# Patient Record
Sex: FEMALE | Race: WHITE | Employment: OTHER | URBAN - NONMETROPOLITAN AREA
[De-identification: names, ages, dates, MRNs, and addresses within clinical notes are randomized per-mention and may not be internally consistent; named-entity substitution may affect disease eponyms.]

---

## 2017-11-20 ENCOUNTER — OFFICE VISIT (OUTPATIENT)
Dept: FAMILY MEDICINE CLINIC | Age: 77
End: 2017-11-20
Payer: MEDICARE

## 2017-11-20 VITALS
BODY MASS INDEX: 28.69 KG/M2 | SYSTOLIC BLOOD PRESSURE: 110 MMHG | HEIGHT: 66 IN | DIASTOLIC BLOOD PRESSURE: 62 MMHG | WEIGHT: 178.5 LBS | OXYGEN SATURATION: 94 % | HEART RATE: 77 BPM

## 2017-11-20 DIAGNOSIS — I25.10 CORONARY ARTERY DISEASE INVOLVING NATIVE HEART WITHOUT ANGINA PECTORIS, UNSPECIFIED VESSEL OR LESION TYPE: ICD-10-CM

## 2017-11-20 DIAGNOSIS — E11.9 TYPE 2 DIABETES MELLITUS WITHOUT COMPLICATION, WITHOUT LONG-TERM CURRENT USE OF INSULIN (HCC): Primary | ICD-10-CM

## 2017-11-20 DIAGNOSIS — E03.9 PRIMARY HYPOTHYROIDISM: ICD-10-CM

## 2017-11-20 DIAGNOSIS — E78.01 FAMILIAL HYPERCHOLESTEROLEMIA: ICD-10-CM

## 2017-11-20 DIAGNOSIS — I65.23 BILATERAL CAROTID ARTERY STENOSIS: ICD-10-CM

## 2017-11-20 DIAGNOSIS — I10 ESSENTIAL (PRIMARY) HYPERTENSION: ICD-10-CM

## 2017-11-20 PROCEDURE — G8400 PT W/DXA NO RESULTS DOC: HCPCS | Performed by: FAMILY MEDICINE

## 2017-11-20 PROCEDURE — 1123F ACP DISCUSS/DSCN MKR DOCD: CPT | Performed by: FAMILY MEDICINE

## 2017-11-20 PROCEDURE — 99204 OFFICE O/P NEW MOD 45 MIN: CPT | Performed by: FAMILY MEDICINE

## 2017-11-20 PROCEDURE — G8419 CALC BMI OUT NRM PARAM NOF/U: HCPCS | Performed by: FAMILY MEDICINE

## 2017-11-20 PROCEDURE — G8484 FLU IMMUNIZE NO ADMIN: HCPCS | Performed by: FAMILY MEDICINE

## 2017-11-20 PROCEDURE — 1036F TOBACCO NON-USER: CPT | Performed by: FAMILY MEDICINE

## 2017-11-20 PROCEDURE — 3017F COLORECTAL CA SCREEN DOC REV: CPT | Performed by: FAMILY MEDICINE

## 2017-11-20 PROCEDURE — 4040F PNEUMOC VAC/ADMIN/RCVD: CPT | Performed by: FAMILY MEDICINE

## 2017-11-20 PROCEDURE — G8427 DOCREV CUR MEDS BY ELIG CLIN: HCPCS | Performed by: FAMILY MEDICINE

## 2017-11-20 PROCEDURE — 1090F PRES/ABSN URINE INCON ASSESS: CPT | Performed by: FAMILY MEDICINE

## 2017-11-20 PROCEDURE — G8598 ASA/ANTIPLAT THER USED: HCPCS | Performed by: FAMILY MEDICINE

## 2017-11-20 PROCEDURE — 3045F PR MOST RECENT HEMOGLOBIN A1C LEVEL 7.0-9.0%: CPT | Performed by: FAMILY MEDICINE

## 2017-11-20 RX ORDER — ASCORBIC ACID 1000 MG
1 TABLET ORAL DAILY
Status: ON HOLD | COMMUNITY
End: 2021-12-02 | Stop reason: SDUPTHER

## 2017-11-20 RX ORDER — SOLIFENACIN SUCCINATE 10 MG/1
5 TABLET, FILM COATED ORAL DAILY
COMMUNITY
End: 2017-12-21 | Stop reason: ALTCHOICE

## 2017-11-20 RX ORDER — LISINOPRIL 40 MG/1
40 TABLET ORAL DAILY
COMMUNITY
End: 2018-04-23 | Stop reason: SDUPTHER

## 2017-11-20 RX ORDER — ESCITALOPRAM OXALATE 20 MG/1
20 TABLET ORAL DAILY
COMMUNITY
End: 2018-05-22 | Stop reason: SDUPTHER

## 2017-11-20 RX ORDER — PRAVASTATIN SODIUM 10 MG
10 TABLET ORAL DAILY
COMMUNITY
End: 2018-07-05 | Stop reason: DRUGHIGH

## 2017-11-20 RX ORDER — BETAMETHASONE DIPROPIONATE 0.5 MG/G
CREAM TOPICAL 2 TIMES DAILY
COMMUNITY
End: 2017-12-21 | Stop reason: ALTCHOICE

## 2017-11-20 RX ORDER — LANOLIN ALCOHOL/MO/W.PET/CERES
1000 CREAM (GRAM) TOPICAL DAILY
Status: ON HOLD | COMMUNITY
End: 2021-12-02 | Stop reason: SDUPTHER

## 2017-11-20 RX ORDER — CARVEDILOL 6.25 MG/1
6.25 TABLET ORAL 2 TIMES DAILY WITH MEALS
COMMUNITY
End: 2018-04-23 | Stop reason: SDUPTHER

## 2017-11-20 RX ORDER — OMEPRAZOLE 40 MG/1
40 CAPSULE, DELAYED RELEASE ORAL DAILY
COMMUNITY
End: 2020-02-25 | Stop reason: SDUPTHER

## 2017-11-20 ASSESSMENT — PATIENT HEALTH QUESTIONNAIRE - PHQ9
SUM OF ALL RESPONSES TO PHQ9 QUESTIONS 1 & 2: 2
2. FEELING DOWN, DEPRESSED OR HOPELESS: 1
SUM OF ALL RESPONSES TO PHQ QUESTIONS 1-9: 2
1. LITTLE INTEREST OR PLEASURE IN DOING THINGS: 1

## 2017-11-20 NOTE — PATIENT INSTRUCTIONS
Room 201:  Walk in anytime. Reduce lisinopril from 40 mg to 20mg. DM (diabetes mellitus)    GI bleeding

## 2017-11-20 NOTE — PROGRESS NOTES
[place]ia [address] [place], [place] [address] [facility]  [address] [facility] [address]  Dept: [phone]  Dept Fax: [phone]  Loc: [phone]    Elma Norris is a 77 y.o. female who presents today for her medical conditions/complaints as noted below. Elma Norris is here for Established New Doctor        HPI:   CC: Here today to discuss the following:    Here to establish today. She has a history of psoriatic arthritis. She states she was on methotrexate in the past but discontinued the medication secondary to side effects. She states her pain is tolerable at this point not interested in restarting the medication. She states she has been stable with her current medications for diabetes management. She recently sustained a fall after standing abruptly and passing out. She states she broke her nose. She's had no chest pain or palpitations since then. She does have history of coronary artery disease and states she was to have a stress test prior to moving. She has yet to establish with a cardiologist.      She reports having a history of low thyroid. Really no symptoms. Her blood pressure has been stable on her current medications. She reports having history of carotid artery stenosis and is in need of a follow-up carotid ultrasound. None of her records made it from her previous physician. Requested those again today. HPI    No past medical history on file. No past surgical history on file.     Family History   Problem Relation Age of Onset    Heart Disease Mother     Heart Disease Father        Social History   Substance Use Topics    Smoking status: Never Smoker    Smokeless tobacco: Never Used    Alcohol use Not on file      Current Outpatient Prescriptions   Medication Sig Dispense Refill    aspirin 81 MG tablet Take 81 mg by mouth 2 times daily      vitamin B-12 (CYANOCOBALAMIN) 1000 MCG tablet Take 1,000 mcg by mouth daily      abdominal pain, anal bleeding, constipation, diarrhea and nausea. Genitourinary: Negative for difficulty urinating. Psychiatric/Behavioral: Negative. See HPI for visit specific review of symptoms. All others negative      Objective:   /62   Pulse 77   Ht 5' 6\" (1.676 m)   Wt 178 lb 8 oz (81 kg)   SpO2 94%   BMI 28.81 kg/m²   Physical Exam    Physical Exam   Constitutional: appears well-developed. does not appear ill. Eyes: Pupils are equal, round, and reactive to light. Neck: Normal range of motion. Neck supple. Cardiovascular: Normal rate and regular rhythm. Exam reveals no friction rub. No murmur heard. Pulmonary/Chest: Effort normal and breath sounds normal. No respiratory distress. He has no wheezes. no rales. Abdominal: Soft. Bowel sounds are normal. He exhibits no distension. There is no tenderness. There is no rebound and no guarding. Musculoskeletal: exhibits no edema. Neurological: alert. Psychiatric: normal mood and affect. behavior is normal.                 Assessment & Plan:      Requested records from her previous PCP  The following diagnoses and conditions are stable with no further orders unless indicated:  1. Type 2 diabetes mellitus without complication, without long-term current use of insulin ([facility])  Recommend checking the following labs today  - Comprehensive Metabolic Panel; Future  - Hemoglobin A1C; Future  - Microalbumin / Creatinine Urine Ratio; Future    2. Essential (primary) hypertension  Controlled however she appears to be symptomatic with aggressive blood pressure control. We'll reduce her lisinopril to 20 mg daily. 3. Familial hypercholesterolemia  Check her lipids today  - Lipid Panel; Future    4. Primary hypothyroidism  Check the following lab studies and continue current medication  - TSH without Reflex; Future    5. Bilateral carotid artery stenosis  Order the following study  - US CAROTID ARTERY BILATERAL; Future    6.  Coronary artery

## 2017-11-21 DIAGNOSIS — E78.01 FAMILIAL HYPERCHOLESTEROLEMIA: ICD-10-CM

## 2017-11-21 DIAGNOSIS — E03.9 PRIMARY HYPOTHYROIDISM: ICD-10-CM

## 2017-11-21 DIAGNOSIS — E11.9 TYPE 2 DIABETES MELLITUS WITHOUT COMPLICATION, WITHOUT LONG-TERM CURRENT USE OF INSULIN (HCC): ICD-10-CM

## 2017-11-21 LAB
ALBUMIN SERPL-MCNC: 4.1 G/DL (ref 3.5–5.2)
ALP BLD-CCNC: 73 U/L (ref 35–104)
ALT SERPL-CCNC: 16 U/L (ref 5–33)
ANION GAP SERPL CALCULATED.3IONS-SCNC: 18 MMOL/L (ref 7–19)
AST SERPL-CCNC: 14 U/L (ref 5–32)
BILIRUB SERPL-MCNC: 0.5 MG/DL (ref 0.2–1.2)
BUN BLDV-MCNC: 24 MG/DL (ref 8–23)
CALCIUM SERPL-MCNC: 9.5 MG/DL (ref 8.8–10.2)
CHLORIDE BLD-SCNC: 102 MMOL/L (ref 98–111)
CHOLESTEROL, TOTAL: 167 MG/DL (ref 160–199)
CO2: 21 MMOL/L (ref 22–29)
CREAT SERPL-MCNC: 0.8 MG/DL (ref 0.5–0.9)
CREATININE URINE: 64.9 MG/DL (ref 4.2–622)
GFR NON-AFRICAN AMERICAN: >60
GLUCOSE BLD-MCNC: 151 MG/DL (ref 74–109)
HBA1C MFR BLD: 7.6 %
HDLC SERPL-MCNC: 44 MG/DL (ref 65–121)
LDL CHOLESTEROL CALCULATED: 83 MG/DL
MICROALBUMIN UR-MCNC: <1.2 MG/DL (ref 0–19)
MICROALBUMIN/CREAT UR-RTO: NORMAL MG/G
POTASSIUM SERPL-SCNC: 4 MMOL/L (ref 3.5–5)
SODIUM BLD-SCNC: 141 MMOL/L (ref 136–145)
TOTAL PROTEIN: 7.3 G/DL (ref 6.6–8.7)
TRIGL SERPL-MCNC: 201 MG/DL (ref 0–149)
TSH SERPL DL<=0.05 MIU/L-ACNC: 3.17 UIU/ML (ref 0.27–4.2)

## 2017-12-12 ENCOUNTER — DIRECT ADMIT ORDERS (OUTPATIENT)
Dept: CARDIOLOGY | Age: 77
End: 2017-12-12

## 2017-12-12 ENCOUNTER — HOSPITAL ENCOUNTER (OUTPATIENT)
Dept: NON INVASIVE DIAGNOSTICS | Age: 77
Discharge: HOME | End: 2017-12-12
Payer: MEDICARE

## 2017-12-12 DIAGNOSIS — I25.10 CORONARY ARTERY DISEASE INVOLVING NATIVE HEART WITHOUT ANGINA PECTORIS, UNSPECIFIED VESSEL OR LESION TYPE: ICD-10-CM

## 2017-12-12 DIAGNOSIS — I65.23 BILATERAL CAROTID ARTERY STENOSIS: ICD-10-CM

## 2017-12-12 DIAGNOSIS — I25.10 ATHEROSCLEROSIS OF NATIVE CORONARY ARTERY OF NATIVE HEART WITHOUT ANGINA PECTORIS: Primary | ICD-10-CM

## 2017-12-12 DIAGNOSIS — I25.10 ATHEROSCLEROSIS OF NATIVE CORONARY ARTERY OF NATIVE HEART WITHOUT ANGINA PECTORIS: ICD-10-CM

## 2017-12-12 PROCEDURE — 93880 EXTRACRANIAL BILAT STUDY: CPT

## 2017-12-12 PROCEDURE — 93350 STRESS TTE ONLY: CPT

## 2017-12-14 ENCOUNTER — TELEPHONE (OUTPATIENT)
Dept: FAMILY MEDICINE CLINIC | Age: 77
End: 2017-12-14

## 2017-12-14 NOTE — TELEPHONE ENCOUNTER
Let patient know she had an abnormal stress test and 12/18 she has an appt with Dr. Raymond Nelson to discuss next steps.

## 2017-12-15 ENCOUNTER — TELEPHONE (OUTPATIENT)
Dept: FAMILY MEDICINE CLINIC | Age: 77
End: 2017-12-15

## 2017-12-15 NOTE — TELEPHONE ENCOUNTER
Landon for pt with results and appt details since I cant reach her by phone.  appt with dr Dayday Rodriges 12/18 at 9 am

## 2017-12-18 ENCOUNTER — OFFICE VISIT (OUTPATIENT)
Dept: CARDIOLOGY | Age: 77
End: 2017-12-18
Payer: MEDICARE

## 2017-12-18 VITALS
HEIGHT: 66 IN | DIASTOLIC BLOOD PRESSURE: 60 MMHG | BODY MASS INDEX: 28.77 KG/M2 | HEART RATE: 76 BPM | WEIGHT: 179 LBS | SYSTOLIC BLOOD PRESSURE: 120 MMHG

## 2017-12-18 DIAGNOSIS — R94.39 POSITIVE CARDIAC STRESS TEST: Primary | ICD-10-CM

## 2017-12-18 DIAGNOSIS — R53.83 OTHER FATIGUE: ICD-10-CM

## 2017-12-18 DIAGNOSIS — R07.9 CHEST PAIN IN ADULT: ICD-10-CM

## 2017-12-18 PROCEDURE — 1036F TOBACCO NON-USER: CPT | Performed by: INTERNAL MEDICINE

## 2017-12-18 PROCEDURE — 4040F PNEUMOC VAC/ADMIN/RCVD: CPT | Performed by: INTERNAL MEDICINE

## 2017-12-18 PROCEDURE — G8419 CALC BMI OUT NRM PARAM NOF/U: HCPCS | Performed by: INTERNAL MEDICINE

## 2017-12-18 PROCEDURE — G8400 PT W/DXA NO RESULTS DOC: HCPCS | Performed by: INTERNAL MEDICINE

## 2017-12-18 PROCEDURE — 99204 OFFICE O/P NEW MOD 45 MIN: CPT | Performed by: INTERNAL MEDICINE

## 2017-12-18 PROCEDURE — G8427 DOCREV CUR MEDS BY ELIG CLIN: HCPCS | Performed by: INTERNAL MEDICINE

## 2017-12-18 PROCEDURE — 1090F PRES/ABSN URINE INCON ASSESS: CPT | Performed by: INTERNAL MEDICINE

## 2017-12-18 PROCEDURE — 3017F COLORECTAL CA SCREEN DOC REV: CPT | Performed by: INTERNAL MEDICINE

## 2017-12-18 PROCEDURE — 93000 ELECTROCARDIOGRAM COMPLETE: CPT | Performed by: INTERNAL MEDICINE

## 2017-12-18 PROCEDURE — 1123F ACP DISCUSS/DSCN MKR DOCD: CPT | Performed by: INTERNAL MEDICINE

## 2017-12-18 PROCEDURE — G8598 ASA/ANTIPLAT THER USED: HCPCS | Performed by: INTERNAL MEDICINE

## 2017-12-18 PROCEDURE — G8484 FLU IMMUNIZE NO ADMIN: HCPCS | Performed by: INTERNAL MEDICINE

## 2017-12-18 ASSESSMENT — ENCOUNTER SYMPTOMS: SHORTNESS OF BREATH: 0

## 2017-12-18 NOTE — PATIENT INSTRUCTIONS
an appropriate treatment   Possible Complications   If you are planning to have a cardiac catheterization, your doctor will review a list of possible complications, which may include:   Bleeding at the point of the catheter insertion   Damage to arteries   Heart attack or arrhythmia (abnormal heart beats)   Allergic reaction to x-ray dye   Blood clot formation   Infection   Some factors that may increase the risk of complications include: Allergies to medicines or x-ray dye   Obesity   Smoking   Bleeding disorder   Age: 61 or older   Recent pneumonia   Recent heart attack   Diabetes   Kidney disease   What to Expect Prior to Procedure   Your doctor may order:   Blood and urine tests   Electrocardiogram (ECG, EKG)a test that records the heart's activity by measuring electrical currents through the heart muscle   Chest x-ray   Stress test   Talk to your doctor about your medicines. You may be asked to stop taking some medicines before the procedure, like:   Anti-inflammatory drugs (eg, ibuprofen )   Blood thinners, like or warfarin (Coumadin)   clopidogrel (Plavix)   Metformin (Glucophage) or glyburide and metformin (Glucovance)   Leading up to your procedure:   Arrange for a ride to and from the procedure. The night before, do not eat or drink anything after midnight. Anesthesia   Local anesthesia will be used at the insertion site. A mild sedative may be given one hour before or through IV during the procedure. This will help you relax. Description of the Procedure   During the procedure, you will receive IV fluids and medicines. An EKG will be monitoring your heart's activity. You will be awake but sedated so that you will be more relaxed. Your doctor will ask you to do basic functions such as coughing, breathing out, and holding your breath. If you feel any chest pain, dizziness, nausea, tingling, or other discomfort, tell your doctor.    The area of the groin or arm where the catheter will be inserted You will likely need to lie still and flat on your back for a period of time. A pressure dressing may be placed over the area where the catheter was inserted to help prevent bleeding. It is important to follow the nurse's directions. At Home   When you return home, do the following to help ensure a smooth recovery:   Do not drive until your doctor says it is okay. Do not lift heavy objects or engage in strenuous exercise or sexual activity for at least 5-7 days. Change the dressing around the incision area as instructed. Your doctor will explain to you which medicines you can take and which ones to avoid. Take medicines as instructed. Ice may help decrease discomfort at the insertion site. You may apply the ice for 15-20 minutes each hour, for the first few days. To lower your risk for further complications of heart disease, you can make lifestyle changes. These include eating a healthier diet, exercising regularly, and managing stress. Ask your doctor about when it is safe to shower, bathe, or soak in water. Be sure to follow your doctor's instructions . After arriving home, contact your doctor if any of the following occurs:   Signs of infection, including fever and chills   Extreme sweating, nausea, or vomiting   Change in sensation to affected leg, including numbness, feeling cold, or change in color   Redness, swelling, increasing pain, excessive bleeding, or discharge at point of catheter insertion   Cough, shortness of breath, or difficulty breathing   Extreme pain   Chest pain   Drooping facial muscles   Changes in vision or speech   Difficulty walking or using your limbs   In case of an emergency, Call 911.

## 2017-12-18 NOTE — PROGRESS NOTES
Dear Dr. Lesvia Amato MD,    Thank you for allowing me to participate in the care of Ms. Xavier Fernández. She presents today at the [address] [place] [address] in the [facility] with her . As you know, Ms. Abraham Faulkner is a 77 y.o. female with history of hypertension, hyperlipidemia, diabetes and CAD s/p MI (s/p PCI to LADx2 [2.5x12 and 2.5 x 8] and OM) who presents with the chief complaint of fatigue. She is recently moved from [place] to the [facility]. She had a stress test performed last week that showed ECG and echo evidence of myocardial ischemia. She says for the past 6 months she's been more fatigued and tired. She becomes more breathless with exertion. She's not able to wash dishes or cleaning the house. She denies having any sort of chest pressure or pain outside of the occasional twinges that last 2 seconds. These will happen irrespective of exertion and 2-3 times per year. She didn't comply with her medications. Her blood sugar is been controlled. She otherwise denies chest pain, PND, orthopnea, syncope or near syncope. She has no other complaints. Review of Systems   Constitutional: Positive for malaise/fatigue. Respiratory: Negative for shortness of breath. Cardiovascular: Negative for chest pain. Neurological: Negative for weakness. All other systems reviewed and are negative. Past Medical History:   Diagnosis Date    CAD (coronary artery disease)     CHF (congestive heart failure) (HCC)     MI (myocardial infarction)        History reviewed. No pertinent surgical history. Family History   Problem Relation Age of Onset    Heart Disease Mother     Heart Disease Father        Social History     Social History    Marital status:      Spouse name: N/A    Number of children: N/A    Years of education: N/A     Occupational History    Not on file.      Social History Main Topics    Smoking status: Never Smoker    encounter: 179 lb (81.2 kg). General - No acute distress  Eyes - PERRL, anicteric sclerae; no lid-lag  ENMT - Atraumatic; Mucous membranes moist, oropharynx clear  Neck - trachea midline, thyroid non-tender  Cardio - No jugular venous distension                Clear s1 s2, no gallop, rub, murmur                 No edema, normal pulses  Resp - Normal effort, Clear to auscultation bilaterally  GI - abdomen soft, non-tender, no hepatosplenomegaly  Skin - warm and dry; no rashes  Psych - A+O x 3, normal affect    Lab Results   Component Value Date    CREATININE 0.8 11/21/2017       ECG 12/18/17  Sinus rhythm, left anterior fascicular block, voltage criteria for LVH     Assessment, Recommendations, & Plan:  77 y.o. female with positive cardiac stress, hypertension, CAD, hyperlipidemia    Positive cardiac stress/CAD - patient was offered. After much discussion she agreed. We'll obtain labs including CBC and BMP. I discussed with her in detail  the risks and benefits of cardiac catheterization. The risks mentioned to her include but are not limited to vascular complications in 3%, stroke <1%, renal dysfunction <5%, myocardial infarction <1%, coronary dissection <1%, need for emergency open heart surgery <1, death <1% . she verbalized understanding and agreed to proceed with this plan. HTN - well-controlled, no changes    Hyperlipidemia - monitored by PCP, no changes    Disposition - RTC in 1 months or sooner if needed    Thank you very much for allowing me to participate in this patient's care. Please do not hesitate to contact me for any questions or concerns. Sincerely yours,    Edgar Pimentel MD, MSc  Structural Heart Disease Interventions  [facility]

## 2017-12-21 ENCOUNTER — OFFICE VISIT (OUTPATIENT)
Dept: FAMILY MEDICINE CLINIC | Age: 77
End: 2017-12-21
Payer: MEDICARE

## 2017-12-21 VITALS
WEIGHT: 178 LBS | BODY MASS INDEX: 28.61 KG/M2 | SYSTOLIC BLOOD PRESSURE: 106 MMHG | HEIGHT: 66 IN | DIASTOLIC BLOOD PRESSURE: 62 MMHG | OXYGEN SATURATION: 96 % | HEART RATE: 61 BPM

## 2017-12-21 DIAGNOSIS — I25.10 CAD IN NATIVE ARTERY: ICD-10-CM

## 2017-12-21 DIAGNOSIS — K21.9 GASTROESOPHAGEAL REFLUX DISEASE WITHOUT ESOPHAGITIS: ICD-10-CM

## 2017-12-21 DIAGNOSIS — I10 ESSENTIAL HYPERTENSION: ICD-10-CM

## 2017-12-21 DIAGNOSIS — F41.8 DEPRESSION WITH ANXIETY: ICD-10-CM

## 2017-12-21 DIAGNOSIS — E11.9 TYPE 2 DIABETES MELLITUS WITHOUT COMPLICATION, WITHOUT LONG-TERM CURRENT USE OF INSULIN (HCC): Primary | ICD-10-CM

## 2017-12-21 PROCEDURE — 3017F COLORECTAL CA SCREEN DOC REV: CPT | Performed by: FAMILY MEDICINE

## 2017-12-21 PROCEDURE — G8484 FLU IMMUNIZE NO ADMIN: HCPCS | Performed by: FAMILY MEDICINE

## 2017-12-21 PROCEDURE — 4040F PNEUMOC VAC/ADMIN/RCVD: CPT | Performed by: FAMILY MEDICINE

## 2017-12-21 PROCEDURE — 1123F ACP DISCUSS/DSCN MKR DOCD: CPT | Performed by: FAMILY MEDICINE

## 2017-12-21 PROCEDURE — G8419 CALC BMI OUT NRM PARAM NOF/U: HCPCS | Performed by: FAMILY MEDICINE

## 2017-12-21 PROCEDURE — 99214 OFFICE O/P EST MOD 30 MIN: CPT | Performed by: FAMILY MEDICINE

## 2017-12-21 PROCEDURE — G8400 PT W/DXA NO RESULTS DOC: HCPCS | Performed by: FAMILY MEDICINE

## 2017-12-21 PROCEDURE — 3045F PR MOST RECENT HEMOGLOBIN A1C LEVEL 7.0-9.0%: CPT | Performed by: FAMILY MEDICINE

## 2017-12-21 PROCEDURE — G8598 ASA/ANTIPLAT THER USED: HCPCS | Performed by: FAMILY MEDICINE

## 2017-12-21 PROCEDURE — 1036F TOBACCO NON-USER: CPT | Performed by: FAMILY MEDICINE

## 2017-12-21 PROCEDURE — G8427 DOCREV CUR MEDS BY ELIG CLIN: HCPCS | Performed by: FAMILY MEDICINE

## 2017-12-21 PROCEDURE — 1090F PRES/ABSN URINE INCON ASSESS: CPT | Performed by: FAMILY MEDICINE

## 2017-12-21 NOTE — PROGRESS NOTES
orders unless indicated:  1. Type 2 diabetes mellitus without complication, without long-term current use of insulin (HCC)  a1c borderline. Encouraged ADA diet. Continue same    Discussed lifestyle changes such as diet and exercise. Recommended eliminate processed food from diet such as sugar and fried foods. Recommended exercising at least 150 minutes/week. Try to do full body resistance training twice a week as well. Offered suggestions for calorie counting such as phone apps and online resources such as My fitness pal and Lose it. Discussed healthy weight. - Comprehensive Metabolic Panel; Future  - Hemoglobin A1C; Future  - Lipid Panel; Future    2. CAD in native artery  Heart cath tomorrow    3. Essential hypertension  Controlled      4. Gastroesophageal reflux disease without esophagitis  Stable    5. Depression with anxiety  Cont with lexapro        Return in about 3 months (around 3/21/2018) for AWV. Discussed use, benefit, and side effects of prescribed medications. All patient questions answered. Pt voiced understanding. Reviewed health maintenance. Instructed to continue current medications, diet and exercise. Patient agreed with treatment plan. Follow up as directed.

## 2017-12-22 ENCOUNTER — HOSPITAL ENCOUNTER (OUTPATIENT)
Dept: CARDIAC CATH/INVASIVE PROCEDURES | Age: 77
Setting detail: OBSERVATION
Discharge: HOME | End: 2017-12-23
Attending: INTERNAL MEDICINE | Admitting: INTERNAL MEDICINE
Payer: MEDICARE

## 2017-12-22 PROBLEM — I25.10 CAD IN NATIVE ARTERY: Status: ACTIVE | Noted: 2017-12-22

## 2017-12-22 LAB
ANION GAP SERPL CALCULATED.3IONS-SCNC: 14 MMOL/L (ref 7–19)
BUN BLDV-MCNC: 22 MG/DL (ref 8–23)
CALCIUM SERPL-MCNC: 9.1 MG/DL (ref 8.8–10.2)
CHLORIDE BLD-SCNC: 101 MMOL/L (ref 98–111)
CO2: 22 MMOL/L (ref 22–29)
CREAT SERPL-MCNC: 0.8 MG/DL (ref 0.5–0.9)
GFR NON-AFRICAN AMERICAN: >60
GLUCOSE BLD-MCNC: 161 MG/DL (ref 74–109)
HCT VFR BLD CALC: 40.6 % (ref 37–47)
HEMOGLOBIN: 13.7 G/DL (ref 12–16)
MCH RBC QN AUTO: 31 PG (ref 27–31)
MCHC RBC AUTO-ENTMCNC: 33.7 G/DL (ref 33–37)
MCV RBC AUTO: 91.9 FL (ref 81–99)
PDW BLD-RTO: 14.6 % (ref 11.5–14.5)
PLATELET # BLD: 218 K/UL (ref 130–400)
PMV BLD AUTO: 9.9 FL (ref 9.4–12.3)
POC ACT LR: 313 SEC
POTASSIUM SERPL-SCNC: 4.5 MMOL/L (ref 3.5–5)
RBC # BLD: 4.42 M/UL (ref 4.2–5.4)
SODIUM BLD-SCNC: 137 MMOL/L (ref 136–145)
WBC # BLD: 7.7 K/UL (ref 4.8–10.8)

## 2017-12-22 PROCEDURE — 93458 L HRT ARTERY/VENTRICLE ANGIO: CPT | Performed by: INTERNAL MEDICINE

## 2017-12-22 PROCEDURE — C1769 GUIDE WIRE: HCPCS

## 2017-12-22 PROCEDURE — C1874 STENT, COATED/COV W/DEL SYS: HCPCS

## 2017-12-22 PROCEDURE — 2709999900 HC NON-CHARGEABLE SUPPLY

## 2017-12-22 PROCEDURE — 2580000003 HC RX 258: Performed by: INTERNAL MEDICINE

## 2017-12-22 PROCEDURE — 92928 PRQ TCAT PLMT NTRAC ST 1 LES: CPT | Performed by: INTERNAL MEDICINE

## 2017-12-22 PROCEDURE — C1887 CATHETER, GUIDING: HCPCS

## 2017-12-22 PROCEDURE — 36415 COLL VENOUS BLD VENIPUNCTURE: CPT

## 2017-12-22 PROCEDURE — 85347 COAGULATION TIME ACTIVATED: CPT

## 2017-12-22 PROCEDURE — C1894 INTRO/SHEATH, NON-LASER: HCPCS

## 2017-12-22 PROCEDURE — 80048 BASIC METABOLIC PNL TOTAL CA: CPT

## 2017-12-22 PROCEDURE — 2720000001 HC MISC SURG SUPPLY STERILE $51-500

## 2017-12-22 PROCEDURE — 6370000000 HC RX 637 (ALT 250 FOR IP)

## 2017-12-22 PROCEDURE — 85027 COMPLETE CBC AUTOMATED: CPT

## 2017-12-22 PROCEDURE — 2500000003 HC RX 250 WO HCPCS

## 2017-12-22 PROCEDURE — 6370000000 HC RX 637 (ALT 250 FOR IP): Performed by: INTERNAL MEDICINE

## 2017-12-22 PROCEDURE — G0378 HOSPITAL OBSERVATION PER HR: HCPCS

## 2017-12-22 PROCEDURE — 6360000002 HC RX W HCPCS

## 2017-12-22 PROCEDURE — 96374 THER/PROPH/DIAG INJ IV PUSH: CPT

## 2017-12-22 PROCEDURE — C1725 CATH, TRANSLUMIN NON-LASER: HCPCS

## 2017-12-22 PROCEDURE — 2720000000 HC MISC SURG SUPPLY STERILE $0-50

## 2017-12-22 RX ORDER — CLOPIDOGREL BISULFATE 75 MG/1
75 TABLET ORAL DAILY
Status: DISCONTINUED | OUTPATIENT
Start: 2017-12-23 | End: 2017-12-23 | Stop reason: HOSPADM

## 2017-12-22 RX ORDER — ASPIRIN 325 MG
325 TABLET ORAL ONCE
Status: COMPLETED | OUTPATIENT
Start: 2017-12-22 | End: 2017-12-22

## 2017-12-22 RX ORDER — SODIUM CHLORIDE 9 MG/ML
INJECTION, SOLUTION INTRAVENOUS CONTINUOUS
Status: DISCONTINUED | OUTPATIENT
Start: 2017-12-22 | End: 2017-12-23 | Stop reason: HOSPADM

## 2017-12-22 RX ORDER — SODIUM CHLORIDE 0.9 % (FLUSH) 0.9 %
10 SYRINGE (ML) INJECTION EVERY 12 HOURS SCHEDULED
Status: DISCONTINUED | OUTPATIENT
Start: 2017-12-22 | End: 2017-12-23 | Stop reason: HOSPADM

## 2017-12-22 RX ORDER — SODIUM CHLORIDE 0.9 % (FLUSH) 0.9 %
10 SYRINGE (ML) INJECTION PRN
Status: DISCONTINUED | OUTPATIENT
Start: 2017-12-22 | End: 2017-12-23 | Stop reason: HOSPADM

## 2017-12-22 RX ORDER — SODIUM CHLORIDE 0.9 % (FLUSH) 0.9 %
10 SYRINGE (ML) INJECTION PRN
Status: DISCONTINUED | OUTPATIENT
Start: 2017-12-22 | End: 2017-12-22 | Stop reason: SDUPTHER

## 2017-12-22 RX ORDER — ASPIRIN 81 MG/1
81 TABLET, CHEWABLE ORAL 2 TIMES DAILY
Status: DISCONTINUED | OUTPATIENT
Start: 2017-12-22 | End: 2017-12-23 | Stop reason: HOSPADM

## 2017-12-22 RX ORDER — SODIUM CHLORIDE 9 MG/ML
INJECTION, SOLUTION INTRAVENOUS CONTINUOUS
Status: DISCONTINUED | OUTPATIENT
Start: 2017-12-22 | End: 2017-12-22 | Stop reason: SDUPTHER

## 2017-12-22 RX ORDER — ONDANSETRON 2 MG/ML
4 INJECTION INTRAMUSCULAR; INTRAVENOUS EVERY 6 HOURS PRN
Status: DISCONTINUED | OUTPATIENT
Start: 2017-12-22 | End: 2017-12-23 | Stop reason: HOSPADM

## 2017-12-22 RX ORDER — ATORVASTATIN CALCIUM 80 MG/1
80 TABLET, FILM COATED ORAL NIGHTLY
Status: DISCONTINUED | OUTPATIENT
Start: 2017-12-22 | End: 2017-12-23 | Stop reason: HOSPADM

## 2017-12-22 RX ORDER — CARVEDILOL 6.25 MG/1
6.25 TABLET ORAL 2 TIMES DAILY WITH MEALS
Status: DISCONTINUED | OUTPATIENT
Start: 2017-12-22 | End: 2017-12-23 | Stop reason: HOSPADM

## 2017-12-22 RX ORDER — LISINOPRIL 20 MG/1
40 TABLET ORAL DAILY
Status: DISCONTINUED | OUTPATIENT
Start: 2017-12-22 | End: 2017-12-23 | Stop reason: HOSPADM

## 2017-12-22 RX ORDER — ESCITALOPRAM OXALATE 10 MG/1
20 TABLET ORAL DAILY
Status: DISCONTINUED | OUTPATIENT
Start: 2017-12-22 | End: 2017-12-23 | Stop reason: HOSPADM

## 2017-12-22 RX ORDER — ACETAMINOPHEN 325 MG/1
650 TABLET ORAL EVERY 4 HOURS PRN
Status: DISCONTINUED | OUTPATIENT
Start: 2017-12-22 | End: 2017-12-23 | Stop reason: HOSPADM

## 2017-12-22 RX ORDER — LABETALOL HYDROCHLORIDE 5 MG/ML
10 INJECTION, SOLUTION INTRAVENOUS ONCE
Status: COMPLETED | OUTPATIENT
Start: 2017-12-23 | End: 2017-12-22

## 2017-12-22 RX ORDER — SODIUM CHLORIDE 0.9 % (FLUSH) 0.9 %
10 SYRINGE (ML) INJECTION EVERY 12 HOURS SCHEDULED
Status: DISCONTINUED | OUTPATIENT
Start: 2017-12-22 | End: 2017-12-22 | Stop reason: SDUPTHER

## 2017-12-22 RX ADMIN — ASPIRIN 81 MG CHEWABLE TABLET 81 MG: 81 TABLET CHEWABLE at 15:21

## 2017-12-22 RX ADMIN — Medication 10 ML: at 21:30

## 2017-12-22 RX ADMIN — ESCITALOPRAM OXALATE 20 MG: 10 TABLET ORAL at 18:05

## 2017-12-22 RX ADMIN — ACETAMINOPHEN 650 MG: 325 TABLET ORAL at 21:30

## 2017-12-22 RX ADMIN — ASPIRIN 81 MG CHEWABLE TABLET 81 MG: 81 TABLET CHEWABLE at 21:30

## 2017-12-22 RX ADMIN — ATORVASTATIN CALCIUM 80 MG: 80 TABLET, FILM COATED ORAL at 21:30

## 2017-12-22 RX ADMIN — SODIUM CHLORIDE: 9 INJECTION, SOLUTION INTRAVENOUS at 18:06

## 2017-12-22 RX ADMIN — LISINOPRIL 40 MG: 20 TABLET ORAL at 18:05

## 2017-12-22 RX ADMIN — LABETALOL HYDROCHLORIDE 10 MG: 5 INJECTION, SOLUTION INTRAVENOUS at 11:08

## 2017-12-22 RX ADMIN — CARVEDILOL 6.25 MG: 6.25 TABLET, FILM COATED ORAL at 18:04

## 2017-12-22 RX ADMIN — ASPIRIN 325 MG ORAL TABLET 325 MG: 325 PILL ORAL at 07:11

## 2017-12-22 RX ADMIN — SODIUM CHLORIDE: 9 INJECTION, SOLUTION INTRAVENOUS at 07:07

## 2017-12-22 ASSESSMENT — PAIN SCALES - GENERAL
PAINLEVEL_OUTOF10: 0
PAINLEVEL_OUTOF10: 4

## 2017-12-22 NOTE — PLAN OF CARE
Problem: SAFETY  Goal: Free from accidental physical injury  Outcome: Ongoing    Goal: Free from intentional harm  Outcome: Ongoing      Problem: DAILY CARE  Goal: Daily care needs are met  Outcome: Ongoing      Problem: PAIN  Goal: Patient's pain/discomfort is manageable  Outcome: Ongoing      Problem: SKIN INTEGRITY  Goal: Skin integrity is maintained or improved  Outcome: Ongoing

## 2017-12-22 NOTE — PROGRESS NOTES
Patient has arrived to floor via transportation. Right wrist Radial pulses palpated +3.  TR band fully deflated per cath holding. Will continue to monitor patient.

## 2017-12-22 NOTE — LETTER
[address] [facility]  Cardiac Rehab Department  [address] [facility]  [phone]  Toll Free [phone]                December 26, 2017    Dear Matt Sanderson,    We apologize that a member of the Cardiac Rehab staff was unable to see you prior to your discharge from [facility]. Please find this informational packet that has been put together for you on heart disease and the guidelines you are to follow concerning your present cardiac condition and immediate recovery. Due to your recent hospitalization and intervention your cardiologist, Dr. Iqra Thorpe, has referred you to Phase II Outpatient Cardiac Rehab. Your orientation and assessment appointment has been scheduled for:     DAY:  Thursday  DATE:   1/4/18  TIME:   9:00 am    For your convenience the Cardiac Rehab Department is located on the first floor of the hospital within [facility] [place] [address]. Please confirm or cancel this appointment no less than 24 hours prior. Furthermore, feel free to reach out to us at anytime and our staff will be more than pleased to assist you. Thank you.     To Your Health,        Cardiac Rehab Staff

## 2017-12-23 VITALS
DIASTOLIC BLOOD PRESSURE: 76 MMHG | RESPIRATION RATE: 18 BRPM | HEIGHT: 66 IN | TEMPERATURE: 98.1 F | HEART RATE: 69 BPM | BODY MASS INDEX: 28.01 KG/M2 | SYSTOLIC BLOOD PRESSURE: 127 MMHG | OXYGEN SATURATION: 95 % | WEIGHT: 174.3 LBS

## 2017-12-23 LAB
ANION GAP SERPL CALCULATED.3IONS-SCNC: 13 MMOL/L (ref 7–19)
BUN BLDV-MCNC: 16 MG/DL (ref 8–23)
CALCIUM SERPL-MCNC: 8.2 MG/DL (ref 8.8–10.2)
CHLORIDE BLD-SCNC: 104 MMOL/L (ref 98–111)
CO2: 22 MMOL/L (ref 22–29)
CREAT SERPL-MCNC: 0.6 MG/DL (ref 0.5–0.9)
GFR NON-AFRICAN AMERICAN: >60
GLUCOSE BLD-MCNC: 144 MG/DL (ref 74–109)
HCT VFR BLD CALC: 36.8 % (ref 37–47)
HEMOGLOBIN: 12.4 G/DL (ref 12–16)
MCH RBC QN AUTO: 30.9 PG (ref 27–31)
MCHC RBC AUTO-ENTMCNC: 33.7 G/DL (ref 33–37)
MCV RBC AUTO: 91.8 FL (ref 81–99)
PDW BLD-RTO: 14.3 % (ref 11.5–14.5)
PLATELET # BLD: 190 K/UL (ref 130–400)
PMV BLD AUTO: 10 FL (ref 9.4–12.3)
POTASSIUM SERPL-SCNC: 3.7 MMOL/L (ref 3.5–5)
RBC # BLD: 4.01 M/UL (ref 4.2–5.4)
SODIUM BLD-SCNC: 139 MMOL/L (ref 136–145)
WBC # BLD: 8.7 K/UL (ref 4.8–10.8)

## 2017-12-23 PROCEDURE — 36415 COLL VENOUS BLD VENIPUNCTURE: CPT

## 2017-12-23 PROCEDURE — 99217 PR OBSERVATION CARE DISCHARGE MANAGEMENT: CPT | Performed by: INTERNAL MEDICINE

## 2017-12-23 PROCEDURE — 85027 COMPLETE CBC AUTOMATED: CPT

## 2017-12-23 PROCEDURE — 6370000000 HC RX 637 (ALT 250 FOR IP): Performed by: INTERNAL MEDICINE

## 2017-12-23 PROCEDURE — G0378 HOSPITAL OBSERVATION PER HR: HCPCS

## 2017-12-23 PROCEDURE — 2580000003 HC RX 258: Performed by: INTERNAL MEDICINE

## 2017-12-23 PROCEDURE — 80048 BASIC METABOLIC PNL TOTAL CA: CPT

## 2017-12-23 RX ORDER — CLOPIDOGREL BISULFATE 75 MG/1
75 TABLET ORAL DAILY
Qty: 30 TABLET | Refills: 3 | Status: ON HOLD | OUTPATIENT
Start: 2017-12-24 | End: 2021-12-02 | Stop reason: SDUPTHER

## 2017-12-23 RX ADMIN — ASPIRIN 81 MG CHEWABLE TABLET 81 MG: 81 TABLET CHEWABLE at 09:07

## 2017-12-23 RX ADMIN — LISINOPRIL 40 MG: 20 TABLET ORAL at 09:06

## 2017-12-23 RX ADMIN — CLOPIDOGREL BISULFATE 75 MG: 75 TABLET ORAL at 09:06

## 2017-12-23 RX ADMIN — Medication 10 ML: at 09:06

## 2017-12-23 RX ADMIN — CARVEDILOL 6.25 MG: 6.25 TABLET, FILM COATED ORAL at 09:07

## 2017-12-23 RX ADMIN — ESCITALOPRAM OXALATE 20 MG: 10 TABLET ORAL at 09:06

## 2017-12-23 NOTE — DISCHARGE SUMMARY
[facility]    Discharge Summary        Patient ID: Huckabayalonzo Seymour      Patient's PCP: Isiah Marlow MD    Admit Date: 12/22/2017     Discharge Date:  12/23/17     Admitting Physician:  Dr. Kim Shannon      Discharge Physician: Meg Garay MD     Discharge Diagnoses:    1. Coronary artery disease    12/14/2017  SE  Positive for EKG and echo myocardial ischemia  12/22/2017  PCI to mid and distal LAD    2. Hypertension  3. Diabetes mellitus      Cardiac Specific Diagnoses:    Specialty Problems        Cardiology Problems    CAD in native artery        HTN (hypertension)                The patient was seen and examined on day of discharge and this discharge summary is in conjunction with any daily progress note from day of discharge. History of Present Illness: Thank you for allowing me to participate in the care of Ms. Eryn Ferreira. She presents today at the [address] [place] [address] in the [facility] with her . As you know, Ms. Som Ryan is a 77 y.o. female with history of hypertension, hyperlipidemia, diabetes and CAD s/p MI (s/p PCI to LADx2 [2.5x12 and 2.5 x 8] and OM) who presents with the chief complaint of fatigue. She is recently moved from [place] to the [facility]. She had a stress test performed last week that showed ECG and echo evidence of myocardial ischemia. She says for the past 6 months she's been more fatigued and tired. She becomes more breathless with exertion. She's not able to wash dishes or cleaning the house. She denies having any sort of chest pressure or pain outside of the occasional twinges that last 2 seconds. These will happen irrespective of exertion and 2-3 times per year. She didn't comply with her medications. Her blood sugar is been controlled. She otherwise denies chest pain, PND, orthopnea, syncope or near syncope. She has no other complaints.       Hospital Course:     After admission, the patient underwent elective cardiac catheterization. The patient underwent cardiac catheterization according to the following criteria:  12/14/2017  SE  Positive for EKG and echo myocardial ischemia. Selective left heart and coronary arteriography was preformed, which revealed:    1. Severe 1V CAD s/p PCI to the LAD with ENOC x2 to the mid to distal LAD   2. Normal LVEDP. There were no apparent complications. The rest of the patient's hospitalization was uneventful. She was discharged home on medical therapy with outpatient follow up. Consults:     None      Significant Diagnostic Studies:    1. Selective left heart (pressure only) and coronary arteriography, (radial approach), 12/22/2017     Significant Therapeutic Endeavors:      1. Percutaneous coronary intervention, 12/22/2017      Activity: activity as directed per discharge instructions. Diet:  Cardiac diet    Labs:  For convenience and continuity at follow-up the following most recent labs are provided:    CBC:    Lab Results   Component Value Date    WBC 8.7 12/23/2017    HGB 12.4 12/23/2017    HCT 36.8 12/23/2017     12/23/2017       Renal:    Lab Results   Component Value Date     12/23/2017    K 3.7 12/23/2017     12/23/2017    CO2 22 12/23/2017    BUN 16 12/23/2017    CREATININE 0.6 12/23/2017    CALCIUM 8.2 12/23/2017         Discharge Medications:     Current Discharge Medication List           Details   clopidogrel (PLAVIX) 75 MG tablet Take 1 tablet by mouth daily  Qty: 30 tablet, Refills: 3              Details   vitamin B-12 (CYANOCOBALAMIN) 1000 MCG tablet Take 1,000 mcg by mouth daily      Coenzyme Q10 (CO Q 10) 10 MG CAPS Take by mouth      carvedilol (COREG) 6.25 MG tablet Take 6.25 mg by mouth 2 times daily (with meals)      GLUCOSAMINE-CALCIUM-VIT D PO Take by mouth      escitalopram (LEXAPRO) 20 MG tablet Take 20 mg by mouth daily      lisinopril (PRINIVIL;ZESTRIL) 40 MG tablet Take 40 mg by mouth daily      omeprazole (PRILOSEC) 40 MG delayed release capsule Take 40 mg by mouth daily      pravastatin (PRAVACHOL) 10 MG tablet Take 10 mg by mouth daily      aspirin 81 MG tablet Take 162 mg by mouth daily       Multiple Minerals-Vitamins (CALCIUM-MAGNESIUM-ZINC-D3 PO) Take by mouth 2 times daily      Misc Natural Products (OSTEO BI-FLEX/5-LOXIN ADVANCED PO) Take by mouth      Nutritional Supplements (ESTROVEN JOINT & BONE PO) Take by mouth      empagliflozin (JARDIANCE) 25 MG tablet Take 25 mg by mouth daily      linagliptin (TRADJENTA) 5 MG tablet Take 5 mg by mouth daily                 Disposition:      1. Home  2. Follow up with cardiology as arranged  3. Follow up with primary care provider as arranged  4. For patients who underwent percutaneous coronary intervention during this hospitalization, they will be sent home on:  aspirin, an antiplatelet,a beta - blocker, ACE-inhibitor or ARB, and statin if not allergic.     Electronically signed by Rayshawn Mcgrath MD on 12/23/17

## 2017-12-23 NOTE — PLAN OF CARE
Problem: SAFETY  Goal: Free from accidental physical injury  Outcome: Met This Shift    Goal: Free from intentional harm  Outcome: Met This Shift      Problem: DAILY CARE  Goal: Daily care needs are met  Outcome: Met This Shift      Problem: PAIN  Goal: Patient's pain/discomfort is manageable  Outcome: Met This Shift      Problem: SKIN INTEGRITY  Goal: Skin integrity is maintained or improved  Outcome: Met This Shift

## 2017-12-26 NOTE — CONSULTS
Patient was discharged prior to MI/PTCA/STENT TEACHING being conducted. Cardiac Rehab education packet was sent to the patient's address on record. Handouts included were titled; \"Home Instructions Following a Cardiac Event\", \"Cardiac Home Exercise Program - Phase I\", \"Risk Factors for Heart Disease and Stroke\" and \"Cardiac Diet/Low Cholesterol\". Patient was instructed to call 24 hours prior to confirm or cancel her appointment to start Phase II Outpatient Cardiac Rehab on [place].

## 2017-12-28 PROBLEM — F41.8 DEPRESSION WITH ANXIETY: Status: ACTIVE | Noted: 2017-12-28

## 2017-12-28 PROBLEM — K21.9 GASTROESOPHAGEAL REFLUX DISEASE WITHOUT ESOPHAGITIS: Status: ACTIVE | Noted: 2017-12-28

## 2017-12-28 ASSESSMENT — ENCOUNTER SYMPTOMS
CHEST TIGHTNESS: 0
NAUSEA: 0
COUGH: 0
SHORTNESS OF BREATH: 0
CONSTIPATION: 0
ANAL BLEEDING: 0
ABDOMINAL PAIN: 0
DIARRHEA: 0

## 2018-01-08 ENCOUNTER — OFFICE VISIT (OUTPATIENT)
Dept: FAMILY MEDICINE CLINIC | Age: 78
End: 2018-01-08
Payer: MEDICARE

## 2018-01-08 VITALS
OXYGEN SATURATION: 94 % | TEMPERATURE: 97.9 F | BODY MASS INDEX: 28.57 KG/M2 | HEART RATE: 90 BPM | DIASTOLIC BLOOD PRESSURE: 70 MMHG | RESPIRATION RATE: 18 BRPM | WEIGHT: 177 LBS | SYSTOLIC BLOOD PRESSURE: 122 MMHG

## 2018-01-08 DIAGNOSIS — I10 ESSENTIAL HYPERTENSION: ICD-10-CM

## 2018-01-08 DIAGNOSIS — M10.9 ACUTE GOUT INVOLVING TOE OF RIGHT FOOT, UNSPECIFIED CAUSE: ICD-10-CM

## 2018-01-08 DIAGNOSIS — R30.0 DYSURIA: ICD-10-CM

## 2018-01-08 DIAGNOSIS — E11.9 TYPE 2 DIABETES MELLITUS WITHOUT COMPLICATION, WITHOUT LONG-TERM CURRENT USE OF INSULIN (HCC): ICD-10-CM

## 2018-01-08 DIAGNOSIS — B37.9 YEAST INFECTION: Primary | ICD-10-CM

## 2018-01-08 LAB
APPEARANCE FLUID: ABNORMAL
BILIRUBIN, POC: ABNORMAL
BLOOD URINE, POC: ABNORMAL
CLARITY, POC: ABNORMAL
COLOR, POC: ABNORMAL
GLUCOSE URINE, POC: >=1000
KETONES, POC: ABNORMAL
LEUKOCYTE EST, POC: ABNORMAL
NITRITE, POC: ABNORMAL
PH, POC: 5
PROTEIN, POC: ABNORMAL
SPECIFIC GRAVITY, POC: 1.01
UROBILINOGEN, POC: 0.2

## 2018-01-08 PROCEDURE — G8484 FLU IMMUNIZE NO ADMIN: HCPCS | Performed by: FAMILY MEDICINE

## 2018-01-08 PROCEDURE — G8419 CALC BMI OUT NRM PARAM NOF/U: HCPCS | Performed by: FAMILY MEDICINE

## 2018-01-08 PROCEDURE — 99214 OFFICE O/P EST MOD 30 MIN: CPT | Performed by: FAMILY MEDICINE

## 2018-01-08 PROCEDURE — 1036F TOBACCO NON-USER: CPT | Performed by: FAMILY MEDICINE

## 2018-01-08 PROCEDURE — G8598 ASA/ANTIPLAT THER USED: HCPCS | Performed by: FAMILY MEDICINE

## 2018-01-08 PROCEDURE — G8400 PT W/DXA NO RESULTS DOC: HCPCS | Performed by: FAMILY MEDICINE

## 2018-01-08 PROCEDURE — G8427 DOCREV CUR MEDS BY ELIG CLIN: HCPCS | Performed by: FAMILY MEDICINE

## 2018-01-08 PROCEDURE — 1123F ACP DISCUSS/DSCN MKR DOCD: CPT | Performed by: FAMILY MEDICINE

## 2018-01-08 PROCEDURE — 4040F PNEUMOC VAC/ADMIN/RCVD: CPT | Performed by: FAMILY MEDICINE

## 2018-01-08 PROCEDURE — 1090F PRES/ABSN URINE INCON ASSESS: CPT | Performed by: FAMILY MEDICINE

## 2018-01-08 PROCEDURE — 81002 URINALYSIS NONAUTO W/O SCOPE: CPT | Performed by: FAMILY MEDICINE

## 2018-01-08 RX ORDER — INDOMETHACIN 50 MG/1
50 CAPSULE ORAL 3 TIMES DAILY
Qty: 60 CAPSULE | Refills: 3 | Status: SHIPPED | OUTPATIENT
Start: 2018-01-08 | End: 2018-05-22 | Stop reason: SDUPTHER

## 2018-01-08 RX ORDER — FLUCONAZOLE 100 MG/1
100 TABLET ORAL DAILY
Qty: 5 TABLET | Refills: 0 | Status: SHIPPED | OUTPATIENT
Start: 2018-01-08 | End: 2018-01-15

## 2018-01-08 RX ORDER — LIDOCAINE AND PRILOCAINE 25; 25 MG/G; MG/G
CREAM TOPICAL
Qty: 30 G | Refills: 2 | Status: SHIPPED | OUTPATIENT
Start: 2018-01-08 | End: 2018-04-23 | Stop reason: CLARIF

## 2018-01-08 RX ORDER — CLOTRIMAZOLE 1 %
CREAM (GRAM) TOPICAL
Qty: 60 G | Refills: 1 | Status: SHIPPED | OUTPATIENT
Start: 2018-01-08 | End: 2018-01-15

## 2018-01-08 ASSESSMENT — ENCOUNTER SYMPTOMS
SHORTNESS OF BREATH: 0
CONSTIPATION: 0
NAUSEA: 0
DIARRHEA: 0
COUGH: 0
ABDOMINAL PAIN: 0
CHEST TIGHTNESS: 0
ANAL BLEEDING: 0

## 2018-01-08 NOTE — PROGRESS NOTES
136 - 145 mmol/L    Potassium 4.5 3.5 - 5.0 mmol/L    Chloride 101 98 - 111 mmol/L    CO2 22 22 - 29 mmol/L    Anion Gap 14 7 - 19 mmol/L    Glucose 161 (H) 74 - 109 mg/dL    BUN 22 8 - 23 mg/dL    CREATININE 0.8 0.5 - 0.9 mg/dL    GFR Non-African American >60 >60    Calcium 9.1 8.8 - 10.2 mg/dL   POC ACT-Low Range    Collection Time: 12/22/17  8:18 AM   Result Value Ref Range    POC ACT  sec   Basic metabolic panel    Collection Time: 12/23/17  1:54 AM   Result Value Ref Range    Sodium 139 136 - 145 mmol/L    Potassium 3.7 3.5 - 5.0 mmol/L    Chloride 104 98 - 111 mmol/L    CO2 22 22 - 29 mmol/L    Anion Gap 13 7 - 19 mmol/L    Glucose 144 (H) 74 - 109 mg/dL    BUN 16 8 - 23 mg/dL    CREATININE 0.6 0.5 - 0.9 mg/dL    GFR Non-African American >60 >60    Calcium 8.2 (L) 8.8 - 10.2 mg/dL   CBC    Collection Time: 12/23/17  1:54 AM   Result Value Ref Range    WBC 8.7 4.8 - 10.8 K/uL    RBC 4.01 (L) 4.20 - 5.40 M/uL    Hemoglobin 12.4 12.0 - 16.0 g/dL    Hematocrit 36.8 (L) 37.0 - 47.0 %    MCV 91.8 81.0 - 99.0 fL    MCH 30.9 27.0 - 31.0 pg    MCHC 33.7 33.0 - 37.0 g/dL    RDW 14.3 11.5 - 14.5 %    Platelets 471 779 - 222 K/uL    MPV 10.0 9.4 - 12.3 fL   POCT Urinalysis no Micro    Collection Time: 01/08/18  3:56 PM   Result Value Ref Range    Color, UA light yellow     Clarity, UA slightly cloudy     Glucose, UA POC >=1,000     Bilirubin, UA neg     Ketones, UA neg     Spec Grav, UA 1.015     Blood, UA POC trace     pH, UA 5.0     Protein, UA POC neg     Urobilinogen, UA 0.2     Leukocytes, UA neg     Nitrite, UA neg     Appearance, Fluid  Clear, Slightly Cloudy               Assessment & Plan: The following diagnoses and conditions are stable with no further orders unless indicated:  1. Yeast infection  Patient Instructions   Hold pravastatin for 5 days. Resume when done with diflucan (fluconazole)  Use lotrimin twice a day      - fluconazole (DIFLUCAN) 100 MG tablet;  Take 1 tablet by mouth daily for 7

## 2018-01-16 ENCOUNTER — TELEPHONE (OUTPATIENT)
Dept: CARDIOLOGY | Age: 78
End: 2018-01-16

## 2018-01-23 ENCOUNTER — OUTSIDE FACILITY SERVICE (OUTPATIENT)
Dept: PODIATRY | Facility: CLINIC | Age: 78
End: 2018-01-23

## 2018-01-24 ENCOUNTER — OFFICE VISIT (OUTPATIENT)
Dept: CARDIOLOGY | Age: 78
End: 2018-01-24
Payer: MEDICARE

## 2018-01-24 VITALS
DIASTOLIC BLOOD PRESSURE: 64 MMHG | HEART RATE: 74 BPM | BODY MASS INDEX: 28.77 KG/M2 | HEIGHT: 66 IN | SYSTOLIC BLOOD PRESSURE: 138 MMHG | WEIGHT: 179 LBS

## 2018-01-24 DIAGNOSIS — K92.1 BLOOD IN STOOL: ICD-10-CM

## 2018-01-24 DIAGNOSIS — I25.10 CAD IN NATIVE ARTERY: Primary | ICD-10-CM

## 2018-01-24 LAB
BASOPHILS ABSOLUTE: 0 K/UL (ref 0–0.2)
BASOPHILS RELATIVE PERCENT: 0.3 % (ref 0–1)
EOSINOPHILS ABSOLUTE: 0.2 K/UL (ref 0–0.6)
EOSINOPHILS RELATIVE PERCENT: 2.4 % (ref 0–5)
HCT VFR BLD CALC: 41.2 % (ref 37–47)
HEMOGLOBIN: 13.9 G/DL (ref 12–16)
LYMPHOCYTES ABSOLUTE: 1.5 K/UL (ref 1.1–4.5)
LYMPHOCYTES RELATIVE PERCENT: 17.6 % (ref 20–40)
MCH RBC QN AUTO: 31.7 PG (ref 27–31)
MCHC RBC AUTO-ENTMCNC: 33.7 G/DL (ref 33–37)
MCV RBC AUTO: 93.8 FL (ref 81–99)
MONOCYTES ABSOLUTE: 0.9 K/UL (ref 0–0.9)
MONOCYTES RELATIVE PERCENT: 10.8 % (ref 0–10)
NEUTROPHILS ABSOLUTE: 5.9 K/UL (ref 1.5–7.5)
NEUTROPHILS RELATIVE PERCENT: 68.3 % (ref 50–65)
PDW BLD-RTO: 14.6 % (ref 11.5–14.5)
PLATELET # BLD: 217 K/UL (ref 130–400)
PMV BLD AUTO: 9.8 FL (ref 9.4–12.3)
RBC # BLD: 4.39 M/UL (ref 4.2–5.4)
WBC # BLD: 8.7 K/UL (ref 4.8–10.8)

## 2018-01-24 PROCEDURE — 1036F TOBACCO NON-USER: CPT | Performed by: INTERNAL MEDICINE

## 2018-01-24 PROCEDURE — 99214 OFFICE O/P EST MOD 30 MIN: CPT | Performed by: INTERNAL MEDICINE

## 2018-01-24 PROCEDURE — 1123F ACP DISCUSS/DSCN MKR DOCD: CPT | Performed by: INTERNAL MEDICINE

## 2018-01-24 PROCEDURE — 4040F PNEUMOC VAC/ADMIN/RCVD: CPT | Performed by: INTERNAL MEDICINE

## 2018-01-24 PROCEDURE — G8427 DOCREV CUR MEDS BY ELIG CLIN: HCPCS | Performed by: INTERNAL MEDICINE

## 2018-01-24 PROCEDURE — G8484 FLU IMMUNIZE NO ADMIN: HCPCS | Performed by: INTERNAL MEDICINE

## 2018-01-24 PROCEDURE — 93000 ELECTROCARDIOGRAM COMPLETE: CPT | Performed by: INTERNAL MEDICINE

## 2018-01-24 PROCEDURE — G8400 PT W/DXA NO RESULTS DOC: HCPCS | Performed by: INTERNAL MEDICINE

## 2018-01-24 PROCEDURE — 1090F PRES/ABSN URINE INCON ASSESS: CPT | Performed by: INTERNAL MEDICINE

## 2018-01-24 PROCEDURE — G8598 ASA/ANTIPLAT THER USED: HCPCS | Performed by: INTERNAL MEDICINE

## 2018-01-24 PROCEDURE — G8419 CALC BMI OUT NRM PARAM NOF/U: HCPCS | Performed by: INTERNAL MEDICINE

## 2018-01-24 ASSESSMENT — ENCOUNTER SYMPTOMS: SHORTNESS OF BREATH: 0

## 2018-01-24 NOTE — PROGRESS NOTES
History    Marital status:      Spouse name: N/A    Number of children: N/A    Years of education: N/A     Occupational History    Not on file. Social History Main Topics    Smoking status: Never Smoker    Smokeless tobacco: Never Used    Alcohol use No    Drug use: Unknown    Sexual activity: Not on file     Other Topics Concern    Not on file     Social History Narrative    No narrative on file       Allergies   Allergen Reactions    Tape [Adhesive Tape]          Current Outpatient Prescriptions:     indomethacin (INDOCIN) 50 MG capsule, Take 1 capsule by mouth 3 times daily, Disp: 60 capsule, Rfl: 3    lidocaine-prilocaine (EMLA) 2.5-2.5 % cream, Apply topically as needed. , Disp: 30 g, Rfl: 2    clopidogrel (PLAVIX) 75 MG tablet, Take 1 tablet by mouth daily, Disp: 30 tablet, Rfl: 3    aspirin 81 MG tablet, Take 162 mg by mouth daily , Disp: , Rfl:     vitamin B-12 (CYANOCOBALAMIN) 1000 MCG tablet, Take 1,000 mcg by mouth daily, Disp: , Rfl:     Coenzyme Q10 (CO Q 10) 10 MG CAPS, Take by mouth, Disp: , Rfl:     Multiple Minerals-Vitamins (CALCIUM-MAGNESIUM-ZINC-D3 PO), Take by mouth 2 times daily, Disp: , Rfl:     carvedilol (COREG) 6.25 MG tablet, Take 6.25 mg by mouth 2 times daily (with meals), Disp: , Rfl:     GLUCOSAMINE-CALCIUM-VIT D PO, Take by mouth, Disp: , Rfl:     Misc Natural Products (OSTEO BI-FLEX/5-LOXIN ADVANCED PO), Take by mouth, Disp: , Rfl:     escitalopram (LEXAPRO) 20 MG tablet, Take 20 mg by mouth daily, Disp: , Rfl:     Nutritional Supplements (ESTROVEN JOINT & BONE PO), Take by mouth, Disp: , Rfl:     empagliflozin (JARDIANCE) 25 MG tablet, Take 25 mg by mouth daily, Disp: , Rfl:     lisinopril (PRINIVIL;ZESTRIL) 40 MG tablet, Take 40 mg by mouth daily, Disp: , Rfl:     omeprazole (PRILOSEC) 40 MG delayed release capsule, Take 40 mg by mouth daily, Disp: , Rfl:     pravastatin (PRAVACHOL) 10 MG tablet, Take 10 mg by mouth daily, Disp: , Rfl:   

## 2018-04-10 ENCOUNTER — TELEPHONE (OUTPATIENT)
Dept: FAMILY MEDICINE CLINIC | Age: 78
End: 2018-04-10

## 2018-04-16 DIAGNOSIS — E11.9 TYPE 2 DIABETES MELLITUS WITHOUT COMPLICATION, WITHOUT LONG-TERM CURRENT USE OF INSULIN (HCC): ICD-10-CM

## 2018-04-16 LAB
ALBUMIN SERPL-MCNC: 3.9 G/DL (ref 3.5–5.2)
ALP BLD-CCNC: 79 U/L (ref 35–104)
ALT SERPL-CCNC: 29 U/L (ref 5–33)
ANION GAP SERPL CALCULATED.3IONS-SCNC: 19 MMOL/L (ref 7–19)
AST SERPL-CCNC: 25 U/L (ref 5–32)
BILIRUB SERPL-MCNC: 0.3 MG/DL (ref 0.2–1.2)
BUN BLDV-MCNC: 19 MG/DL (ref 8–23)
CALCIUM SERPL-MCNC: 9.3 MG/DL (ref 8.8–10.2)
CHLORIDE BLD-SCNC: 99 MMOL/L (ref 98–111)
CHOLESTEROL, TOTAL: 166 MG/DL (ref 160–199)
CO2: 18 MMOL/L (ref 22–29)
CREAT SERPL-MCNC: 0.7 MG/DL (ref 0.5–0.9)
GFR NON-AFRICAN AMERICAN: >60
GLUCOSE BLD-MCNC: 311 MG/DL (ref 74–109)
HBA1C MFR BLD: 8.9 %
HDLC SERPL-MCNC: 35 MG/DL (ref 65–121)
LDL CHOLESTEROL CALCULATED: ABNORMAL MG/DL
LDL CHOLESTEROL DIRECT: 88 MG/DL
POTASSIUM SERPL-SCNC: 4 MMOL/L (ref 3.5–5)
SODIUM BLD-SCNC: 136 MMOL/L (ref 136–145)
TOTAL PROTEIN: 6.6 G/DL (ref 6.6–8.7)
TRIGL SERPL-MCNC: 484 MG/DL (ref 0–149)

## 2018-04-23 ENCOUNTER — OFFICE VISIT (OUTPATIENT)
Dept: FAMILY MEDICINE CLINIC | Age: 78
End: 2018-04-23
Payer: MEDICARE

## 2018-04-23 VITALS
WEIGHT: 188.5 LBS | DIASTOLIC BLOOD PRESSURE: 78 MMHG | TEMPERATURE: 97.1 F | SYSTOLIC BLOOD PRESSURE: 112 MMHG | RESPIRATION RATE: 18 BRPM | OXYGEN SATURATION: 91 % | BODY MASS INDEX: 30.42 KG/M2 | HEART RATE: 83 BPM

## 2018-04-23 DIAGNOSIS — Z00.00 ANNUAL PHYSICAL EXAM: Primary | ICD-10-CM

## 2018-04-23 DIAGNOSIS — R06.02 SHORTNESS OF BREATH: ICD-10-CM

## 2018-04-23 DIAGNOSIS — M54.50 CHRONIC MIDLINE LOW BACK PAIN WITHOUT SCIATICA: ICD-10-CM

## 2018-04-23 DIAGNOSIS — E03.9 PRIMARY HYPOTHYROIDISM: ICD-10-CM

## 2018-04-23 DIAGNOSIS — Z23 NEED FOR ZOSTER VACCINE: ICD-10-CM

## 2018-04-23 DIAGNOSIS — I25.10 CAD IN NATIVE ARTERY: ICD-10-CM

## 2018-04-23 DIAGNOSIS — Z78.0 ASYMPTOMATIC MENOPAUSAL STATE: ICD-10-CM

## 2018-04-23 DIAGNOSIS — I10 ESSENTIAL (PRIMARY) HYPERTENSION: ICD-10-CM

## 2018-04-23 DIAGNOSIS — E11.9 TYPE 2 DIABETES MELLITUS WITHOUT COMPLICATION, WITHOUT LONG-TERM CURRENT USE OF INSULIN (HCC): ICD-10-CM

## 2018-04-23 DIAGNOSIS — G89.29 CHRONIC MIDLINE LOW BACK PAIN WITHOUT SCIATICA: ICD-10-CM

## 2018-04-23 DIAGNOSIS — I10 ESSENTIAL HYPERTENSION: ICD-10-CM

## 2018-04-23 PROCEDURE — 1036F TOBACCO NON-USER: CPT | Performed by: FAMILY MEDICINE

## 2018-04-23 PROCEDURE — 1090F PRES/ABSN URINE INCON ASSESS: CPT | Performed by: FAMILY MEDICINE

## 2018-04-23 PROCEDURE — G8417 CALC BMI ABV UP PARAM F/U: HCPCS | Performed by: FAMILY MEDICINE

## 2018-04-23 PROCEDURE — 1123F ACP DISCUSS/DSCN MKR DOCD: CPT | Performed by: FAMILY MEDICINE

## 2018-04-23 PROCEDURE — 99214 OFFICE O/P EST MOD 30 MIN: CPT | Performed by: FAMILY MEDICINE

## 2018-04-23 PROCEDURE — 4040F PNEUMOC VAC/ADMIN/RCVD: CPT | Performed by: FAMILY MEDICINE

## 2018-04-23 PROCEDURE — G8598 ASA/ANTIPLAT THER USED: HCPCS | Performed by: FAMILY MEDICINE

## 2018-04-23 PROCEDURE — G8427 DOCREV CUR MEDS BY ELIG CLIN: HCPCS | Performed by: FAMILY MEDICINE

## 2018-04-23 PROCEDURE — G8400 PT W/DXA NO RESULTS DOC: HCPCS | Performed by: FAMILY MEDICINE

## 2018-04-23 PROCEDURE — G0439 PPPS, SUBSEQ VISIT: HCPCS | Performed by: FAMILY MEDICINE

## 2018-04-23 RX ORDER — CARVEDILOL 6.25 MG/1
6.25 TABLET ORAL 2 TIMES DAILY WITH MEALS
Qty: 180 TABLET | Refills: 3 | Status: SHIPPED | OUTPATIENT
Start: 2018-04-23 | End: 2018-11-07 | Stop reason: SDUPTHER

## 2018-04-23 RX ORDER — LISINOPRIL 40 MG/1
40 TABLET ORAL DAILY
Qty: 90 TABLET | Refills: 3 | Status: SHIPPED | OUTPATIENT
Start: 2018-04-23 | End: 2018-07-31 | Stop reason: CLARIF

## 2018-04-23 RX ORDER — TRAMADOL HYDROCHLORIDE 50 MG/1
50 TABLET ORAL EVERY 6 HOURS PRN
COMMUNITY
End: 2018-05-22 | Stop reason: SDUPTHER

## 2018-04-23 RX ORDER — LEVOTHYROXINE SODIUM 0.03 MG/1
25 TABLET ORAL DAILY
COMMUNITY
End: 2018-04-23 | Stop reason: SDUPTHER

## 2018-04-23 RX ORDER — SYRING-NEEDL,DISP,INSUL,0.3 ML 30 GX5/16"
1 SYRINGE, EMPTY DISPOSABLE MISCELLANEOUS ONCE
Qty: 100 DEVICE | Refills: 0 | Status: SHIPPED | OUTPATIENT
Start: 2018-04-23 | End: 2018-04-23 | Stop reason: CLARIF

## 2018-04-23 RX ORDER — LEVOTHYROXINE SODIUM 0.03 MG/1
25 TABLET ORAL DAILY
Qty: 90 TABLET | Refills: 3 | Status: SHIPPED | OUTPATIENT
Start: 2018-04-23 | End: 2019-07-19 | Stop reason: SDUPTHER

## 2018-04-23 RX ORDER — SYRING-NEEDL,DISP,INSUL,0.3 ML 30 GX5/16"
1 SYRINGE, EMPTY DISPOSABLE MISCELLANEOUS ONCE
Qty: 100 DEVICE | Refills: 0 | Status: SHIPPED | OUTPATIENT
Start: 2018-04-23 | End: 2020-11-19 | Stop reason: SDUPTHER

## 2018-04-23 RX ORDER — ALBUTEROL SULFATE 90 UG/1
2 AEROSOL, METERED RESPIRATORY (INHALATION) EVERY 6 HOURS PRN
Qty: 1 INHALER | Refills: 3 | Status: SHIPPED | OUTPATIENT
Start: 2018-04-23 | End: 2019-01-14 | Stop reason: ALTCHOICE

## 2018-04-23 ASSESSMENT — LIFESTYLE VARIABLES: HOW OFTEN DO YOU HAVE A DRINK CONTAINING ALCOHOL: 0

## 2018-04-23 ASSESSMENT — PATIENT HEALTH QUESTIONNAIRE - PHQ9: SUM OF ALL RESPONSES TO PHQ QUESTIONS 1-9: 0

## 2018-04-23 ASSESSMENT — ANXIETY QUESTIONNAIRES: GAD7 TOTAL SCORE: 2

## 2018-04-28 ASSESSMENT — ENCOUNTER SYMPTOMS
COUGH: 1
CONSTIPATION: 0
DIARRHEA: 0
SHORTNESS OF BREATH: 1
ANAL BLEEDING: 0
CHEST TIGHTNESS: 0
NAUSEA: 0
ABDOMINAL PAIN: 0

## 2018-04-30 ENCOUNTER — TELEPHONE (OUTPATIENT)
Dept: FAMILY MEDICINE CLINIC | Age: 78
End: 2018-04-30

## 2018-04-30 ENCOUNTER — HOSPITAL ENCOUNTER (OUTPATIENT)
Dept: GENERAL RADIOLOGY | Age: 78
Discharge: HOME | End: 2018-04-30
Payer: MEDICARE

## 2018-04-30 DIAGNOSIS — J84.89 INTERSTITIAL PNEUMONITIS (HCC): Primary | ICD-10-CM

## 2018-04-30 DIAGNOSIS — R06.02 SHORTNESS OF BREATH: ICD-10-CM

## 2018-04-30 PROCEDURE — 71046 X-RAY EXAM CHEST 2 VIEWS: CPT

## 2018-05-03 DIAGNOSIS — R06.02 SHORTNESS OF BREATH: ICD-10-CM

## 2018-05-09 ENCOUNTER — HOSPITAL ENCOUNTER (OUTPATIENT)
Dept: CT IMAGING | Age: 78
Discharge: HOME | End: 2018-05-09
Payer: MEDICARE

## 2018-05-09 ENCOUNTER — HOSPITAL ENCOUNTER (OUTPATIENT)
Dept: PULMONOLOGY | Age: 78
Discharge: HOME | End: 2018-05-09
Payer: MEDICARE

## 2018-05-09 DIAGNOSIS — R06.02 SHORTNESS OF BREATH: ICD-10-CM

## 2018-05-09 DIAGNOSIS — J84.89 INTERSTITIAL PNEUMONITIS (HCC): ICD-10-CM

## 2018-05-09 PROCEDURE — 94729 DIFFUSING CAPACITY: CPT

## 2018-05-09 PROCEDURE — 6360000004 HC RX CONTRAST MEDICATION: Performed by: FAMILY MEDICINE

## 2018-05-09 PROCEDURE — 94727 GAS DIL/WSHOT DETER LNG VOL: CPT

## 2018-05-09 PROCEDURE — 94060 EVALUATION OF WHEEZING: CPT

## 2018-05-09 PROCEDURE — 71260 CT THORAX DX C+: CPT

## 2018-05-09 RX ORDER — ALBUTEROL SULFATE 2.5 MG/3ML
2.5 SOLUTION RESPIRATORY (INHALATION) EVERY 6 HOURS PRN
Status: DISCONTINUED | OUTPATIENT
Start: 2018-05-09 | End: 2018-05-11 | Stop reason: HOSPADM

## 2018-05-09 RX ADMIN — ALBUTEROL SULFATE 2.5 MG: 2.5 SOLUTION RESPIRATORY (INHALATION) at 09:08

## 2018-05-09 RX ADMIN — IOPAMIDOL 90 ML: 755 INJECTION, SOLUTION INTRAVENOUS at 09:58

## 2018-05-21 ENCOUNTER — TELEPHONE (OUTPATIENT)
Dept: FAMILY MEDICINE CLINIC | Age: 78
End: 2018-05-21

## 2018-05-22 ENCOUNTER — OFFICE VISIT (OUTPATIENT)
Dept: FAMILY MEDICINE CLINIC | Age: 78
End: 2018-05-22
Payer: MEDICARE

## 2018-05-22 VITALS
SYSTOLIC BLOOD PRESSURE: 132 MMHG | OXYGEN SATURATION: 94 % | DIASTOLIC BLOOD PRESSURE: 84 MMHG | BODY MASS INDEX: 28.89 KG/M2 | TEMPERATURE: 97.1 F | WEIGHT: 179 LBS | RESPIRATION RATE: 20 BRPM | HEART RATE: 85 BPM

## 2018-05-22 DIAGNOSIS — R06.02 SHORTNESS OF BREATH: Primary | ICD-10-CM

## 2018-05-22 DIAGNOSIS — I10 ESSENTIAL HYPERTENSION: ICD-10-CM

## 2018-05-22 DIAGNOSIS — J44.9 CHRONIC OBSTRUCTIVE PULMONARY DISEASE, UNSPECIFIED COPD TYPE (HCC): ICD-10-CM

## 2018-05-22 DIAGNOSIS — E11.9 TYPE 2 DIABETES MELLITUS WITHOUT COMPLICATION, WITHOUT LONG-TERM CURRENT USE OF INSULIN (HCC): ICD-10-CM

## 2018-05-22 DIAGNOSIS — R40.0 DAYTIME SOMNOLENCE: ICD-10-CM

## 2018-05-22 DIAGNOSIS — M54.50 CHRONIC MIDLINE LOW BACK PAIN WITHOUT SCIATICA: ICD-10-CM

## 2018-05-22 DIAGNOSIS — R91.1 PULMONARY NODULE: ICD-10-CM

## 2018-05-22 DIAGNOSIS — E03.9 PRIMARY HYPOTHYROIDISM: ICD-10-CM

## 2018-05-22 DIAGNOSIS — Z91.81 AT HIGH RISK FOR FALLS: ICD-10-CM

## 2018-05-22 DIAGNOSIS — Z79.899 HIGH RISK MEDICATION USE: ICD-10-CM

## 2018-05-22 DIAGNOSIS — B37.9 YEAST INFECTION: ICD-10-CM

## 2018-05-22 DIAGNOSIS — G89.29 CHRONIC MIDLINE LOW BACK PAIN WITHOUT SCIATICA: ICD-10-CM

## 2018-05-22 DIAGNOSIS — I10 ESSENTIAL (PRIMARY) HYPERTENSION: ICD-10-CM

## 2018-05-22 LAB
AMPHETAMINE SCREEN, URINE: NORMAL
BARBITURATE SCREEN, URINE: NORMAL
BENZODIAZEPINE SCREEN, URINE: NORMAL
COCAINE METABOLITE SCREEN URINE: NORMAL
MDMA URINE: NORMAL
METHADONE SCREEN, URINE: NORMAL
METHAMPHETAMINE, URINE: NORMAL
OPIATE SCREEN URINE: NORMAL
OXYCODONE SCREEN URINE: NORMAL
PHENCYCLIDINE SCREEN URINE: NORMAL
PROPOXYPHENE SCREEN, URINE: NORMAL
THC: NORMAL
TRICYCLIC ANTIDEPRESSANTS, UR: NORMAL

## 2018-05-22 PROCEDURE — G8400 PT W/DXA NO RESULTS DOC: HCPCS | Performed by: FAMILY MEDICINE

## 2018-05-22 PROCEDURE — G8427 DOCREV CUR MEDS BY ELIG CLIN: HCPCS | Performed by: FAMILY MEDICINE

## 2018-05-22 PROCEDURE — 1036F TOBACCO NON-USER: CPT | Performed by: FAMILY MEDICINE

## 2018-05-22 PROCEDURE — 3023F SPIROM DOC REV: CPT | Performed by: FAMILY MEDICINE

## 2018-05-22 PROCEDURE — 80305 DRUG TEST PRSMV DIR OPT OBS: CPT | Performed by: FAMILY MEDICINE

## 2018-05-22 PROCEDURE — 99214 OFFICE O/P EST MOD 30 MIN: CPT | Performed by: FAMILY MEDICINE

## 2018-05-22 PROCEDURE — 4040F PNEUMOC VAC/ADMIN/RCVD: CPT | Performed by: FAMILY MEDICINE

## 2018-05-22 PROCEDURE — G8926 SPIRO NO PERF OR DOC: HCPCS | Performed by: FAMILY MEDICINE

## 2018-05-22 PROCEDURE — 1123F ACP DISCUSS/DSCN MKR DOCD: CPT | Performed by: FAMILY MEDICINE

## 2018-05-22 PROCEDURE — G8417 CALC BMI ABV UP PARAM F/U: HCPCS | Performed by: FAMILY MEDICINE

## 2018-05-22 PROCEDURE — 1090F PRES/ABSN URINE INCON ASSESS: CPT | Performed by: FAMILY MEDICINE

## 2018-05-22 PROCEDURE — G8598 ASA/ANTIPLAT THER USED: HCPCS | Performed by: FAMILY MEDICINE

## 2018-05-22 RX ORDER — INDOMETHACIN 50 MG/1
50 CAPSULE ORAL 3 TIMES DAILY
Qty: 270 CAPSULE | Refills: 3 | Status: SHIPPED | OUTPATIENT
Start: 2018-05-22 | End: 2019-07-29 | Stop reason: SDUPTHER

## 2018-05-22 RX ORDER — TRAMADOL HYDROCHLORIDE 50 MG/1
50 TABLET ORAL EVERY 6 HOURS PRN
Qty: 60 TABLET | Refills: 2 | Status: SHIPPED | OUTPATIENT
Start: 2018-05-22 | End: 2018-06-21

## 2018-05-22 RX ORDER — ESCITALOPRAM OXALATE 20 MG/1
20 TABLET ORAL DAILY
Qty: 90 TABLET | Refills: 3 | Status: SHIPPED | OUTPATIENT
Start: 2018-05-22 | End: 2018-05-29

## 2018-05-22 ASSESSMENT — ENCOUNTER SYMPTOMS
COUGH: 0
SHORTNESS OF BREATH: 1
ABDOMINAL PAIN: 0
DIARRHEA: 0
CHEST TIGHTNESS: 0
CONSTIPATION: 0
ANAL BLEEDING: 0
NAUSEA: 0

## 2018-05-24 ENCOUNTER — TELEPHONE (OUTPATIENT)
Dept: FAMILY MEDICINE CLINIC | Age: 78
End: 2018-05-24

## 2018-05-29 ENCOUNTER — OFFICE VISIT (OUTPATIENT)
Dept: FAMILY MEDICINE CLINIC | Age: 78
End: 2018-05-29
Payer: MEDICARE

## 2018-05-29 VITALS
DIASTOLIC BLOOD PRESSURE: 76 MMHG | HEART RATE: 81 BPM | RESPIRATION RATE: 16 BRPM | OXYGEN SATURATION: 93 % | BODY MASS INDEX: 28.89 KG/M2 | SYSTOLIC BLOOD PRESSURE: 132 MMHG | TEMPERATURE: 97.4 F | WEIGHT: 179 LBS

## 2018-05-29 DIAGNOSIS — F41.8 DEPRESSION WITH ANXIETY: Primary | ICD-10-CM

## 2018-05-29 PROCEDURE — G8417 CALC BMI ABV UP PARAM F/U: HCPCS | Performed by: FAMILY MEDICINE

## 2018-05-29 PROCEDURE — G8427 DOCREV CUR MEDS BY ELIG CLIN: HCPCS | Performed by: FAMILY MEDICINE

## 2018-05-29 PROCEDURE — 99213 OFFICE O/P EST LOW 20 MIN: CPT | Performed by: FAMILY MEDICINE

## 2018-05-29 PROCEDURE — G8598 ASA/ANTIPLAT THER USED: HCPCS | Performed by: FAMILY MEDICINE

## 2018-05-29 PROCEDURE — 1123F ACP DISCUSS/DSCN MKR DOCD: CPT | Performed by: FAMILY MEDICINE

## 2018-05-29 PROCEDURE — 1036F TOBACCO NON-USER: CPT | Performed by: FAMILY MEDICINE

## 2018-05-29 PROCEDURE — 4040F PNEUMOC VAC/ADMIN/RCVD: CPT | Performed by: FAMILY MEDICINE

## 2018-05-29 PROCEDURE — 1090F PRES/ABSN URINE INCON ASSESS: CPT | Performed by: FAMILY MEDICINE

## 2018-05-29 PROCEDURE — G8400 PT W/DXA NO RESULTS DOC: HCPCS | Performed by: FAMILY MEDICINE

## 2018-05-29 ASSESSMENT — ENCOUNTER SYMPTOMS
DIARRHEA: 0
COUGH: 0
NAUSEA: 0
SHORTNESS OF BREATH: 1
ABDOMINAL PAIN: 0
CONSTIPATION: 0
CHEST TIGHTNESS: 0
ANAL BLEEDING: 0

## 2018-06-04 ENCOUNTER — OFFICE VISIT (OUTPATIENT)
Dept: CARDIOLOGY | Age: 78
End: 2018-06-04
Payer: MEDICARE

## 2018-06-04 VITALS
WEIGHT: 174 LBS | HEART RATE: 80 BPM | HEIGHT: 66 IN | SYSTOLIC BLOOD PRESSURE: 122 MMHG | DIASTOLIC BLOOD PRESSURE: 72 MMHG | BODY MASS INDEX: 27.97 KG/M2

## 2018-06-04 DIAGNOSIS — I25.10 CAD IN NATIVE ARTERY: ICD-10-CM

## 2018-06-04 DIAGNOSIS — Z01.812 PRE-OPERATIVE LABORATORY EXAMINATION: ICD-10-CM

## 2018-06-04 DIAGNOSIS — R07.9 CHEST PAIN IN ADULT: ICD-10-CM

## 2018-06-04 DIAGNOSIS — I10 ESSENTIAL HYPERTENSION: Primary | ICD-10-CM

## 2018-06-04 LAB
ANION GAP SERPL CALCULATED.3IONS-SCNC: 15 MMOL/L (ref 7–19)
BUN BLDV-MCNC: 17 MG/DL (ref 8–23)
CALCIUM SERPL-MCNC: 9.7 MG/DL (ref 8.8–10.2)
CHLORIDE BLD-SCNC: 96 MMOL/L (ref 98–111)
CO2: 25 MMOL/L (ref 22–29)
CREAT SERPL-MCNC: 0.8 MG/DL (ref 0.5–0.9)
GFR NON-AFRICAN AMERICAN: >60
GLUCOSE BLD-MCNC: 154 MG/DL (ref 74–109)
HCT VFR BLD CALC: 40.5 % (ref 37–47)
HEMOGLOBIN: 13.7 G/DL (ref 12–16)
INR BLD: 0.98 (ref 0.88–1.18)
MCH RBC QN AUTO: 30.5 PG (ref 27–31)
MCHC RBC AUTO-ENTMCNC: 33.8 G/DL (ref 33–37)
MCV RBC AUTO: 90.2 FL (ref 81–99)
PDW BLD-RTO: 13.9 % (ref 11.5–14.5)
PLATELET # BLD: 254 K/UL (ref 130–400)
PMV BLD AUTO: 9.9 FL (ref 9.4–12.3)
POTASSIUM SERPL-SCNC: 4 MMOL/L (ref 3.5–5)
PROTHROMBIN TIME: 12.9 SEC (ref 12–14.6)
RBC # BLD: 4.49 M/UL (ref 4.2–5.4)
SODIUM BLD-SCNC: 136 MMOL/L (ref 136–145)
WBC # BLD: 12.1 K/UL (ref 4.8–10.8)

## 2018-06-04 PROCEDURE — 1123F ACP DISCUSS/DSCN MKR DOCD: CPT | Performed by: INTERNAL MEDICINE

## 2018-06-04 PROCEDURE — G8417 CALC BMI ABV UP PARAM F/U: HCPCS | Performed by: INTERNAL MEDICINE

## 2018-06-04 PROCEDURE — 1036F TOBACCO NON-USER: CPT | Performed by: INTERNAL MEDICINE

## 2018-06-04 PROCEDURE — G8598 ASA/ANTIPLAT THER USED: HCPCS | Performed by: INTERNAL MEDICINE

## 2018-06-04 PROCEDURE — G8427 DOCREV CUR MEDS BY ELIG CLIN: HCPCS | Performed by: INTERNAL MEDICINE

## 2018-06-04 PROCEDURE — 1090F PRES/ABSN URINE INCON ASSESS: CPT | Performed by: INTERNAL MEDICINE

## 2018-06-04 PROCEDURE — 99214 OFFICE O/P EST MOD 30 MIN: CPT | Performed by: INTERNAL MEDICINE

## 2018-06-04 PROCEDURE — 4040F PNEUMOC VAC/ADMIN/RCVD: CPT | Performed by: INTERNAL MEDICINE

## 2018-06-04 PROCEDURE — G8400 PT W/DXA NO RESULTS DOC: HCPCS | Performed by: INTERNAL MEDICINE

## 2018-06-04 PROCEDURE — 93000 ELECTROCARDIOGRAM COMPLETE: CPT | Performed by: INTERNAL MEDICINE

## 2018-06-06 ENCOUNTER — TELEPHONE (OUTPATIENT)
Dept: FAMILY MEDICINE CLINIC | Age: 78
End: 2018-06-06

## 2018-06-08 ENCOUNTER — HOSPITAL ENCOUNTER (OUTPATIENT)
Dept: CARDIAC CATH/INVASIVE PROCEDURES | Age: 78
Setting detail: OBSERVATION
Discharge: HOME | End: 2018-06-09
Attending: INTERNAL MEDICINE | Admitting: INTERNAL MEDICINE
Payer: MEDICARE

## 2018-06-08 DIAGNOSIS — E11.9 TYPE 2 DIABETES MELLITUS WITHOUT COMPLICATION, WITHOUT LONG-TERM CURRENT USE OF INSULIN (HCC): ICD-10-CM

## 2018-06-08 PROBLEM — I20.0 UNSTABLE ANGINA (HCC): Status: ACTIVE | Noted: 2018-06-08

## 2018-06-08 LAB
GLUCOSE BLD-MCNC: 132 MG/DL (ref 70–99)
GLUCOSE BLD-MCNC: 182 MG/DL (ref 70–99)
GLUCOSE BLD-MCNC: 214 MG/DL (ref 70–99)
HBA1C MFR BLD: 8.5 %
PERFORMED ON: ABNORMAL
POC ACT LR: 253 SEC
POC ACT LR: 331 SEC

## 2018-06-08 PROCEDURE — G0378 HOSPITAL OBSERVATION PER HR: HCPCS

## 2018-06-08 PROCEDURE — 6370000000 HC RX 637 (ALT 250 FOR IP): Performed by: INTERNAL MEDICINE

## 2018-06-08 PROCEDURE — 82948 REAGENT STRIP/BLOOD GLUCOSE: CPT

## 2018-06-08 PROCEDURE — C1769 GUIDE WIRE: HCPCS

## 2018-06-08 PROCEDURE — C1894 INTRO/SHEATH, NON-LASER: HCPCS

## 2018-06-08 PROCEDURE — 93458 L HRT ARTERY/VENTRICLE ANGIO: CPT | Performed by: INTERNAL MEDICINE

## 2018-06-08 PROCEDURE — 2580000003 HC RX 258: Performed by: INTERNAL MEDICINE

## 2018-06-08 PROCEDURE — 83036 HEMOGLOBIN GLYCOSYLATED A1C: CPT

## 2018-06-08 PROCEDURE — 2709999900 HC NON-CHARGEABLE SUPPLY

## 2018-06-08 PROCEDURE — 2720000000 HC MISC SURG SUPPLY STERILE $0-50

## 2018-06-08 PROCEDURE — 6360000002 HC RX W HCPCS

## 2018-06-08 PROCEDURE — 36415 COLL VENOUS BLD VENIPUNCTURE: CPT

## 2018-06-08 PROCEDURE — 2720000010 HC SURG SUPPLY STERILE

## 2018-06-08 PROCEDURE — 92928 PRQ TCAT PLMT NTRAC ST 1 LES: CPT | Performed by: INTERNAL MEDICINE

## 2018-06-08 PROCEDURE — 2500000003 HC RX 250 WO HCPCS

## 2018-06-08 PROCEDURE — 2720000001 HC MISC SURG SUPPLY STERILE $51-500

## 2018-06-08 PROCEDURE — 85347 COAGULATION TIME ACTIVATED: CPT

## 2018-06-08 PROCEDURE — C1887 CATHETER, GUIDING: HCPCS

## 2018-06-08 PROCEDURE — 6370000000 HC RX 637 (ALT 250 FOR IP)

## 2018-06-08 PROCEDURE — C1874 STENT, COATED/COV W/DEL SYS: HCPCS

## 2018-06-08 PROCEDURE — C1725 CATH, TRANSLUMIN NON-LASER: HCPCS

## 2018-06-08 RX ORDER — ASPIRIN 325 MG
325 TABLET ORAL ONCE
Status: COMPLETED | OUTPATIENT
Start: 2018-06-08 | End: 2018-06-08

## 2018-06-08 RX ORDER — CAPTOPRIL 12.5 MG/1
6.25 TABLET ORAL ONCE
Status: COMPLETED | OUTPATIENT
Start: 2018-06-08 | End: 2018-06-08

## 2018-06-08 RX ORDER — CLOPIDOGREL BISULFATE 75 MG/1
75 TABLET ORAL DAILY
Status: DISCONTINUED | OUTPATIENT
Start: 2018-06-08 | End: 2018-06-09 | Stop reason: HOSPADM

## 2018-06-08 RX ORDER — ONDANSETRON 2 MG/ML
4 INJECTION INTRAMUSCULAR; INTRAVENOUS EVERY 6 HOURS PRN
Status: DISCONTINUED | OUTPATIENT
Start: 2018-06-08 | End: 2018-06-09 | Stop reason: HOSPADM

## 2018-06-08 RX ORDER — ASPIRIN 325 MG
162 TABLET ORAL DAILY
Status: DISCONTINUED | OUTPATIENT
Start: 2018-06-08 | End: 2018-06-09 | Stop reason: HOSPADM

## 2018-06-08 RX ORDER — DEXTROSE MONOHYDRATE 25 G/50ML
12.5 INJECTION, SOLUTION INTRAVENOUS PRN
Status: DISCONTINUED | OUTPATIENT
Start: 2018-06-08 | End: 2018-06-09 | Stop reason: HOSPADM

## 2018-06-08 RX ORDER — TRAMADOL HYDROCHLORIDE 50 MG/1
50 TABLET ORAL EVERY 6 HOURS PRN
Status: DISCONTINUED | OUTPATIENT
Start: 2018-06-08 | End: 2018-06-09 | Stop reason: HOSPADM

## 2018-06-08 RX ORDER — LISINOPRIL 20 MG/1
40 TABLET ORAL DAILY
Status: DISCONTINUED | OUTPATIENT
Start: 2018-06-08 | End: 2018-06-09 | Stop reason: HOSPADM

## 2018-06-08 RX ORDER — LEVOTHYROXINE SODIUM 0.05 MG/1
25 TABLET ORAL DAILY
Status: DISCONTINUED | OUTPATIENT
Start: 2018-06-08 | End: 2018-06-09 | Stop reason: HOSPADM

## 2018-06-08 RX ORDER — SODIUM CHLORIDE 0.9 % (FLUSH) 0.9 %
10 SYRINGE (ML) INJECTION PRN
Status: DISCONTINUED | OUTPATIENT
Start: 2018-06-08 | End: 2018-06-09 | Stop reason: HOSPADM

## 2018-06-08 RX ORDER — SODIUM CHLORIDE 0.9 % (FLUSH) 0.9 %
10 SYRINGE (ML) INJECTION EVERY 12 HOURS SCHEDULED
Status: DISCONTINUED | OUTPATIENT
Start: 2018-06-08 | End: 2018-06-08 | Stop reason: SDUPTHER

## 2018-06-08 RX ORDER — PANTOPRAZOLE SODIUM 40 MG/1
40 TABLET, DELAYED RELEASE ORAL
Status: DISCONTINUED | OUTPATIENT
Start: 2018-06-09 | End: 2018-06-09 | Stop reason: HOSPADM

## 2018-06-08 RX ORDER — NICOTINE POLACRILEX 4 MG
15 LOZENGE BUCCAL PRN
Status: DISCONTINUED | OUTPATIENT
Start: 2018-06-08 | End: 2018-06-09 | Stop reason: HOSPADM

## 2018-06-08 RX ORDER — DEXTROSE MONOHYDRATE 50 MG/ML
100 INJECTION, SOLUTION INTRAVENOUS PRN
Status: DISCONTINUED | OUTPATIENT
Start: 2018-06-08 | End: 2018-06-09 | Stop reason: HOSPADM

## 2018-06-08 RX ORDER — ACETAMINOPHEN 325 MG/1
650 TABLET ORAL EVERY 4 HOURS PRN
Status: DISCONTINUED | OUTPATIENT
Start: 2018-06-08 | End: 2018-06-09 | Stop reason: HOSPADM

## 2018-06-08 RX ORDER — SODIUM CHLORIDE 9 MG/ML
INJECTION, SOLUTION INTRAVENOUS CONTINUOUS
Status: DISCONTINUED | OUTPATIENT
Start: 2018-06-08 | End: 2018-06-08 | Stop reason: SDUPTHER

## 2018-06-08 RX ORDER — SODIUM CHLORIDE 9 MG/ML
INJECTION, SOLUTION INTRAVENOUS CONTINUOUS
Status: DISCONTINUED | OUTPATIENT
Start: 2018-06-08 | End: 2018-06-09 | Stop reason: HOSPADM

## 2018-06-08 RX ORDER — ALBUTEROL SULFATE 90 UG/1
2 AEROSOL, METERED RESPIRATORY (INHALATION) EVERY 6 HOURS PRN
Status: DISCONTINUED | OUTPATIENT
Start: 2018-06-08 | End: 2018-06-09 | Stop reason: HOSPADM

## 2018-06-08 RX ORDER — CARVEDILOL 6.25 MG/1
6.25 TABLET ORAL 2 TIMES DAILY WITH MEALS
Status: DISCONTINUED | OUTPATIENT
Start: 2018-06-08 | End: 2018-06-09 | Stop reason: HOSPADM

## 2018-06-08 RX ORDER — SODIUM CHLORIDE 0.9 % (FLUSH) 0.9 %
10 SYRINGE (ML) INJECTION EVERY 12 HOURS SCHEDULED
Status: DISCONTINUED | OUTPATIENT
Start: 2018-06-08 | End: 2018-06-09 | Stop reason: HOSPADM

## 2018-06-08 RX ORDER — SODIUM CHLORIDE 0.9 % (FLUSH) 0.9 %
10 SYRINGE (ML) INJECTION PRN
Status: DISCONTINUED | OUTPATIENT
Start: 2018-06-08 | End: 2018-06-08 | Stop reason: SDUPTHER

## 2018-06-08 RX ADMIN — SODIUM CHLORIDE: 9 INJECTION, SOLUTION INTRAVENOUS at 07:27

## 2018-06-08 RX ADMIN — ASPIRIN 325 MG ORAL TABLET 325 MG: 325 PILL ORAL at 07:34

## 2018-06-08 RX ADMIN — CARVEDILOL 6.25 MG: 6.25 TABLET, FILM COATED ORAL at 17:43

## 2018-06-08 RX ADMIN — INSULIN LISPRO 1 UNITS: 100 INJECTION, SOLUTION INTRAVENOUS; SUBCUTANEOUS at 17:43

## 2018-06-08 RX ADMIN — CAPTOPRIL 6.25 MG: 12.5 TABLET ORAL at 11:09

## 2018-06-08 RX ADMIN — SODIUM CHLORIDE: 9 INJECTION, SOLUTION INTRAVENOUS at 12:24

## 2018-06-08 ASSESSMENT — ENCOUNTER SYMPTOMS: SHORTNESS OF BREATH: 1

## 2018-06-08 ASSESSMENT — PAIN SCALES - GENERAL: PAINLEVEL_OUTOF10: 0

## 2018-06-09 VITALS
OXYGEN SATURATION: 93 % | BODY MASS INDEX: 27.16 KG/M2 | TEMPERATURE: 96.3 F | WEIGHT: 169 LBS | DIASTOLIC BLOOD PRESSURE: 88 MMHG | HEART RATE: 75 BPM | HEIGHT: 66 IN | SYSTOLIC BLOOD PRESSURE: 138 MMHG | RESPIRATION RATE: 16 BRPM

## 2018-06-09 LAB
GLUCOSE BLD-MCNC: 174 MG/DL (ref 70–99)
PERFORMED ON: ABNORMAL

## 2018-06-09 PROCEDURE — 2580000003 HC RX 258: Performed by: INTERNAL MEDICINE

## 2018-06-09 PROCEDURE — 6370000000 HC RX 637 (ALT 250 FOR IP): Performed by: INTERNAL MEDICINE

## 2018-06-09 PROCEDURE — G0378 HOSPITAL OBSERVATION PER HR: HCPCS

## 2018-06-09 PROCEDURE — 82948 REAGENT STRIP/BLOOD GLUCOSE: CPT

## 2018-06-09 PROCEDURE — 99217 PR OBSERVATION CARE DISCHARGE MANAGEMENT: CPT | Performed by: INTERNAL MEDICINE

## 2018-06-09 RX ADMIN — CLOPIDOGREL BISULFATE 75 MG: 75 TABLET ORAL at 08:21

## 2018-06-09 RX ADMIN — SODIUM CHLORIDE: 9 INJECTION, SOLUTION INTRAVENOUS at 03:44

## 2018-06-09 RX ADMIN — PANTOPRAZOLE SODIUM 40 MG: 40 TABLET, DELAYED RELEASE ORAL at 08:21

## 2018-06-09 RX ADMIN — ASPIRIN 325 MG ORAL TABLET 162 MG: 325 PILL ORAL at 08:21

## 2018-06-09 RX ADMIN — LISINOPRIL 40 MG: 20 TABLET ORAL at 08:21

## 2018-06-09 RX ADMIN — CARVEDILOL 6.25 MG: 6.25 TABLET, FILM COATED ORAL at 08:21

## 2018-06-09 RX ADMIN — INSULIN LISPRO 1 UNITS: 100 INJECTION, SOLUTION INTRAVENOUS; SUBCUTANEOUS at 08:43

## 2018-06-09 RX ADMIN — SERTRALINE HYDROCHLORIDE 50 MG: 50 TABLET ORAL at 08:21

## 2018-06-09 RX ADMIN — LEVOTHYROXINE SODIUM 25 MCG: 50 TABLET ORAL at 08:22

## 2018-06-09 RX ADMIN — Medication 10 ML: at 08:21

## 2018-06-12 ENCOUNTER — OFFICE VISIT (OUTPATIENT)
Dept: FAMILY MEDICINE CLINIC | Age: 78
End: 2018-06-12
Payer: MEDICARE

## 2018-06-12 VITALS
HEART RATE: 80 BPM | DIASTOLIC BLOOD PRESSURE: 80 MMHG | RESPIRATION RATE: 18 BRPM | WEIGHT: 173 LBS | SYSTOLIC BLOOD PRESSURE: 132 MMHG | OXYGEN SATURATION: 94 % | BODY MASS INDEX: 27.92 KG/M2 | TEMPERATURE: 97.9 F

## 2018-06-12 DIAGNOSIS — F41.8 DEPRESSION WITH ANXIETY: Primary | ICD-10-CM

## 2018-06-12 DIAGNOSIS — I25.10 CAD IN NATIVE ARTERY: ICD-10-CM

## 2018-06-12 DIAGNOSIS — I10 ESSENTIAL (PRIMARY) HYPERTENSION: ICD-10-CM

## 2018-06-12 DIAGNOSIS — E11.9 TYPE 2 DIABETES MELLITUS WITHOUT COMPLICATION, WITHOUT LONG-TERM CURRENT USE OF INSULIN (HCC): ICD-10-CM

## 2018-06-12 PROCEDURE — 1090F PRES/ABSN URINE INCON ASSESS: CPT | Performed by: FAMILY MEDICINE

## 2018-06-12 PROCEDURE — 99214 OFFICE O/P EST MOD 30 MIN: CPT | Performed by: FAMILY MEDICINE

## 2018-06-12 PROCEDURE — 1123F ACP DISCUSS/DSCN MKR DOCD: CPT | Performed by: FAMILY MEDICINE

## 2018-06-12 PROCEDURE — G8428 CUR MEDS NOT DOCUMENT: HCPCS | Performed by: FAMILY MEDICINE

## 2018-06-12 PROCEDURE — G8598 ASA/ANTIPLAT THER USED: HCPCS | Performed by: FAMILY MEDICINE

## 2018-06-12 PROCEDURE — G8417 CALC BMI ABV UP PARAM F/U: HCPCS | Performed by: FAMILY MEDICINE

## 2018-06-12 PROCEDURE — 1036F TOBACCO NON-USER: CPT | Performed by: FAMILY MEDICINE

## 2018-06-12 PROCEDURE — 4040F PNEUMOC VAC/ADMIN/RCVD: CPT | Performed by: FAMILY MEDICINE

## 2018-06-12 PROCEDURE — G8400 PT W/DXA NO RESULTS DOC: HCPCS | Performed by: FAMILY MEDICINE

## 2018-06-12 ASSESSMENT — ENCOUNTER SYMPTOMS
ABDOMINAL PAIN: 0
ANAL BLEEDING: 0
CONSTIPATION: 0
SHORTNESS OF BREATH: 1
COUGH: 0
DIARRHEA: 0
CHEST TIGHTNESS: 0
NAUSEA: 0

## 2018-06-20 ENCOUNTER — HOSPITAL ENCOUNTER (OUTPATIENT)
Dept: WOMENS IMAGING | Age: 78
Discharge: HOME | End: 2018-06-20
Payer: MEDICARE

## 2018-06-20 DIAGNOSIS — Z78.0 ASYMPTOMATIC MENOPAUSAL STATE: ICD-10-CM

## 2018-06-20 DIAGNOSIS — Z12.31 VISIT FOR SCREENING MAMMOGRAM: ICD-10-CM

## 2018-06-20 PROCEDURE — 77080 DXA BONE DENSITY AXIAL: CPT

## 2018-06-20 PROCEDURE — 77067 SCR MAMMO BI INCL CAD: CPT

## 2018-06-21 ENCOUNTER — TELEPHONE (OUTPATIENT)
Dept: FAMILY MEDICINE CLINIC | Age: 78
End: 2018-06-21

## 2018-07-05 ENCOUNTER — OFFICE VISIT (OUTPATIENT)
Dept: CARDIOLOGY | Age: 78
End: 2018-07-05
Payer: MEDICARE

## 2018-07-05 VITALS
SYSTOLIC BLOOD PRESSURE: 120 MMHG | BODY MASS INDEX: 27.68 KG/M2 | DIASTOLIC BLOOD PRESSURE: 76 MMHG | WEIGHT: 172.2 LBS | HEART RATE: 80 BPM | HEIGHT: 66 IN

## 2018-07-05 DIAGNOSIS — I10 ESSENTIAL HYPERTENSION: Primary | ICD-10-CM

## 2018-07-05 DIAGNOSIS — Z98.890 S/P LEFT HEART CATHETERIZATION BY PERCUTANEOUS APPROACH: ICD-10-CM

## 2018-07-05 DIAGNOSIS — I25.10 CAD IN NATIVE ARTERY: ICD-10-CM

## 2018-07-05 PROCEDURE — 1101F PT FALLS ASSESS-DOCD LE1/YR: CPT | Performed by: NURSE PRACTITIONER

## 2018-07-05 PROCEDURE — 99213 OFFICE O/P EST LOW 20 MIN: CPT | Performed by: NURSE PRACTITIONER

## 2018-07-05 PROCEDURE — 1036F TOBACCO NON-USER: CPT | Performed by: NURSE PRACTITIONER

## 2018-07-05 PROCEDURE — G8417 CALC BMI ABV UP PARAM F/U: HCPCS | Performed by: NURSE PRACTITIONER

## 2018-07-05 PROCEDURE — 4040F PNEUMOC VAC/ADMIN/RCVD: CPT | Performed by: NURSE PRACTITIONER

## 2018-07-05 PROCEDURE — G8427 DOCREV CUR MEDS BY ELIG CLIN: HCPCS | Performed by: NURSE PRACTITIONER

## 2018-07-05 PROCEDURE — G8399 PT W/DXA RESULTS DOCUMENT: HCPCS | Performed by: NURSE PRACTITIONER

## 2018-07-05 PROCEDURE — G8598 ASA/ANTIPLAT THER USED: HCPCS | Performed by: NURSE PRACTITIONER

## 2018-07-05 PROCEDURE — 1090F PRES/ABSN URINE INCON ASSESS: CPT | Performed by: NURSE PRACTITIONER

## 2018-07-05 PROCEDURE — 1123F ACP DISCUSS/DSCN MKR DOCD: CPT | Performed by: NURSE PRACTITIONER

## 2018-07-05 RX ORDER — ATORVASTATIN CALCIUM 80 MG/1
80 TABLET, FILM COATED ORAL DAILY
Qty: 90 TABLET | Refills: 3 | Status: SHIPPED | OUTPATIENT
Start: 2018-07-05 | End: 2019-08-01 | Stop reason: SDUPTHER

## 2018-07-05 NOTE — PROGRESS NOTES
CAD (coronary artery disease)     CHF (congestive heart failure) (HCC)     COPD (chronic obstructive pulmonary disease) (HCC)     DM (diabetes mellitus) (HCC)     GERD (gastroesophageal reflux disease)     HTN (hypertension)     Hyperlipidemia     MI (myocardial infarction)     x2    Thyroid disease        Past Surgical History:   Procedure Laterality Date    CATARACT REMOVAL      CHOLECYSTECTOMY      HIP SURGERY      HYSTERECTOMY      JOINT REPLACEMENT Right     Right knee replacement       Family History   Problem Relation Age of Onset    Heart Disease Mother     Heart Disease Father        Social History     Social History    Marital status:      Spouse name: N/A    Number of children: N/A    Years of education: N/A     Occupational History    Not on file.      Social History Main Topics    Smoking status: Never Smoker    Smokeless tobacco: Never Used    Alcohol use No    Drug use: No    Sexual activity: Not on file     Other Topics Concern    Not on file     Social History Narrative    No narrative on file       Allergies   Allergen Reactions    Tape [Adhesive Tape]          Current Outpatient Prescriptions:     atorvastatin (LIPITOR) 80 MG tablet, Take 1 tablet by mouth daily, Disp: 90 tablet, Rfl: 3    sertraline (ZOLOFT) 50 MG tablet, Take 1 tablet by mouth daily, Disp: 90 tablet, Rfl: 3    linagliptin (TRADJENTA) 5 MG tablet, Take 1 tablet by mouth daily, Disp: 90 tablet, Rfl: 3    indomethacin (INDOCIN) 50 MG capsule, Take 1 capsule by mouth 3 times daily, Disp: 270 capsule, Rfl: 3    carvedilol (COREG) 6.25 MG tablet, Take 1 tablet by mouth 2 times daily (with meals), Disp: 180 tablet, Rfl: 3    lisinopril (PRINIVIL;ZESTRIL) 40 MG tablet, Take 1 tablet by mouth daily (Patient taking differently: Take 40 mg by mouth daily 1/2 tablet daily), Disp: 90 tablet, Rfl: 3    levothyroxine (SYNTHROID) 25 MCG tablet, Take 1 tablet by mouth Daily, Disp: 90 tablet, Rfl: 3   rate, regular rhythm, normal heart sounds. no murmur ascultated. No gallop and no friction rub.  no carotid bruits. no peripheral edema. Pulmonary/Chest:  Lungs clear to auscultation bilaterally without evidence of respiratory distress. She without wheezes. She without rales or ronchi. Musculoskeletal: Normal range of motion. Gait is normal no assitive device. Neurological: She is alert and oriented to person, place, and time. Skin: Skin is warm and dry without rash or pallor. right hand warm and dry with palpable pulses   Psychiatric: She has a normal mood and affect. Her behavior is normal. Thought content normal.     Lab Results   Component Value Date    CREATININE 0.8 06/04/2018    CREATININE 0.7 04/16/2018    CREATININE 0.6 12/23/2017    HGB 13.7 06/04/2018    HGB 13.9 01/24/2018    HGB 12.4 12/23/2017       Cath 6/8/2018     Conclusions      1. 2 vessel CAD status post PCI to the mid LAD and balloon angioplasty for   in-stent restenosis to the distal LAD   2. Elevated LVEDP      Recommendations      Routine post cath care.   Optimize medical management for CAD.   Switch to Lipitor   Loaded with 300 mg Plavix      Signatures      ----------------------------------------------------------------   Electronically signed by Myrtle Hendrix MD(Performing Physician)   on 06/08/2018 11:06      Cath 12/12/2017  Conclusions      1. Severe 1V CAD s/p PCI to the LAD with ENOC x2 to the mid to distal LAD   2. Normal LVEDP      Recommendations      Routine post cath care.   Optimize medical management for CAD.   Aggressive risk factor modification.      Signatures      ----------------------------------------------------------------   Electronically signed by Myrtle Hendrix MD(Performing Physician)  Randall Calderon 12/22/2017 09:10    Assessment    1. Essential hypertension    2. CAD in native artery    3.  S/P left heart catheterization by percutaneous approach          Plan:  CAD-S/P Cath with PCI to the mid LAD and balloon

## 2018-07-12 ENCOUNTER — OFFICE VISIT (OUTPATIENT)
Dept: FAMILY MEDICINE CLINIC | Age: 78
End: 2018-07-12
Payer: MEDICARE

## 2018-07-12 VITALS
SYSTOLIC BLOOD PRESSURE: 105 MMHG | RESPIRATION RATE: 18 BRPM | BODY MASS INDEX: 27.84 KG/M2 | WEIGHT: 172.5 LBS | DIASTOLIC BLOOD PRESSURE: 68 MMHG | HEART RATE: 101 BPM | OXYGEN SATURATION: 93 % | TEMPERATURE: 96.2 F

## 2018-07-12 DIAGNOSIS — E11.9 TYPE 2 DIABETES MELLITUS WITHOUT COMPLICATION, WITHOUT LONG-TERM CURRENT USE OF INSULIN (HCC): ICD-10-CM

## 2018-07-12 DIAGNOSIS — I10 ESSENTIAL HYPERTENSION: ICD-10-CM

## 2018-07-12 DIAGNOSIS — K21.9 GASTROESOPHAGEAL REFLUX DISEASE WITHOUT ESOPHAGITIS: ICD-10-CM

## 2018-07-12 DIAGNOSIS — G47.33 OSA ON CPAP: Primary | ICD-10-CM

## 2018-07-12 DIAGNOSIS — F41.8 DEPRESSION WITH ANXIETY: ICD-10-CM

## 2018-07-12 DIAGNOSIS — E03.9 PRIMARY HYPOTHYROIDISM: ICD-10-CM

## 2018-07-12 DIAGNOSIS — Z99.89 OSA ON CPAP: Primary | ICD-10-CM

## 2018-07-12 PROCEDURE — 1090F PRES/ABSN URINE INCON ASSESS: CPT | Performed by: FAMILY MEDICINE

## 2018-07-12 PROCEDURE — 99214 OFFICE O/P EST MOD 30 MIN: CPT | Performed by: FAMILY MEDICINE

## 2018-07-12 PROCEDURE — 1101F PT FALLS ASSESS-DOCD LE1/YR: CPT | Performed by: FAMILY MEDICINE

## 2018-07-12 PROCEDURE — G8428 CUR MEDS NOT DOCUMENT: HCPCS | Performed by: FAMILY MEDICINE

## 2018-07-12 PROCEDURE — G8399 PT W/DXA RESULTS DOCUMENT: HCPCS | Performed by: FAMILY MEDICINE

## 2018-07-12 PROCEDURE — G8417 CALC BMI ABV UP PARAM F/U: HCPCS | Performed by: FAMILY MEDICINE

## 2018-07-12 PROCEDURE — G8598 ASA/ANTIPLAT THER USED: HCPCS | Performed by: FAMILY MEDICINE

## 2018-07-12 PROCEDURE — 4040F PNEUMOC VAC/ADMIN/RCVD: CPT | Performed by: FAMILY MEDICINE

## 2018-07-12 PROCEDURE — 1123F ACP DISCUSS/DSCN MKR DOCD: CPT | Performed by: FAMILY MEDICINE

## 2018-07-12 PROCEDURE — 1036F TOBACCO NON-USER: CPT | Performed by: FAMILY MEDICINE

## 2018-07-12 RX ORDER — LISINOPRIL 40 MG/1
40 TABLET ORAL DAILY
Qty: 45 TABLET | Refills: 3 | Status: CANCELLED | OUTPATIENT
Start: 2018-07-12

## 2018-07-12 RX ORDER — LISINOPRIL 20 MG/1
20 TABLET ORAL DAILY
Qty: 90 TABLET | Refills: 3 | Status: SHIPPED | OUTPATIENT
Start: 2018-07-12 | End: 2018-09-20 | Stop reason: SDUPTHER

## 2018-07-12 NOTE — PATIENT INSTRUCTIONS
Go to room 201 for labs prior to next visit. Be sure to fast for at least 12 hours prior to laboratory appointment. Would recommend getting labs about 1 week prior to the appointment time to ensure they are available at the time of the appointment. No appointment is necessary for laboratory work as the orders have already been placed.     Lab opens at 6:30 am and closes at 5:00 pm.

## 2018-07-12 NOTE — PROGRESS NOTES
(PLAVIX) 75 MG tablet Take 1 tablet by mouth daily 30 tablet 3    aspirin 81 MG tablet Take 162 mg by mouth daily       vitamin B-12 (CYANOCOBALAMIN) 1000 MCG tablet Take 1,000 mcg by mouth daily      Coenzyme Q10 (CO Q 10) 10 MG CAPS Take 1 capsule by mouth daily       GLUCOSAMINE-CALCIUM-VIT D PO Take 1 tablet by mouth daily       empagliflozin (JARDIANCE) 25 MG tablet Take 25 mg by mouth daily      omeprazole (PRILOSEC) 40 MG delayed release capsule Take 40 mg by mouth daily       No current facility-administered medications for this visit. Allergies   Allergen Reactions    Tape [Adhesive [address] Maintenance   Topic Date Due    Colon cancer screen colonoscopy  12/28/2016    DTaP/Tdap/Td vaccine (1 - Tdap) 11/20/2027 (Originally 11/21/2017)    Flu vaccine (1) 09/01/2018    Potassium monitoring  06/04/2019    Creatinine monitoring  06/04/2019    Breast cancer screen  06/20/2020    DEXA (modify frequency per FRAX score)  Completed    Pneumococcal low/med risk  Completed    Shingles Vaccine  Completed       Subjective:      Review of Systems   Constitutional: Positive for fatigue. Negative for chills and fever. HENT: Negative for congestion. Respiratory: Positive for shortness of breath. Negative for cough and chest tightness. Cardiovascular: Negative for chest pain, palpitations and leg swelling. Gastrointestinal: Negative for abdominal pain, anal bleeding, constipation, diarrhea and nausea. Genitourinary: Negative for difficulty urinating. Psychiatric/Behavioral: Negative. See HPI for visit specific review of symptoms. All others negative      Objective:   /68   Pulse 101   Temp 96.2 °F (35.7 °C) (Temporal)   Resp 18   Wt 172 lb 8 oz (78.2 kg)   SpO2 93%   BMI 27.84 kg/m²   Physical Exam   Constitutional: She appears well-developed. She does not appear ill. Eyes: Pupils are equal, round, and reactive to light. Neck: Normal range of motion.

## 2018-07-15 ASSESSMENT — ENCOUNTER SYMPTOMS
CHEST TIGHTNESS: 0
DIARRHEA: 0
ANAL BLEEDING: 0
NAUSEA: 0
COUGH: 0
CONSTIPATION: 0
SHORTNESS OF BREATH: 1
ABDOMINAL PAIN: 0

## 2018-07-20 ENCOUNTER — TELEPHONE (OUTPATIENT)
Dept: FAMILY MEDICINE CLINIC | Age: 78
End: 2018-07-20

## 2018-07-28 PROBLEM — Z98.890 S/P LEFT HEART CATHETERIZATION BY PERCUTANEOUS APPROACH: Status: ACTIVE | Noted: 2018-07-28

## 2018-07-31 ENCOUNTER — OFFICE VISIT (OUTPATIENT)
Dept: FAMILY MEDICINE CLINIC | Age: 78
End: 2018-07-31
Payer: MEDICARE

## 2018-07-31 VITALS
SYSTOLIC BLOOD PRESSURE: 180 MMHG | WEIGHT: 171.38 LBS | HEART RATE: 118 BPM | TEMPERATURE: 97.5 F | DIASTOLIC BLOOD PRESSURE: 92 MMHG | OXYGEN SATURATION: 96 % | BODY MASS INDEX: 27.66 KG/M2

## 2018-07-31 DIAGNOSIS — J30.2 ACUTE SEASONAL ALLERGIC RHINITIS, UNSPECIFIED TRIGGER: ICD-10-CM

## 2018-07-31 DIAGNOSIS — E11.9 TYPE 2 DIABETES MELLITUS WITHOUT COMPLICATION, WITHOUT LONG-TERM CURRENT USE OF INSULIN (HCC): Primary | ICD-10-CM

## 2018-07-31 DIAGNOSIS — J44.9 CHRONIC OBSTRUCTIVE PULMONARY DISEASE, UNSPECIFIED COPD TYPE (HCC): ICD-10-CM

## 2018-07-31 PROCEDURE — G8427 DOCREV CUR MEDS BY ELIG CLIN: HCPCS | Performed by: NURSE PRACTITIONER

## 2018-07-31 PROCEDURE — 4040F PNEUMOC VAC/ADMIN/RCVD: CPT | Performed by: NURSE PRACTITIONER

## 2018-07-31 PROCEDURE — G8399 PT W/DXA RESULTS DOCUMENT: HCPCS | Performed by: NURSE PRACTITIONER

## 2018-07-31 PROCEDURE — G8417 CALC BMI ABV UP PARAM F/U: HCPCS | Performed by: NURSE PRACTITIONER

## 2018-07-31 PROCEDURE — 1101F PT FALLS ASSESS-DOCD LE1/YR: CPT | Performed by: NURSE PRACTITIONER

## 2018-07-31 PROCEDURE — G8598 ASA/ANTIPLAT THER USED: HCPCS | Performed by: NURSE PRACTITIONER

## 2018-07-31 PROCEDURE — 1123F ACP DISCUSS/DSCN MKR DOCD: CPT | Performed by: NURSE PRACTITIONER

## 2018-07-31 PROCEDURE — 3023F SPIROM DOC REV: CPT | Performed by: NURSE PRACTITIONER

## 2018-07-31 PROCEDURE — 1036F TOBACCO NON-USER: CPT | Performed by: NURSE PRACTITIONER

## 2018-07-31 PROCEDURE — 99214 OFFICE O/P EST MOD 30 MIN: CPT | Performed by: NURSE PRACTITIONER

## 2018-07-31 PROCEDURE — G8926 SPIRO NO PERF OR DOC: HCPCS | Performed by: NURSE PRACTITIONER

## 2018-07-31 PROCEDURE — 1090F PRES/ABSN URINE INCON ASSESS: CPT | Performed by: NURSE PRACTITIONER

## 2018-07-31 RX ORDER — LANCETS 28 GAUGE
EACH MISCELLANEOUS
Qty: 180 EACH | Refills: 1 | Status: SHIPPED | OUTPATIENT
Start: 2018-07-31 | End: 2018-09-24 | Stop reason: SDUPTHER

## 2018-07-31 RX ORDER — FEXOFENADINE HCL 180 MG/1
180 TABLET ORAL DAILY PRN
Qty: 90 TABLET | Refills: 1 | Status: SHIPPED | OUTPATIENT
Start: 2018-07-31 | End: 2019-01-03 | Stop reason: ALTCHOICE

## 2018-07-31 RX ORDER — IPRATROPIUM BROMIDE 42 UG/1
2 SPRAY, METERED NASAL 3 TIMES DAILY PRN
Qty: 3 BOTTLE | Refills: 1 | Status: SHIPPED | OUTPATIENT
Start: 2018-07-31 | End: 2021-06-17 | Stop reason: ALTCHOICE

## 2018-07-31 ASSESSMENT — ENCOUNTER SYMPTOMS
COUGH: 0
SORE THROAT: 0
CHEST TIGHTNESS: 0
RHINORRHEA: 1
ABDOMINAL PAIN: 0
DIARRHEA: 0
WHEEZING: 0
NAUSEA: 0
SHORTNESS OF BREATH: 0

## 2018-07-31 NOTE — PROGRESS NOTES
by mouth daily       No current facility-administered medications for this visit. Allergies   Allergen Reactions    Tape Laishane [place] Tape]        Past Surgical History:   Procedure Laterality Date    CATARACT REMOVAL      CHOLECYSTECTOMY      HIP SURGERY      HYSTERECTOMY      JOINT REPLACEMENT Right     Right knee replacement       Social History   Substance Use Topics    Smoking status: Never Smoker    Smokeless tobacco: Never Used    Alcohol use No       Family History   Problem Relation Age of Onset    Heart Disease Mother     Heart Disease Father        BP (!) 180/92   Pulse 118   Temp 97.5 °F (36.4 °C) (Temporal)   Wt 171 lb 6 oz (77.7 kg)   SpO2 96%   BMI 27.66 kg/m²     Physical Exam   Constitutional: She appears well-developed and well-nourished. HENT:   Head: Normocephalic. Right Ear: External ear normal.   Left Ear: External ear normal.   Nose: Nose normal.   Mouth/Throat: Oropharynx is clear and moist. No oropharyngeal exudate. Eyes: Conjunctivae and EOM are normal. Pupils are equal, round, and reactive to light. Neck: Normal range of motion. Neck supple. No JVD present. No tracheal deviation present. No thyromegaly present. Cardiovascular: Normal rate, regular rhythm, normal heart sounds and intact distal pulses. No murmur heard. Pulmonary/Chest: Effort normal and breath sounds normal. No respiratory distress. Musculoskeletal: Normal range of motion. She exhibits no edema. Lymphadenopathy:     She has no cervical adenopathy. Skin: Skin is warm and dry. No rash noted. Psychiatric: She has a normal mood and affect. Vitals reviewed. Assessment:    ICD-10-CM ICD-9-CM    1. Type 2 diabetes mellitus without complication, without long-term current use of insulin ([facility]) E11.9 250.00 metFORMIN (GLUCOPHAGE) 500 MG tablet   2. Chronic obstructive pulmonary disease, unspecified COPD type ([facility] J44.9 496    3.  Acute seasonal allergic rhinitis, unspecified trigger

## 2018-08-06 ENCOUNTER — TELEPHONE (OUTPATIENT)
Dept: CARDIOLOGY | Age: 78
End: 2018-08-06

## 2018-08-06 NOTE — TELEPHONE ENCOUNTER
SF no longer in clinic. Called Pt regarding appt on 11/7/2018. Unable to leaving v/m for Pt.  Pt needs to be moved to NP at main office or LMP

## 2018-08-21 ENCOUNTER — HOSPITAL ENCOUNTER (OUTPATIENT)
Dept: SLEEP CENTER | Age: 78
Discharge: HOME | End: 2018-08-21
Payer: MEDICARE

## 2018-09-12 DIAGNOSIS — E11.9 TYPE 2 DIABETES MELLITUS WITHOUT COMPLICATION, WITHOUT LONG-TERM CURRENT USE OF INSULIN (HCC): ICD-10-CM

## 2018-09-12 LAB
ALBUMIN SERPL-MCNC: 4.3 G/DL (ref 3.5–5.2)
ALP BLD-CCNC: 85 U/L (ref 35–104)
ALT SERPL-CCNC: 19 U/L (ref 5–33)
ANION GAP SERPL CALCULATED.3IONS-SCNC: 16 MMOL/L (ref 7–19)
AST SERPL-CCNC: 13 U/L (ref 5–32)
BILIRUB SERPL-MCNC: 0.4 MG/DL (ref 0.2–1.2)
BUN BLDV-MCNC: 24 MG/DL (ref 8–23)
CALCIUM SERPL-MCNC: 9.8 MG/DL (ref 8.8–10.2)
CHLORIDE BLD-SCNC: 102 MMOL/L (ref 98–111)
CHOLESTEROL, TOTAL: 116 MG/DL (ref 160–199)
CO2: 21 MMOL/L (ref 22–29)
CREAT SERPL-MCNC: 0.8 MG/DL (ref 0.5–0.9)
CREATININE URINE: 136.9 MG/DL (ref 4.2–622)
GFR NON-AFRICAN AMERICAN: >60
GLUCOSE BLD-MCNC: 202 MG/DL (ref 74–109)
HBA1C MFR BLD: 7.5 % (ref 4–6)
HDLC SERPL-MCNC: 37 MG/DL (ref 65–121)
LDL CHOLESTEROL CALCULATED: 30 MG/DL
MICROALBUMIN UR-MCNC: 2.7 MG/DL (ref 0–19)
MICROALBUMIN/CREAT UR-RTO: 19.7 MG/G
POTASSIUM SERPL-SCNC: 4.1 MMOL/L (ref 3.5–5)
SODIUM BLD-SCNC: 139 MMOL/L (ref 136–145)
TOTAL PROTEIN: 7.7 G/DL (ref 6.6–8.7)
TRIGL SERPL-MCNC: 243 MG/DL (ref 0–149)

## 2018-09-20 ENCOUNTER — OFFICE VISIT (OUTPATIENT)
Dept: FAMILY MEDICINE CLINIC | Age: 78
End: 2018-09-20
Payer: MEDICARE

## 2018-09-20 VITALS
HEIGHT: 66 IN | HEART RATE: 75 BPM | TEMPERATURE: 98.1 F | WEIGHT: 173 LBS | DIASTOLIC BLOOD PRESSURE: 82 MMHG | OXYGEN SATURATION: 95 % | SYSTOLIC BLOOD PRESSURE: 138 MMHG | BODY MASS INDEX: 27.8 KG/M2

## 2018-09-20 DIAGNOSIS — E78.01 FAMILIAL HYPERCHOLESTEROLEMIA: ICD-10-CM

## 2018-09-20 DIAGNOSIS — Z23 NEEDS FLU SHOT: Primary | ICD-10-CM

## 2018-09-20 DIAGNOSIS — E03.9 PRIMARY HYPOTHYROIDISM: ICD-10-CM

## 2018-09-20 DIAGNOSIS — E11.9 TYPE 2 DIABETES MELLITUS WITHOUT COMPLICATION, WITHOUT LONG-TERM CURRENT USE OF INSULIN (HCC): ICD-10-CM

## 2018-09-20 DIAGNOSIS — Z99.89 OSA ON CPAP: ICD-10-CM

## 2018-09-20 DIAGNOSIS — I10 ESSENTIAL HYPERTENSION: ICD-10-CM

## 2018-09-20 DIAGNOSIS — F41.8 DEPRESSION WITH ANXIETY: ICD-10-CM

## 2018-09-20 DIAGNOSIS — G47.33 OSA ON CPAP: ICD-10-CM

## 2018-09-20 PROCEDURE — G8428 CUR MEDS NOT DOCUMENT: HCPCS | Performed by: FAMILY MEDICINE

## 2018-09-20 PROCEDURE — G0008 ADMIN INFLUENZA VIRUS VAC: HCPCS | Performed by: FAMILY MEDICINE

## 2018-09-20 PROCEDURE — 1101F PT FALLS ASSESS-DOCD LE1/YR: CPT | Performed by: FAMILY MEDICINE

## 2018-09-20 PROCEDURE — G8598 ASA/ANTIPLAT THER USED: HCPCS | Performed by: FAMILY MEDICINE

## 2018-09-20 PROCEDURE — 1090F PRES/ABSN URINE INCON ASSESS: CPT | Performed by: FAMILY MEDICINE

## 2018-09-20 PROCEDURE — G8399 PT W/DXA RESULTS DOCUMENT: HCPCS | Performed by: FAMILY MEDICINE

## 2018-09-20 PROCEDURE — G8417 CALC BMI ABV UP PARAM F/U: HCPCS | Performed by: FAMILY MEDICINE

## 2018-09-20 PROCEDURE — 4040F PNEUMOC VAC/ADMIN/RCVD: CPT | Performed by: FAMILY MEDICINE

## 2018-09-20 PROCEDURE — 99214 OFFICE O/P EST MOD 30 MIN: CPT | Performed by: FAMILY MEDICINE

## 2018-09-20 PROCEDURE — 90662 IIV NO PRSV INCREASED AG IM: CPT | Performed by: FAMILY MEDICINE

## 2018-09-20 PROCEDURE — 1036F TOBACCO NON-USER: CPT | Performed by: FAMILY MEDICINE

## 2018-09-20 PROCEDURE — 1123F ACP DISCUSS/DSCN MKR DOCD: CPT | Performed by: FAMILY MEDICINE

## 2018-09-20 RX ORDER — LISINOPRIL 40 MG/1
40 TABLET ORAL DAILY
Qty: 90 TABLET | Refills: 3 | Status: SHIPPED | OUTPATIENT
Start: 2018-09-20 | End: 2019-10-01 | Stop reason: SDUPTHER

## 2018-09-20 NOTE — PATIENT INSTRUCTIONS
1.  Increase lisinopril to 40mg once a day. 2.  The following may help for colds and allergies:    Antihistamines: Help nasal drainage, watery eyes, sneezing, sore throat.     Benadryl (diphenhydramine) 25mg every 6 hours as needed    Zyrtec (cetirizine) 10mg once a day   Allegra (fexofenadine) 180mg once a day   Claritin or Alavert (loratadine) 10mg once a day     Expectorants: Loosen mucous and help nasal and chest congestion   Mucinex 600mg twice a day     Nasal Steroid: Help nasal drainage, nasal congestion, sneezing, and sinus pressure   Nasacort 1 spray each nostril once a day   Flonase 1 spray each nostril once a day   Rhinocort 1 spray each nostril once a day

## 2018-09-20 NOTE — PROGRESS NOTES
(myocardial infarction) ([facility] [address]     x2    Thyroid disease       Past Surgical History:   Procedure Laterality Date    CATARACT REMOVAL      CHOLECYSTECTOMY      HIP SURGERY      HYSTERECTOMY      JOINT REPLACEMENT Right     Right knee replacement       Family History   Problem Relation Age of Onset    Heart Disease Mother     Heart Disease Father        Social History   Substance Use Topics    Smoking status: Never Smoker    Smokeless tobacco: Never Used    Alcohol use No      Current Outpatient Prescriptions   Medication Sig Dispense Refill    lisinopril (PRINIVIL;ZESTRIL) 40 MG tablet Take 1 tablet by mouth daily 90 tablet 3    metFORMIN (GLUCOPHAGE) 500 MG tablet Take 2 tabs by mouth qam and 1 tab qpm 270 tablet 1    FREESTYLE LANCETS MISC Use 1-2 times daily to check blood sugar. 180 each 1    blood glucose test strips (FREESTYLE LITE) strip Use 1-2 times daily to check blood sugar.  180 each 1    fexofenadine (ALLEGRA ALLERGY) 180 MG tablet Take 1 tablet by mouth daily as needed (allergies) 90 tablet 1    atorvastatin (LIPITOR) 80 MG tablet Take 1 tablet by mouth daily 90 tablet 3    sertraline (ZOLOFT) 50 MG tablet Take 1 tablet by mouth daily 90 tablet 3    linagliptin (TRADJENTA) 5 MG tablet Take 1 tablet by mouth daily 90 tablet 3    indomethacin (INDOCIN) 50 MG capsule Take 1 capsule by mouth 3 times daily 270 capsule 3    carvedilol (COREG) 6.25 MG tablet Take 1 tablet by mouth 2 times daily (with meals) 180 tablet 3    levothyroxine (SYNTHROID) 25 MCG tablet Take 1 tablet by mouth Daily 90 tablet 3    albuterol sulfate HFA (VENTOLIN HFA) 108 (90 Base) MCG/ACT inhaler Inhale 2 puffs into the lungs every 6 hours as needed for Wheezing 1 Inhaler 3    Blood Glucose Monitoring Suppl ADE 1 kit by Does not apply route daily ICD-10-CM: E11.9 1 Device 0    aspirin 81 MG tablet Take 162 mg by mouth daily       GLUCOSAMINE-CALCIUM-VIT D PO Take 1 tablet by mouth daily       empagliflozin diagnoses and conditions are stable with no further orders unless indicated:  1. Needs flu shot    - INFLUENZA, HIGH DOSE, 65 YRS +, IM, PF, PREFILL SYR, 0.5ML (FLUZONE HD)    2. Essential hypertension  BP Readings from Last 3 Encounters:   09/20/18 138/82   07/31/18 (!) 180/92   07/12/18 105/68     Stable  - lisinopril (PRINIVIL;ZESTRIL) 40 MG tablet; Take 1 tablet by mouth daily  Dispense: 90 tablet; Refill: 3    3. Type 2 diabetes mellitus without complication, without long-term current use of insulin (HCC)  Lab Results   Component Value Date    LABA1C 7.5 (H) 09/12/2018     No results found for: EAG  Blood sugar continues to improve. Continue with current medication encouraged her to stick with a strict ADA diet  - Hemoglobin A1C; Future  - Comprehensive Metabolic Panel; Future    4. MAGALY on CPAP  Compliant satisfied with current results    5. Depression with anxiety  Stable on current medication    6. Primary hypothyroidism  Lab Results   Component Value Date    TSH 3.170 11/21/2017     Stable will check prior to next visit. Symptoms are stable  - TSH without Reflex; Future    7. Familial hypercholesterolemia  Lab Results   Component Value Date    CHOL 116 (L) 09/12/2018    CHOL 166 04/16/2018    CHOL 167 11/21/2017     Lab Results   Component Value Date    TRIG 243 (H) 09/12/2018    TRIG 484 (H) 04/16/2018    TRIG 201 (H) 11/21/2017     Lab Results   Component Value Date    HDL 37 (L) 09/12/2018    HDL 35 (L) 04/16/2018    HDL 44 (L) 11/21/2017     Lab Results   Component Value Date    LDLCALC 30 09/12/2018    LDLCALC see below 04/16/2018    LDLCALC 83 11/21/2017     No results found for: LABVLDL, VLDL  No results found for: CHOLHDLRATIO    Continue high intensity statin therapy. Continual improvement in her cholesterol and triglycerides          Return in about 3 months (around 12/20/2018) for Routine follow up - 15 minute visit. Discussed use, benefit, and side effects of prescribed medications. All patient questions answered. Pt voiced understanding. Reviewed health maintenance. Instructed to continue current medications, diet and exercise. Patient agreed with treatment plan. Follow up as directed.

## 2018-09-24 DIAGNOSIS — E11.9 TYPE 2 DIABETES MELLITUS WITHOUT COMPLICATION, WITHOUT LONG-TERM CURRENT USE OF INSULIN (HCC): Primary | ICD-10-CM

## 2018-09-24 RX ORDER — LANCETS 28 GAUGE
EACH MISCELLANEOUS
Qty: 180 EACH | Refills: 1 | Status: SHIPPED | OUTPATIENT
Start: 2018-09-24 | End: 2019-05-18 | Stop reason: SDUPTHER

## 2018-10-30 ENCOUNTER — OFFICE VISIT (OUTPATIENT)
Dept: FAMILY MEDICINE CLINIC | Age: 78
End: 2018-10-30
Payer: MEDICARE

## 2018-10-30 VITALS
OXYGEN SATURATION: 96 % | HEART RATE: 78 BPM | BODY MASS INDEX: 27.44 KG/M2 | TEMPERATURE: 97.2 F | SYSTOLIC BLOOD PRESSURE: 130 MMHG | RESPIRATION RATE: 20 BRPM | DIASTOLIC BLOOD PRESSURE: 72 MMHG | WEIGHT: 170 LBS

## 2018-10-30 DIAGNOSIS — I10 ESSENTIAL HYPERTENSION: ICD-10-CM

## 2018-10-30 DIAGNOSIS — J30.1 SEASONAL ALLERGIC RHINITIS DUE TO POLLEN: Primary | ICD-10-CM

## 2018-10-30 DIAGNOSIS — E11.9 TYPE 2 DIABETES MELLITUS WITHOUT COMPLICATION, WITHOUT LONG-TERM CURRENT USE OF INSULIN (HCC): ICD-10-CM

## 2018-10-30 DIAGNOSIS — R19.7 DIARRHEA, UNSPECIFIED TYPE: ICD-10-CM

## 2018-10-30 DIAGNOSIS — F41.8 DEPRESSION WITH ANXIETY: ICD-10-CM

## 2018-10-30 PROCEDURE — 1090F PRES/ABSN URINE INCON ASSESS: CPT | Performed by: FAMILY MEDICINE

## 2018-10-30 PROCEDURE — G8598 ASA/ANTIPLAT THER USED: HCPCS | Performed by: FAMILY MEDICINE

## 2018-10-30 PROCEDURE — 1123F ACP DISCUSS/DSCN MKR DOCD: CPT | Performed by: FAMILY MEDICINE

## 2018-10-30 PROCEDURE — 1036F TOBACCO NON-USER: CPT | Performed by: FAMILY MEDICINE

## 2018-10-30 PROCEDURE — G8427 DOCREV CUR MEDS BY ELIG CLIN: HCPCS | Performed by: FAMILY MEDICINE

## 2018-10-30 PROCEDURE — G8482 FLU IMMUNIZE ORDER/ADMIN: HCPCS | Performed by: FAMILY MEDICINE

## 2018-10-30 PROCEDURE — 1101F PT FALLS ASSESS-DOCD LE1/YR: CPT | Performed by: FAMILY MEDICINE

## 2018-10-30 PROCEDURE — G8399 PT W/DXA RESULTS DOCUMENT: HCPCS | Performed by: FAMILY MEDICINE

## 2018-10-30 PROCEDURE — G8417 CALC BMI ABV UP PARAM F/U: HCPCS | Performed by: FAMILY MEDICINE

## 2018-10-30 PROCEDURE — 4040F PNEUMOC VAC/ADMIN/RCVD: CPT | Performed by: FAMILY MEDICINE

## 2018-10-30 PROCEDURE — 99214 OFFICE O/P EST MOD 30 MIN: CPT | Performed by: FAMILY MEDICINE

## 2018-10-30 RX ORDER — MONTELUKAST SODIUM 10 MG/1
10 TABLET ORAL DAILY
Qty: 90 TABLET | Refills: 3 | Status: SHIPPED | OUTPATIENT
Start: 2018-10-30 | End: 2021-11-04 | Stop reason: ALTCHOICE

## 2018-10-30 RX ORDER — AZELASTINE HCL 205.5 UG/1
2 SPRAY NASAL 2 TIMES DAILY
Qty: 90 ML | Refills: 5 | Status: SHIPPED | OUTPATIENT
Start: 2018-10-30 | End: 2021-11-04 | Stop reason: ALTCHOICE

## 2018-11-02 ENCOUNTER — TRANSCRIBE ORDERS (OUTPATIENT)
Dept: PULMONOLOGY | Facility: CLINIC | Age: 78
End: 2018-11-02

## 2018-11-02 ENCOUNTER — HOSPITAL ENCOUNTER (OUTPATIENT)
Dept: CT IMAGING | Age: 78
Discharge: HOME | End: 2018-11-02
Payer: MEDICARE

## 2018-11-02 DIAGNOSIS — R91.8 LUNG NODULES: Primary | ICD-10-CM

## 2018-11-02 DIAGNOSIS — R91.8 LUNG NODULES: ICD-10-CM

## 2018-11-02 PROCEDURE — 71250 CT THORAX DX C-: CPT

## 2018-11-03 PROBLEM — I20.0 UNSTABLE ANGINA (HCC): Status: RESOLVED | Noted: 2018-06-08 | Resolved: 2018-11-03

## 2018-11-03 ASSESSMENT — ENCOUNTER SYMPTOMS
CHEST TIGHTNESS: 0
ABDOMINAL PAIN: 0
SINUS PRESSURE: 1
CONSTIPATION: 0
SINUS PAIN: 0
RHINORRHEA: 1
FACIAL SWELLING: 0
COUGH: 0
SHORTNESS OF BREATH: 0
NAUSEA: 0
ANAL BLEEDING: 0
DIARRHEA: 1

## 2018-11-07 ENCOUNTER — OFFICE VISIT (OUTPATIENT)
Dept: CARDIOLOGY | Age: 78
End: 2018-11-07
Payer: MEDICARE

## 2018-11-07 VITALS
HEIGHT: 66 IN | HEART RATE: 74 BPM | WEIGHT: 167 LBS | BODY MASS INDEX: 26.84 KG/M2 | SYSTOLIC BLOOD PRESSURE: 142 MMHG | DIASTOLIC BLOOD PRESSURE: 80 MMHG

## 2018-11-07 DIAGNOSIS — E78.2 MIXED HYPERLIPIDEMIA: ICD-10-CM

## 2018-11-07 DIAGNOSIS — E11.9 TYPE 2 DIABETES MELLITUS WITHOUT COMPLICATION, WITHOUT LONG-TERM CURRENT USE OF INSULIN (HCC): ICD-10-CM

## 2018-11-07 DIAGNOSIS — I10 ESSENTIAL HYPERTENSION: ICD-10-CM

## 2018-11-07 DIAGNOSIS — R06.02 SHORTNESS OF BREATH: Primary | ICD-10-CM

## 2018-11-07 DIAGNOSIS — I25.10 CAD IN NATIVE ARTERY: ICD-10-CM

## 2018-11-07 PROCEDURE — G8482 FLU IMMUNIZE ORDER/ADMIN: HCPCS | Performed by: CLINICAL NURSE SPECIALIST

## 2018-11-07 PROCEDURE — G8598 ASA/ANTIPLAT THER USED: HCPCS | Performed by: CLINICAL NURSE SPECIALIST

## 2018-11-07 PROCEDURE — 1036F TOBACCO NON-USER: CPT | Performed by: CLINICAL NURSE SPECIALIST

## 2018-11-07 PROCEDURE — G8399 PT W/DXA RESULTS DOCUMENT: HCPCS | Performed by: CLINICAL NURSE SPECIALIST

## 2018-11-07 PROCEDURE — 1123F ACP DISCUSS/DSCN MKR DOCD: CPT | Performed by: CLINICAL NURSE SPECIALIST

## 2018-11-07 PROCEDURE — G8417 CALC BMI ABV UP PARAM F/U: HCPCS | Performed by: CLINICAL NURSE SPECIALIST

## 2018-11-07 PROCEDURE — 1101F PT FALLS ASSESS-DOCD LE1/YR: CPT | Performed by: CLINICAL NURSE SPECIALIST

## 2018-11-07 PROCEDURE — 99214 OFFICE O/P EST MOD 30 MIN: CPT | Performed by: CLINICAL NURSE SPECIALIST

## 2018-11-07 PROCEDURE — G8427 DOCREV CUR MEDS BY ELIG CLIN: HCPCS | Performed by: CLINICAL NURSE SPECIALIST

## 2018-11-07 PROCEDURE — 4040F PNEUMOC VAC/ADMIN/RCVD: CPT | Performed by: CLINICAL NURSE SPECIALIST

## 2018-11-07 PROCEDURE — 1090F PRES/ABSN URINE INCON ASSESS: CPT | Performed by: CLINICAL NURSE SPECIALIST

## 2018-11-07 RX ORDER — CARVEDILOL 12.5 MG/1
12.5 TABLET ORAL 2 TIMES DAILY WITH MEALS
Qty: 180 TABLET | Refills: 3 | Status: SHIPPED | OUTPATIENT
Start: 2018-11-07 | End: 2019-05-20 | Stop reason: DRUGHIGH

## 2018-11-07 ASSESSMENT — ENCOUNTER SYMPTOMS
COUGH: 0
WHEEZING: 0
FACIAL SWELLING: 0
CHEST TIGHTNESS: 0
NAUSEA: 0
ABDOMINAL PAIN: 0
VOMITING: 0
EYE REDNESS: 0
SHORTNESS OF BREATH: 1

## 2018-11-07 NOTE — PROGRESS NOTES
Inhaler 3    Blood Glucose Monitoring Suppl ADE 1 kit by Does not apply route daily ICD-10-CM: E11.9 1 Device 0    Lancet Device MISC 1 Device by Does not apply route once for 1 dose ICD-10-CM: E11.9 100 Device 0    clopidogrel (PLAVIX) 75 MG tablet Take 1 tablet by mouth daily 30 tablet 3    aspirin 81 MG tablet Take 162 mg by mouth daily       vitamin B-12 (CYANOCOBALAMIN) 1000 MCG tablet Take 1,000 mcg by mouth daily      Coenzyme Q10 (CO Q 10) 10 MG CAPS Take 1 capsule by mouth daily       GLUCOSAMINE-CALCIUM-VIT D PO Take 1 tablet by mouth daily       empagliflozin (JARDIANCE) 25 MG tablet Take 25 mg by mouth daily      omeprazole (PRILOSEC) 40 MG delayed release capsule Take 40 mg by mouth daily       No current facility-administered medications for this visit. Allergies: Tape [adhesive tape]    Review of Systems  Review of Systems   Constitutional: Positive for fatigue. Negative for activity change, diaphoresis, fever and unexpected weight change. HENT: Positive for congestion. Negative for facial swelling and nosebleeds. Eyes: Negative for redness and visual disturbance. Respiratory: Positive for shortness of breath. Negative for cough, chest tightness and wheezing. Cardiovascular: Negative for chest pain, palpitations and leg swelling. Gastrointestinal: Negative for abdominal pain, nausea and vomiting. Endocrine: Negative for cold intolerance and heat intolerance. Genitourinary: Negative for dysuria and hematuria. Musculoskeletal: Negative for arthralgias and myalgias. Skin: Negative for pallor and rash. Neurological: Negative for dizziness, seizures, syncope, weakness and light-headedness. Hematological: Does not bruise/bleed easily. Psychiatric/Behavioral: Negative for agitation. The patient is not nervous/anxious.         Objective  Vital Signs - BP (!) 142/80   Pulse 74   Ht 5' 6\" (1.676 m)   Wt 167 lb (75.8 kg)   BMI 26.95 kg/m²    Wt Readings from Last 3

## 2018-11-08 NOTE — PROGRESS NOTES
MANOJ Estrada  [facility]   Respiratory Disease Clinic  [address] [place] [address]  Phone: [phone]  Fax: [phone]     Kary Alcaraz is a 77 y.o. female.   CC:   Chief Complaint   Patient presents with   • COPD      HPI: This is a 77-year-old  female who presents today for her follow-up of COPD and lung nodule.  The patient appears to be uninterested in the visit today and continues to look at a magazine as I am trying to assess her.  I did have to re-ask several questions for her to answer as she was continuing to look at a magazine.  The patient states that her breathing has been okay and she is using the anoro.  She is requesting samples and a prescription of Anoro.  The patient was given samples today from the office.  The patient also has known allergic rhinitis and states that she has had a runny nose for the past 6 months.  She is using Flonase, azelastine, over-the-counter allergy medication, and Singulair.  I encouraged her to continue that regimen.  For her lung nodule, the CT scan from [facility] was reviewed as stated below.  I do recommend that she have a repeat CT scan of the chest in 6 months as recommended by the radiologist.  She is agreeable to this.  The patient's primary care provider is Dr. Alfonso Whitley.  She does receive her flu and pneumonia vaccines.  The following portions of the patient's history were reviewed and updated as appropriate: allergies, current medications, past family history, past medical history, past social history, past surgical history and problem list.  No past medical history on file.  No family history on file.  Social History     Social History   • Marital status:      Spouse name: N/A   • Number of children: N/A   • Years of education: N/A     Occupational History   • Not on file.     Social History Main Topics   • Smoking status: Never Smoker   • Smokeless tobacco: Not on file   • Alcohol use Not on file   •  Drug use: No   • Sexual activity: Defer     Other Topics Concern   • Not on file     Social History Narrative   • No narrative on file     Review of Systems   Constitutional: Negative for chills and fever.   HENT: Positive for rhinorrhea. Negative for congestion.    Eyes: Negative for blurred vision.   Respiratory: Negative for cough and shortness of breath.    Cardiovascular: Negative for chest pain.   Gastrointestinal: Negative for diarrhea, nausea and vomiting.   Endocrine: Negative for cold intolerance and heat intolerance.   Genitourinary: Negative for dysuria.   Musculoskeletal: Negative for arthralgias.   Skin: Negative for rash.   Neurological: Negative for dizziness, weakness and light-headedness.   Hematological: Does not bruise/bleed easily.   Psychiatric/Behavioral: Negative for agitation. The patient is not nervous/anxious.      Vitals:    11/09/18 1056   BP: 126/72   Pulse: 70   SpO2: 96%     Physical Exam   Constitutional: She is oriented to person, place, and time. She appears well-developed and well-nourished. No distress.   HENT:   Head: Normocephalic and atraumatic.   Eyes: Pupils are equal, round, and reactive to light. Conjunctivae and EOM are normal. No scleral icterus.   Neck: Normal range of motion. Neck supple.   Cardiovascular: Normal rate, regular rhythm and normal heart sounds.  Exam reveals no friction rub.    No murmur heard.  Pulmonary/Chest: Effort normal and breath sounds normal. No respiratory distress. She has no wheezes. She has no rales.   Abdominal: Soft. Bowel sounds are normal. She exhibits no distension. There is no tenderness.   Musculoskeletal: Normal range of motion. She exhibits no edema.   Neurological: She is alert and oriented to person, place, and time.   Skin: Skin is warm and dry.   Psychiatric: She has a normal mood and affect. Her behavior is normal. Judgment and thought content normal.   Nursing note and vitals reviewed.    Pulmonary Functions Testing  Results:  No results found for: FEV1, FVC, OYR2ACS, TLC, DLCO  PFT Interpretation: None today  Imaging impression: CT scan of the chest without contrast at [facility] on 11/02/2018 shows the previously seen 6 mm solid nodule in the left lower lobe is not visualized in this study.  The radiologist recommends another six-month follow-up in case this nodule is missed between the CT cuts.  A stable tiny nodule in the right upper lobe.  Chronic inflammatory lung changes.  Assessment and Plan:   Kary was seen today for copd.    Diagnoses and all orders for this visit:    Stage 1 mild COPD by GOLD classification (CMS/[facility])  Continue Anoro.  The patient was given samples of Anoro today and a prescription for Anoro today.  Allergic rhinitis, unspecified seasonality, unspecified trigger  Continue her current treatment regimen of Flonase, over-the-counter allergy medication, Singulair, and azelastine.  Scarring of lung  Stable.  Lung nodule  -     CT Chest Without Contrast; Future  Repeat CT scan of the chest without contrast in 6 months.  Other orders  -     umeclidinium-vilanterol (ANORO ELLIPTA) 62.5-25 MCG/INH aerosol powder  inhaler; Inhale 1 puff Daily for 90 days.  -     umeclidinium-vilanterol (ANORO ELLIPTA) 62.5-25 MCG/INH aerosol powder  inhaler; Inhale 1 puff Daily for 14 days.      Patient's Body mass index is 27.6 kg/m². BMI is within normal parameters. No follow-up required.    Follow up: 6 months with flow volume loop and diffusion  MANOJ Estrada  11/9/2018  2:44 PM

## 2018-11-09 ENCOUNTER — OFFICE VISIT (OUTPATIENT)
Dept: PULMONOLOGY | Facility: CLINIC | Age: 78
End: 2018-11-09

## 2018-11-09 VITALS
DIASTOLIC BLOOD PRESSURE: 72 MMHG | HEART RATE: 70 BPM | WEIGHT: 171 LBS | SYSTOLIC BLOOD PRESSURE: 126 MMHG | OXYGEN SATURATION: 96 % | BODY MASS INDEX: 27.48 KG/M2 | HEIGHT: 66 IN

## 2018-11-09 DIAGNOSIS — R91.1 LUNG NODULE: ICD-10-CM

## 2018-11-09 DIAGNOSIS — J30.9 ALLERGIC RHINITIS, UNSPECIFIED SEASONALITY, UNSPECIFIED TRIGGER: ICD-10-CM

## 2018-11-09 DIAGNOSIS — J44.9 STAGE 1 MILD COPD BY GOLD CLASSIFICATION (HCC): Primary | ICD-10-CM

## 2018-11-09 DIAGNOSIS — J98.4 SCARRING OF LUNG: ICD-10-CM

## 2018-11-09 PROCEDURE — 99214 OFFICE O/P EST MOD 30 MIN: CPT | Performed by: NURSE PRACTITIONER

## 2018-11-09 RX ORDER — CLOPIDOGREL BISULFATE 75 MG/1
75 TABLET ORAL DAILY
COMMUNITY
End: 2022-10-31

## 2018-11-09 RX ORDER — ASPIRIN 81 MG/1
81 TABLET, CHEWABLE ORAL DAILY
COMMUNITY

## 2018-11-09 RX ORDER — AZELASTINE 1 MG/ML
2 SPRAY, METERED NASAL 2 TIMES DAILY
COMMUNITY
End: 2022-10-31

## 2018-11-09 RX ORDER — LANOLIN ALCOHOL/MO/W.PET/CERES
1000 CREAM (GRAM) TOPICAL DAILY
COMMUNITY

## 2018-11-09 RX ORDER — MONTELUKAST SODIUM 10 MG/1
10 TABLET ORAL NIGHTLY
COMMUNITY

## 2018-11-09 RX ORDER — ALBUTEROL SULFATE 90 UG/1
AEROSOL, METERED RESPIRATORY (INHALATION)
COMMUNITY
Start: 2018-04-23

## 2018-11-09 RX ORDER — ATORVASTATIN CALCIUM 80 MG/1
80 TABLET, FILM COATED ORAL DAILY
COMMUNITY

## 2018-11-09 RX ORDER — IPRATROPIUM BROMIDE 42 UG/1
2 SPRAY, METERED NASAL EVERY 12 HOURS
COMMUNITY
End: 2022-10-31

## 2018-11-09 RX ORDER — LISINOPRIL 40 MG/1
40 TABLET ORAL DAILY
COMMUNITY
End: 2022-10-31

## 2018-11-09 RX ORDER — OMEPRAZOLE 40 MG/1
40 CAPSULE, DELAYED RELEASE ORAL DAILY
COMMUNITY

## 2018-11-09 RX ORDER — LEVOTHYROXINE SODIUM 0.03 MG/1
25 TABLET ORAL DAILY
COMMUNITY

## 2018-11-09 RX ORDER — UBIDECARENONE 100 MG
100 CAPSULE ORAL DAILY
COMMUNITY

## 2018-11-09 RX ORDER — CARVEDILOL 12.5 MG/1
12.5 TABLET ORAL 2 TIMES DAILY WITH MEALS
COMMUNITY

## 2018-11-09 RX ORDER — FEXOFENADINE HCL 180 MG/1
180 TABLET ORAL DAILY
COMMUNITY
End: 2022-10-31

## 2018-11-09 RX ORDER — INDOMETHACIN 50 MG/1
50 CAPSULE ORAL 3 TIMES DAILY PRN
COMMUNITY
End: 2022-10-31

## 2018-11-09 NOTE — PATIENT INSTRUCTIONS
Chronic Obstructive Pulmonary Disease  Chronic obstructive pulmonary disease (COPD) is a long-term (chronic) lung problem. When you have COPD, it is hard for air to get in and out of your lungs. The way your lungs work will never return to normal. Usually the condition gets worse over time. There are things you can do to keep yourself as healthy as possible. Your doctor may treat your condition with:  · Medicines.  · Quitting smoking, if you smoke.  · Rehabilitation. This may involve a team of specialists.  · Oxygen.  · Exercise and changes to your diet.  · Lung surgery.  · Comfort measures (palliative care).    Follow these instructions at home:  Medicines  · Take over-the-counter and prescription medicines only as told by your doctor.  · Talk to your doctor before taking any cough or allergy medicines. You may need to avoid medicines that cause your lungs to be dry.  Lifestyle  · If you smoke, stop. Smoking makes the problem worse. If you need help quitting, ask your doctor.  · Avoid being around things that make your breathing worse. This may include smoke, chemicals, and fumes.  · Stay active, but remember to also rest.  · Learn and use tips on how to relax.  · Make sure you get enough sleep. Most adults need at least 7 hours a night.  · Eat healthy foods. Eat smaller meals more often. Rest before meals.  Controlled breathing  · Learn and use tips on how to control your breathing as told by your doctor. Try:  ? Breathing in (inhaling) through your nose for 1 second. Then, pucker your lips and breath out (exhale) through your lips for 2 seconds.  ? Putting one hand on your belly (abdomen). Breathe in slowly through your nose for 1 second. Your hand on your belly should move out. Pucker your lips and breathe out slowly through your lips. Your hand on your belly should move in as you breathe out.  Controlled coughing  · Learn and use controlled coughing to clear mucus from your lungs. The steps are:  1. Lean your  head a little forward.  2. Breathe in deeply.  3. Try to hold your breath for 3 seconds.  4. Keep your mouth slightly open while coughing 2 times.  5. Spit any mucus out into a tissue.  6. Rest and do the steps again 1 or 2 times as needed.  General instructions  · Make sure you get all the shots (vaccines) that your doctor recommends. Ask your doctor about a flu shot and a pneumonia shot.  · Use oxygen therapy and therapy to help improve your lungs (pulmonary rehabilitation) if told by your doctor. If you need home oxygen therapy, ask your doctor if you should buy a tool to measure your oxygen level (oximeter).  · Make a COPD action plan with your doctor. This helps you know what to do if you feel worse than usual.  · Manage any other conditions you have as told by your doctor.  · Avoid going outside when it is very hot, cold, or humid.  · Avoid people who have a sickness you can catch (contagious).  · Keep all follow-up visits as told by your doctor. This is important.  Contact a doctor if:  · You cough up more mucus than usual.  · There is a change in the color or thickness of the mucus.  · It is harder to breathe than usual.  · Your breathing is faster than usual.  · You have trouble sleeping.  · You need to use your medicines more often than usual.  · You have trouble doing your normal activities such as getting dressed or walking around the house.  Get help right away if:  · You have shortness of breath while resting.  · You have shortness of breath that stops you from:  ? Being able to talk.  ? Doing normal activities.  · Your chest hurts for longer than 5 minutes.  · Your skin color is more blue than usual.  · Your pulse oximeter shows that you have low oxygen for longer than 5 minutes.  · You have a fever.  · You feel too tired to breathe normally.  Summary  · Chronic obstructive pulmonary disease (COPD) is a long-term lung problem.  · The way your lungs work will never return to normal. Usually the  condition gets worse over time. There are things you can do to keep yourself as healthy as possible.  · Take over-the-counter and prescription medicines only as told by your doctor.  · If you smoke, stop. Smoking makes the problem worse.  This information is not intended to replace advice given to you by your health care provider. Make sure you discuss any questions you have with your health care provider.  Document Released: 06/05/2009 Document Revised: 05/25/2017 Document Reviewed: 08/14/2014  Elsevier Interactive Patient Education © 2017 [employer]  Allergic Rhinitis, Adult  Allergic rhinitis is an allergic reaction that affects the mucous membrane inside the nose. It causes sneezing, a runny or stuffy nose, and the feeling of mucus going down the back of the throat (postnasal drip). Allergic rhinitis can be mild to severe.  There are two types of allergic rhinitis:  · Seasonal. This type is also called hay fever. It happens only during certain seasons.  · Perennial. This type can happen at any time of the year.    What are the causes?  This condition happens when the body's defense system (immune system) responds to certain harmless substances called allergens as though they were germs.   Seasonal allergic rhinitis is triggered by pollen, which can come from grasses, trees, and weeds. Perennial allergic rhinitis may be caused by:  · House dust mites.  · Pet dander.  · Mold spores.    What are the signs or symptoms?  Symptoms of this condition include:  · Sneezing.  · Runny or stuffy nose (nasal congestion).  · Postnasal drip.  · Itchy nose.  · Tearing of the eyes.  · Trouble sleeping.  · Daytime sleepiness.    How is this diagnosed?  This condition may be diagnosed based on:  · Your medical history.  · A physical exam.  · Tests to check for related conditions, such as:  ? Asthma.  ? Pink eye.  ? Ear infection.  ? Upper respiratory infection.  · Tests to find out which allergens trigger your symptoms. These  may include skin or blood tests.    How is this treated?  There is no cure for this condition, but treatment can help control symptoms. Treatment may include:  · Taking medicines that block allergy symptoms, such as antihistamines. Medicine may be given as a shot, nasal spray, or pill.  · Avoiding the allergen.  · Desensitization. This treatment involves getting ongoing shots until your body becomes less sensitive to the allergen. This treatment may be done if other treatments do not help.  · If taking medicine and avoiding the allergen does not work, new, stronger medicines may be prescribed.    Follow these instructions at home:  · Find out what you are allergic to. Common allergens include smoke, dust, and pollen.  · Avoid the things you are allergic to. These are some things you can do to help avoid allergens:  ? Replace carpet with wood, tile, or vinyl priya. Carpet can trap dander and dust.  ? Do not smoke. Do not allow smoking in your home.  ? Change your heating and air conditioning filter at least once a month.  ? During allergy season:  § Keep windows closed as much as possible.  § Plan outdoor activities when pollen counts are lowest. This is usually during the evening hours.  § When coming indoors, change clothing and shower before sitting on furniture or bedding.  · Take over-the-counter and prescription medicines only as told by your health care provider.  · Keep all follow-up visits as told by your health care provider. This is important.  Contact a health care provider if:  · You have a fever.  · You develop a persistent cough.  · You make whistling sounds when you breathe (you wheeze).  · Your symptoms interfere with your normal daily activities.  Get help right away if:  · You have shortness of breath.  Summary  · This condition can be managed by taking medicines as directed and avoiding allergens.  · Contact your health care provider if you develop a persistent cough or fever.  · During allergy  season, keep windows closed as much as possible.  This information is not intended to replace advice given to you by your health care provider. Make sure you discuss any questions you have with your health care provider.  Document Released: 09/12/2002 Document Revised: 01/25/2018 Document Reviewed: 01/25/2018  [employer] Interactive Patient Education © 2018 [employer]  ructions  · Make sure you get all the shots (vaccines) that your doctor recommends. Ask your doctor about a flu shot and a pneumonia shot.  · Use oxygen therapy and therapy to help improve your lungs (pulmonary rehabilitation) if told by your doctor. If you need home oxygen therapy, ask your doctor if you should buy a tool to measure your oxygen level (oximeter).  · Make a COPD action plan with your doctor. This helps you know what to do if you feel worse than usual.  · Manage any other conditions you have as told by your doctor.  · Avoid going outside when it is very hot, cold, or humid.  · Avoid people who have a sickness you can catch (contagious).  · Keep all follow-up visits as told by your doctor. This is important.  Contact a doctor if:  · You cough up more mucus than usual.  · There is a change in the color or thickness of the mucus.  · It is harder to breathe than usual.  · Your breathing is faster than usual.  · You have trouble sleeping.  · You need to use your medicines more often than usual.  · You have trouble doing your normal activities such as getting dressed or walking around the house.  Get help right away if:  · You have shortness of breath while resting.  · You have shortness of breath that stops you from:  ? Being able to talk.  ? Doing normal activities.  · Your chest hurts for longer than 5 minutes.  · Your skin color is more blue than usual.  · Your pulse oximeter shows that you have low oxygen for longer than 5 minutes.  · You have a fever.  · You feel too tired to breathe normally.  Summary  · Chronic obstructive pulmonary  disease (COPD) is a long-term lung problem.  · The way your lungs work will never return to normal. Usually the condition gets worse over time. There are things you can do to keep yourself as healthy as possible.  · Take over-the-counter and prescription medicines only as told by your doctor.  · If you smoke, stop. Smoking makes the problem worse.  This information is not intended to replace advice given to you by your health care provider. Make sure you discuss any questions you have with your health care provider.  Document Released: 06/05/2009 Document Revised: 05/25/2017 Document Reviewed: 08/14/2014  [email or web address] Interactive Patient Education © 2017 [employer]

## 2018-11-16 ENCOUNTER — HOSPITAL ENCOUNTER (OUTPATIENT)
Dept: NON INVASIVE DIAGNOSTICS | Age: 78
Discharge: HOME | End: 2018-11-16
Payer: MEDICARE

## 2018-11-16 DIAGNOSIS — R06.02 SHORTNESS OF BREATH: ICD-10-CM

## 2018-11-16 LAB
LV EF: 58 %
LVEF MODALITY: NORMAL

## 2018-11-16 PROCEDURE — 93306 TTE W/DOPPLER COMPLETE: CPT

## 2018-11-19 RX ORDER — FUROSEMIDE 20 MG/1
20 TABLET ORAL DAILY
Qty: 90 TABLET | Refills: 0 | Status: SHIPPED | OUTPATIENT
Start: 2018-11-19 | End: 2019-01-25 | Stop reason: SDUPTHER

## 2018-11-30 DIAGNOSIS — E11.9 TYPE 2 DIABETES MELLITUS WITHOUT COMPLICATION, WITHOUT LONG-TERM CURRENT USE OF INSULIN (HCC): ICD-10-CM

## 2018-11-30 DIAGNOSIS — E03.9 PRIMARY HYPOTHYROIDISM: ICD-10-CM

## 2018-11-30 LAB
ALBUMIN SERPL-MCNC: 3.8 G/DL (ref 3.5–5.2)
ALP BLD-CCNC: 90 U/L (ref 35–104)
ALT SERPL-CCNC: 16 U/L (ref 5–33)
ANION GAP SERPL CALCULATED.3IONS-SCNC: 17 MMOL/L (ref 7–19)
AST SERPL-CCNC: 14 U/L (ref 5–32)
BILIRUB SERPL-MCNC: 0.3 MG/DL (ref 0.2–1.2)
BUN BLDV-MCNC: 26 MG/DL (ref 8–23)
CALCIUM SERPL-MCNC: 9.3 MG/DL (ref 8.8–10.2)
CHLORIDE BLD-SCNC: 98 MMOL/L (ref 98–111)
CO2: 23 MMOL/L (ref 22–29)
CREAT SERPL-MCNC: 1.2 MG/DL (ref 0.5–0.9)
GFR NON-AFRICAN AMERICAN: 44
GLUCOSE BLD-MCNC: 272 MG/DL (ref 74–109)
HBA1C MFR BLD: 8.7 % (ref 4–6)
POTASSIUM SERPL-SCNC: 3.7 MMOL/L (ref 3.5–5)
SODIUM BLD-SCNC: 138 MMOL/L (ref 136–145)
TOTAL PROTEIN: 7 G/DL (ref 6.6–8.7)
TSH SERPL DL<=0.05 MIU/L-ACNC: 2.45 UIU/ML (ref 0.27–4.2)

## 2018-12-03 ENCOUNTER — OFFICE VISIT (OUTPATIENT)
Dept: FAMILY MEDICINE CLINIC | Age: 78
End: 2018-12-03
Payer: MEDICARE

## 2018-12-03 VITALS
TEMPERATURE: 96.8 F | HEART RATE: 77 BPM | RESPIRATION RATE: 20 BRPM | BODY MASS INDEX: 27.44 KG/M2 | OXYGEN SATURATION: 95 % | SYSTOLIC BLOOD PRESSURE: 114 MMHG | DIASTOLIC BLOOD PRESSURE: 82 MMHG | WEIGHT: 170 LBS

## 2018-12-03 DIAGNOSIS — R19.7 DIARRHEA, UNSPECIFIED TYPE: ICD-10-CM

## 2018-12-03 DIAGNOSIS — E03.9 PRIMARY HYPOTHYROIDISM: ICD-10-CM

## 2018-12-03 DIAGNOSIS — K21.9 GASTROESOPHAGEAL REFLUX DISEASE WITHOUT ESOPHAGITIS: ICD-10-CM

## 2018-12-03 DIAGNOSIS — F41.8 DEPRESSION WITH ANXIETY: ICD-10-CM

## 2018-12-03 DIAGNOSIS — I10 ESSENTIAL HYPERTENSION: ICD-10-CM

## 2018-12-03 DIAGNOSIS — E11.9 TYPE 2 DIABETES MELLITUS WITHOUT COMPLICATION, WITHOUT LONG-TERM CURRENT USE OF INSULIN (HCC): Primary | ICD-10-CM

## 2018-12-03 DIAGNOSIS — J30.1 SEASONAL ALLERGIC RHINITIS DUE TO POLLEN: ICD-10-CM

## 2018-12-03 DIAGNOSIS — Z99.89 OSA ON CPAP: ICD-10-CM

## 2018-12-03 DIAGNOSIS — G47.33 OSA ON CPAP: ICD-10-CM

## 2018-12-03 DIAGNOSIS — J44.9 CHRONIC OBSTRUCTIVE PULMONARY DISEASE, UNSPECIFIED COPD TYPE (HCC): ICD-10-CM

## 2018-12-03 PROCEDURE — 1036F TOBACCO NON-USER: CPT | Performed by: FAMILY MEDICINE

## 2018-12-03 PROCEDURE — G8427 DOCREV CUR MEDS BY ELIG CLIN: HCPCS | Performed by: FAMILY MEDICINE

## 2018-12-03 PROCEDURE — 1123F ACP DISCUSS/DSCN MKR DOCD: CPT | Performed by: FAMILY MEDICINE

## 2018-12-03 PROCEDURE — G8482 FLU IMMUNIZE ORDER/ADMIN: HCPCS | Performed by: FAMILY MEDICINE

## 2018-12-03 PROCEDURE — G8926 SPIRO NO PERF OR DOC: HCPCS | Performed by: FAMILY MEDICINE

## 2018-12-03 PROCEDURE — 3023F SPIROM DOC REV: CPT | Performed by: FAMILY MEDICINE

## 2018-12-03 PROCEDURE — 1101F PT FALLS ASSESS-DOCD LE1/YR: CPT | Performed by: FAMILY MEDICINE

## 2018-12-03 PROCEDURE — 1090F PRES/ABSN URINE INCON ASSESS: CPT | Performed by: FAMILY MEDICINE

## 2018-12-03 PROCEDURE — 99214 OFFICE O/P EST MOD 30 MIN: CPT | Performed by: FAMILY MEDICINE

## 2018-12-03 PROCEDURE — G8399 PT W/DXA RESULTS DOCUMENT: HCPCS | Performed by: FAMILY MEDICINE

## 2018-12-03 PROCEDURE — G8417 CALC BMI ABV UP PARAM F/U: HCPCS | Performed by: FAMILY MEDICINE

## 2018-12-03 PROCEDURE — G8598 ASA/ANTIPLAT THER USED: HCPCS | Performed by: FAMILY MEDICINE

## 2018-12-03 PROCEDURE — 4040F PNEUMOC VAC/ADMIN/RCVD: CPT | Performed by: FAMILY MEDICINE

## 2018-12-03 RX ORDER — SERTRALINE HYDROCHLORIDE 100 MG/1
100 TABLET, FILM COATED ORAL DAILY
Qty: 30 TABLET | Refills: 5 | Status: SHIPPED | OUTPATIENT
Start: 2018-12-03 | End: 2019-01-14 | Stop reason: SDUPTHER

## 2018-12-03 RX ORDER — GLIMEPIRIDE 1 MG/1
1 TABLET ORAL
Qty: 30 TABLET | Refills: 5 | Status: SHIPPED | OUTPATIENT
Start: 2018-12-03 | End: 2019-01-14 | Stop reason: SDUPTHER

## 2018-12-03 NOTE — PROGRESS NOTES
[facility] [address] [place], [place] [address] [facility]  [address] [facility] [address]  Dept: [phone]  Dept Fax: [phone]  Loc: [phone]    Lisseth You is a 69 y.o. female who presents today for her medical conditions/complaintsas noted below. Lisseth You is here for 1 Month Follow-Up        HPI:   CC: Here today to discuss the following:    She was last here on October 30 for follow-up for chronic medical issues including allergies, type II diabetes mellitus, hypertension and depression with anxiety. She was experiencing diarrhea associated with her metformin and it was discontinued. Since her last visit, she followed up with her cardiologist. She was seen by the nurse practitioner and 2-D echocardiogram was ordered. A 2-D echocardiogram showed evidence of an ejection fraction of 55-60%. She had normal left ventricular size. She was not demonstrating any chest pain at her visit with her cardiologist however she does have chronic shortness of breath with exertion. Also, since her last visit she is followed up with pulmonology. She was to be continued on her current inhaler, Anoro. She was also advised to continue treating her allergic rhinitis with Flonase Singulair and Astepro. There was evidence of a lung nodule and the plan was to repeat a CT scan in 6 months. She is out of Jardiance and discontinued to her metformin. MAGALY on CPAP  Compliant with CPAP. No daytime somnolence. Feels rested for the most part when wearing CPAP. Diabetes Mellitus  Her insurance is requesting to change her medication. She has been compliant with her current medication. Her diarrhea resolved once we discontinued metformin. Hyperlipidemia  Tolerating current cholesterol medication without side effects. No body aches. Attempting to reduce processed sugar and cholesterol from diet. Hypothyroidism  Symptoms are currently well controlled.   No temperature intolerance, mood issues, or fatigue reported. Tolerating current medication without adverse effects. Allergies  Continue be a problem. .    Depression with Anxiety  Compliant with her current medication. She is requesting an increase in dose. Has been having some increased depression symptoms lately.  No suicidal or homicidal ideation      HPI    Past Medical History:   Diagnosis Date    Arthritis     Psoriatic    Asthma     CAD (coronary artery disease)     [facility]estive [facility] failure[facility])     [facility]     [facility]     GERD (gastroesophageal reflux disease)     HTN (hypertension)     Hyperlipidemia     MI [facility]myocardial infarction) [facility]     x2    Thyroid disease       Past Surgical History:   Procedure Laterality Date    CATARACT REMOVAL      CHOLECYSTECTOMY      CORONARY ANGIOPLASTY  06/2018    Angioplasty to in-stent restenosis to the distal LAD    HIP SURGERY      HYSTERECTOMY      JOINT REPLACEMENT Right     Right knee replacement       Family History   Problem Relation Age of Onset    Heart Disease Mother     Heart Disease Father        Social History   Substance Use Topics    Smoking status: Never Smoker    Smokeless tobacco: Never Used    Alcohol use No     Current Outpatient Prescriptions   Medication Sig Dispense Refill    SITagliptin (JANUVIA) 100 MG tablet Take 1 tablet by mouth daily 30 tablet 5    canagliflozin (INVOKANA) 100 MG TABS tablet Take 1 tablet by mouth every morning (before breakfast) 30 tablet 5    glimepiride (AMARYL) 1 MG tablet Take 1 tablet by mouth every morning (before breakfast) 30 tablet 5    sertraline (ZOLOFT) 100 MG tablet Take 1 tablet by mouth daily 30 tablet 5    furosemide (LASIX) 20 MG tablet Take 1 tablet by mouth daily 90 tablet 0    carvedilol (COREG) 12.5 MG tablet Take 1 tablet by mouth 2 times daily (with meals) 180 tablet 3    montelukast (SINGULAIR) 10 MG tablet Take 1 tablet by mouth Hemoglobin A1C 8.7 (H) 4.0 - 6.0 %   Comprehensive Metabolic Panel    Collection Time: 11/30/18  3:32 PM   Result Value Ref Range    Sodium 138 136 - 145 mmol/L    Potassium 3.7 3.5 - 5.0 mmol/L    Chloride 98 98 - 111 mmol/L    CO2 23 22 - 29 mmol/L    Anion Gap 17 7 - 19 mmol/L    Glucose 272 (H) 74 - 109 mg/dL    BUN 26 (H) 8 - 23 mg/dL    CREATININE 1.2 (H) 0.5 - 0.9 mg/dL    GFR Non- 44 (A) >60    Calcium 9.3 8.8 - 10.2 mg/dL    Total Protein 7.0 6.6 - 8.7 g/dL    Alb 3.8 3.5 - 5.2 g/dL    Total Bilirubin 0.3 0.2 - 1.2 mg/dL    Alkaline Phosphatase 90 35 - 104 U/L    ALT 16 5 - 33 U/L    AST 14 5 - 32 U/L   TSH without Reflex    Collection Time: 11/30/18  3:32 PM   Result Value Ref Range    TSH 2.450 0.270 - 4.200 uIU/mL               Assessment & Plan: The following diagnoses and conditions are stable with no further orders unlessindicated:  1. Type 2 diabetes mellitus without complication, without long-term current use of insulin [facility])  Patient Instructions   Discontinue Tradjenta  Replace with Januvia    Discontinue Jardiance  Replace with Invokana    Start Glimepiride 1mg once a day with breakfast    Increase sertraline from 50mg to 100mg. Medication changes made due to insurance coverage  - SITagliptin (JANUVIA) 100 MG tablet; Take 1 tablet by mouth daily  Dispense: 30 tablet; Refill: 5  - canagliflozin (INVOKANA) 100 MG TABS tablet; Take 1 tablet by mouth every morning (before breakfast)  Dispense: 30 tablet; Refill: 5  - glimepiride (AMARYL) 1 MG tablet; Take 1 tablet by mouth every morning (before breakfast)  Dispense: 30 tablet; Refill: 5    2. Essential hypertension  BP Readings from Last 3 Encounters:   12/03/18 114/82   11/07/18 (!) 142/80   10/30/18 130/72     Stable    3. Primary hypothyroidism  Lab Results   Component Value Date    TSH 2.450 11/30/2018     Stable compliant    4. MAGALY on CPAP  Compliant    5.  Seasonal allergic rhinitis due to pollen  Continue with current medication. Offered referral to allergist. She will consider    6. Diarrhea, unspecified type  Resolved with discontinuation of metformin    7. Depression with anxiety  Increase Zoloft 200 mg daily    8. Gastroesophageal reflux disease without esophagitis  Gio    9. Chronic obstructive pulmonary disease, unspecified COPD type ([facility]  Stable. Diabetes mellitus type 2:  She is currently taking DPP 4 inhibitor: Tradjenta  SGL T2 inhibitor:  Jardiance    Return in about 1 month (around 1/3/2019) for Routine follow up - 15 minute visit. Patient Instructions   Discontinue Tradjenta  Replace with Januvia    Discontinue Jardiance  Replace with Invokana    Start Glimepiride 1mg once a day with breakfast    Increase sertraline from 50mg to 100mg. Discussed use, benefit, and side effects of prescribed medications. All patientquestions answered. Pt voiced understanding. Reviewed health maintenance. Instructedto continue current medications, diet and exercise. Patient agreed with treatmentplan. Follow up as directed.

## 2018-12-11 ASSESSMENT — ENCOUNTER SYMPTOMS
DIARRHEA: 0
SHORTNESS OF BREATH: 1
ABDOMINAL PAIN: 0
CONSTIPATION: 0
ANAL BLEEDING: 0
NAUSEA: 0
COUGH: 0
CHEST TIGHTNESS: 0

## 2018-12-31 DIAGNOSIS — E11.9 TYPE 2 DIABETES MELLITUS WITHOUT COMPLICATION, WITHOUT LONG-TERM CURRENT USE OF INSULIN (HCC): ICD-10-CM

## 2019-01-03 ENCOUNTER — HOSPITAL ENCOUNTER (OUTPATIENT)
Dept: GENERAL RADIOLOGY | Age: 79
Discharge: HOME | End: 2019-01-03
Payer: MEDICARE

## 2019-01-03 ENCOUNTER — OFFICE VISIT (OUTPATIENT)
Dept: FAMILY MEDICINE CLINIC | Age: 79
End: 2019-01-03
Payer: MEDICARE

## 2019-01-03 VITALS
DIASTOLIC BLOOD PRESSURE: 62 MMHG | TEMPERATURE: 97.4 F | HEART RATE: 92 BPM | BODY MASS INDEX: 26.47 KG/M2 | OXYGEN SATURATION: 98 % | SYSTOLIC BLOOD PRESSURE: 100 MMHG | WEIGHT: 164 LBS

## 2019-01-03 DIAGNOSIS — R06.02 SHORTNESS OF BREATH: ICD-10-CM

## 2019-01-03 DIAGNOSIS — J44.1 CHRONIC OBSTRUCTIVE PULMONARY DISEASE WITH ACUTE EXACERBATION (HCC): Primary | ICD-10-CM

## 2019-01-03 LAB
ALBUMIN SERPL-MCNC: 4.3 G/DL (ref 3.5–5.2)
ALP BLD-CCNC: 82 U/L (ref 35–104)
ALT SERPL-CCNC: 22 U/L (ref 5–33)
ANION GAP SERPL CALCULATED.3IONS-SCNC: 15 MMOL/L (ref 7–19)
AST SERPL-CCNC: 18 U/L (ref 5–32)
BILIRUB SERPL-MCNC: 0.3 MG/DL (ref 0.2–1.2)
BUN BLDV-MCNC: 21 MG/DL (ref 8–23)
CALCIUM SERPL-MCNC: 9.4 MG/DL (ref 8.8–10.2)
CHLORIDE BLD-SCNC: 103 MMOL/L (ref 98–111)
CO2: 22 MMOL/L (ref 22–29)
CREAT SERPL-MCNC: 1.1 MG/DL (ref 0.5–0.9)
GFR NON-AFRICAN AMERICAN: 48
GLUCOSE BLD-MCNC: 155 MG/DL (ref 74–109)
HCT VFR BLD CALC: 39.4 % (ref 37–47)
HEMOGLOBIN: 12.9 G/DL (ref 12–16)
MCH RBC QN AUTO: 29.7 PG (ref 27–31)
MCHC RBC AUTO-ENTMCNC: 32.7 G/DL (ref 33–37)
MCV RBC AUTO: 90.6 FL (ref 81–99)
PDW BLD-RTO: 14.5 % (ref 11.5–14.5)
PLATELET # BLD: 249 K/UL (ref 130–400)
PMV BLD AUTO: 9.7 FL (ref 9.4–12.3)
POTASSIUM SERPL-SCNC: 3.5 MMOL/L (ref 3.5–5)
RBC # BLD: 4.35 M/UL (ref 4.2–5.4)
SODIUM BLD-SCNC: 140 MMOL/L (ref 136–145)
TOTAL PROTEIN: 7.4 G/DL (ref 6.6–8.7)
WBC # BLD: 8.9 K/UL (ref 4.8–10.8)

## 2019-01-03 PROCEDURE — G8482 FLU IMMUNIZE ORDER/ADMIN: HCPCS | Performed by: CLINICAL NURSE SPECIALIST

## 2019-01-03 PROCEDURE — 4040F PNEUMOC VAC/ADMIN/RCVD: CPT | Performed by: CLINICAL NURSE SPECIALIST

## 2019-01-03 PROCEDURE — G8926 SPIRO NO PERF OR DOC: HCPCS | Performed by: CLINICAL NURSE SPECIALIST

## 2019-01-03 PROCEDURE — G8399 PT W/DXA RESULTS DOCUMENT: HCPCS | Performed by: CLINICAL NURSE SPECIALIST

## 2019-01-03 PROCEDURE — 3023F SPIROM DOC REV: CPT | Performed by: CLINICAL NURSE SPECIALIST

## 2019-01-03 PROCEDURE — 1090F PRES/ABSN URINE INCON ASSESS: CPT | Performed by: CLINICAL NURSE SPECIALIST

## 2019-01-03 PROCEDURE — G8427 DOCREV CUR MEDS BY ELIG CLIN: HCPCS | Performed by: CLINICAL NURSE SPECIALIST

## 2019-01-03 PROCEDURE — 1101F PT FALLS ASSESS-DOCD LE1/YR: CPT | Performed by: CLINICAL NURSE SPECIALIST

## 2019-01-03 PROCEDURE — 99214 OFFICE O/P EST MOD 30 MIN: CPT | Performed by: CLINICAL NURSE SPECIALIST

## 2019-01-03 PROCEDURE — 1036F TOBACCO NON-USER: CPT | Performed by: CLINICAL NURSE SPECIALIST

## 2019-01-03 PROCEDURE — G8598 ASA/ANTIPLAT THER USED: HCPCS | Performed by: CLINICAL NURSE SPECIALIST

## 2019-01-03 PROCEDURE — 71046 X-RAY EXAM CHEST 2 VIEWS: CPT

## 2019-01-03 PROCEDURE — 1123F ACP DISCUSS/DSCN MKR DOCD: CPT | Performed by: CLINICAL NURSE SPECIALIST

## 2019-01-03 PROCEDURE — G8417 CALC BMI ABV UP PARAM F/U: HCPCS | Performed by: CLINICAL NURSE SPECIALIST

## 2019-01-03 PROCEDURE — 93000 ELECTROCARDIOGRAM COMPLETE: CPT | Performed by: CLINICAL NURSE SPECIALIST

## 2019-01-03 RX ORDER — BENZONATATE 200 MG/1
200 CAPSULE ORAL 3 TIMES DAILY PRN
Qty: 30 CAPSULE | Refills: 0 | Status: SHIPPED | OUTPATIENT
Start: 2019-01-03 | End: 2019-01-10

## 2019-01-03 RX ORDER — PREDNISONE 20 MG/1
TABLET ORAL
Qty: 12 TABLET | Refills: 0 | Status: SHIPPED | OUTPATIENT
Start: 2019-01-03 | End: 2019-01-14 | Stop reason: ALTCHOICE

## 2019-01-03 RX ORDER — DOXYCYCLINE HYCLATE 100 MG/1
100 CAPSULE ORAL 2 TIMES DAILY
Qty: 14 CAPSULE | Refills: 0 | Status: SHIPPED | OUTPATIENT
Start: 2019-01-03 | End: 2019-01-10

## 2019-01-03 RX ORDER — CETIRIZINE HYDROCHLORIDE 10 MG/1
10 TABLET ORAL 2 TIMES DAILY
Status: ON HOLD | COMMUNITY
End: 2021-12-02 | Stop reason: HOSPADM

## 2019-01-03 RX ORDER — AZELASTINE HYDROCHLORIDE, FLUTICASONE PROPIONATE 137; 50 UG/1; UG/1
SPRAY, METERED NASAL
COMMUNITY
End: 2019-01-14 | Stop reason: SDUPTHER

## 2019-01-03 ASSESSMENT — ENCOUNTER SYMPTOMS
BACK PAIN: 0
EYE REDNESS: 0
DIARRHEA: 0
RHINORRHEA: 1
SHORTNESS OF BREATH: 1
FACIAL SWELLING: 0
TROUBLE SWALLOWING: 0
COLOR CHANGE: 0
EYE PAIN: 0
EYE DISCHARGE: 0
SINUS PRESSURE: 0
CONSTIPATION: 0
COUGH: 1
ABDOMINAL PAIN: 0
WHEEZING: 0
CHEST TIGHTNESS: 1
SORE THROAT: 0

## 2019-01-14 ENCOUNTER — OFFICE VISIT (OUTPATIENT)
Dept: FAMILY MEDICINE CLINIC | Age: 79
End: 2019-01-14
Payer: MEDICARE

## 2019-01-14 VITALS
TEMPERATURE: 97 F | DIASTOLIC BLOOD PRESSURE: 76 MMHG | OXYGEN SATURATION: 98 % | HEART RATE: 70 BPM | WEIGHT: 166 LBS | BODY MASS INDEX: 26.79 KG/M2 | SYSTOLIC BLOOD PRESSURE: 136 MMHG

## 2019-01-14 DIAGNOSIS — I25.10 CAD IN NATIVE ARTERY: ICD-10-CM

## 2019-01-14 DIAGNOSIS — I10 ESSENTIAL HYPERTENSION: ICD-10-CM

## 2019-01-14 DIAGNOSIS — R06.02 SHORTNESS OF BREATH: ICD-10-CM

## 2019-01-14 DIAGNOSIS — K21.9 GASTROESOPHAGEAL REFLUX DISEASE WITHOUT ESOPHAGITIS: ICD-10-CM

## 2019-01-14 DIAGNOSIS — Z99.89 OSA ON CPAP: ICD-10-CM

## 2019-01-14 DIAGNOSIS — R91.1 PULMONARY NODULE: ICD-10-CM

## 2019-01-14 DIAGNOSIS — J44.1 CHRONIC OBSTRUCTIVE PULMONARY DISEASE WITH ACUTE EXACERBATION (HCC): Primary | ICD-10-CM

## 2019-01-14 DIAGNOSIS — F41.8 DEPRESSION WITH ANXIETY: ICD-10-CM

## 2019-01-14 DIAGNOSIS — E78.2 MIXED HYPERLIPIDEMIA: ICD-10-CM

## 2019-01-14 DIAGNOSIS — G47.33 OSA ON CPAP: ICD-10-CM

## 2019-01-14 DIAGNOSIS — E11.9 TYPE 2 DIABETES MELLITUS WITHOUT COMPLICATION, WITHOUT LONG-TERM CURRENT USE OF INSULIN (HCC): ICD-10-CM

## 2019-01-14 PROCEDURE — G8417 CALC BMI ABV UP PARAM F/U: HCPCS | Performed by: FAMILY MEDICINE

## 2019-01-14 PROCEDURE — G8482 FLU IMMUNIZE ORDER/ADMIN: HCPCS | Performed by: FAMILY MEDICINE

## 2019-01-14 PROCEDURE — 1123F ACP DISCUSS/DSCN MKR DOCD: CPT | Performed by: FAMILY MEDICINE

## 2019-01-14 PROCEDURE — 3023F SPIROM DOC REV: CPT | Performed by: FAMILY MEDICINE

## 2019-01-14 PROCEDURE — G8399 PT W/DXA RESULTS DOCUMENT: HCPCS | Performed by: FAMILY MEDICINE

## 2019-01-14 PROCEDURE — 1090F PRES/ABSN URINE INCON ASSESS: CPT | Performed by: FAMILY MEDICINE

## 2019-01-14 PROCEDURE — 1036F TOBACCO NON-USER: CPT | Performed by: FAMILY MEDICINE

## 2019-01-14 PROCEDURE — 4040F PNEUMOC VAC/ADMIN/RCVD: CPT | Performed by: FAMILY MEDICINE

## 2019-01-14 PROCEDURE — 99214 OFFICE O/P EST MOD 30 MIN: CPT | Performed by: FAMILY MEDICINE

## 2019-01-14 PROCEDURE — 1101F PT FALLS ASSESS-DOCD LE1/YR: CPT | Performed by: FAMILY MEDICINE

## 2019-01-14 PROCEDURE — G8926 SPIRO NO PERF OR DOC: HCPCS | Performed by: FAMILY MEDICINE

## 2019-01-14 PROCEDURE — G8427 DOCREV CUR MEDS BY ELIG CLIN: HCPCS | Performed by: FAMILY MEDICINE

## 2019-01-14 PROCEDURE — G8598 ASA/ANTIPLAT THER USED: HCPCS | Performed by: FAMILY MEDICINE

## 2019-01-14 RX ORDER — FLUTICASONE PROPIONATE 50 MCG
1 SPRAY, SUSPENSION (ML) NASAL DAILY
Status: ON HOLD | COMMUNITY
End: 2021-12-02 | Stop reason: HOSPADM

## 2019-01-14 RX ORDER — SERTRALINE HYDROCHLORIDE 100 MG/1
100 TABLET, FILM COATED ORAL DAILY
Qty: 90 TABLET | Refills: 3 | Status: SHIPPED | OUTPATIENT
Start: 2019-01-14 | End: 2020-04-17

## 2019-01-14 RX ORDER — GLIMEPIRIDE 1 MG/1
1 TABLET ORAL
Qty: 90 TABLET | Refills: 3 | Status: SHIPPED | OUTPATIENT
Start: 2019-01-14 | End: 2019-01-24

## 2019-01-14 ASSESSMENT — ENCOUNTER SYMPTOMS
SINUS PAIN: 0
FACIAL SWELLING: 0
CONSTIPATION: 0
DIARRHEA: 0
SHORTNESS OF BREATH: 0
CHEST TIGHTNESS: 0
COUGH: 0
ABDOMINAL PAIN: 0
SINUS PRESSURE: 1
ANAL BLEEDING: 0
NAUSEA: 0
RHINORRHEA: 1

## 2019-01-20 LAB — MISCELLANEOUS LAB TEST RESULT: NORMAL

## 2019-01-21 ENCOUNTER — TELEPHONE (OUTPATIENT)
Dept: FAMILY MEDICINE CLINIC | Age: 79
End: 2019-01-21

## 2019-01-21 LAB
ALLERGEN AMERICAN COCKROACH (PERIPLANETA AMERICANA) IGE: <0.1 KU/L
ALLERGEN ASPERGILLUS ALTERNATA IGE: <0.1 KU/L
ALLERGEN BERMUDA GRASS IGE: 0.46 KU/L
ALLERGEN CEPHALOSPORIUM ACREMONIUM IGE: <0.1 KU/L
ALLERGEN FUNGI/MOLD M.RACEMOSUS IGE: <0.1 KU/L
ALLERGEN GERMAN COCKROACH IGE: <0.1 KU/L
ALLERGEN GRASS BENT/REDTOP IGE: 0.98 KU/L
ALLERGEN JOHNSON GRASS IGE: 1.44 KU/L
ALLERGEN JUNE KENTUCKY BLUEGRASS: 1.31 KU/L
ALLERGEN MEADOW GRASS: 1.07 KU/L
ALLERGEN ORCHARD GRASS: 0.96 KU/L
ALLERGEN PENICILLIUM NOTATUM: <0.1 KU/L
ALLERGEN PERENNIAL RYE GRASS IGE: 0.85 KU/L
ALLERGEN PHOMA BETAE: <0.1 KU/L
ALLERGEN SEE NOTE: ABNORMAL
ALLERGEN SEE NOTE: NORMAL
ALLERGEN SEE NOTE: NORMAL
ALLERGEN TIMOTHY GRASS: 0.7 KU/L
Lab: <0.1 KU/L
MISCELLANEOUS LAB TEST ORDER: NORMAL
MISCELLANEOUS LAB TEST RESULT: NORMAL
MISCELLANEOUS LAB TEST RESULT: NORMAL

## 2019-01-22 LAB
ALLERGEN HORMODENDRUM HORDEI IGE: <0.1 KU/L
ALLERGEN SEE NOTE: NORMAL
ALLERGEN SEE NOTE: NORMAL
MISCELLANEOUS LAB TEST ORDER: NORMAL
MISCELLANEOUS LAB TEST RESULT: NORMAL

## 2019-01-23 LAB — MISCELLANEOUS LAB TEST RESULT: NORMAL

## 2019-01-24 ENCOUNTER — OFFICE VISIT (OUTPATIENT)
Dept: FAMILY MEDICINE CLINIC | Age: 79
End: 2019-01-24
Payer: MEDICARE

## 2019-01-24 VITALS
BODY MASS INDEX: 26.79 KG/M2 | TEMPERATURE: 97.6 F | SYSTOLIC BLOOD PRESSURE: 120 MMHG | WEIGHT: 166 LBS | DIASTOLIC BLOOD PRESSURE: 64 MMHG | HEART RATE: 62 BPM | OXYGEN SATURATION: 98 %

## 2019-01-24 DIAGNOSIS — E11.9 TYPE 2 DIABETES MELLITUS WITHOUT COMPLICATION, WITHOUT LONG-TERM CURRENT USE OF INSULIN (HCC): Primary | ICD-10-CM

## 2019-01-24 PROCEDURE — 1101F PT FALLS ASSESS-DOCD LE1/YR: CPT | Performed by: FAMILY MEDICINE

## 2019-01-24 PROCEDURE — 1090F PRES/ABSN URINE INCON ASSESS: CPT | Performed by: FAMILY MEDICINE

## 2019-01-24 PROCEDURE — G8427 DOCREV CUR MEDS BY ELIG CLIN: HCPCS | Performed by: FAMILY MEDICINE

## 2019-01-24 PROCEDURE — G8482 FLU IMMUNIZE ORDER/ADMIN: HCPCS | Performed by: FAMILY MEDICINE

## 2019-01-24 PROCEDURE — 99213 OFFICE O/P EST LOW 20 MIN: CPT | Performed by: FAMILY MEDICINE

## 2019-01-24 PROCEDURE — 4040F PNEUMOC VAC/ADMIN/RCVD: CPT | Performed by: FAMILY MEDICINE

## 2019-01-24 PROCEDURE — G8598 ASA/ANTIPLAT THER USED: HCPCS | Performed by: FAMILY MEDICINE

## 2019-01-24 PROCEDURE — 1036F TOBACCO NON-USER: CPT | Performed by: FAMILY MEDICINE

## 2019-01-24 PROCEDURE — G8399 PT W/DXA RESULTS DOCUMENT: HCPCS | Performed by: FAMILY MEDICINE

## 2019-01-24 PROCEDURE — 1123F ACP DISCUSS/DSCN MKR DOCD: CPT | Performed by: FAMILY MEDICINE

## 2019-01-24 PROCEDURE — G8417 CALC BMI ABV UP PARAM F/U: HCPCS | Performed by: FAMILY MEDICINE

## 2019-01-24 RX ORDER — MECLIZINE HYDROCHLORIDE 25 MG/1
25 TABLET ORAL 2 TIMES DAILY PRN
Qty: 30 TABLET | Refills: 2 | Status: SHIPPED | OUTPATIENT
Start: 2019-01-24 | End: 2019-02-23

## 2019-01-28 ENCOUNTER — TELEPHONE (OUTPATIENT)
Dept: FAMILY MEDICINE CLINIC | Age: 79
End: 2019-01-28

## 2019-01-28 DIAGNOSIS — J44.9 CHRONIC OBSTRUCTIVE PULMONARY DISEASE, UNSPECIFIED COPD TYPE (HCC): Primary | ICD-10-CM

## 2019-01-28 RX ORDER — FUROSEMIDE 20 MG/1
TABLET ORAL
Qty: 90 TABLET | Refills: 3 | Status: SHIPPED | OUTPATIENT
Start: 2019-01-28 | End: 2019-05-20 | Stop reason: DRUGHIGH

## 2019-03-21 ENCOUNTER — OFFICE VISIT (OUTPATIENT)
Dept: FAMILY MEDICINE CLINIC | Age: 79
End: 2019-03-21
Payer: MEDICARE

## 2019-03-21 VITALS
SYSTOLIC BLOOD PRESSURE: 132 MMHG | DIASTOLIC BLOOD PRESSURE: 88 MMHG | TEMPERATURE: 98.6 F | HEART RATE: 64 BPM | WEIGHT: 167 LBS | OXYGEN SATURATION: 95 % | BODY MASS INDEX: 26.95 KG/M2

## 2019-03-21 DIAGNOSIS — E78.2 MIXED HYPERLIPIDEMIA: ICD-10-CM

## 2019-03-21 DIAGNOSIS — Z99.89 OSA ON CPAP: ICD-10-CM

## 2019-03-21 DIAGNOSIS — J44.9 CHRONIC OBSTRUCTIVE PULMONARY DISEASE, UNSPECIFIED COPD TYPE (HCC): ICD-10-CM

## 2019-03-21 DIAGNOSIS — E11.9 TYPE 2 DIABETES MELLITUS WITHOUT COMPLICATION, WITHOUT LONG-TERM CURRENT USE OF INSULIN (HCC): Primary | ICD-10-CM

## 2019-03-21 DIAGNOSIS — G47.33 OSA ON CPAP: ICD-10-CM

## 2019-03-21 DIAGNOSIS — K21.9 GASTROESOPHAGEAL REFLUX DISEASE WITHOUT ESOPHAGITIS: ICD-10-CM

## 2019-03-21 DIAGNOSIS — I10 ESSENTIAL HYPERTENSION: ICD-10-CM

## 2019-03-21 DIAGNOSIS — F41.8 DEPRESSION WITH ANXIETY: ICD-10-CM

## 2019-03-21 DIAGNOSIS — E03.9 PRIMARY HYPOTHYROIDISM: ICD-10-CM

## 2019-03-21 PROCEDURE — G8399 PT W/DXA RESULTS DOCUMENT: HCPCS | Performed by: FAMILY MEDICINE

## 2019-03-21 PROCEDURE — G8598 ASA/ANTIPLAT THER USED: HCPCS | Performed by: FAMILY MEDICINE

## 2019-03-21 PROCEDURE — 3023F SPIROM DOC REV: CPT | Performed by: FAMILY MEDICINE

## 2019-03-21 PROCEDURE — G8427 DOCREV CUR MEDS BY ELIG CLIN: HCPCS | Performed by: FAMILY MEDICINE

## 2019-03-21 PROCEDURE — 4040F PNEUMOC VAC/ADMIN/RCVD: CPT | Performed by: FAMILY MEDICINE

## 2019-03-21 PROCEDURE — G8417 CALC BMI ABV UP PARAM F/U: HCPCS | Performed by: FAMILY MEDICINE

## 2019-03-21 PROCEDURE — 1123F ACP DISCUSS/DSCN MKR DOCD: CPT | Performed by: FAMILY MEDICINE

## 2019-03-21 PROCEDURE — 1036F TOBACCO NON-USER: CPT | Performed by: FAMILY MEDICINE

## 2019-03-21 PROCEDURE — 1090F PRES/ABSN URINE INCON ASSESS: CPT | Performed by: FAMILY MEDICINE

## 2019-03-21 PROCEDURE — G8482 FLU IMMUNIZE ORDER/ADMIN: HCPCS | Performed by: FAMILY MEDICINE

## 2019-03-21 PROCEDURE — 1101F PT FALLS ASSESS-DOCD LE1/YR: CPT | Performed by: FAMILY MEDICINE

## 2019-03-21 PROCEDURE — G8926 SPIRO NO PERF OR DOC: HCPCS | Performed by: FAMILY MEDICINE

## 2019-03-21 PROCEDURE — 99214 OFFICE O/P EST MOD 30 MIN: CPT | Performed by: FAMILY MEDICINE

## 2019-03-21 RX ORDER — KETOCONAZOLE 20 MG/ML
SHAMPOO TOPICAL
Qty: 120 ML | Refills: 0 | Status: SHIPPED | OUTPATIENT
Start: 2019-03-21 | End: 2021-10-07 | Stop reason: ALTCHOICE

## 2019-03-21 ASSESSMENT — ENCOUNTER SYMPTOMS
NAUSEA: 0
ANAL BLEEDING: 0
CONSTIPATION: 0
COUGH: 0
CHEST TIGHTNESS: 0
ABDOMINAL PAIN: 0
DIARRHEA: 0

## 2019-03-21 ASSESSMENT — PATIENT HEALTH QUESTIONNAIRE - PHQ9
2. FEELING DOWN, DEPRESSED OR HOPELESS: 1
1. LITTLE INTEREST OR PLEASURE IN DOING THINGS: 1
SUM OF ALL RESPONSES TO PHQ QUESTIONS 1-9: 2
SUM OF ALL RESPONSES TO PHQ9 QUESTIONS 1 & 2: 2
SUM OF ALL RESPONSES TO PHQ QUESTIONS 1-9: 2

## 2019-03-23 ASSESSMENT — ENCOUNTER SYMPTOMS: SHORTNESS OF BREATH: 1

## 2019-04-10 ENCOUNTER — OUTSIDE FACILITY SERVICE (OUTPATIENT)
Dept: PODIATRY | Facility: CLINIC | Age: 79
End: 2019-04-10

## 2019-04-10 PROCEDURE — 11721 DEBRIDE NAIL 6 OR MORE: CPT | Performed by: PODIATRIST

## 2019-04-22 ENCOUNTER — OFFICE VISIT (OUTPATIENT)
Dept: FAMILY MEDICINE CLINIC | Age: 79
End: 2019-04-22
Payer: MEDICARE

## 2019-04-22 VITALS
TEMPERATURE: 97 F | WEIGHT: 168 LBS | HEART RATE: 76 BPM | DIASTOLIC BLOOD PRESSURE: 76 MMHG | SYSTOLIC BLOOD PRESSURE: 146 MMHG | BODY MASS INDEX: 27.12 KG/M2 | OXYGEN SATURATION: 98 %

## 2019-04-22 DIAGNOSIS — E78.2 MIXED HYPERLIPIDEMIA: ICD-10-CM

## 2019-04-22 DIAGNOSIS — E11.9 TYPE 2 DIABETES MELLITUS WITHOUT COMPLICATION, WITHOUT LONG-TERM CURRENT USE OF INSULIN (HCC): Primary | ICD-10-CM

## 2019-04-22 DIAGNOSIS — J44.9 CHRONIC OBSTRUCTIVE PULMONARY DISEASE, UNSPECIFIED COPD TYPE (HCC): ICD-10-CM

## 2019-04-22 DIAGNOSIS — G47.33 OSA ON CPAP: ICD-10-CM

## 2019-04-22 DIAGNOSIS — F41.8 DEPRESSION WITH ANXIETY: ICD-10-CM

## 2019-04-22 DIAGNOSIS — Z99.89 OSA ON CPAP: ICD-10-CM

## 2019-04-22 DIAGNOSIS — K21.9 GASTROESOPHAGEAL REFLUX DISEASE WITHOUT ESOPHAGITIS: ICD-10-CM

## 2019-04-22 PROCEDURE — G8417 CALC BMI ABV UP PARAM F/U: HCPCS | Performed by: FAMILY MEDICINE

## 2019-04-22 PROCEDURE — 1090F PRES/ABSN URINE INCON ASSESS: CPT | Performed by: FAMILY MEDICINE

## 2019-04-22 PROCEDURE — 4040F PNEUMOC VAC/ADMIN/RCVD: CPT | Performed by: FAMILY MEDICINE

## 2019-04-22 PROCEDURE — G8427 DOCREV CUR MEDS BY ELIG CLIN: HCPCS | Performed by: FAMILY MEDICINE

## 2019-04-22 PROCEDURE — 1036F TOBACCO NON-USER: CPT | Performed by: FAMILY MEDICINE

## 2019-04-22 PROCEDURE — 1123F ACP DISCUSS/DSCN MKR DOCD: CPT | Performed by: FAMILY MEDICINE

## 2019-04-22 PROCEDURE — 99213 OFFICE O/P EST LOW 20 MIN: CPT | Performed by: FAMILY MEDICINE

## 2019-04-22 PROCEDURE — G8926 SPIRO NO PERF OR DOC: HCPCS | Performed by: FAMILY MEDICINE

## 2019-04-22 PROCEDURE — 3023F SPIROM DOC REV: CPT | Performed by: FAMILY MEDICINE

## 2019-04-22 PROCEDURE — G8399 PT W/DXA RESULTS DOCUMENT: HCPCS | Performed by: FAMILY MEDICINE

## 2019-04-22 PROCEDURE — G8598 ASA/ANTIPLAT THER USED: HCPCS | Performed by: FAMILY MEDICINE

## 2019-04-22 NOTE — PROGRESS NOTES
Dania [address] [place] [address] Way [facility]  Dept: [phone]  Dept Fax: [phone]  Loc: [phone]    Deanne Wagner is a 69 y.o. female who presents today for her medical conditions/complaints as noted below. Deanne Wagner is here for 1 Month Follow-Up        HPI:   CC: Here today to discuss the following:    Diabetes Mellitus  Has been compliant with medications. No side effects of medications since last visit. No polyuria, polydipsia, or vision changes since last visit. No symptomatic episodes of hypoglycemia. Glucometer readings show a steady decline in her blood sugar. She's had no episodes of hypoglycemia. COPD  Compliant with medications. No adverse effects from medication. Symptoms are stable today. Has chronic shortness of breath and cough but at baseline today. No wheezing or chest tightness. Depression with Anxiety  Compliant with medications. No adverse effects from medication. Depression and anxiety symptoms are stable today. No suicidal or homicidal ideation. Excessive worry, insomnia, and anxiousness are stable. Energy, concentration, and apathy are stable. Gastroesophageal Reflux Disease  Symptoms currently under control. Medication adequately controls his symptoms. No hematochezia or melena. Hyperlipidemia  Tolerating current cholesterol medication without side effects. No body aches. Attempting to reduce processed sugar and cholesterol from diet. MAGALY on CPAP  Compliant with CPAP. No daytime somnolence. Feels rested for the most part when wearing CPAP.               HPI    Past Medical History:   Diagnosis Date    Arthritis     Psoriatic    Asthma     CAD (coronary artery disease)     CHF (congestive heart failure) (HCC)     COPD (chronic obstructive pulmonary disease) (HCC)     [facility]     GERD (gastroesophageal reflux disease)     HTN (hypertension)     Hyperlipidemia     MI [facility]myocardial infarction) [facility]     x2    Thyroid disease       Past Surgical History:   Procedure Laterality Date    CATARACT REMOVAL      CHOLECYSTECTOMY      CORONARY ANGIOPLASTY  06/2018    Angioplasty to in-stent restenosis to the distal LAD    HIP SURGERY      HYSTERECTOMY      JOINT REPLACEMENT Right     Right knee replacement       Family History   Problem Relation Age of Onset    Heart Disease Mother     Heart Disease Father        Social History     Tobacco Use    Smoking status: Never Smoker    Smokeless tobacco: Never Used   Substance Use Topics    Alcohol use: No     Current Outpatient Medications   Medication Sig Dispense Refill    ketoconazole (NIZORAL) 2 % shampoo Apply topically daily as needed. 120 mL 0    furosemide (LASIX) 20 MG tablet TAKE 1 TABLET DAILY 90 tablet 3    insulin glargine (LANTUS SOLOSTAR) 100 UNIT/ML injection pen Inject 10 Units into the skin nightly 5 pen 5    Insulin Pen Needle 32G X 6 MM MISC 1 Act by Does not apply route every evening 100 each 5    Omega-3 Fatty Acids (FISH OIL OMEGA-3 PO) Take by mouth      umeclidinium-vilanterol (ANORO ELLIPTA) 62.5-25 MCG/INH AEPB inhaler Inhale 1 puff into the lungs daily      fluticasone (FLONASE) 50 MCG/ACT nasal spray 1 spray by Each Nare route daily      sertraline (ZOLOFT) 100 MG tablet Take 1 tablet by mouth daily 90 tablet 3    cetirizine (ZYRTEC) 10 MG tablet Take 10 mg by mouth 2 times daily      SITagliptin (JANUVIA) 100 MG tablet Take 1 tablet by mouth daily 90 tablet 3    carvedilol (COREG) 12.5 MG tablet Take 1 tablet by mouth 2 times daily (with meals) 180 tablet 3    montelukast (SINGULAIR) 10 MG tablet Take 1 tablet by mouth daily 90 tablet 3    azelastine HCl 0.15 % SOLN 2 sprays by Nasal route 2 times daily 90 mL 5    FREESTYLE LANCETS MISC Use 1-2 times daily to check blood sugar.  Dx:E11.9 180 each 1    lisinopril (PRINIVIL;ZESTRIL) 40 MG tablet Take 1 tablet by mouth daily 90 tablet 3    blood glucose test strips (FREESTYLE LITE) strip Use 1-2 times daily to check blood sugar. 180 each 1    ipratropium (ATROVENT) 0.06 % nasal spray 2 sprays by Nasal route 3 times daily as needed for Rhinitis 3 Bottle 1    atorvastatin (LIPITOR) 80 MG tablet Take 1 tablet by mouth daily 90 tablet 3    indomethacin (INDOCIN) 50 MG capsule Take 1 capsule by mouth 3 times daily 270 capsule 3    levothyroxine (SYNTHROID) 25 MCG tablet Take 1 tablet by mouth Daily 90 tablet 3    Blood Glucose Monitoring Suppl ADE 1 kit by Does not apply route daily ICD-10-CM: E11.9 1 Device 0    Lancet Device MISC 1 Device by Does not apply route once for 1 dose ICD-10-CM: E11.9 100 Device 0    clopidogrel (PLAVIX) 75 MG tablet Take 1 tablet by mouth daily 30 tablet 3    aspirin 81 MG tablet Take 162 mg by mouth daily       vitamin B-12 (CYANOCOBALAMIN) 1000 MCG tablet Take 1,000 mcg by mouth daily      Coenzyme Q10 (CO Q 10) 10 MG CAPS Take 1 capsule by mouth daily       GLUCOSAMINE-CALCIUM-VIT D PO Take 2 tablets by mouth daily       omeprazole (PRILOSEC) 40 MG delayed release capsule Take 40 mg by mouth daily       No current facility-administered medications for this visit. Allergies   Allergen Reactions    Tape [Adhesive Tape] Rash       Health Maintenance   Topic Date Due    Colon cancer screen colonoscopy  12/28/2016    DTaP/Tdap/Td vaccine (1 - Tdap) 11/20/2027 (Originally 11/21/2017)    Potassium monitoring  01/03/2020    Creatinine monitoring  01/03/2020    Breast cancer screen  06/20/2020    DEXA (modify frequency per FRAX score)  Completed    Flu vaccine  Completed    Shingles Vaccine  Completed    Pneumococcal 65+ years Vaccine  Completed       Subjective:      Review of Systems    Review of Systems   Constitutional: Negative for chills and fever. HENT: Negative for congestion. Respiratory: Negative for cough, chest tightness and shortness of breath.   Cardiovascular: Negative for chest pain, palpitations and leg swelling. Gastrointestinal: Negative for abdominal pain, anal bleeding, constipation, diarrhea and nausea. Genitourinary: Negative for difficulty urinating. Psychiatric/Behavioral: Negative. SeeHPI for visit specific review of symptoms. All others negative      Objective:   BP (!) 146/76   Pulse 76   Temp 97 °F (36.1 °C)   Wt 168 lb (76.2 kg)   SpO2 98%   BMI 27.12 kg/m²   Physical Exam    Physical Exam   Constitutional: She appears well-developed. Does not appear ill. Eyes: Pupils are equal, round, and reactive to light. Conjunctiva and Lids normal.  Neck: Normal range of motion. Neck supple. No masses. Neck Symmetric. Normal tracheal position. No thyroid enlargement  Cardiovascular: Normal rate and regular rhythm. Exam reveals no friction rub. Carotid arteries: no bruit observed. No murmur heard. Respiratory:  Effort normal and breath sounds normal. No respiratory distress. No wheezes. No rales. No use of accessory muscles or intercostal retractions. Abdominal: Soft. Bowel sounds are normal. exhibits no distension. There is no tenderness. There is no rebound and no guarding. Musculoskeletal: exhibits no edema. Normal gait. Neurological: alert. Psychiatric: normal mood and affect. Her behavior is normal. Normal judgement and insight observed. No results found for this or any previous visit (from the past 672 hour(s)). Assessment & Plan: The following diagnoses and conditions are stable with no further orders unless indicated:  1. Type 2 diabetes mellitus without complication, without long-term current use of insulin ([facility])  Lab Results   Component Value Date    LABA1C 8.7 (H) 11/30/2018     No results found for: EAG   On further review of her medications she tells me she does not take her Lantus when her blood sugar is less than 120 at night. She had noticed occasional hypoglycemic episodes at this point.  I discussed we can tailor a basal insulin schedule for her based on her reported hypoglycemia. She is not consistent with her meal planning and the following schedule may provide some flexibility and reduce hypoglycemia: She appears to be sensitive to insulin. If we are still having difficulty may switch her to NPH for more options for control. Patient Instructions   If blood sguar is less than 70 at bedtime:  No lantus  If blood sugar is 120 or less: Give 6 units of lantus at bedtime. If blood sugar is greater than 120: give the full 10 units. 2. Chronic obstructive pulmonary disease, unspecified COPD type ([address]  stable    3. Depression with anxiety  stable    4. Gastroesophageal reflux disease without esophagitis  stable    5. Mixed hyperlipidemia  stable    6. MAGALY on CPAP  Compliant in stable            Return in about 6 weeks (around 6/3/2019) for Routine follow up - 15 minute visit; poct a1c. Peace Hector Discussed use, benefit, and side effects of prescribed medications. All patient questions answered. Pt voiced understanding. Reviewed health maintenance. Instructedto continue current medications, diet and exercise. Patient agreed with treatmentplan. Follow up as directed.

## 2019-04-22 NOTE — PATIENT INSTRUCTIONS
If blood sguar is less than 70 at bedtime:  No lantus  If blood sugar is 120 or less: Give 6 units of lantus at bedtime. If blood sugar is greater than 120: give the full 10 units.

## 2019-05-11 NOTE — PROGRESS NOTES
"Subjective   Kary Alcaraz is a 78 y.o. female.     Background: pt with lung nodule 6 mm LLL, copd gold stage 1.    Chief Complaint   Patient presents with   • Shortness of Breath        History of Present Illness   Pt reports moderate intermittent dyspnea on exertion in chest associated with wheeze and alleviated by inhalers.  She is using anoro and doing well with that.    Medical/Family/Social History   reports that she has never smoked. She does not have any smokeless tobacco history on file. She reports that she does not use drugs.  Allergies   Allergen Reactions   • Adhesive Tape Other (See Comments)   • Elastic Bandages & [Zinc] Other (See Comments)     \"Elastic in bra\"     Medications    Current Outpatient Medications:   •  albuterol (PROVENTIL HFA;VENTOLIN HFA) 108 (90 Base) MCG/ACT inhaler, Inhale., Disp: , Rfl:   •  aspirin 81 MG chewable tablet, Chew 81 mg Daily., Disp: , Rfl:   •  atorvastatin (LIPITOR) 80 MG tablet, Take 80 mg by mouth Daily., Disp: , Rfl:   •  azelastine (ASTELIN) 0.1 % nasal spray, 2 sprays into the nostril(s) as directed by provider 2 (Two) Times a Day. Use in each nostril as directed, Disp: , Rfl:   •  carvedilol (COREG) 12.5 MG tablet, Take 12.5 mg by mouth 2 (Two) Times a Day With Meals., Disp: , Rfl:   •  clopidogrel (PLAVIX) 75 MG tablet, Take 75 mg by mouth Daily., Disp: , Rfl:   •  coenzyme Q10 100 MG capsule, Take 100 mg by mouth Daily., Disp: , Rfl:   •  Empagliflozin 25 MG tablet, Take  by mouth., Disp: , Rfl:   •  fexofenadine (ALLEGRA) 180 MG tablet, Take 180 mg by mouth Daily., Disp: , Rfl:   •  indomethacin (INDOCIN) 50 MG capsule, Take 50 mg by mouth 3 (Three) Times a Day As Needed for Mild Pain ., Disp: , Rfl:   •  ipratropium (ATROVENT) 0.06 % nasal spray, 2 sprays into the nostril(s) as directed by provider Every 12 (Twelve) Hours., Disp: , Rfl:   •  levothyroxine (SYNTHROID, LEVOTHROID) 25 MCG tablet, Take 25 mcg by mouth Daily., Disp: , Rfl:   •  linagliptin " "(TRADJENTA) 5 MG tablet tablet, Take 5 mg by mouth Daily., Disp: , Rfl:   •  lisinopril (PRINIVIL,ZESTRIL) 40 MG tablet, Take 40 mg by mouth Daily., Disp: , Rfl:   •  montelukast (SINGULAIR) 10 MG tablet, Take 10 mg by mouth Every Night., Disp: , Rfl:   •  omeprazole (priLOSEC) 40 MG capsule, Take 40 mg by mouth Daily., Disp: , Rfl:   •  sertraline (ZOLOFT) 50 MG tablet, Take 50 mg by mouth Daily., Disp: , Rfl:   •  vitamin B-12 (CYANOCOBALAMIN) 1000 MCG tablet, Take 1,000 mcg by mouth Daily., Disp: , Rfl:     Review of Systems   Constitutional: Negative for chills and fever.   Respiratory: Negative for cough and choking.    Cardiovascular: Negative for chest pain.   Gastrointestinal: Negative for nausea.     Objective   /80   Pulse 66   Ht 165.7 cm (65.25\")   Wt 75.8 kg (167 lb)   SpO2 94% Comment: RA  Breastfeeding? No   BMI 27.58 kg/m²   Physical Exam   Constitutional: She appears well-developed. She does not appear ill. No distress.   HENT:   Head: Atraumatic.   Nose: Nose normal.   Eyes: Conjunctivae and EOM are normal. No scleral icterus.   Neck: Neck supple.   Cardiovascular: Normal rate, regular rhythm, S1 normal and S2 normal.   Pulmonary/Chest: Effort normal and breath sounds normal.   Abdominal: Soft. She exhibits no distension.   Musculoskeletal: She exhibits no deformity.   Neurological: She is alert.   Skin: No rash noted.   Psychiatric: She has a normal mood and affect.     -----------------------------------------------------------------------------------------------  Pulmonary Functions Testing Results:  FEV1   Date Value Ref Range Status   05/13/2019 85% liters Final     FVC   Date Value Ref Range Status   05/13/2019 87% liters Final     FEV1/FVC   Date Value Ref Range Status   05/13/2019 74.47% % Final     DLCO   Date Value Ref Range Status   05/13/2019 84% ml/mmHg sec Final      My interpretation of PFT: normal " spirometry  -----------------------------------------------------------------------------------------------  Assessment/Plan   Problem List Items Addressed This Visit        Respiratory    Stage 1 mild COPD by GOLD classification (CMS/[facility]) - Primary    Relevant Orders    Pulmonary Function Test (Completed)        Patient's Body mass index is 27.58 kg/m². BMI is within normal parameters. No follow-up required..      She has normalized her spirometry now.  We have treated her as a stage 1 copd, may have more asthmatic physiology.  No just has mild intermittent symptoms.  Continue anoro inasmuch as it helps.  We could end up changing to ics if she ends up with more persistent symptoms. Follow up in 6 months.       Electronically signed by Jose Shah MD, 5/13/2019, 11:53 AM

## 2019-05-13 ENCOUNTER — PROCEDURE VISIT (OUTPATIENT)
Dept: PULMONOLOGY | Facility: CLINIC | Age: 79
End: 2019-05-13

## 2019-05-13 ENCOUNTER — OFFICE VISIT (OUTPATIENT)
Dept: PULMONOLOGY | Facility: CLINIC | Age: 79
End: 2019-05-13

## 2019-05-13 VITALS
BODY MASS INDEX: 27.82 KG/M2 | WEIGHT: 167 LBS | SYSTOLIC BLOOD PRESSURE: 132 MMHG | HEIGHT: 65 IN | HEART RATE: 66 BPM | DIASTOLIC BLOOD PRESSURE: 80 MMHG | OXYGEN SATURATION: 94 %

## 2019-05-13 DIAGNOSIS — J44.9 STAGE 1 MILD COPD BY GOLD CLASSIFICATION (HCC): Primary | ICD-10-CM

## 2019-05-13 LAB
DIFF CAP.CO: NORMAL ML/MMHG SEC
FEV1/FVC: NORMAL %
FEV1: NORMAL LITERS
FVC VOL RESPIRATORY: NORMAL LITERS

## 2019-05-13 PROCEDURE — 94010 BREATHING CAPACITY TEST: CPT | Performed by: INTERNAL MEDICINE

## 2019-05-13 PROCEDURE — 99212 OFFICE O/P EST SF 10 MIN: CPT | Performed by: INTERNAL MEDICINE

## 2019-05-13 PROCEDURE — 94729 DIFFUSING CAPACITY: CPT | Performed by: INTERNAL MEDICINE

## 2019-05-20 ENCOUNTER — OFFICE VISIT (OUTPATIENT)
Dept: CARDIOLOGY | Age: 79
End: 2019-05-20
Payer: MEDICARE

## 2019-05-20 VITALS
DIASTOLIC BLOOD PRESSURE: 70 MMHG | SYSTOLIC BLOOD PRESSURE: 120 MMHG | BODY MASS INDEX: 28.16 KG/M2 | HEIGHT: 65 IN | WEIGHT: 169 LBS | HEART RATE: 78 BPM

## 2019-05-20 DIAGNOSIS — I25.10 CORONARY ARTERY DISEASE INVOLVING NATIVE CORONARY ARTERY OF NATIVE HEART WITHOUT ANGINA PECTORIS: ICD-10-CM

## 2019-05-20 DIAGNOSIS — E78.5 DYSLIPIDEMIA: Primary | ICD-10-CM

## 2019-05-20 DIAGNOSIS — I10 ESSENTIAL HYPERTENSION: ICD-10-CM

## 2019-05-20 DIAGNOSIS — I51.89 DIASTOLIC DYSFUNCTION: ICD-10-CM

## 2019-05-20 PROCEDURE — G8598 ASA/ANTIPLAT THER USED: HCPCS | Performed by: NURSE PRACTITIONER

## 2019-05-20 PROCEDURE — 99213 OFFICE O/P EST LOW 20 MIN: CPT | Performed by: NURSE PRACTITIONER

## 2019-05-20 PROCEDURE — G8427 DOCREV CUR MEDS BY ELIG CLIN: HCPCS | Performed by: NURSE PRACTITIONER

## 2019-05-20 PROCEDURE — 1123F ACP DISCUSS/DSCN MKR DOCD: CPT | Performed by: NURSE PRACTITIONER

## 2019-05-20 PROCEDURE — G8399 PT W/DXA RESULTS DOCUMENT: HCPCS | Performed by: NURSE PRACTITIONER

## 2019-05-20 PROCEDURE — 1090F PRES/ABSN URINE INCON ASSESS: CPT | Performed by: NURSE PRACTITIONER

## 2019-05-20 PROCEDURE — G8417 CALC BMI ABV UP PARAM F/U: HCPCS | Performed by: NURSE PRACTITIONER

## 2019-05-20 PROCEDURE — 4040F PNEUMOC VAC/ADMIN/RCVD: CPT | Performed by: NURSE PRACTITIONER

## 2019-05-20 PROCEDURE — 1036F TOBACCO NON-USER: CPT | Performed by: NURSE PRACTITIONER

## 2019-05-20 RX ORDER — FUROSEMIDE 20 MG/1
20 TABLET ORAL PRN
Qty: 30 TABLET | Refills: 5
Start: 2019-05-20 | End: 2020-02-11

## 2019-05-20 RX ORDER — CARVEDILOL 25 MG/1
25 TABLET ORAL 2 TIMES DAILY WITH MEALS
Qty: 30 TABLET | Refills: 5 | Status: SHIPPED | OUTPATIENT
Start: 2019-05-20 | End: 2020-01-16 | Stop reason: SDUPTHER

## 2019-05-20 NOTE — PATIENT INSTRUCTIONS
Change Lasix to PRN for weight gain. Increase Coreg to 25 mg twice daily. Weigh daily:  Learn what your \"dry\" or \"ideal\" weight is - Dry weight is your weight without extra water (fluid). Weigh yourself at the same time each day, preferably in the morning, in similar clothing, after urinating but before eating, and on the same scale. Record your weight in a diary or calendar. If you gain two pounds in a day or three pounds in two days, double your water pill until you are back down to your dry weight. Limiting Sodium and Fluids With Heart Failure: Care Instructions  Your Care Instructions    Sodium causes your body to hold on to extra water. This may cause your heart failure symptoms to get worse. Limiting sodium may help you feel better and lower your risk of having to go to the hospital.  People get most of their sodium from processed foods. Fast food and restaurant meals also tend to be very high in sodium. Your doctor may suggest that you limit sodium to 2,000 milligrams (mg) a day or less. That is less than 1 teaspoon of salt a day, including all the salt you eat in cooked or packaged foods. Usually, you have to limit the amount of liquids you drink only if your heart failure is severe. Limiting sodium alone often is enough to help your body get rid of extra fluids. However, your doctor may tell you to limit your fluid intake to a set amount each day. Follow-up care is a key part of your treatment and safety. Be sure to make and go to all appointments, and call your doctor if you are having problems. It's also a good idea to know your test results and keep a list of the medicines you take. How can you care for yourself at home? Read food labels  · Read food labels on cans and food packages. The labels tell you how much sodium is in each serving. Make sure that you look at the serving size. If you eat more than the serving size, you have eaten more sodium than is listed for one serving.   · Food labels also tell you the Percent Daily Value. If the Percent Daily Value says 50%, it means that you will get at least 50% of all the sodium you need for the entire day in one serving. Choose products with low Percent Daily Values for sodium. · Be aware that sodium can come in forms other than salt, including monosodium glutamate (MSG), sodium citrate, and sodium bicarbonate (baking soda). MSG is often added to Asian food. You can sometimes ask for food without MSG or salt. Buy low-sodium foods  · Buy foods that are labeled \"unsalted\" (no salt added), \"sodium-free\" (less than 5 mg of sodium per serving), or \"low-sodium\" (less than 140 mg of sodium per serving). A food labeled \"light sodium\" has less than half of the full-sodium version of that food. Foods labeled \"reduced-sodium\" may still have too much sodium. · Buy fresh vegetables or plain, frozen vegetables. Buy low-sodium versions of canned vegetables, soups, and other canned goods. Prepare low-sodium meals  · Use less salt each day when cooking. Reducing salt in this way will help you adjust to the taste. Do not add salt after cooking. Take the salt shaker off the table. · Flavor your food with garlic, lemon juice, onion, vinegar, herbs, and spices instead of salt. Do not use soy sauce, steak sauce, onion salt, garlic salt, mustard, or ketchup on your food. · Make your own salad dressings, sauces, and ketchup without adding salt. · Use less salt (or none) when recipes call for it. You can often use half the salt a recipe calls for without losing flavor. Other dishes like rice, pasta, and grains do not need added salt. · Rinse canned vegetables. This removes some-but not all-of the salt. · Avoid water that has a naturally high sodium content or that has been treated with water softeners, which add sodium. Call your local water company to find out the sodium content of your water supply.  If you buy bottled water, read the label and choose a sodium-free brand. Avoid high-sodium foods, such as:  · Smoked, cured, salted, and canned meat, fish, and poultry. · Ham, mora, hot dogs, and luncheon meats. · Regular, hard, and processed cheese and regular peanut butter. · Crackers with salted tops. · Frozen prepared meals. · Canned and dried soups, broths, and bouillon, unless labeled sodium-free or low-sodium. · Canned vegetables, unless labeled sodium-free or low-sodium. · Salted snack foods such as chips and pretzels. · [place] fries, pizza, tacos, and other fast foods. · Pickles, olives, ketchup, and other condiments, especially soy sauce, unless labeled sodium-free or low-sodium. If you cannot cook for yourself  · Have family members or friends help you, or have someone cook low-sodium meals. · Check with your local senior nutrition program to find out where meals are served and whether they offer a low-sodium option. You can often find these programs through your local health department or hospital.  · Have meals delivered to your home. Most [facility] have a Meals on [address] [place]. These programs provide one hot meal a day for older adults, delivered to their homes. Ask whether these meals are low-sodium. Let them know that you are on a low-sodium diet. Limiting fluid intake  · Find a method that works for you. You might simply write down how much you drink every time you do. Some people keep a container filled with the amount of fluid allowed for that day. If they drink from a source other than the container, then they pour out that amount. · Measure your regular drinking glasses to find out how much fluid each one holds. Once you know this, you will not have to measure every time. · Besides water, milk, juices, and other drinks, some foods have a lot of fluid. Count any foods that will melt (such as ice cream or gelatin dessert) or liquid foods (such as soup) as part of your fluid intake for the day. Where can you learn more?   Go to [email or web address].[email or web address] org and sign in to your [employer] account. Enter A166 in the [facility] box to learn more about \"Limiting Sodium and Fluids With Heart Failure: Care Instructions. \"     If you do not have an account, please click on the \"Sign Up Now\" link. Current as of: September 21, 2016  Content Version: 11.5  © 7665-7499 [employer]. Care instructions adapted under license by [facility] ([facility]).  If you have questions about a medical condition or this instruction, always ask your healthcare professional. [place] [address] any warranty or liability for your use of this information.

## 2019-05-20 NOTE — PROGRESS NOTES
Dear Drs. Kae Canavan, MD & Rodrigo Linares MD,    Thank you for allowing me to participate in the care of Ms. Rafaela Carrera. She presents today at the [address] [place] [address] in the [facility]. As you know, Ms. Hortencia Sarah is a 69 y.o. female with history of hypertension, hyperlipidemia, diabetes, GERD, COPD, CHF and CAD s/p balloon angioplasty to LAD in-stent restenosis who presents with the chief complaint of 6 month follow up of chronic cardiac conditions. HTN-High today. Had coreg increased in Nov. Usually 140/80 at home. Had a stressful morning this morning. Uses salt shaker. Weighs daily. Weight runs 162-165/166. Uses Lasix 20 mg daily. Increased instance of dizziness and stumbling. Using cane more frequently. Dizziness is worse when sitting for a while and standing. HLD-on statin, PCP manages   CAD-on ASA, statin, plavix. Denies chest pain or SOB   CHF-diastolic dysfunction-on Lasix     Lives at the [place] [address]. She otherwise denies PND, orthopnea, syncope or near syncope. She has no other complaints. Review of Systems    Constitutional: Negative for fever, chills, diaphoresis, activity change, appetite change, fatigue and unexpected weight change. Eyes: Negative for photophobia, pain, redness and visual disturbance. Respiratory: Negative for apnea, cough, chest tightness, + shortness of breath-improved, wheezing and stridor. Cardiovascular: Negative for chest pain, palpitations and leg swelling. Gastrointestinal: Negative for abdominal distention. Genitourinary: Negative for dysuria, urgency and frequency. Musculoskeletal: Negative for myalgias, arthralgias and +gait problem using cane-stumbling   Skin: Negative for color change, pallor, rash and wound. Neurological: Negative for  tremors, speech difficulty, weakness and numbness. +dizziness  Hematological: Does not bruise/bleed easily. Psychiatric/Behavioral: Negative.         Past Medical History:   Diagnosis Date    Arthritis     Psoriatic    Asthma     CAD (coronary artery disease)     CHF (congestive heart failure) (HCC)     COPD (chronic obstructive pulmonary disease) (HCC)     [facility]     GERD (gastroesophageal reflux disease)     HTN (hypertension)     Hyperlipidemia     [facility] ([facility]ocardial infarction) ([facility]     x2    Thyroid disease        Past Surgical History:   Procedure Laterality Date    CATARACT REMOVAL      CHOLECYSTECTOMY      CORONARY ANGIOPLASTY  06/2018    Angioplasty to in-stent restenosis to the distal LAD    HIP SURGERY      HYSTERECTOMY      JOINT REPLACEMENT Right     Right knee replacement       Family History   Problem Relation Age of Onset    Heart Disease Mother     Heart Disease Father        Social History     Socioeconomic History    Marital status:      Spouse name: Not on file    Number of children: Not on file    Years of education: Not on file    Highest education level: Not on file   Occupational History    Not on file   Social Needs    Financial resource strain: Not on file    Food insecurity:     Worry: Not on file     Inability: Not on file    Transportation needs:     Medical: Not on file     Non-medical: Not on file   Tobacco Use    Smoking status: Never Smoker    Smokeless tobacco: Never Used   Substance and Sexual Activity    Alcohol use: No    Drug use: No    Sexual activity: Not on file   Lifestyle    Physical activity:     Days per week: Not on file     Minutes per session: Not on file    Stress: Not on file   Relationships    Social connections:     Talks on phone: Not on file     Gets together: Not on file     Attends Judaism service: Not on file     Active member of club or organization: Not on file     Attends meetings of clubs or organizations: Not on file     Relationship status: Not on file    Intimate partner violence:     Fear of current or ex partner: Not on file     Emotionally abused: Not on file     Physically abused: Not on file     Forced sexual activity: Not on file   Other Topics Concern    Not on file   Social History Narrative    Not on file       Allergies   Allergen Reactions    Tape Julio Friendly Tape] Rash         Current Outpatient Medications:     blood glucose test strips (FREESTYLE LITE) strip, USE 1 STRIP TO CHECK GLUCOSE ONCE DAILY AS NEEDED, Disp: 100 each, Rfl: 3    FREESTYLE LANCETS MISC, USE TO CHECK BLOOD SUGAR 1-2 TIMES DAILY, Disp: 100 each, Rfl: 3    ketoconazole (NIZORAL) 2 % shampoo, Apply topically daily as needed. , Disp: 120 mL, Rfl: 0    furosemide (LASIX) 20 MG tablet, TAKE 1 TABLET DAILY, Disp: 90 tablet, Rfl: 3    insulin glargine (LANTUS SOLOSTAR) 100 UNIT/ML injection pen, Inject 10 Units into the skin nightly, Disp: 5 pen, Rfl: 5    Insulin Pen Needle 32G X 6 MM MISC, 1 Act by Does not apply route every evening, Disp: 100 each, Rfl: 5    Omega-3 Fatty Acids (FISH OIL OMEGA-3 PO), Take by mouth, Disp: , Rfl:     umeclidinium-vilanterol (ANORO ELLIPTA) 62.5-25 MCG/INH AEPB inhaler, Inhale 1 puff into the lungs daily, Disp: , Rfl:     fluticasone (FLONASE) 50 MCG/ACT nasal spray, 1 spray by Each Nare route daily, Disp: , Rfl:     sertraline (ZOLOFT) 100 MG tablet, Take 1 tablet by mouth daily, Disp: 90 tablet, Rfl: 3    cetirizine (ZYRTEC) 10 MG tablet, Take 10 mg by mouth 2 times daily, Disp: , Rfl:     SITagliptin (JANUVIA) 100 MG tablet, Take 1 tablet by mouth daily, Disp: 90 tablet, Rfl: 3    carvedilol (COREG) 12.5 MG tablet, Take 1 tablet by mouth 2 times daily (with meals), Disp: 180 tablet, Rfl: 3    montelukast (SINGULAIR) 10 MG tablet, Take 1 tablet by mouth daily, Disp: 90 tablet, Rfl: 3    azelastine HCl 0.15 % SOLN, 2 sprays by Nasal route 2 times daily, Disp: 90 mL, Rfl: 5    lisinopril (PRINIVIL;ZESTRIL) 40 MG tablet, Take 1 tablet by mouth daily, Disp: 90 tablet, Rfl: 3    ipratropium (ATROVENT) 0.06 % nasal spray, 2 sprays by Nasal route 3 times daily as needed for Rhinitis, Disp: 3 Bottle, Rfl: 1    atorvastatin (LIPITOR) 80 MG tablet, Take 1 tablet by mouth daily, Disp: 90 tablet, Rfl: 3    indomethacin (INDOCIN) 50 MG capsule, Take 1 capsule by mouth 3 times daily, Disp: 270 capsule, Rfl: 3    levothyroxine (SYNTHROID) 25 MCG tablet, Take 1 tablet by mouth Daily, Disp: 90 tablet, Rfl: 3    Blood Glucose Monitoring Suppl ADE, 1 kit by Does not apply route daily ICD-10-CM: E11.9, Disp: 1 Device, Rfl: 0    Lancet Device MISC, 1 Device by Does not apply route once for 1 dose ICD-10-CM: E11.9, Disp: 100 Device, Rfl: 0    clopidogrel (PLAVIX) 75 MG tablet, Take 1 tablet by mouth daily, Disp: 30 tablet, Rfl: 3    aspirin 81 MG tablet, Take 162 mg by mouth daily , Disp: , Rfl:     vitamin B-12 (CYANOCOBALAMIN) 1000 MCG tablet, Take 1,000 mcg by mouth daily, Disp: , Rfl:     Coenzyme Q10 (CO Q 10) 10 MG CAPS, Take 1 capsule by mouth daily , Disp: , Rfl:     GLUCOSAMINE-CALCIUM-VIT D PO, Take 2 tablets by mouth daily , Disp: , Rfl:     omeprazole (PRILOSEC) 40 MG delayed release capsule, Take 40 mg by mouth daily, Disp: , Rfl:     PE:  Vitals:    05/20/19 1052   BP: 120/70   Pulse:        Estimated body mass index is 28.12 kg/m² as calculated from the following:    Height as of this encounter: 5' 5\" (1.651 m). Weight as of this encounter: 169 lb (76.7 kg). Constitutional: She is oriented to person, place, and time. She appears well-developed and well-nourished in no acute distress. Head: Normocephalic and atraumatic. Neck:  Neck supple without JVD present. Cardiovascular: Normal rate, regular rhythm, normal heart sounds. no murmur ascultated. No gallop and no friction rub.  no carotid bruits. no peripheral edema. Pulmonary/Chest:  Lungs clear to auscultation bilaterally without evidence of respiratory distress. She without wheezes. She without rales or ronchi. Musculoskeletal: Normal range of motion.   Gait is effusion and aortic root dimensions are within normal   limits.      Signature      ----------------------------------------------------------------   Electronically signed by Mallika Watson MD(Interpreting physician)  Timothy Castaneda 11/19/2018 03:34 PM    Assessment    1. Dyslipidemia    2. Essential hypertension    3. Coronary artery disease involving native coronary artery of native heart without angina pectoris    4. Diastolic dysfunction          Plan:    HTN-Coreg 12.5 twice daily, lisinopril 40 daily-Orthostatic /90 supine, 148/86 sitting, 120/70 standing. Will change Lasix to PRN. Increase Coreg to 25 mg twice daily. HLD-on statin, pcp manages   CAD-on aspirin and statin, Plavix-continue DAPT till after June 8th, then stop Plavix and continue ASA   CHF-diastolic dysfunction-Lasix 20 mg daily-change to PRN    Disposition - RTC in 1 months with APRN or sooner if needed    Please do not hesitate to contact me for any questions or concerns.     Sincerely yours,    SHARI Brand

## 2019-05-30 ENCOUNTER — TELEPHONE (OUTPATIENT)
Dept: FAMILY MEDICINE CLINIC | Age: 79
End: 2019-05-30

## 2019-05-30 NOTE — TELEPHONE ENCOUNTER
Pt has checked her BG this morning and it was 310 before breakfast. She currently takes Lantus 10units at bedtime.  Please advise if needs to be seen or med changes

## 2019-06-04 ENCOUNTER — OFFICE VISIT (OUTPATIENT)
Dept: FAMILY MEDICINE CLINIC | Age: 79
End: 2019-06-04
Payer: MEDICARE

## 2019-06-04 VITALS
SYSTOLIC BLOOD PRESSURE: 128 MMHG | WEIGHT: 169 LBS | TEMPERATURE: 97 F | DIASTOLIC BLOOD PRESSURE: 72 MMHG | HEART RATE: 74 BPM | BODY MASS INDEX: 28.12 KG/M2 | OXYGEN SATURATION: 97 %

## 2019-06-04 DIAGNOSIS — E11.9 TYPE 2 DIABETES MELLITUS WITHOUT COMPLICATION, WITHOUT LONG-TERM CURRENT USE OF INSULIN (HCC): Primary | ICD-10-CM

## 2019-06-04 DIAGNOSIS — E78.2 MIXED HYPERLIPIDEMIA: ICD-10-CM

## 2019-06-04 DIAGNOSIS — J44.9 CHRONIC OBSTRUCTIVE PULMONARY DISEASE, UNSPECIFIED COPD TYPE (HCC): ICD-10-CM

## 2019-06-04 DIAGNOSIS — F41.8 DEPRESSION WITH ANXIETY: ICD-10-CM

## 2019-06-04 DIAGNOSIS — E03.9 PRIMARY HYPOTHYROIDISM: ICD-10-CM

## 2019-06-04 DIAGNOSIS — K21.9 GASTROESOPHAGEAL REFLUX DISEASE WITHOUT ESOPHAGITIS: ICD-10-CM

## 2019-06-04 PROCEDURE — G8598 ASA/ANTIPLAT THER USED: HCPCS | Performed by: FAMILY MEDICINE

## 2019-06-04 PROCEDURE — G8427 DOCREV CUR MEDS BY ELIG CLIN: HCPCS | Performed by: FAMILY MEDICINE

## 2019-06-04 PROCEDURE — G8926 SPIRO NO PERF OR DOC: HCPCS | Performed by: FAMILY MEDICINE

## 2019-06-04 PROCEDURE — 4040F PNEUMOC VAC/ADMIN/RCVD: CPT | Performed by: FAMILY MEDICINE

## 2019-06-04 PROCEDURE — 99214 OFFICE O/P EST MOD 30 MIN: CPT | Performed by: FAMILY MEDICINE

## 2019-06-04 PROCEDURE — G8417 CALC BMI ABV UP PARAM F/U: HCPCS | Performed by: FAMILY MEDICINE

## 2019-06-04 PROCEDURE — 3023F SPIROM DOC REV: CPT | Performed by: FAMILY MEDICINE

## 2019-06-04 PROCEDURE — 1090F PRES/ABSN URINE INCON ASSESS: CPT | Performed by: FAMILY MEDICINE

## 2019-06-04 PROCEDURE — 1036F TOBACCO NON-USER: CPT | Performed by: FAMILY MEDICINE

## 2019-06-04 PROCEDURE — G8399 PT W/DXA RESULTS DOCUMENT: HCPCS | Performed by: FAMILY MEDICINE

## 2019-06-04 PROCEDURE — 1123F ACP DISCUSS/DSCN MKR DOCD: CPT | Performed by: FAMILY MEDICINE

## 2019-06-04 NOTE — PROGRESS NOTES
[facility] [address] [place] [address]  [address] [place] [address] [place] [address]  Dept: [phone]  Dept Fax: [phone]  Loc: [phone]    Duke Arce is a 69 y.o. female who presents today for her medical conditions/complaints as noted below. Duke Arce is here for Follow-up        HPI:   CC: Here today to discuss the following:    Diabetes Mellitus  She states she often sleeps in past noon will forget to take her insulin  No side effects of medications since last visit. No polyuria, polydipsia, or vision changes since last visit. No symptomatic episodes of hypoglycemia. She did not bring her glucometer results in today. COPD  Compliant with medications. No adverse effects from medication. Symptoms are stable today. Has chronic shortness of breath and cough but at baseline today. No wheezing or chest tightness. Depression with Anxiety  Compliant with medications. No adverse effects from medication. Depression and anxiety symptoms are stable today. No suicidal or homicidal ideation. Excessive worry, insomnia, and anxiousness are stable. Energy, concentration, and apathy are stable. Gastroesophageal Reflux Disease  Symptoms currently under control. Medication adequately controls his symptoms. No hematochezia or melena. Hypothyroidism  Symptoms are currently well controlled. No temperature intolerance, mood issues, or fatigue reported. Tolerating current medication without adverse effects. Hyperlipidemia  Tolerating current cholesterol medication without side effects. No body aches. Attempting to reduce processed sugar and cholesterol from diet.             HPI    Past Medical History:   Diagnosis Date    Arthritis     Psoriatic    Asthma     CAD (coronary artery disease)     CHF (congestive heart failure) (HCC)     [facility]     DM (diabetes mellitus) ([facility]     GERD (gastroesophageal reflux disease)     HTN (hypertension)     Hyperlipidemia     [facility] infarction) [facility]     x2    Thyroid disease       Past Surgical History:   Procedure Laterality Date    CATARACT REMOVAL      CHOLECYSTECTOMY      CORONARY ANGIOPLASTY  06/2018    Angioplasty to in-stent restenosis to the distal LAD    HIP SURGERY      HYSTERECTOMY      JOINT REPLACEMENT Right     Right knee replacement       Family History   Problem Relation Age of Onset    Heart Disease Mother     Heart Disease Father        Social History     Tobacco Use    Smoking status: Never Smoker    Smokeless tobacco: Never Used   Substance Use Topics    Alcohol use: No     Current Outpatient Medications   Medication Sig Dispense Refill    insulin glargine (LANTUS SOLOSTAR) 100 UNIT/ML injection pen Inject 16 Units into the skin nightly 5 pen 5    blood glucose test strips (FREESTYLE LITE) strip USE 1 STRIP TO CHECK GLUCOSE ONCE DAILY AS NEEDED 100 each 3    FREESTYLE LANCETS MISC USE TO CHECK BLOOD SUGAR 1-2 TIMES DAILY 100 each 3    furosemide (LASIX) 20 MG tablet Take 1 tablet by mouth as needed (swelling) 30 tablet 5    carvedilol (COREG) 25 MG tablet Take 1 tablet by mouth 2 times daily (with meals) 30 tablet 5    ketoconazole (NIZORAL) 2 % shampoo Apply topically daily as needed.  120 mL 0    Insulin Pen Needle 32G X 6 MM MISC 1 Act by Does not apply route every evening 100 each 5    Omega-3 Fatty Acids (FISH OIL OMEGA-3 PO) Take by mouth      umeclidinium-vilanterol (ANORO ELLIPTA) 62.5-25 MCG/INH AEPB inhaler Inhale 1 puff into the lungs daily      fluticasone (FLONASE) 50 MCG/ACT nasal spray 1 spray by Each Nare route daily      sertraline (ZOLOFT) 100 MG tablet Take 1 tablet by mouth daily 90 tablet 3    cetirizine (ZYRTEC) 10 MG tablet Take 10 mg by mouth 2 times daily      montelukast (SINGULAIR) 10 MG tablet Take 1 tablet by mouth daily 90 tablet 3    azelastine HCl 0.15 % SOLN 2 sprays by Nasal route 2 times daily 90 mL 5    lisinopril (PRINIVIL;ZESTRIL) 40 MG tablet Take 1 tablet by mouth daily 90 tablet 3    ipratropium (ATROVENT) 0.06 % nasal spray 2 sprays by Nasal route 3 times daily as needed for Rhinitis 3 Bottle 1    atorvastatin (LIPITOR) 80 MG tablet Take 1 tablet by mouth daily 90 tablet 3    indomethacin (INDOCIN) 50 MG capsule Take 1 capsule by mouth 3 times daily 270 capsule 3    levothyroxine (SYNTHROID) 25 MCG tablet Take 1 tablet by mouth Daily 90 tablet 3    Blood Glucose Monitoring Suppl ADE 1 kit by Does not apply route daily ICD-10-CM: E11.9 1 Device 0    Lancet Device MISC 1 Device by Does not apply route once for 1 dose ICD-10-CM: E11.9 100 Device 0    clopidogrel (PLAVIX) 75 MG tablet Take 1 tablet by mouth daily 30 tablet 3    aspirin 81 MG tablet Take 162 mg by mouth daily       vitamin B-12 (CYANOCOBALAMIN) 1000 MCG tablet Take 1,000 mcg by mouth daily      Coenzyme Q10 (CO Q 10) 10 MG CAPS Take 1 capsule by mouth daily       GLUCOSAMINE-CALCIUM-VIT D PO Take 2 tablets by mouth daily       omeprazole (PRILOSEC) 40 MG delayed release capsule Take 40 mg by mouth daily       No current facility-administered medications for this visit. Allergies   Allergen Reactions    Tape [Adhesive Tape] Rash       Health Maintenance   Topic Date Due    Colon cancer screen colonoscopy  12/28/2016    DTaP/Tdap/Td vaccine (1 - Tdap) 11/20/2027 (Originally 11/21/2017)    Potassium monitoring  01/03/2020    Creatinine monitoring  01/03/2020    Breast cancer screen  06/20/2020    DEXA (modify frequency per FRAX score)  Completed    Flu vaccine  Completed    Shingles Vaccine  Completed    Pneumococcal 65+ years Vaccine  Completed       Subjective:      Review of Systems   Constitutional: Negative for chills and fever. HENT: Negative for congestion. Respiratory: Positive for cough and shortness of breath. Negative for chest tightness.     Cardiovascular: Negative for chest pain, SOLOSTAR) 100 UNIT/ML injection pen; Inject 16 Units into the skin nightly  Dispense: 5 pen; Refill: 5    2. Chronic obstructive pulmonary disease, unspecified COPD type ([facility]  stable    3. Depression with anxiety  stable    4. Gastroesophageal reflux disease without esophagitis  stable  - CBC Auto Differential; Future    5. Mixed hyperlipidemia  Continue current medication check the following labs prior to next visit  - Comprehensive Metabolic Panel; Future  - Lipid Panel; Future    6. Primary hypothyroidism  Lab Results   Component Value Date    TSH 2.450 11/30/2018     Symptoms appear to be stable recheck labs before next visit  - T4, Free; Future  - TSH without Reflex; Future        She feels she went to the lab last week. I don't see her labwork today and I asked her to find out where she got her labwork. Return in about 3 months (around 9/4/2019) for AWV - 30 minute visit. Discussed use, benefit, and side effects of prescribed medications. All patient questions answered. Pt voiced understanding. Reviewed health maintenance. Instructedto continue current medications, diet and exercise. Patient agreed with treatmentplan. Follow up as directed.

## 2019-06-04 NOTE — PATIENT INSTRUCTIONS
Go to room 201 for labs prior to next visit. Be sure to fast for at least 12 hours prior to laboratory appointment. Would recommend getting labs about 1 week prior to the appointment time to ensure they are available at the time of the appointment. No appointment is necessary for laboratory work as the orders have already been placed.     Lab opens at 6:30 am and closes at 5:00 pm.     _______________________________________________________________        Stop Correne Fall  Increase Insulin (Lantus)

## 2019-06-08 ASSESSMENT — ENCOUNTER SYMPTOMS
SHORTNESS OF BREATH: 1
DIARRHEA: 0
COUGH: 1
CONSTIPATION: 0
ABDOMINAL PAIN: 0
ANAL BLEEDING: 0
NAUSEA: 0
CHEST TIGHTNESS: 0

## 2019-06-12 ENCOUNTER — TELEPHONE (OUTPATIENT)
Dept: FAMILY MEDICINE CLINIC | Age: 79
End: 2019-06-12

## 2019-06-17 DIAGNOSIS — E78.2 MIXED HYPERLIPIDEMIA: ICD-10-CM

## 2019-06-17 DIAGNOSIS — E03.9 PRIMARY HYPOTHYROIDISM: ICD-10-CM

## 2019-06-17 DIAGNOSIS — K21.9 GASTROESOPHAGEAL REFLUX DISEASE WITHOUT ESOPHAGITIS: ICD-10-CM

## 2019-06-17 DIAGNOSIS — E11.9 TYPE 2 DIABETES MELLITUS WITHOUT COMPLICATION, WITHOUT LONG-TERM CURRENT USE OF INSULIN (HCC): ICD-10-CM

## 2019-06-17 LAB
ALBUMIN SERPL-MCNC: 4 G/DL (ref 3.5–5.2)
ALP BLD-CCNC: 88 U/L (ref 35–104)
ALT SERPL-CCNC: 19 U/L (ref 5–33)
ANION GAP SERPL CALCULATED.3IONS-SCNC: 16 MMOL/L (ref 7–19)
AST SERPL-CCNC: 15 U/L (ref 5–32)
BASOPHILS ABSOLUTE: 0 K/UL (ref 0–0.2)
BASOPHILS RELATIVE PERCENT: 0.4 % (ref 0–1)
BILIRUB SERPL-MCNC: 0.4 MG/DL (ref 0.2–1.2)
BUN BLDV-MCNC: 18 MG/DL (ref 8–23)
CALCIUM SERPL-MCNC: 9.8 MG/DL (ref 8.8–10.2)
CHLORIDE BLD-SCNC: 101 MMOL/L (ref 98–111)
CHOLESTEROL, TOTAL: 116 MG/DL (ref 160–199)
CO2: 24 MMOL/L (ref 22–29)
CREAT SERPL-MCNC: 1.1 MG/DL (ref 0.5–0.9)
EOSINOPHILS ABSOLUTE: 0.1 K/UL (ref 0–0.6)
EOSINOPHILS RELATIVE PERCENT: 0.9 % (ref 0–5)
GFR NON-AFRICAN AMERICAN: 48
GLUCOSE BLD-MCNC: 273 MG/DL (ref 74–109)
HBA1C MFR BLD: 8.9 % (ref 4–6)
HCT VFR BLD CALC: 36.8 % (ref 37–47)
HDLC SERPL-MCNC: 30 MG/DL (ref 65–121)
HEMOGLOBIN: 12.1 G/DL (ref 12–16)
LDL CHOLESTEROL CALCULATED: 35 MG/DL
LYMPHOCYTES ABSOLUTE: 1.4 K/UL (ref 1.1–4.5)
LYMPHOCYTES RELATIVE PERCENT: 17.8 % (ref 20–40)
MCH RBC QN AUTO: 29.3 PG (ref 27–31)
MCHC RBC AUTO-ENTMCNC: 32.9 G/DL (ref 33–37)
MCV RBC AUTO: 89.1 FL (ref 81–99)
MONOCYTES ABSOLUTE: 0.8 K/UL (ref 0–0.9)
MONOCYTES RELATIVE PERCENT: 10.4 % (ref 0–10)
NEUTROPHILS ABSOLUTE: 5.5 K/UL (ref 1.5–7.5)
NEUTROPHILS RELATIVE PERCENT: 70 % (ref 50–65)
PDW BLD-RTO: 13.7 % (ref 11.5–14.5)
PLATELET # BLD: 242 K/UL (ref 130–400)
PMV BLD AUTO: 10 FL (ref 9.4–12.3)
POTASSIUM SERPL-SCNC: 4.3 MMOL/L (ref 3.5–5)
RBC # BLD: 4.13 M/UL (ref 4.2–5.4)
SODIUM BLD-SCNC: 141 MMOL/L (ref 136–145)
T4 FREE: 1.1 NG/DL (ref 0.9–1.7)
TOTAL PROTEIN: 7.1 G/DL (ref 6.6–8.7)
TRIGL SERPL-MCNC: 255 MG/DL (ref 0–149)
TSH SERPL DL<=0.05 MIU/L-ACNC: 3.47 UIU/ML (ref 0.27–4.2)
WBC # BLD: 7.9 K/UL (ref 4.8–10.8)

## 2019-06-21 DIAGNOSIS — E11.9 TYPE 2 DIABETES MELLITUS WITHOUT COMPLICATION, WITHOUT LONG-TERM CURRENT USE OF INSULIN (HCC): ICD-10-CM

## 2019-06-21 RX ORDER — LANCETS 28 GAUGE
EACH MISCELLANEOUS
Qty: 100 EACH | Refills: 3 | Status: ON HOLD | OUTPATIENT
Start: 2019-06-21 | End: 2021-12-02 | Stop reason: HOSPADM

## 2019-06-24 ENCOUNTER — OFFICE VISIT (OUTPATIENT)
Dept: CARDIOLOGY | Age: 79
End: 2019-06-24
Payer: MEDICARE

## 2019-06-24 VITALS
HEART RATE: 68 BPM | HEIGHT: 66 IN | SYSTOLIC BLOOD PRESSURE: 140 MMHG | DIASTOLIC BLOOD PRESSURE: 90 MMHG | BODY MASS INDEX: 26.68 KG/M2 | WEIGHT: 166 LBS

## 2019-06-24 DIAGNOSIS — I10 ESSENTIAL HYPERTENSION: Primary | ICD-10-CM

## 2019-06-24 PROCEDURE — 1036F TOBACCO NON-USER: CPT | Performed by: NURSE PRACTITIONER

## 2019-06-24 PROCEDURE — 4040F PNEUMOC VAC/ADMIN/RCVD: CPT | Performed by: NURSE PRACTITIONER

## 2019-06-24 PROCEDURE — G8399 PT W/DXA RESULTS DOCUMENT: HCPCS | Performed by: NURSE PRACTITIONER

## 2019-06-24 PROCEDURE — 1123F ACP DISCUSS/DSCN MKR DOCD: CPT | Performed by: NURSE PRACTITIONER

## 2019-06-24 PROCEDURE — 1090F PRES/ABSN URINE INCON ASSESS: CPT | Performed by: NURSE PRACTITIONER

## 2019-06-24 PROCEDURE — 99213 OFFICE O/P EST LOW 20 MIN: CPT | Performed by: NURSE PRACTITIONER

## 2019-06-24 PROCEDURE — G8598 ASA/ANTIPLAT THER USED: HCPCS | Performed by: NURSE PRACTITIONER

## 2019-06-24 PROCEDURE — G8427 DOCREV CUR MEDS BY ELIG CLIN: HCPCS | Performed by: NURSE PRACTITIONER

## 2019-06-24 PROCEDURE — G8417 CALC BMI ABV UP PARAM F/U: HCPCS | Performed by: NURSE PRACTITIONER

## 2019-06-24 RX ORDER — NIFEDIPINE 30 MG/1
30 TABLET, EXTENDED RELEASE ORAL DAILY
Qty: 90 TABLET | Refills: 1 | Status: SHIPPED | OUTPATIENT
Start: 2019-06-24 | End: 2019-07-29 | Stop reason: SDUPTHER

## 2019-06-24 NOTE — PATIENT INSTRUCTIONS
Only take Lasix as needed for 2-3 lb weight gain overnight. Begin Nifedipine 30 mg daily at noon. Continue to take Lisinopril and Coreg at 8 am and Coreg at 6pm     Completing a Blood Pressure Log    Your doctor has recommended completing a blood pressure log to see what your blood pressure runs at home. Managing your blood pressure can be very beneficial in your overall health. Listed below are a few tips and instructions on how to check your blood pressure correctly. · Always check your blood pressure AFTER taking all blood pressure medications. Waiting about 2 hours gives the best results on how your medication is working. · Before checking your blood pressure come inside, sit comfortably for 5 to 10 minutes and then check your blood pressure. Your arm should rest comfortably, sit up straight with your back against the chair, legs uncrossed and feet touching the floor. · Your blood pressure will typically run higher when you have recently been excercising, smoking, or drinking caffiene and can give inaccurate readings, try to wait 30 minutes before checking your blood pressure. · Check your blood pressure in the mornings and at night for  3 days a week and write it down. After 2 to 3 weeks of gathering readings you can call, e-mail, or fax in your results. Our office number is [phone], our fax number is [phone], and you can always e-mail us via [employer] to send your results directly to your Doctor. Weigh daily:  Learn what your \"dry\" or \"ideal\" weight is - Dry weight is your weight without extra water (fluid). Weigh yourself at the same time each day, preferably in the morning, in similar clothing, after urinating but before eating, and on the same scale. Record your weight in a diary or calendar.   If you gain two pounds in a day or three pounds in two days, double your water pill until you are back down to your dry weight.    nifedipine  Pronunciation: magui kay  Brand: Adalat CC, Afeditab CR, Nifediac CC, Nifedical XL, Procardia, Procardia XL  What is the most important information I should know about nifedipine? You should not use nifedipine if you have severe coronary artery disease, or if you have had a heart attack within the past 2 weeks. What is nifedipine? Nifedipine is in a group of drugs called calcium channel blockers. It works by relaxing the muscles of your heart and blood vessels. Nifedipine is used to treat hypertension (high blood pressure) and angina (chest pain). Nifedipine may also be used for purposes not listed in this medication guide. What should I discuss with my healthcare provider before taking nifedipine? You should not use this medicine if you are allergic to nifedipine, if you have severe coronary artery disease, or if you have had a heart attack within the past 2 weeks. To make sure nifedipine is safe for you, tell your doctor if you have:  · severe COPD (chronic obstructive pulmonary disease);  · kidney disease;  · congestive heart failure; or  · if you take other medications, especially an antibiotic or antifungal medicine, an antidepressant, heart or blood pressure medicine, or drugs to treat HIV/AIDS or hepatitis C. It is not known whether nifedipine will harm an unborn baby. Tell your doctor if you are pregnant or plan to become pregnant. Nifedipine can pass into breast milk and may harm a nursing baby. Tell your doctor if you are breast-feeding a baby. The nifedipine extended-release tablet may contain lactose. Talk to your doctor before using this form of nifedipine if you have galactose intolerance, or severe problems with lactose (milk sugar). How should I take nifedipine? Follow all directions on your prescription label. Your doctor may occasionally change your dose to make sure you get the best results. Do not take this medicine in larger or smaller amounts or for longer than recommended.   You may need to take an extended-release tablet on an empty stomach. Follow the directions on your medicine label about taking this medication with or without food. Do not crush, chew, or break an extended-release tablet. Swallow it whole. Your blood pressure will need to be checked often and you may need other blood tests at your doctor's office. Some tablet forms of nifedipine are made with a shell that is not absorbed or melted in the body. Part of the tablet shell may appear in your stool. This is a normal side effect of nifedipine and will not make the medication less effective. If you need surgery, tell the surgeon ahead of time that you are using nifedipine. You may need to stop using the medicine at least 36 hours before surgery. You may have very low blood pressure while taking this medication. Call your doctor if you are sick with vomiting or diarrhea, or if you are sweating more than usual.  If you are also taking a beta-blocker (atenolol, carvedilol, labetalol, metoprolol, nadolol, nebivolol, propranolol, sotalol, and others) you should not stop using the beta-blocker suddenly or you could have serious heart problems that will not be prevented by nifedipine. Follow your doctor's instructions about tapering your beta-blocker dose. You should not stop using nifedipine suddenly. Stopping suddenly may make your condition worse. If you are being treated for high blood pressure, keep using this medication even if you feel well. High blood pressure often has no symptoms. You may need to use blood pressure medication for the rest of your life. Store at room temperature away from moisture, heat, and light. What happens if I miss a dose? Take the missed dose as soon as you remember. Skip the missed dose if it is almost time for your next scheduled dose. Do not take extra medicine to make up the missed dose. What happens if I overdose? Seek emergency medical attention or call the Poison Help line at 1-966.771.9974.   What should I avoid while taking nifedipine? Grapefruit and grapefruit juice may interact with nifedipine and lead to unwanted side effects. Discuss the use of grapefruit products with your doctor. Avoid getting up too fast from a sitting or lying position, or you may feel dizzy. Get up slowly and steady yourself to prevent a fall. What are the possible side effects of nifedipine? Get emergency medical help if you have signs of an allergic reaction: hives; difficult breathing; swelling of your face, lips, tongue, or throat. Call your doctor at once if you have:  · worsening angina;  · a light-headed feeling, like you might pass out;  · pounding heartbeats or fluttering in your chest;  · chest pain or heavy feeling, pain spreading to the jaw or shoulder, nausea, sweating, general ill feeling;  · swelling in your ankles or feet; or  · upper stomach pain, jaundice (yellowing of the skin or eyes). Common side effects may include:  · mild dizziness;  · flushing (warmth, redness, or tingly feeling);  · weakness, headache, mood changes;  · heartburn, nausea;  · tremors, muscle cramps; or  · cough, wheezing, sore throat, stuffy nose. This is not a complete list of side effects and others may occur. Call your doctor for medical advice about side effects. You may report side effects to FDA at 5-181-FDA-2407. What other drugs will affect nifedipine? Other drugs may interact with nifedipine, including prescription and over-the-counter medicines, vitamins, and herbal products. Tell each of your health care providers about all medicines you use now and any medicine you start or stop using. Where can I get more information? Your pharmacist can provide more information about nifedipine. Remember, keep this and all other medicines out of the reach of children, never share your medicines with others, and use this medication only for the indication prescribed.   Every effort has been made to ensure that the information provided by [address] is accurate, up-to-date, and complete, but no guarantee is made to that effect. Drug information contained herein may be time sensitive. [facility] information has been compiled for use by healthcare practitioners and consumers in the [place] and therefore [facility] does not warrant that uses outside of the [place] are appropriate, unless specifically indicated otherwise. [facility]'s drug information does not endorse drugs, diagnose patients or recommend therapy. [facility]'s drug information is an informational resource designed to assist licensed healthcare practitioners in caring for their patients and/or to serve consumers viewing this service as a supplement to, and not a substitute for, the expertise, skill, knowledge and judgment of healthcare practitioners. The absence of a warning for a given drug or drug combination in no way should be construed to indicate that the drug or drug combination is safe, effective or appropriate for any given patient. [facility] does not assume any responsibility for any aspect of healthcare administered with the aid of information [facility] provides. The information contained herein is not intended to cover all possible uses, directions, precautions, warnings, drug interactions, allergic reactions, or adverse effects. If you have questions about the drugs you are taking, check with your doctor, nurse or pharmacist.  Copyright 7344-2134 [address]: 12.01. Revision date: 3/17/2015. Care instructions adapted under license by [facility] ([facility]).  If you have questions about a medical condition or this instruction, always ask your healthcare professional. [place] [address] any warranty or liability for your use of this information.

## 2019-06-24 NOTE — PROGRESS NOTES
speech difficulty, weakness and numbness. +dizziness-ongoing forever but seems to be worse. Hematological: Does not bruise/bleed easily. Psychiatric/Behavioral: Negative.         Past Medical History:   Diagnosis Date    Arthritis     Psoriatic    Asthma     CAD (coronary artery disease)     CHF (congestive heart failure) (HCC)     COPD (chronic obstructive pulmonary disease) (HCC)     DM (diabetes mellitus) ([facility] [address]     GERD (gastroesophageal reflux disease)     HTN (hypertension)     Hyperlipidemia     [facility]     x2    Thyroid disease        Past Surgical History:   Procedure Laterality Date    CATARACT REMOVAL      CHOLECYSTECTOMY      CORONARY ANGIOPLASTY  06/2018    Angioplasty to in-stent restenosis to the distal LAD    HIP SURGERY      HYSTERECTOMY      JOINT REPLACEMENT Right     Right knee replacement       Family History   Problem Relation Age of Onset    Heart Disease Mother     Heart Disease Father        Social History     Socioeconomic History    Marital status:      Spouse name: Not on file    Number of children: Not on file    Years of education: Not on file    Highest education level: Not on file   Occupational History    Not on file   Social Needs    Financial resource strain: Not on file    Food insecurity:     Worry: Not on file     Inability: Not on file    Transportation needs:     Medical: Not on file     Non-medical: Not on file   Tobacco Use    Smoking status: Never Smoker    Smokeless tobacco: Never Used   Substance and Sexual Activity    Alcohol use: No    Drug use: No    Sexual activity: Not on file   Lifestyle    Physical activity:     Days per week: Not on file     Minutes per session: Not on file    Stress: Not on file   Relationships    Social connections:     Talks on phone: Not on file     Gets together: Not on file     Attends Mandaeism service: Not on file     Active member of club or organization: Not on file Attends meetings of clubs or organizations: Not on file     Relationship status: Not on file    Intimate partner violence:     Fear of current or ex partner: Not on file     Emotionally abused: Not on file     Physically abused: Not on file     Forced sexual activity: Not on file   Other Topics Concern    Not on file   Social History Narrative    Not on file       Allergies   Allergen Reactions    Tape Danette Goldberg Tape] Rash         Current Outpatient Medications:     NIFEdipine (PROCARDIA XL) 30 MG extended release tablet, Take 1 tablet by mouth daily, Disp: 90 tablet, Rfl: 1    blood glucose test strips (FREESTYLE LITE) strip, USE 1 STRIP TO CHECK GLUCOSE ONCE DAILY AS NEEDED, Disp: 100 each, Rfl: 3    FREESTYLE LANCETS MISC, USE TO CHECK BLOOD SUGAR 1-2 TIMES DAILY, Disp: 100 each, Rfl: 3    insulin glargine (LANTUS SOLOSTAR) 100 UNIT/ML injection pen, Inject 16 Units into the skin nightly, Disp: 5 pen, Rfl: 5    furosemide (LASIX) 20 MG tablet, Take 1 tablet by mouth as needed (swelling), Disp: 30 tablet, Rfl: 5    carvedilol (COREG) 25 MG tablet, Take 1 tablet by mouth 2 times daily (with meals), Disp: 30 tablet, Rfl: 5    ketoconazole (NIZORAL) 2 % shampoo, Apply topically daily as needed. , Disp: 120 mL, Rfl: 0    Insulin Pen Needle 32G X 6 MM MISC, 1 Act by Does not apply route every evening, Disp: 100 each, Rfl: 5    Omega-3 Fatty Acids (FISH OIL OMEGA-3 PO), Take by mouth, Disp: , Rfl:     umeclidinium-vilanterol (ANORO ELLIPTA) 62.5-25 MCG/INH AEPB inhaler, Inhale 1 puff into the lungs daily, Disp: , Rfl:     fluticasone (FLONASE) 50 MCG/ACT nasal spray, 1 spray by Each Nare route daily, Disp: , Rfl:     sertraline (ZOLOFT) 100 MG tablet, Take 1 tablet by mouth daily, Disp: 90 tablet, Rfl: 3    cetirizine (ZYRTEC) 10 MG tablet, Take 10 mg by mouth 2 times daily, Disp: , Rfl:     montelukast (SINGULAIR) 10 MG tablet, Take 1 tablet by mouth daily, Disp: 90 tablet, Rfl: 3    azelastine HCl 0.15 % SOLN, 2 sprays by Nasal route 2 times daily, Disp: 90 mL, Rfl: 5    lisinopril (PRINIVIL;ZESTRIL) 40 MG tablet, Take 1 tablet by mouth daily, Disp: 90 tablet, Rfl: 3    ipratropium (ATROVENT) 0.06 % nasal spray, 2 sprays by Nasal route 3 times daily as needed for Rhinitis, Disp: 3 Bottle, Rfl: 1    atorvastatin (LIPITOR) 80 MG tablet, Take 1 tablet by mouth daily, Disp: 90 tablet, Rfl: 3    indomethacin (INDOCIN) 50 MG capsule, Take 1 capsule by mouth 3 times daily, Disp: 270 capsule, Rfl: 3    levothyroxine (SYNTHROID) 25 MCG tablet, Take 1 tablet by mouth Daily, Disp: 90 tablet, Rfl: 3    Blood Glucose Monitoring Suppl ADE, 1 kit by Does not apply route daily ICD-10-CM: E11.9, Disp: 1 Device, Rfl: 0    Lancet Device MISC, 1 Device by Does not apply route once for 1 dose ICD-10-CM: E11.9, Disp: 100 Device, Rfl: 0    clopidogrel (PLAVIX) 75 MG tablet, Take 1 tablet by mouth daily, Disp: 30 tablet, Rfl: 3    aspirin 81 MG tablet, Take 162 mg by mouth daily , Disp: , Rfl:     vitamin B-12 (CYANOCOBALAMIN) 1000 MCG tablet, Take 1,000 mcg by mouth daily, Disp: , Rfl:     Coenzyme Q10 (CO Q 10) 10 MG CAPS, Take 1 capsule by mouth daily , Disp: , Rfl:     GLUCOSAMINE-CALCIUM-VIT D PO, Take 2 tablets by mouth daily , Disp: , Rfl:     omeprazole (PRILOSEC) 40 MG delayed release capsule, Take 40 mg by mouth daily, Disp: , Rfl:     PE:  Vitals:    06/24/19 1046   BP: (!) 140/90   Pulse:        Estimated body mass index is 26.79 kg/m² as calculated from the following:    Height as of this encounter: 5' 6\" (1.676 m). Weight as of this encounter: 166 lb (75.3 kg). Constitutional: She is oriented to person, place, and time. She appears well-developed and well-nourished in no acute distress. Head: Normocephalic and atraumatic. Neck:  Neck supple without JVD present. Cardiovascular: Normal rate, regular rhythm, normal heart sounds. no murmur ascultated.   No gallop and no friction rub.  no carotid bruits. no peripheral edema. Pulmonary/Chest:  Lungs clear to auscultation bilaterally without evidence of respiratory distress. She without wheezes. She without rales or ronchi. Musculoskeletal: Normal range of motion. Gait is normal no assitive device. Neurological: She is alert and oriented to person, place, and time. Skin: Skin is warm and dry without rash or pallor. right hand warm and dry with palpable pulses   Psychiatric: She has a normal mood and affect. Her behavior is normal. Thought content normal.     Lab Results   Component Value Date    CREATININE 1.1 06/17/2019    CREATININE 1.1 01/03/2019    CREATININE 1.2 11/30/2018    HGB 12.1 06/17/2019    HGB 12.9 01/03/2019    HGB 13.7 06/04/2018       Cath 6/8/2018     Conclusions      1. 2 vessel CAD status post PCI to the mid LAD and balloon angioplasty for   in-stent restenosis to the distal LAD   2. Elevated LVEDP      Recommendations      Routine post cath care.   Optimize medical management for CAD.   Switch to Lipitor   Loaded with 300 mg Plavix      Signatures      ----------------------------------------------------------------   Electronically signed by Ulysses Garces MD(Performing Physician)   on 06/08/2018 11:06      Cath 12/12/2017  Conclusions      1. Severe 1V CAD s/p PCI to the LAD with ENOC x2 to the mid to distal LAD   2. Normal LVEDP      Recommendations      Routine post cath care.   Optimize medical management for CAD.   Aggressive risk factor modification.      Signatures      ----------------------------------------------------------------   Electronically signed by Ulysses Garces MD(Performing Physician)   on 12/22/2017 09:10    Echo 11/16/2018   Summary   Normal left ventricular size and systolic function EF 85-20%. Mild   concentric left ventricular hypertrophy with grade 2 diastolic   dysfunction. Mild left atrial enlargement. Mild thickening of a tricuspid   aortic valve with normal cusp separation and trace insufficiency. Mild   thickening of the normally mobile mitral valve with mild regurgitation.   Right-sided chambers appear normal size with preserved RV systolic   function. Mild tricuspid regurgitation with a normal RVSP estimate of 30.   No pericardial effusion and aortic root dimensions are within normal   limits.      Signature      ----------------------------------------------------------------   Electronically signed by Dorothea Higgins MD(Interpreting physician)  Cathy Brush 11/19/2018 03:34 PM    Assessment    1. Essential hypertension          Plan:    HTN-Only take Lasix as needed for 2-3 lb weight gain overnight. Begin Nifedipine 30 mg daily at noon. Continue to take Lisinopril and Coreg at 8 am and Coreg at 6pm     HLD-on statin, pcp manages   CAD-on aspirin and statin, Plavix-continue DAPT till after June 8th, then stop Plavix and continue ASA   CHF-diastolic dysfunction-Lasix 20 mg daily-change to PRN    Disposition - RTC in 1 months with SHARI or sooner if needed    Please do not hesitate to contact me for any questions or concerns.     Sincerely yours,    SHARI Ibarra

## 2019-07-19 DIAGNOSIS — E03.9 PRIMARY HYPOTHYROIDISM: ICD-10-CM

## 2019-07-22 RX ORDER — LEVOTHYROXINE SODIUM 25 MCG
TABLET ORAL
Qty: 90 TABLET | Refills: 3 | Status: SHIPPED | OUTPATIENT
Start: 2019-07-22 | End: 2020-08-10 | Stop reason: SDUPTHER

## 2019-07-26 ENCOUNTER — TELEPHONE (OUTPATIENT)
Dept: CARDIOLOGY | Age: 79
End: 2019-07-26

## 2019-07-29 ENCOUNTER — OFFICE VISIT (OUTPATIENT)
Dept: CARDIOLOGY | Age: 79
End: 2019-07-29
Payer: MEDICARE

## 2019-07-29 VITALS
HEIGHT: 66 IN | BODY MASS INDEX: 26.52 KG/M2 | HEART RATE: 60 BPM | WEIGHT: 165 LBS | SYSTOLIC BLOOD PRESSURE: 110 MMHG | DIASTOLIC BLOOD PRESSURE: 76 MMHG

## 2019-07-29 DIAGNOSIS — I25.10 CORONARY ARTERY DISEASE INVOLVING NATIVE CORONARY ARTERY OF NATIVE HEART WITHOUT ANGINA PECTORIS: Primary | ICD-10-CM

## 2019-07-29 DIAGNOSIS — E11.9 TYPE 2 DIABETES MELLITUS WITHOUT COMPLICATION, WITHOUT LONG-TERM CURRENT USE OF INSULIN (HCC): ICD-10-CM

## 2019-07-29 DIAGNOSIS — E78.5 HYPERLIPIDEMIA, UNSPECIFIED HYPERLIPIDEMIA TYPE: ICD-10-CM

## 2019-07-29 DIAGNOSIS — B37.9 YEAST INFECTION: ICD-10-CM

## 2019-07-29 DIAGNOSIS — I10 ESSENTIAL HYPERTENSION: ICD-10-CM

## 2019-07-29 DIAGNOSIS — R00.2 HEART PALPITATIONS: ICD-10-CM

## 2019-07-29 LAB
CHP ED QC CHECK: ABNORMAL
GLUCOSE BLD-MCNC: 300 MG/DL

## 2019-07-29 PROCEDURE — 0296T PR EXT ECG > 48HR TO 21 DAY RCRD W/CONECT INTL RCRD: CPT | Performed by: NURSE PRACTITIONER

## 2019-07-29 PROCEDURE — 99214 OFFICE O/P EST MOD 30 MIN: CPT | Performed by: NURSE PRACTITIONER

## 2019-07-29 RX ORDER — INDOMETHACIN 50 MG/1
50 CAPSULE ORAL 3 TIMES DAILY
Qty: 270 CAPSULE | Refills: 3 | Status: SHIPPED | OUTPATIENT
Start: 2019-07-29 | End: 2021-06-17 | Stop reason: ALTCHOICE

## 2019-07-29 RX ORDER — NIFEDIPINE 30 MG/1
30 TABLET, EXTENDED RELEASE ORAL DAILY
Qty: 90 TABLET | Refills: 3 | Status: SHIPPED | OUTPATIENT
Start: 2019-07-29 | End: 2020-05-07 | Stop reason: SDUPTHER

## 2019-07-29 NOTE — PROGRESS NOTES
40 MG tablet Take 1 tablet by mouth daily 90 tablet 3    ipratropium (ATROVENT) 0.06 % nasal spray 2 sprays by Nasal route 3 times daily as needed for Rhinitis 3 Bottle 1    atorvastatin (LIPITOR) 80 MG tablet Take 1 tablet by mouth daily 90 tablet 3    Blood Glucose Monitoring Suppl ADE 1 kit by Does not apply route daily ICD-10-CM: E11.9 1 Device 0    Lancet Device MISC 1 Device by Does not apply route once for 1 dose ICD-10-CM: E11.9 100 Device 0    clopidogrel (PLAVIX) 75 MG tablet Take 1 tablet by mouth daily 30 tablet 3    aspirin 81 MG tablet Take 162 mg by mouth daily       vitamin B-12 (CYANOCOBALAMIN) 1000 MCG tablet Take 1,000 mcg by mouth daily      Coenzyme Q10 (CO Q 10) 10 MG CAPS Take 1 capsule by mouth daily       GLUCOSAMINE-CALCIUM-VIT D PO Take 2 tablets by mouth daily       omeprazole (PRILOSEC) 40 MG delayed release capsule Take 40 mg by mouth daily       No current facility-administered medications for this visit.       Allergies: Tape Kodi Clunes tape]  Past Medical History:   Diagnosis Date    Arthritis     Psoriatic    Asthma     CAD (coronary artery disease)     CHF (congestive heart failure) (HCC)     COPD (chronic obstructive pulmonary disease) (HCC)     [facility]     GERD (gastroesophageal reflux disease)     HTN (hypertension)     Hyperlipidemia     [facility] infarction) [facility]     x2    Thyroid disease      Past Surgical History:   Procedure Laterality Date    CATARACT REMOVAL      CHOLECYSTECTOMY      CORONARY ANGIOPLASTY  06/2018    Angioplasty to in-stent restenosis to the distal LAD    HIP SURGERY      HYSTERECTOMY      JOINT REPLACEMENT Right     Right knee replacement     Family History   Problem Relation Age of Onset    Heart Disease Mother     Heart Disease Father      Social History     Tobacco Use    Smoking status: Never Smoker    Smokeless tobacco: Never Used   Substance Use Topics    Alcohol use: No          Review of Systems:    General:      Complaint / Symptom Yes / No / Description if Yes       Fatigue No   Weight gain N/A   Insomnia N/A       Respiratory:        Complaint / Symptom Yes / No / Description if Yes       Cough No   Horseness N/A       Cardiovascular:    Complaint / Symptom Yes / No / Description if Yes       Chest Pain No   Shortness of Air / Orthopnea No   Presyncope / Syncope No   Palpitations No         Objective:    /76   Pulse 60   Ht 5' 6\" (1.676 m)   Wt 165 lb (74.8 kg)   BMI 26.63 kg/m²     GENERAL - well developed and well nourished, conversant  HEENT -  PERRLA, Hearing appears normal, gentleman lids are normal.  External inspection of ears and nose appear normal.  NECK - no thyromegaly, no JVD, trachea is in the midline  CARDIOVASCULAR - PMI is in the mid line clavicular position, Normal S1 and S2 with no systolic murmur. No S3 or S4    PULMONARY - no respiratory distress. No wheezes or rales. Lungs are clear to ausculation, normal respiratory effort. ABDOMEN  - soft, non tender, no rebound  MUSCULOSKELETAL  - range of motion of the upper and lower extermites appears normal and equal and is without pain   EXTREMITIES - no significant edema   NEUROLOGIC - gait and station are normal  SKIN - turgor is normal, can warm and dry. PSYCHIATRIC - normal mood and affect, alert and orientated x 3,      ASSESSMENT:    ALL THE CARDIOLOGY PROBLEMS ARE LISTED ABOVE; HOWEVER, THE FOLLOWING SPECIFIC CARDIAC PROBLEMS / CONDITIONS WERE ADDRESSED AND TREATED DURING THE OFFICE VISIT TODAY:                                                                                            MEDICAL DECISION MAKING             Cardiac Specific Problem / Diagnosis  Discussion and Data Reviewed Diagnostic Procedures Ordered Management Options Selected           1. CAD  Stable   Review and summation of old records:    No chest pain. No Continue current medications:     Yes           2.  Hypertension   Well Controlled

## 2019-08-01 RX ORDER — ATORVASTATIN CALCIUM 80 MG/1
80 TABLET, FILM COATED ORAL DAILY
Qty: 90 TABLET | Refills: 3 | Status: SHIPPED | OUTPATIENT
Start: 2019-08-01 | End: 2020-08-10 | Stop reason: SDUPTHER

## 2019-08-13 DIAGNOSIS — E11.9 TYPE 2 DIABETES MELLITUS WITHOUT COMPLICATION, WITHOUT LONG-TERM CURRENT USE OF INSULIN (HCC): ICD-10-CM

## 2019-08-13 NOTE — TELEPHONE ENCOUNTER
Valdojonny Micah called to request a refill on her medication.       Last office visit : 6/4/2019   Next office visit : 9/5/2019     Requested Prescriptions     Pending Prescriptions Disp Refills    blood glucose test strips (FREESTYLE LITE) strip 100 each 3     Sig: USE 1 STRIP TO CHECK GLUCOSE ONCE DAILY AS NEEDED            Becky Miller MA

## 2019-08-29 ENCOUNTER — OFFICE VISIT (OUTPATIENT)
Dept: CARDIOLOGY | Age: 79
End: 2019-08-29
Payer: MEDICARE

## 2019-08-29 VITALS
HEART RATE: 77 BPM | WEIGHT: 162 LBS | BODY MASS INDEX: 25.43 KG/M2 | OXYGEN SATURATION: 97 % | SYSTOLIC BLOOD PRESSURE: 130 MMHG | HEIGHT: 67 IN | DIASTOLIC BLOOD PRESSURE: 80 MMHG

## 2019-08-29 DIAGNOSIS — I25.10 CORONARY ARTERY DISEASE INVOLVING NATIVE CORONARY ARTERY OF NATIVE HEART WITHOUT ANGINA PECTORIS: Primary | ICD-10-CM

## 2019-08-29 DIAGNOSIS — I10 ESSENTIAL HYPERTENSION: ICD-10-CM

## 2019-08-29 DIAGNOSIS — R06.09 DYSPNEA ON EXERTION: ICD-10-CM

## 2019-08-29 PROCEDURE — 99214 OFFICE O/P EST MOD 30 MIN: CPT | Performed by: NURSE PRACTITIONER

## 2019-08-29 NOTE — PROGRESS NOTES
[facility]  Established Patient Office Visit   [facility]. [address] [place] [id number] [phone]        OFFICE VISIT:  2019    Gio Lange - : 1940    Reason For Visit:  Kisha Miller is a 69 y.o. female who is here for Follow-up (Patient complains of shortness of breath still; here for zio results ) and Hypertension    1. Coronary artery disease involving native coronary artery of native heart without angina pectoris    2. Essential hypertension      Patient with a history of hypertension, hyperlipidemia, diabetes, GERD, COPD, CHF, and coronary artery disease status post balloon angioplasty to LAD in-stent restenosis. Was seen last in the office on 2019 patient was complaining of palpitations and dizziness. A 14-day ZIO patch was placed which showed sinus rhythm with a minimum heart rate of 52 bpm and a maximum heart rate of 164 bpm.  There were 3 VT runs fastest interval was 5 beats at a maximum rate of 164 bpm.  There were 19 SVT runs. Patient noting shortness of breath with exertion. Patient denies any chest pain, pressure or tightness. Last 2D echo  Normal left ventricular size and systolic function EF 21-13%. Mild   concentric left ventricular hypertrophy with grade 2 diastolic   dysfunction. Mild left atrial enlargement. Mild thickening of a tricuspid   aortic valve with normal cusp separation and trace insufficiency. Mild   thickening of the normally mobile mitral valve with mild regurgitation.   Right-sided chambers appear normal size with preserved RV systolic   function.  Mild tricuspid regurgitation with a normal RVSP estimate of 30.   No pericardial effusion and aortic root dimensions are within normal    Subjective    Gio Lange is a 69 y.o. female with the [facility]:    Patient Active Problem List    Diagnosis Date Noted    HTN (hypertension)      Priority: High    CAD in native artery 2017     Priority: Smoking status: Never Smoker    Smokeless tobacco: Never Used   Substance Use Topics    Alcohol use: No          Review of Systems:    General:      Complaint / Symptom Yes / No / Description if Yes       Fatigue No   Weight gain N/A   Insomnia N/A       Respiratory:        Complaint / Symptom Yes / No / Description if Yes       Cough No   Horseness N/A       Cardiovascular:    Complaint / Symptom Yes / No / Description if Yes       Chest Pain No   Shortness of Air / Orthopnea Yes: Exertional   Presyncope / Syncope No   Palpitations No     All other systems reviewed and are negative. Objective:    /80   Pulse 77   Ht 5' 7\" (1.702 m)   Wt 162 lb (73.5 kg)   SpO2 97%   BMI 25.37 kg/m²     GENERAL - well developed and well nourished, conversant  HEENT -  PERRLA, Hearing appears normal, gentleman lids are normal.  External inspection of ears and nose appear normal.  NECK - no thyromegaly, no JVD, trachea is in the midline  CARDIOVASCULAR - PMI is in the mid line clavicular position, Normal S1 and S2 with no systolic murmur. No S3 or S4    PULMONARY - no respiratory distress. No wheezes or rales. Lungs are clear to ausculation, normal respiratory effort. ABDOMEN  - soft, non tender, no rebound  MUSCULOSKELETAL  - range of motion of the upper and lower extermites appears normal and equal and is without pain   EXTREMITIES - no significant edema   NEUROLOGIC - gait and station are normal  SKIN - turgor is normal, can warm and dry.   PSYCHIATRIC - normal mood and affect, alert and orientated x 3,      ASSESSMENT:    ALL THE CARDIOLOGY PROBLEMS ARE LISTED ABOVE; HOWEVER, THE FOLLOWING SPECIFIC CARDIAC PROBLEMS / CONDITIONS WERE ADDRESSED AND TREATED DURING THE OFFICE VISIT TODAY:                                                                                            MEDICAL DECISION MAKING             Cardiac Specific Problem / Diagnosis  Discussion and Data Reviewed Diagnostic Procedures Ordered

## 2019-08-29 NOTE — PATIENT INSTRUCTIONS
to the test.   Nitroglycerin patches must be taken off 1 hour before testing.  Wear comfortable clothing.  Please refrain from any strenuous exercise or activities the day before your test, or the day of your test.   The Nuclear Lexiscan Stress test takes about 2 ½ to 3 hours to complete. If for any reason you are unable to keep this appointment, please contact Outpatient Scheduling, [phone], as soon as possible to reschedule.

## 2019-09-09 ENCOUNTER — HOSPITAL ENCOUNTER (OUTPATIENT)
Dept: NON INVASIVE DIAGNOSTICS | Age: 79
Discharge: HOME | End: 2019-09-09
Payer: MEDICARE

## 2019-09-09 ENCOUNTER — HOSPITAL ENCOUNTER (OUTPATIENT)
Dept: NUCLEAR MEDICINE | Age: 79
Discharge: HOME | End: 2019-09-11
Payer: MEDICARE

## 2019-09-09 DIAGNOSIS — R06.09 DYSPNEA ON EXERTION: ICD-10-CM

## 2019-09-09 DIAGNOSIS — I25.10 CORONARY ARTERY DISEASE INVOLVING NATIVE CORONARY ARTERY OF NATIVE HEART WITHOUT ANGINA PECTORIS: ICD-10-CM

## 2019-09-09 LAB
LV EF: 58 %
LVEF MODALITY: NORMAL

## 2019-09-09 PROCEDURE — 3430000000 HC RX DIAGNOSTIC RADIOPHARMACEUTICAL: Performed by: NURSE PRACTITIONER

## 2019-09-09 PROCEDURE — 6360000002 HC RX W HCPCS: Performed by: INTERNAL MEDICINE

## 2019-09-09 PROCEDURE — 78452 HT MUSCLE IMAGE SPECT MULT: CPT

## 2019-09-09 PROCEDURE — 93017 CV STRESS TEST TRACING ONLY: CPT

## 2019-09-09 PROCEDURE — A9500 TC99M SESTAMIBI: HCPCS | Performed by: NURSE PRACTITIONER

## 2019-09-09 PROCEDURE — 93306 TTE W/DOPPLER COMPLETE: CPT

## 2019-09-09 RX ADMIN — TETRAKIS(2-METHOXYISOBUTYLISOCYANIDE)COPPER(I) TETRAFLUOROBORATE 30 MILLICURIE: 1 INJECTION, POWDER, LYOPHILIZED, FOR SOLUTION INTRAVENOUS at 16:38

## 2019-09-09 RX ADMIN — REGADENOSON 0.4 MG: 0.08 INJECTION, SOLUTION INTRAVENOUS at 10:30

## 2019-09-09 RX ADMIN — TETRAKIS(2-METHOXYISOBUTYLISOCYANIDE)COPPER(I) TETRAFLUOROBORATE 10 MILLICURIE: 1 INJECTION, POWDER, LYOPHILIZED, FOR SOLUTION INTRAVENOUS at 16:38

## 2019-09-10 ENCOUNTER — TELEPHONE (OUTPATIENT)
Dept: CARDIOLOGY | Age: 79
End: 2019-09-10

## 2019-09-10 LAB
LV EF: 57 %
LVEF MODALITY: NORMAL

## 2019-09-30 ENCOUNTER — OFFICE VISIT (OUTPATIENT)
Dept: FAMILY MEDICINE CLINIC | Age: 79
End: 2019-09-30
Payer: MEDICARE

## 2019-09-30 VITALS
BODY MASS INDEX: 25.06 KG/M2 | OXYGEN SATURATION: 98 % | TEMPERATURE: 97 F | HEART RATE: 68 BPM | SYSTOLIC BLOOD PRESSURE: 142 MMHG | WEIGHT: 160 LBS | DIASTOLIC BLOOD PRESSURE: 88 MMHG

## 2019-09-30 DIAGNOSIS — G47.33 OSA ON CPAP: ICD-10-CM

## 2019-09-30 DIAGNOSIS — I10 ESSENTIAL HYPERTENSION: ICD-10-CM

## 2019-09-30 DIAGNOSIS — N18.30 TYPE 2 DIABETES MELLITUS WITH STAGE 3 CHRONIC KIDNEY DISEASE, WITHOUT LONG-TERM CURRENT USE OF INSULIN (HCC): Primary | ICD-10-CM

## 2019-09-30 DIAGNOSIS — Z23 NEED FOR INFLUENZA VACCINATION: ICD-10-CM

## 2019-09-30 DIAGNOSIS — F41.8 DEPRESSION WITH ANXIETY: ICD-10-CM

## 2019-09-30 DIAGNOSIS — Z99.89 OSA ON CPAP: ICD-10-CM

## 2019-09-30 DIAGNOSIS — E78.2 MIXED HYPERLIPIDEMIA: ICD-10-CM

## 2019-09-30 DIAGNOSIS — K21.9 GASTROESOPHAGEAL REFLUX DISEASE WITHOUT ESOPHAGITIS: ICD-10-CM

## 2019-09-30 DIAGNOSIS — E11.22 TYPE 2 DIABETES MELLITUS WITH STAGE 3 CHRONIC KIDNEY DISEASE, WITHOUT LONG-TERM CURRENT USE OF INSULIN (HCC): Primary | ICD-10-CM

## 2019-09-30 DIAGNOSIS — N18.30 CHRONIC KIDNEY DISEASE, STAGE III (MODERATE) (HCC): ICD-10-CM

## 2019-09-30 PROCEDURE — 1090F PRES/ABSN URINE INCON ASSESS: CPT | Performed by: FAMILY MEDICINE

## 2019-09-30 PROCEDURE — G8598 ASA/ANTIPLAT THER USED: HCPCS | Performed by: FAMILY MEDICINE

## 2019-09-30 PROCEDURE — 1123F ACP DISCUSS/DSCN MKR DOCD: CPT | Performed by: FAMILY MEDICINE

## 2019-09-30 PROCEDURE — 90653 IIV ADJUVANT VACCINE IM: CPT | Performed by: FAMILY MEDICINE

## 2019-09-30 PROCEDURE — G8417 CALC BMI ABV UP PARAM F/U: HCPCS | Performed by: FAMILY MEDICINE

## 2019-09-30 PROCEDURE — 1036F TOBACCO NON-USER: CPT | Performed by: FAMILY MEDICINE

## 2019-09-30 PROCEDURE — 99214 OFFICE O/P EST MOD 30 MIN: CPT | Performed by: FAMILY MEDICINE

## 2019-09-30 PROCEDURE — 4040F PNEUMOC VAC/ADMIN/RCVD: CPT | Performed by: FAMILY MEDICINE

## 2019-09-30 PROCEDURE — G0008 ADMIN INFLUENZA VIRUS VAC: HCPCS | Performed by: FAMILY MEDICINE

## 2019-09-30 PROCEDURE — G8399 PT W/DXA RESULTS DOCUMENT: HCPCS | Performed by: FAMILY MEDICINE

## 2019-09-30 PROCEDURE — G8427 DOCREV CUR MEDS BY ELIG CLIN: HCPCS | Performed by: FAMILY MEDICINE

## 2019-10-01 DIAGNOSIS — I10 ESSENTIAL HYPERTENSION: ICD-10-CM

## 2019-10-01 RX ORDER — LISINOPRIL 40 MG/1
TABLET ORAL
Qty: 90 TABLET | Refills: 3 | Status: SHIPPED | OUTPATIENT
Start: 2019-10-01 | End: 2020-12-17

## 2019-10-14 PROBLEM — E11.22 TYPE 2 DIABETES MELLITUS WITH CHRONIC KIDNEY DISEASE, WITHOUT LONG-TERM CURRENT USE OF INSULIN (HCC): Status: ACTIVE | Noted: 2018-01-08

## 2019-10-14 PROBLEM — N18.30 CHRONIC KIDNEY DISEASE, STAGE III (MODERATE) (HCC): Status: ACTIVE | Noted: 2019-10-14

## 2019-10-31 ENCOUNTER — OFFICE VISIT (OUTPATIENT)
Dept: FAMILY MEDICINE CLINIC | Age: 79
End: 2019-10-31
Payer: MEDICARE

## 2019-10-31 VITALS
WEIGHT: 164 LBS | BODY MASS INDEX: 25.69 KG/M2 | TEMPERATURE: 97 F | DIASTOLIC BLOOD PRESSURE: 68 MMHG | OXYGEN SATURATION: 96 % | HEART RATE: 76 BPM | SYSTOLIC BLOOD PRESSURE: 124 MMHG

## 2019-10-31 DIAGNOSIS — Z99.89 OSA ON CPAP: ICD-10-CM

## 2019-10-31 DIAGNOSIS — G47.33 OSA ON CPAP: ICD-10-CM

## 2019-10-31 DIAGNOSIS — E78.00 HYPERCHOLESTEROLEMIA: ICD-10-CM

## 2019-10-31 DIAGNOSIS — E11.9 TYPE 2 DIABETES MELLITUS WITHOUT COMPLICATION, WITHOUT LONG-TERM CURRENT USE OF INSULIN (HCC): ICD-10-CM

## 2019-10-31 DIAGNOSIS — I10 ESSENTIAL HYPERTENSION: Primary | ICD-10-CM

## 2019-10-31 PROCEDURE — G8482 FLU IMMUNIZE ORDER/ADMIN: HCPCS | Performed by: FAMILY MEDICINE

## 2019-10-31 PROCEDURE — 99214 OFFICE O/P EST MOD 30 MIN: CPT | Performed by: FAMILY MEDICINE

## 2019-10-31 PROCEDURE — 1090F PRES/ABSN URINE INCON ASSESS: CPT | Performed by: FAMILY MEDICINE

## 2019-10-31 PROCEDURE — 1123F ACP DISCUSS/DSCN MKR DOCD: CPT | Performed by: FAMILY MEDICINE

## 2019-10-31 PROCEDURE — G8427 DOCREV CUR MEDS BY ELIG CLIN: HCPCS | Performed by: FAMILY MEDICINE

## 2019-10-31 PROCEDURE — 4040F PNEUMOC VAC/ADMIN/RCVD: CPT | Performed by: FAMILY MEDICINE

## 2019-10-31 PROCEDURE — G8399 PT W/DXA RESULTS DOCUMENT: HCPCS | Performed by: FAMILY MEDICINE

## 2019-10-31 PROCEDURE — G8417 CALC BMI ABV UP PARAM F/U: HCPCS | Performed by: FAMILY MEDICINE

## 2019-10-31 PROCEDURE — 1036F TOBACCO NON-USER: CPT | Performed by: FAMILY MEDICINE

## 2019-10-31 PROCEDURE — G8598 ASA/ANTIPLAT THER USED: HCPCS | Performed by: FAMILY MEDICINE

## 2019-11-06 ASSESSMENT — ENCOUNTER SYMPTOMS
CONSTIPATION: 0
SHORTNESS OF BREATH: 0
COUGH: 0
CHEST TIGHTNESS: 0
ANAL BLEEDING: 0
NAUSEA: 0
DIARRHEA: 0
ABDOMINAL PAIN: 0

## 2019-11-25 ENCOUNTER — OFFICE VISIT (OUTPATIENT)
Dept: FAMILY MEDICINE CLINIC | Age: 79
End: 2019-11-25
Payer: MEDICARE

## 2019-11-25 VITALS
BODY MASS INDEX: 30.55 KG/M2 | SYSTOLIC BLOOD PRESSURE: 126 MMHG | HEART RATE: 70 BPM | DIASTOLIC BLOOD PRESSURE: 84 MMHG | WEIGHT: 166 LBS | HEIGHT: 62 IN | OXYGEN SATURATION: 99 % | TEMPERATURE: 97 F

## 2019-11-25 DIAGNOSIS — I10 ESSENTIAL HYPERTENSION: ICD-10-CM

## 2019-11-25 DIAGNOSIS — R59.0 AXILLARY LYMPHADENOPATHY: ICD-10-CM

## 2019-11-25 DIAGNOSIS — E11.9 TYPE 2 DIABETES MELLITUS WITHOUT COMPLICATION, WITHOUT LONG-TERM CURRENT USE OF INSULIN (HCC): Primary | ICD-10-CM

## 2019-11-25 DIAGNOSIS — R53.83 OTHER FATIGUE: ICD-10-CM

## 2019-11-25 DIAGNOSIS — Z12.31 BREAST CANCER SCREENING BY MAMMOGRAM: ICD-10-CM

## 2019-11-25 DIAGNOSIS — E78.00 HYPERCHOLESTEROLEMIA: ICD-10-CM

## 2019-11-25 PROCEDURE — 1090F PRES/ABSN URINE INCON ASSESS: CPT | Performed by: FAMILY MEDICINE

## 2019-11-25 PROCEDURE — 4040F PNEUMOC VAC/ADMIN/RCVD: CPT | Performed by: FAMILY MEDICINE

## 2019-11-25 PROCEDURE — 1123F ACP DISCUSS/DSCN MKR DOCD: CPT | Performed by: FAMILY MEDICINE

## 2019-11-25 PROCEDURE — G8417 CALC BMI ABV UP PARAM F/U: HCPCS | Performed by: FAMILY MEDICINE

## 2019-11-25 PROCEDURE — G8482 FLU IMMUNIZE ORDER/ADMIN: HCPCS | Performed by: FAMILY MEDICINE

## 2019-11-25 PROCEDURE — G8399 PT W/DXA RESULTS DOCUMENT: HCPCS | Performed by: FAMILY MEDICINE

## 2019-11-25 PROCEDURE — 99214 OFFICE O/P EST MOD 30 MIN: CPT | Performed by: FAMILY MEDICINE

## 2019-11-25 PROCEDURE — G8427 DOCREV CUR MEDS BY ELIG CLIN: HCPCS | Performed by: FAMILY MEDICINE

## 2019-11-25 PROCEDURE — 1036F TOBACCO NON-USER: CPT | Performed by: FAMILY MEDICINE

## 2019-11-25 PROCEDURE — G8598 ASA/ANTIPLAT THER USED: HCPCS | Performed by: FAMILY MEDICINE

## 2019-11-25 ASSESSMENT — PATIENT HEALTH QUESTIONNAIRE - PHQ9
SUM OF ALL RESPONSES TO PHQ QUESTIONS 1-9: 0
SUM OF ALL RESPONSES TO PHQ9 QUESTIONS 1 & 2: 0
1. LITTLE INTEREST OR PLEASURE IN DOING THINGS: 0
2. FEELING DOWN, DEPRESSED OR HOPELESS: 0
SUM OF ALL RESPONSES TO PHQ QUESTIONS 1-9: 0

## 2019-12-10 ENCOUNTER — HOSPITAL ENCOUNTER (OUTPATIENT)
Dept: WOMENS IMAGING | Age: 79
Discharge: HOME | End: 2019-12-10
Payer: MEDICARE

## 2019-12-10 DIAGNOSIS — Z12.31 BREAST CANCER SCREENING BY MAMMOGRAM: ICD-10-CM

## 2019-12-10 DIAGNOSIS — R59.0 AXILLARY LYMPHADENOPATHY: ICD-10-CM

## 2019-12-10 PROCEDURE — 76642 ULTRASOUND BREAST LIMITED: CPT

## 2019-12-10 PROCEDURE — 77063 BREAST TOMOSYNTHESIS BI: CPT

## 2020-01-16 RX ORDER — CARVEDILOL 25 MG/1
25 TABLET ORAL 2 TIMES DAILY WITH MEALS
Qty: 30 TABLET | Refills: 5 | Status: SHIPPED | OUTPATIENT
Start: 2020-01-16 | End: 2020-02-17 | Stop reason: SDUPTHER

## 2020-02-06 ENCOUNTER — OFFICE VISIT (OUTPATIENT)
Dept: FAMILY MEDICINE CLINIC | Age: 80
End: 2020-02-06
Payer: MEDICARE

## 2020-02-06 VITALS
HEIGHT: 64 IN | WEIGHT: 169 LBS | OXYGEN SATURATION: 97 % | DIASTOLIC BLOOD PRESSURE: 70 MMHG | TEMPERATURE: 97 F | BODY MASS INDEX: 28.85 KG/M2 | SYSTOLIC BLOOD PRESSURE: 136 MMHG | HEART RATE: 73 BPM

## 2020-02-06 DIAGNOSIS — E11.9 TYPE 2 DIABETES MELLITUS WITHOUT COMPLICATION, WITHOUT LONG-TERM CURRENT USE OF INSULIN (HCC): ICD-10-CM

## 2020-02-06 DIAGNOSIS — E78.00 HYPERCHOLESTEROLEMIA: ICD-10-CM

## 2020-02-06 DIAGNOSIS — I10 ESSENTIAL HYPERTENSION: ICD-10-CM

## 2020-02-06 DIAGNOSIS — R53.83 OTHER FATIGUE: ICD-10-CM

## 2020-02-06 LAB
ALBUMIN SERPL-MCNC: 4.2 G/DL (ref 3.5–5.2)
ALP BLD-CCNC: 133 U/L (ref 35–104)
ALT SERPL-CCNC: 17 U/L (ref 5–33)
ANION GAP SERPL CALCULATED.3IONS-SCNC: 14 MMOL/L (ref 7–19)
AST SERPL-CCNC: 14 U/L (ref 5–32)
BASOPHILS ABSOLUTE: 0 K/UL (ref 0–0.2)
BASOPHILS RELATIVE PERCENT: 0.5 % (ref 0–1)
BILIRUB SERPL-MCNC: 0.4 MG/DL (ref 0.2–1.2)
BUN BLDV-MCNC: 24 MG/DL (ref 8–23)
CALCIUM SERPL-MCNC: 9.5 MG/DL (ref 8.8–10.2)
CHLORIDE BLD-SCNC: 97 MMOL/L (ref 98–111)
CHOLESTEROL, TOTAL: 132 MG/DL (ref 160–199)
CO2: 25 MMOL/L (ref 22–29)
CREAT SERPL-MCNC: 1.2 MG/DL (ref 0.5–0.9)
CREATININE URINE: 112.4 MG/DL (ref 4.2–622)
EOSINOPHILS ABSOLUTE: 0.2 K/UL (ref 0–0.6)
EOSINOPHILS RELATIVE PERCENT: 1.7 % (ref 0–5)
GFR NON-AFRICAN AMERICAN: 43
GLUCOSE BLD-MCNC: 261 MG/DL (ref 74–109)
HBA1C MFR BLD: 9.7 % (ref 4–6)
HCT VFR BLD CALC: 39.6 % (ref 37–47)
HDLC SERPL-MCNC: 40 MG/DL (ref 65–121)
HEMOGLOBIN: 13.1 G/DL (ref 12–16)
IMMATURE GRANULOCYTES #: 0 K/UL
LDL CHOLESTEROL CALCULATED: ABNORMAL MG/DL
LDL CHOLESTEROL DIRECT: 53 MG/DL
LYMPHOCYTES ABSOLUTE: 1.5 K/UL (ref 1.1–4.5)
LYMPHOCYTES RELATIVE PERCENT: 16.8 % (ref 20–40)
MCH RBC QN AUTO: 29.6 PG (ref 27–31)
MCHC RBC AUTO-ENTMCNC: 33.1 G/DL (ref 33–37)
MCV RBC AUTO: 89.4 FL (ref 81–99)
MICROALBUMIN UR-MCNC: 13.9 MG/DL (ref 0–19)
MICROALBUMIN/CREAT UR-RTO: 123.7 MG/G
MONOCYTES ABSOLUTE: 0.8 K/UL (ref 0–0.9)
MONOCYTES RELATIVE PERCENT: 8.6 % (ref 0–10)
NEUTROPHILS ABSOLUTE: 6.4 K/UL (ref 1.5–7.5)
NEUTROPHILS RELATIVE PERCENT: 71.9 % (ref 50–65)
PDW BLD-RTO: 14.6 % (ref 11.5–14.5)
PLATELET # BLD: 254 K/UL (ref 130–400)
PMV BLD AUTO: 10.3 FL (ref 9.4–12.3)
POTASSIUM SERPL-SCNC: 4.4 MMOL/L (ref 3.5–5)
RBC # BLD: 4.43 M/UL (ref 4.2–5.4)
SODIUM BLD-SCNC: 136 MMOL/L (ref 136–145)
T4 FREE: 0.86 NG/DL (ref 0.93–1.7)
TOTAL PROTEIN: 7.2 G/DL (ref 6.6–8.7)
TRIGL SERPL-MCNC: 513 MG/DL (ref 0–149)
TSH SERPL DL<=0.05 MIU/L-ACNC: 3.22 UIU/ML (ref 0.27–4.2)
WBC # BLD: 8.9 K/UL (ref 4.8–10.8)

## 2020-02-06 PROCEDURE — 1090F PRES/ABSN URINE INCON ASSESS: CPT | Performed by: FAMILY MEDICINE

## 2020-02-06 PROCEDURE — G8926 SPIRO NO PERF OR DOC: HCPCS | Performed by: FAMILY MEDICINE

## 2020-02-06 PROCEDURE — 4040F PNEUMOC VAC/ADMIN/RCVD: CPT | Performed by: FAMILY MEDICINE

## 2020-02-06 PROCEDURE — 1036F TOBACCO NON-USER: CPT | Performed by: FAMILY MEDICINE

## 2020-02-06 PROCEDURE — G8417 CALC BMI ABV UP PARAM F/U: HCPCS | Performed by: FAMILY MEDICINE

## 2020-02-06 PROCEDURE — G8427 DOCREV CUR MEDS BY ELIG CLIN: HCPCS | Performed by: FAMILY MEDICINE

## 2020-02-06 PROCEDURE — G8399 PT W/DXA RESULTS DOCUMENT: HCPCS | Performed by: FAMILY MEDICINE

## 2020-02-06 PROCEDURE — 1123F ACP DISCUSS/DSCN MKR DOCD: CPT | Performed by: FAMILY MEDICINE

## 2020-02-06 PROCEDURE — 3023F SPIROM DOC REV: CPT | Performed by: FAMILY MEDICINE

## 2020-02-06 PROCEDURE — G0439 PPPS, SUBSEQ VISIT: HCPCS | Performed by: FAMILY MEDICINE

## 2020-02-06 PROCEDURE — 99214 OFFICE O/P EST MOD 30 MIN: CPT | Performed by: FAMILY MEDICINE

## 2020-02-06 PROCEDURE — G8482 FLU IMMUNIZE ORDER/ADMIN: HCPCS | Performed by: FAMILY MEDICINE

## 2020-02-06 ASSESSMENT — ENCOUNTER SYMPTOMS
CHEST TIGHTNESS: 0
SHORTNESS OF BREATH: 1
ANAL BLEEDING: 0
COUGH: 1
DIARRHEA: 0
NAUSEA: 0
ABDOMINAL PAIN: 0
CONSTIPATION: 0

## 2020-02-06 ASSESSMENT — LIFESTYLE VARIABLES: HOW OFTEN DO YOU HAVE A DRINK CONTAINING ALCOHOL: 0

## 2020-02-06 ASSESSMENT — PATIENT HEALTH QUESTIONNAIRE - PHQ9
SUM OF ALL RESPONSES TO PHQ QUESTIONS 1-9: 2
SUM OF ALL RESPONSES TO PHQ QUESTIONS 1-9: 2

## 2020-02-06 NOTE — PROGRESS NOTES
Yes Alpesh Valerio MD   Omega-3 Fatty Acids (FISH OIL OMEGA-3 PO) Take by mouth Yes Historical Provider, MD   umeclidinium-vilanterol (ANORO ELLIPTA) 62.5-25 MCG/INH AEPB inhaler Inhale 1 puff into the lungs daily Yes Historical Provider, MD   fluticasone (FLONASE) 50 MCG/ACT nasal spray 1 spray by Each Nare route daily Yes Historical Provider, MD   sertraline (ZOLOFT) 100 MG tablet Take 1 tablet by mouth daily Yes Alpesh Valerio MD   cetirizine (ZYRTEC) 10 MG tablet Take 10 mg by mouth 2 times daily Yes Historical Provider, MD   montelukast (SINGULAIR) 10 MG tablet Take 1 tablet by mouth daily Yes Alpesh Valerio MD   azelastine HCl 0.15 % SOLN 2 sprays by Nasal route 2 times daily Yes Alpesh Valeiro MD   Blood Glucose Monitoring Suppl ADE 1 kit by Does not apply route daily ICD-10-CM: E11.9 Yes Alpesh Valerio MD   Lancet Device MISC 1 Device by Does not apply route once for 1 dose ICD-10-CM: E11.9 Yes Alpesh Valerio MD   clopidogrel (PLAVIX) 75 MG tablet Take 1 tablet by mouth daily Yes Leatha Wheeler MD   aspirin 81 MG tablet Take 162 mg by mouth daily  Yes Historical Provider, MD   vitamin B-12 (CYANOCOBALAMIN) 1000 MCG tablet Take 1,000 mcg by mouth daily Yes Historical Provider, MD   Coenzyme Q10 (CO Q 10) 10 MG CAPS Take 1 capsule by mouth daily  Yes Historical Provider, MD   GLUCOSAMINE-CALCIUM-VIT D PO Take 2 tablets by mouth daily  Yes Historical Provider, MD   omeprazole (PRILOSEC) 40 MG delayed release capsule Take 40 mg by mouth daily Yes Historical Provider, MD   ipratropium (ATROVENT) 0.06 % nasal spray 2 sprays by Nasal route 3 times daily as needed for Rhinitis  SHARI Womack       Past Medical History:   Diagnosis Date    Arthritis     Psoriatic    Asthma     CAD (coronary artery disease)     [facility]     COPD [facility]ch[facility] obstructive [facility]     DM (diabetes mellitus) ([facility]     GERD (gastroesophageal reflux disease)  HTN (hypertension)     Hyperlipidemia     MI [facility]ocardial infarction) [facility]     x2    Thyroid disease        Past Surgical History:   Procedure Laterality Date    CATARACT REMOVAL      CHOLECYSTECTOMY      CORONARY ANGIOPLASTY  06/2018    Angioplasty to in-stent restenosis to the distal LAD    HIP SURGERY      HYSTERECTOMY      JOINT REPLACEMENT Right     Right knee replacement       Family History   Problem Relation Age of Onset    Heart Disease Mother     Heart Disease Father        CareTeam (Including outside providers/suppliers regularly involved in providing care):   Patient Care Team:  Armando Kapoor MD as PCP - General (Family Medicine)  Armando Kapoor MD as PCP [facility]  Horacio Kingston MD as Consulting Physician (Interventional Cardiology)    Wt Readings from Last 3 Encounters:   02/06/20 169 lb (76.7 kg)   11/25/19 166 lb (75.3 kg)   10/31/19 164 lb (74.4 kg)     Vitals:    02/06/20 1306   BP: 136/70   Pulse: 73   Temp: 97 °F (36.1 °C)   SpO2: 97%   Weight: 169 lb (76.7 kg)   Height: 5' 4\" (1.626 m)           The following problems were reviewed today and where indicated follow up appointments were made and/or referrals ordered. Risk Factor Screenings with Interventions     Fall Risk:  Timed Up and Go Test > 12 seconds?  (Complete if either Fall Risk answers are Yes): no  2 or more falls in past year?: no  Fall with injury in past year?: no  Fall Risk Interventions:    · Not indicated     Depression:  PHQ-2 Score: 2  Depression Interventions:  · Not indicated     Anxiety:     Anxiety Interventions:  · Not indicated     Cognitive:  Clock Drawing Test (CDT) Score: (!) Abnormal  Cognitive Impairment Interventions:  · She has issues with short-term memory  · Currently resides in assisted living    Substance Abuse:  Social History     Socioeconomic History    Marital status:      Spouse name: Not on file    Number of children: Not on file    Years of education: Not on file    Highest education level: Not on file   Occupational History    Not on file   Social Needs    Financial resource strain: Not on file    Food insecurity:     Worry: Not on file     Inability: Not on file    Transportation needs:     Medical: Not on file     Non-medical: Not on file   Tobacco Use    Smoking status: Never Smoker    Smokeless tobacco: Never Used   Substance and Sexual Activity    Alcohol use: No    Drug use: No    Sexual activity: Not on file   Lifestyle    Physical activity:     Days per week: Not on file     Minutes per session: Not on file    Stress: Not on file   Relationships    Social connections:     Talks on phone: Not on file     Gets together: Not on file     Attends Latter-day service: Not on file     Active member of club or organization: Not on file     Attends meetings of clubs or organizations: Not on file     Relationship status: Not on file    Intimate partner violence:     Fear of current or ex partner: Not on file     Emotionally abused: Not on file     Physically abused: Not on file     Forced sexual activity: Not on file   Other Topics Concern    Not on file   Social History Narrative    Not on file     Audit Questionnaire: Screen for Alcohol Misuse  How often do you have a drink containing alcohol?: Never  Substance Abuse Interventions:  · Not indicated     Health Risk Assessment:     General  In general, how would you say your health is?: Good  In the past 7 days, have you experienced any of the following?  New or Increased Pain, New or Increased Fatigue, Loneliness, Social Isolation, Stress or Anger?: (!) Stress  Do you get the social and emotional support that you need?: Yes  Do you have a Living Will?: Yes  General Health Risk Interventions:  · Stressed regarding her general health    Health Habits/Nutrition  Do you exercise for at least 20 minutes 2-3 times per week?: Yes  Have you lost any weight without trying in the past 3 months?: No  Do you eat fewer than 2 meals per day?: No  Have you seen a dentist within the past year?: Yes  Body mass index is 29.01 kg/m². Health Habits/Nutrition Interventions:  · Not indicated    Hearing/Vision  Do you or your family notice any trouble with your hearing?: (!) Yes  Do you have difficulty driving, watching TV, or doing any of your daily activities because of your eyesight?: No  Have you had an eye exam within the past year?: Yes  Hearing/Vision Interventions:  · Offered referral to audiology. She will consider and call me back. ·     Safety  Do you have working smoke detectors?: Yes  Have all throw rugs been removed or fastened?: (!) No  Do you have non-slip mats or surfaces in all bathtubs/showers?: Yes  Do all of your stairways have a railing or banister?: Yes  Are your doorways, halls and stairs free of clutter?: Yes  Do you always fasten your seatbelt when you are in a car?: Yes  Safety Interventions:  · Provided home safety tips handout  ·     ADLs  In the past 7 days, did you need help from others to perform any of the following everyday activities? Eating, dressing, grooming, bathing, toileting, or walking/balance?: None  In the past 7 days, did you need help from others to take care of any of the following?  Laundry, housekeeping, banking/finances, shopping, telephone use, food preparation, transportation, or taking medications?: None  ADL Interventions:  · Not indicated     Personalized Preventive Plan   Current Health Maintenance Status  Immunization History   Administered Date(s) Administered    Influenza, High Dose (Fluzone 65 yrs and older) 11/01/2017, 09/20/2018    Influenza, Triv, inactivated, subunit, adjuvanted, IM (Fluad 65 yrs and older) 09/30/2019    Pneumococcal Conjugate 13-valent ([id number]) 10/31/2016    Pneumococcal Polysaccharide ([id number]) 10/31/2017    Td, unspecified formulation 11/20/2017    Zoster Recombinant (Shingrix) 04/23/2018, 06/28/2018        Health Maintenance   Topic Date Due    Hepatitis B vaccine (1 of 3 - Risk 3-dose series) 12/28/1960    Colon cancer screen colonoscopy  12/28/2016    Annual Wellness Visit (AWV)  05/29/2019    DTaP/Tdap/Td vaccine (1 - Tdap) 11/20/2027 (Originally [birth year]28/1951)    Lipid screen  06/17/2020    Potassium monitoring  06/17/2020    Creatinine monitoring  06/17/2020    Breast cancer screen  12/10/2020    DEXA (modify frequency per FRAX score)  Completed    Flu vaccine  Completed    Shingles Vaccine  Completed    Pneumococcal 65+ years Vaccine  Completed    Hepatitis A vaccine  Aged Out    Hib vaccine  Aged Out    Meningococcal (ACWY) vaccine  Aged Out       Recommendations for [employer] Due: see orders.   Recommended screening schedule for the next 5-10 years is provided to the patient in written form: see Patient Instructions/AVS.

## 2020-02-06 NOTE — PROGRESS NOTES
by mouth      umeclidinium-vilanterol (ANORO ELLIPTA) 62.5-25 MCG/INH AEPB inhaler Inhale 1 puff into the lungs daily      fluticasone (FLONASE) 50 MCG/ACT nasal spray 1 spray by Each Nare route daily      sertraline (ZOLOFT) 100 MG tablet Take 1 tablet by mouth daily 90 tablet 3    cetirizine (ZYRTEC) 10 MG tablet Take 10 mg by mouth 2 times daily      montelukast (SINGULAIR) 10 MG tablet Take 1 tablet by mouth daily 90 tablet 3    azelastine HCl 0.15 % SOLN 2 sprays by Nasal route 2 times daily 90 mL 5    Blood Glucose Monitoring Suppl ADE 1 kit by Does not apply route daily ICD-10-CM: E11.9 1 Device 0    Lancet Device MISC 1 Device by Does not apply route once for 1 dose ICD-10-CM: E11.9 100 Device 0    clopidogrel (PLAVIX) 75 MG tablet Take 1 tablet by mouth daily 30 tablet 3    aspirin 81 MG tablet Take 162 mg by mouth daily       vitamin B-12 (CYANOCOBALAMIN) 1000 MCG tablet Take 1,000 mcg by mouth daily      Coenzyme Q10 (CO Q 10) 10 MG CAPS Take 1 capsule by mouth daily       GLUCOSAMINE-CALCIUM-VIT D PO Take 2 tablets by mouth daily       omeprazole (PRILOSEC) 40 MG delayed release capsule Take 40 mg by mouth daily      ipratropium (ATROVENT) 0.06 % nasal spray 2 sprays by Nasal route 3 times daily as needed for Rhinitis 3 Bottle 1     No current facility-administered medications for this visit.       Allergies   Allergen Reactions    Tape [Adhesive Tape] Rash       Health Maintenance   Topic Date Due    Hepatitis B vaccine (1 of 3 - Risk 3-dose series) 12/28/1960    Colon cancer screen colonoscopy  12/28/2016    Annual Wellness Visit (AWV)  05/29/2019    DTaP/Tdap/Td vaccine (1 - Tdap) 11/20/2027 (Originally 12/28/1951)    Lipid screen  06/17/2020    Potassium monitoring  06/17/2020    Creatinine monitoring  06/17/2020    Breast cancer screen  12/10/2020    DEXA (modify frequency per FRAX score)  Completed    Flu vaccine  Completed    Shingles Vaccine  Completed    Pneumococcal 02/06/20  1:58 PM   Result Value Ref Range    T4 Free 0.86 (L) 0.93 - 1.70 ng/dL   TSH without Reflex    Collection Time: 02/06/20  1:58 PM   Result Value Ref Range    TSH 3.220 0.270 - 4.200 uIU/mL   CBC Auto Differential    Collection Time: 02/06/20  1:58 PM   Result Value Ref Range    WBC 8.9 4.8 - 10.8 K/uL    RBC 4.43 4.20 - 5.40 M/uL    Hemoglobin 13.1 12.0 - 16.0 g/dL    Hematocrit 39.6 37.0 - 47.0 %    MCV 89.4 81.0 - 99.0 fL    MCH 29.6 27.0 - 31.0 pg    MCHC 33.1 33.0 - 37.0 g/dL    RDW 14.6 (H) 11.5 - 14.5 %    Platelets 384 513 - 485 K/uL    MPV 10.3 9.4 - 12.3 fL    Neutrophils % 71.9 (H) 50.0 - 65.0 %    Lymphocytes % 16.8 (L) 20.0 - 40.0 %    Monocytes % 8.6 0.0 - 10.0 %    Eosinophils % 1.7 0.0 - 5.0 %    Basophils % 0.5 0.0 - 1.0 %    Neutrophils Absolute 6.4 1.5 - 7.5 K/uL    Immature Granulocytes # 0.0 K/uL    Lymphocytes Absolute 1.5 1.1 - 4.5 K/uL    Monocytes Absolute 0.80 0.00 - 0.90 K/uL    Eosinophils Absolute 0.20 0.00 - 0.60 K/uL    Basophils Absolute 0.00 0.00 - 0.20 K/uL   Lipid Panel    Collection Time: 02/06/20  1:58 PM   Result Value Ref Range    Cholesterol, Total 132 (L) 160 - 199 mg/dL    Triglycerides 513 (H) 0 - 149 mg/dL    HDL 40 (L) 65 - 121 mg/dL    LDL Calculated see below <100 mg/dL   Comprehensive Metabolic Panel    Collection Time: 02/06/20  1:58 PM   Result Value Ref Range    Sodium 136 136 - 145 mmol/L    Potassium 4.4 3.5 - 5.0 mmol/L    Chloride 97 (L) 98 - 111 mmol/L    CO2 25 22 - 29 mmol/L    Anion Gap 14 7 - 19 mmol/L    Glucose 261 (H) 74 - 109 mg/dL    BUN 24 (H) 8 - 23 mg/dL    CREATININE 1.2 (H) 0.5 - 0.9 mg/dL    GFR Non- 43 (A) >60    Calcium 9.5 8.8 - 10.2 mg/dL    Total Protein 7.2 6.6 - 8.7 g/dL    Alb 4.2 3.5 - 5.2 g/dL    Total Bilirubin 0.4 0.2 - 1.2 mg/dL    Alkaline Phosphatase 133 (H) 35 - 104 U/L    ALT 17 5 - 33 U/L    AST 14 5 - 32 U/L   Hemoglobin A1C    Collection Time: 02/06/20  1:58 PM   Result Value Ref Range    Hemoglobin A1C 9.7 (H) 4.0 - 6.0 %   LDL Cholesterol, Direct    Collection Time: 02/06/20  1:58 PM   Result Value Ref Range    LDL Direct 53 (L) <100 mg/dL               Assessment & Plan: The following diagnoses and conditions are stable with no further orders unless indicated:  1. Annual physical exam  Completed annual wellness today    2. Type 2 diabetes mellitus with stage 3 chronic kidney disease, without long-term current use of insulin ([facility])  Blood sugar readings continue to be an issue. Increase Lantus to 46 units  Would recommend checking blood sugar before breakfast, lunch, dinner and bedtime and sending results through [employer] or calling us. - blood glucose test strips (FREESTYLE LITE) strip; USE 4 STRIP TO CHECK GLUCOSE ONCE DAILY AS NEEDED  Dispense: 400 each; Refill: 3    3. Essential hypertension  BP Readings from Last 3 Encounters:   02/06/20 136/70   11/25/19 126/84   10/31/19 124/68     Stable    4. Hypercholesterolemia  Lab Results   Component Value Date    CHOL 132 (L) 02/06/2020    CHOL 116 (L) 06/17/2019    CHOL 116 (L) 09/12/2018     Lab Results   Component Value Date    TRIG 513 (H) 02/06/2020    TRIG 255 (H) 06/17/2019    TRIG 243 (H) 09/12/2018     Lab Results   Component Value Date    HDL 40 (L) 02/06/2020    HDL 30 (L) 06/17/2019    HDL 37 (L) 09/12/2018     Lab Results   Component Value Date    LDLCALC see below 02/06/2020    LDLCALC 35 06/17/2019    LDLCALC 30 09/12/2018     No results found for: LABVLDL, VLDL  No results found for: CHOLHDLRATIO  Triglycerides severely elevated likely related to her increased glucose. Reviewed ADA diet guidelines as well today. Historically her LDL has been well controlled however will recheck next visit    5. MAGALY on CPAP  Compliant and stable    6. Depression with anxiety  Stable    7. Gastroesophageal reflux disease without esophagitis  Stable    8.  Chronic kidney disease, stage III (moderate) ([facility])  Lab Results   Component Value Date    CREATININE 1.2 (H) 02/06/2020     Lab Results   Component Value Date    BUN 24 (H) 02/06/2020       Reinforced goals of adequate hydration. Recommended he avoid NSAIDs such as naproxen and ibuprofen. 9. Primary hypothyroidism  Lab Results   Component Value Date    TSH 3.220 02/06/2020    T4FREE 0.86 (L) 02/06/2020       TSH within normal limits. T4 is slightly low. She is asymptomatic we will continue to monitor    10. Chronic obstructive pulmonary disease, unspecified COPD type ([facility]  Stable    11. Type 2 diabetes mellitus without complication, without long-term current use of insulin (HCC)  As above  - insulin glargine (LANTUS SOLOSTAR) 100 UNIT/ML injection pen; Inject 46 Units into the skin nightly  Dispense: 5 pen; Refill: 5            Return in about 3 months (around 5/6/2020) for Routine follow up - 15 minute visit; poct a1c. Discussed use, benefit, and side effects of prescribed medications. All patient questions answered. Pt voiced understanding. Reviewed health maintenance. Instructedto continue current medications, diet and exercise. Patient agreed with treatmentplan. Follow up as directed.        Note dictated using [id number] [place] [address]

## 2020-02-06 NOTE — PATIENT INSTRUCTIONS
We are committed to providing you with the best care possible. In order to help us achieve these goals please remember to bring all medications, herbal products, and over the counter supplements with you to each visit. If your provider has ordered testing for you, please be sure to follow up with our office if you have not received results within 7 days after the testing took place. *If you receive a survey after visiting one of our offices, please take time to share your experience concerning your physician office visit. These surveys are confidential and no health information about you is shared. We are eager to improve for you and we are counting on your feedback to help make that happen.  _______________________________________________________________         Advance Care Planning: Care Instructions  Your Care Instructions    It can be hard to live with an illness that cannot be cured. But if your health is getting worse, you may want to make decisions about end-of-life care. Planning for the end of your life does not mean that you are giving up. It is a way to make sure that your wishes are met. Clearly stating your wishes can make it easier for your loved ones. Making plans while you are still able may also ease your mind and make your final days less stressful and more meaningful. Follow-up care is a key part of your treatment and safety. Be sure to make and go to all appointments, and call your doctor if you are having problems. It's also a good idea to know your test results and keep a list of the medicines you take. What can you do to plan for the end of life? · Visit:  https://ag.[place].gov/consumer-protection/livingwills  · You can bring these issues up with your doctor. You do not need to wait until your doctor starts the conversation. You might start with \"I would not be willing to live with . Nick Melo \" When you complete this sentence it helps your doctor understand your wishes.   · Talk openly and will explains your wishes about life support and other treatments at the end of your life if you become unable to speak for yourself. Living nash tell doctors to use or not use treatments that would keep you alive. You must have one or two witnesses or a notary present when you sign this form. · Consider a durable power of  for health care. This allows you to name a person to make decisions about your care if you are not able to. Most people ask a close friend or family member. Talk to this person about the kinds of treatments you want and those that you do not want. Make sure this person understands your wishes. These legal papers are simple to change. Tell your doctor what you want to change, and ask him or her to make a note in your medical file. Give your family updated copies of the papers. Where can you learn more? Go to https://chpepiceweb.[email or web address] org and sign in to your [employer] account. Enter P184 in the [employer] box to learn more about \"Advance Care Planning: Care Instructions. \"     If you do not have an account, please click on the \"Sign Up Now\" link. Current as of: September 24, 2016  Content Version: 11.5  © 7759-9957 [employer]. Care instructions adapted under license by [facility] [address] If you have questions about a medical condition or this instruction, always ask your healthcare professional. [place] [address] any warranty or liability for your use of this information. Learning About Living Perroy  What is a living will? A living will is a legal form you use to write down the kind of care you want at the end of your life. It is used by the health professionals who will treat you if you aren't able to decide for yourself. If you put your wishes in writing, your loved ones and others will know what kind of care you want. They won't need to guess. This can ease your mind and be helpful to others.   A living will is not the same your medical care. And then some medical professionals who may not know you as well might have to make decisions for you. In some cases, a  makes the decisions. When you name a health care agent, it is very clear who has the power to make health decisions for you. How do you name a health care agent? You name your health care agent on a legal form. It is usually called a durable power of  for health care. Ask your hospital, state bar association, or office on aging where to find these forms. You must sign the form to make it legal. Some states require you to get the form notarized. This means that a person called a  watches you sign the form and then he or she signs the form. Some states also require that two or more witnesses sign the form. Be sure to tell your family members and doctors who your health care agent is. Keep your forms in a safe place. But make sure that your loved ones know where the forms are. This could be in your desk where you keep other important papers. Make sure your doctor has a copy of your forms. Where can you learn more? Go to https://chpepiceweb.[employer]. org and sign in to your [employer] account. Enter [id number] in the [employer] box to learn more about \"Learning About Durable Power of  for Health Care. \"     If you do not have an account, please click on the \"Sign Up Now\" link. Current as of: September 24, 2016  Content Version: 11.5  © 1662-7917 [employer]. Care instructions adapted under license by [facility] ([facility]). If you have questions about a medical condition or this instruction, always ask your healthcare professional. [place] [address] any warranty or liability for your use of this information. _______________________________________________________________    Home Safety Tips    Each year, thousands of older Americans fall at home. Many of them are seriously injured, and some are disabled.  In are on both sides of the stairs and are as long as the stairs. o Is the carpet on the steps loose or torn? - Make sure the carpet is firmly attached to every step or remove the carpet and attach non-slip rubber treads on the stairs. o Look at your kitchen and eating Look at your kitchen and eating area. Are the things you use often on high shelves? - Move items in your cabinets. Keep things you use often on the lower shelves (about waist high). - Is your step stool unsteady? - Get a new, steady step stool with a bar to hold on to. Never use a chair as a step stool.   - Is the light near the bed hard to reach?   - Place a lamp close to the bed where it is easy to reach.  o Is the path from your bed to the bathroom dark?   - Put in a night-light so you can see where youre walking. Some nightlights go on by themselves after dark  o Is the tub or shower floor slippery?   - Put a non-slip rubber mat or selfstick strips on the floor of the tub or shower. o Do you have some support when you get in and out of the tub or up from the toilet?   - Have a  or a schmid put in a grab bar inside the tub and next to the toilet.  o Exercise regularly. Exercise makes you stronger and improves your balance and coordination. o Have your doctor or pharmacist look at all the medicines you take, even over-the-counter medicines. Some medicines can make you sleepy or dizzy. o Have your vision checked at least once a year by an eye doctor. Poor vision can increase your risk of falling.   o Get up slowly after you sit or lie down.  o Wear sturdy shoes with thin, non-slip soles. Avoid slippers and running shoes with thick soles. o Improve the lighting in your home. Use brighter light bulbs (at least 60 sanchez). Use lamp shades or frosted bulbs to reduce glare. o Use reflecting tape at the top and bottom of the stairs so you can see them better.    o Paint doorsills a different color to prevent tripping.  o Keep

## 2020-02-11 RX ORDER — FUROSEMIDE 20 MG/1
TABLET ORAL
Qty: 90 TABLET | Refills: 3 | Status: SHIPPED | OUTPATIENT
Start: 2020-02-11 | End: 2021-03-16 | Stop reason: ALTCHOICE

## 2020-02-17 ENCOUNTER — TELEPHONE (OUTPATIENT)
Dept: FAMILY MEDICINE CLINIC | Age: 80
End: 2020-02-17

## 2020-02-17 ENCOUNTER — HOSPITAL ENCOUNTER (OUTPATIENT)
Dept: CT IMAGING | Age: 80
Discharge: HOME | End: 2020-02-17
Payer: MEDICARE

## 2020-02-17 ENCOUNTER — OFFICE VISIT (OUTPATIENT)
Dept: FAMILY MEDICINE CLINIC | Age: 80
End: 2020-02-17
Payer: MEDICARE

## 2020-02-17 VITALS
BODY MASS INDEX: 29.02 KG/M2 | SYSTOLIC BLOOD PRESSURE: 152 MMHG | HEIGHT: 64 IN | RESPIRATION RATE: 18 BRPM | OXYGEN SATURATION: 97 % | DIASTOLIC BLOOD PRESSURE: 84 MMHG | HEART RATE: 78 BPM | WEIGHT: 170 LBS | TEMPERATURE: 98.6 F

## 2020-02-17 PROCEDURE — 1036F TOBACCO NON-USER: CPT | Performed by: NURSE PRACTITIONER

## 2020-02-17 PROCEDURE — 1123F ACP DISCUSS/DSCN MKR DOCD: CPT | Performed by: NURSE PRACTITIONER

## 2020-02-17 PROCEDURE — 70450 CT HEAD/BRAIN W/O DYE: CPT

## 2020-02-17 PROCEDURE — 4040F PNEUMOC VAC/ADMIN/RCVD: CPT | Performed by: NURSE PRACTITIONER

## 2020-02-17 PROCEDURE — G8926 SPIRO NO PERF OR DOC: HCPCS | Performed by: NURSE PRACTITIONER

## 2020-02-17 PROCEDURE — G8482 FLU IMMUNIZE ORDER/ADMIN: HCPCS | Performed by: NURSE PRACTITIONER

## 2020-02-17 PROCEDURE — 99214 OFFICE O/P EST MOD 30 MIN: CPT | Performed by: NURSE PRACTITIONER

## 2020-02-17 PROCEDURE — G8427 DOCREV CUR MEDS BY ELIG CLIN: HCPCS | Performed by: NURSE PRACTITIONER

## 2020-02-17 PROCEDURE — G8399 PT W/DXA RESULTS DOCUMENT: HCPCS | Performed by: NURSE PRACTITIONER

## 2020-02-17 PROCEDURE — 1090F PRES/ABSN URINE INCON ASSESS: CPT | Performed by: NURSE PRACTITIONER

## 2020-02-17 PROCEDURE — G8417 CALC BMI ABV UP PARAM F/U: HCPCS | Performed by: NURSE PRACTITIONER

## 2020-02-17 PROCEDURE — 3023F SPIROM DOC REV: CPT | Performed by: NURSE PRACTITIONER

## 2020-02-17 RX ORDER — CARVEDILOL 25 MG/1
25 TABLET ORAL 2 TIMES DAILY WITH MEALS
Qty: 180 TABLET | Refills: 2 | Status: SHIPPED | OUTPATIENT
Start: 2020-02-17 | End: 2021-01-08 | Stop reason: SDUPTHER

## 2020-02-17 ASSESSMENT — ENCOUNTER SYMPTOMS
WHEEZING: 0
CHEST TIGHTNESS: 0
SORE THROAT: 0
DIARRHEA: 0
SHORTNESS OF BREATH: 0
NAUSEA: 0
COUGH: 0
ABDOMINAL PAIN: 0

## 2020-02-17 NOTE — TELEPHONE ENCOUNTER
Called and spoke to Lane Lara at [facility] she will contact the patient for new equipment and to set up overnight pulse oximetry

## 2020-02-17 NOTE — PROGRESS NOTES
Mahogany Perry is a 67 y.o. female who presents today for  Chief Complaint   Patient presents with   Rohit Zurita     has fallen twice in the past 2 weeks     Fatigue    Medication Refill     needs new Rx for nitroglycerin       HPI:  She has had recurrent falls which she states has been ongoing for several months with \"mild\" falls. More recently she has had 2 falls over the past 2 weeks where she hit the ground. Initially she fell 2 weeks ago in the mall parking lot. States she did not hit her head. No syncope. She fell again last week while going to another provider's office. She fell forward and hit her nose on a flower pot. No syncope. She has had shortness of breath which is a chronic issue and feels has progressively worsened. She feels this is contributing to her falls. She gets short of breath with short distances, feels weak, dizzy, off balance. She has had occasional headaches. No significant vision changes. Apparently had a recent vision exam per her report which was normal.  She feels her memory has been worse, more forgetful. Diagnosed several months ago with sleep apnea. She is not wearing her CPAP, states she is \"allergic\" to the nasal pillow. She states every time she wears that she sneezes. Supplies are obtained through [employer]. She has COPD but feels this has been stable. She has CAD, on Plavix with previous stents. Followed by cardiology at [facility]. No recent chest pain. Blood pressure slightly elevated on arrival, still slightly elevated  when rechecked. She is taking her usual medications. Blood pressure was controlled when she was here a couple of weeks ago. She states she was stressed this morning driving over here and relates it to this. Review of Systems   Constitutional: Positive for fatigue. Negative for chills and fever. HENT: Negative for congestion, ear pain and sore throat. Eyes: Negative for visual disturbance.    Respiratory: Negative for cough, chest tightness, shortness of breath and wheezing. Cardiovascular: Negative for chest pain, palpitations and leg swelling. Gastrointestinal: Negative for abdominal pain, diarrhea and nausea. Genitourinary: Negative. Musculoskeletal: Negative for arthralgias and myalgias. Skin: Negative for rash. Neurological: Positive for dizziness, weakness, light-headedness and headaches. Negative for syncope. Falls   Psychiatric/Behavioral: Positive for confusion (memory changes). Past Medical History:   Diagnosis Date    Arthritis     Psoriatic    Asthma     CAD (coronary artery disease)     [facility]     COPD [facility]) [facility] [address]     DM (diabetes mellitus) ([facility]     GERD (gastroesophageal reflux disease)     HTN (hypertension)     Hyperlipidemia     MI (myocardial infarction) ([facility]     x2    Thyroid disease        Current Outpatient Medications   Medication Sig Dispense Refill    carvedilol (COREG) 25 MG tablet Take 1 tablet by mouth 2 times daily (with meals) 180 tablet 2    furosemide (LASIX) 20 MG tablet TAKE 1 TABLET DAILY 90 tablet 3    blood glucose test strips (FREESTYLE LITE) strip USE 4 STRIP TO CHECK GLUCOSE ONCE DAILY AS NEEDED 400 each 3    insulin glargine (LANTUS SOLOSTAR) 100 UNIT/ML injection pen Inject 46 Units into the skin nightly 5 pen 5    lisinopril (PRINIVIL;ZESTRIL) 40 MG tablet TAKE 1 TABLET DAILY 90 tablet 3    atorvastatin (LIPITOR) 80 MG tablet Take 1 tablet by mouth daily 90 tablet 3    NIFEdipine (PROCARDIA XL) 30 MG extended release tablet Take 1 tablet by mouth daily 90 tablet 3    indomethacin (INDOCIN) 50 MG capsule Take 1 capsule by mouth 3 times daily 270 capsule 3    SYNTHROID 25 MCG tablet TAKE 1 TABLET DAILY 90 tablet 3    FREESTYLE LANCETS MISC USE TO CHECK BLOOD SUGAR 1-2 TIMES DAILY 100 each 3    ketoconazole (NIZORAL) 2 % shampoo Apply topically daily as needed.  120 mL 0    Insulin Pen Needle 32G X 6 MM MISC 1 Act by Does not apply route every evening 100 each 5    Omega-3 Fatty Acids (FISH OIL OMEGA-3 PO) Take by mouth      umeclidinium-vilanterol (ANORO ELLIPTA) 62.5-25 MCG/INH AEPB inhaler Inhale 1 puff into the lungs daily      fluticasone (FLONASE) 50 MCG/ACT nasal spray 1 spray by Each Nare route daily      sertraline (ZOLOFT) 100 MG tablet Take 1 tablet by mouth daily 90 tablet 3    cetirizine (ZYRTEC) 10 MG tablet Take 10 mg by mouth 2 times daily      montelukast (SINGULAIR) 10 MG tablet Take 1 tablet by mouth daily 90 tablet 3    azelastine HCl 0.15 % SOLN 2 sprays by Nasal route 2 times daily 90 mL 5    ipratropium (ATROVENT) 0.06 % nasal spray 2 sprays by Nasal route 3 times daily as needed for Rhinitis 3 Bottle 1    Blood Glucose Monitoring Suppl ADE 1 kit by Does not apply route daily ICD-10-CM: E11.9 1 Device 0    Lancet Device MISC 1 Device by Does not apply route once for 1 dose ICD-10-CM: E11.9 100 Device 0    clopidogrel (PLAVIX) 75 MG tablet Take 1 tablet by mouth daily 30 tablet 3    aspirin 81 MG tablet Take 162 mg by mouth daily       vitamin B-12 (CYANOCOBALAMIN) 1000 MCG tablet Take 1,000 mcg by mouth daily      Coenzyme Q10 (CO Q 10) 10 MG CAPS Take 1 capsule by mouth daily       GLUCOSAMINE-CALCIUM-VIT D PO Take 2 tablets by mouth daily       omeprazole (PRILOSEC) 40 MG delayed release capsule Take 40 mg by mouth daily       No current facility-administered medications for this visit.         Allergies   Allergen Reactions    Tape Marlane Guardian Tape] Rash       Past Surgical History:   Procedure Laterality Date    CATARACT REMOVAL      CHOLECYSTECTOMY      CORONARY ANGIOPLASTY  06/2018    Angioplasty to in-stent restenosis to the distal LAD    HIP SURGERY      HYSTERECTOMY      JOINT REPLACEMENT Right     Right knee replacement       Social History     Tobacco Use    Smoking status: Never Smoker    Smokeless tobacco: Never Used   Substance Use Topics  Alcohol use: No    Drug use: No       Family History   Problem Relation Age of Onset    Heart Disease Mother     Heart Disease Father        BP (!) 152/84   Pulse 78   Temp 98.6 °F (37 °C) (Temporal)   Resp 18   Ht 5' 4\" (1.626 m)   Wt 170 lb (77.1 kg)   SpO2 97%   BMI 29.18 kg/m²     Physical Exam  Vitals signs reviewed. Constitutional:       General: She is not in acute distress. Appearance: Normal appearance. She is well-developed. HENT:      Head: Normocephalic. Right Ear: Tympanic membrane and external ear normal.      Left Ear: Tympanic membrane and external ear normal.      Nose: Nose normal.      Mouth/Throat:      Mouth: Mucous membranes are moist.      Pharynx: No oropharyngeal exudate or posterior oropharyngeal erythema. Eyes:      Conjunctiva/sclera: Conjunctivae normal.      Pupils: Pupils are equal, round, and reactive to light. Neck:      Musculoskeletal: Normal range of motion and neck supple. Thyroid: No thyromegaly. Vascular: No carotid bruit or JVD. Trachea: No tracheal deviation. Cardiovascular:      Rate and Rhythm: Normal rate and regular rhythm. Heart sounds: Normal heart sounds. No murmur. Pulmonary:      Effort: Pulmonary effort is normal. No respiratory distress. Breath sounds: Normal breath sounds. No wheezing or rhonchi. Musculoskeletal: Normal range of motion. Lymphadenopathy:      Cervical: No cervical adenopathy. Skin:     General: Skin is warm and dry. Findings: No rash. Neurological:      General: No focal deficit present. Mental Status: She is alert. Motor: No weakness. Coordination: Coordination normal.   Psychiatric:         Mood and Affect: Mood normal.         Behavior: Behavior normal.         Thought Content: Thought content normal.         ASSESSMENT/PLAN:  1.  Closed head injury, initial encounter  - CT of head, rule out acute bleed given recent falls, chronic Plavix  - CT HEAD WO CONTRAST; Future    2. Ataxia  - May be multifactorial secondary to age, comorbidities. She is not wearing her CPAP which could be contributing.  - Refer to neurology for further evaluation and testing, defer any additional testing to them  - CT HEAD WO CONTRAST; Future    3. Dyspnea on exertion  - We will order overnight pulse oximetry, rule out hypoxia and need for O2.  - Pulse oximetry, overnight; Future    4. Chronic obstructive pulmonary disease, unspecified COPD type ([facility] [address]  - Stable but with increased shortness of breath recently check pulse ox as noted above  - Pulse oximetry, overnight; Future    5. MAGALY (obstructive sleep apnea)  - [facility] if [facility] can [facility] her regarding her CPAP machine and equipment. She may need assessment of her equipment, possible new mask or nasal pillows. - Discussed with patient importance of wearing her CPAP, risks of not wearing including increased fatigue, uncontrolled hypertension, shortness of breath, risk for increased coronary issues. - Pulse oximetry, overnight; Future    6. CAD in native artery  - Stable, continue routine follow-up with cardiology, continue Plavix    7. Essential hypertension  - Slightly elevated today but she relates this to stress from driving over here. Continue current medications and monitor at home. Blood pressure has been controlled recently. Follow-up as scheduled, sooner with problems         No follow-ups on file. Selina Bryson was seen today for fall, fatigue and medication refill. Diagnoses and all orders for this visit:    Closed head injury, initial encounter  -     CT HEAD WO CONTRAST; Future    Ataxia  -     CT HEAD WO CONTRAST;  Future    Dyspnea on exertion  -     Pulse oximetry, overnight; Future    Chronic obstructive pulmonary disease, unspecified COPD type ([facility])  -     Pulse oximetry, overnight; Future    MAGALY (obstructive sleep apnea)  -     Pulse oximetry, overnight; Future    CAD in native

## 2020-02-25 RX ORDER — OMEPRAZOLE 40 MG/1
40 CAPSULE, DELAYED RELEASE ORAL DAILY
Qty: 90 CAPSULE | Refills: 3 | Status: SHIPPED | OUTPATIENT
Start: 2020-02-25 | End: 2021-04-09

## 2020-04-01 RX ORDER — INSULIN GLARGINE 100 [IU]/ML
46 INJECTION, SOLUTION SUBCUTANEOUS NIGHTLY
Qty: 5 PEN | Refills: 5 | Status: SHIPPED | OUTPATIENT
Start: 2020-04-01 | End: 2020-08-10 | Stop reason: ALTCHOICE

## 2020-05-01 RX ORDER — INSULIN GLARGINE 100 [IU]/ML
50 INJECTION, SOLUTION SUBCUTANEOUS NIGHTLY
Qty: 5 PEN | Refills: 5 | Status: SHIPPED | OUTPATIENT
Start: 2020-05-01 | End: 2020-08-10 | Stop reason: SDUPTHER

## 2020-05-07 ENCOUNTER — VIRTUAL VISIT (OUTPATIENT)
Dept: FAMILY MEDICINE CLINIC | Age: 80
End: 2020-05-07
Payer: MEDICARE

## 2020-05-07 PROCEDURE — 4040F PNEUMOC VAC/ADMIN/RCVD: CPT | Performed by: FAMILY MEDICINE

## 2020-05-07 PROCEDURE — G8399 PT W/DXA RESULTS DOCUMENT: HCPCS | Performed by: FAMILY MEDICINE

## 2020-05-07 PROCEDURE — 99214 OFFICE O/P EST MOD 30 MIN: CPT | Performed by: FAMILY MEDICINE

## 2020-05-07 PROCEDURE — 1123F ACP DISCUSS/DSCN MKR DOCD: CPT | Performed by: FAMILY MEDICINE

## 2020-05-07 PROCEDURE — G8428 CUR MEDS NOT DOCUMENT: HCPCS | Performed by: FAMILY MEDICINE

## 2020-05-07 PROCEDURE — 1090F PRES/ABSN URINE INCON ASSESS: CPT | Performed by: FAMILY MEDICINE

## 2020-05-07 RX ORDER — NIFEDIPINE 60 MG/1
60 TABLET, FILM COATED, EXTENDED RELEASE ORAL DAILY
Qty: 90 TABLET | Refills: 3 | Status: SHIPPED | OUTPATIENT
Start: 2020-05-07 | End: 2021-10-07 | Stop reason: ALTCHOICE

## 2020-05-07 NOTE — PROGRESS NOTES
2020    TELEHEALTH EVALUATION -- Audio/Visual (During YTLFW-19 public health emergency)    HPI:    Christiane Woo (:  1940) has requested an audio/video evaluation for the following concern(s):    Diabetes Mellitus  Has been compliant with medications. No side effects of medications since last visit. No polyuria, polydipsia, or vision changes since last visit. No symptomatic episodes of hypoglycemia. basaglar 36 units =    Hypertension  Compliant with medications. No adverse effects from medication. No lightheadedness, palpitations, or chest pain. Depression with Anxiety  Compliant with medications. No adverse effects from medication. Depression and anxiety symptoms are stable today. No suicidal or homicidal ideation. Excessive worry, insomnia, and anxiousness are stable. Energy, concentration, and apathy are stable. COPD  Compliant with medications. No adverse effects from medication. Symptoms are stable today. Has chronic shortness of breath and cough but at baseline today. No wheezing or chest tightness. Hyperlipidemia  Tolerating current cholesterol medication without side effects. No body aches. Attempting to reduce processed sugar and cholesterol from diet. MAGALY on CPAP  Compliant with CPAP. No daytime somnolence. Feels rested for the most part when wearing CPAP. Review of Systems  Review of Systems   Constitutional: Negative for chills and fever. HENT: Negative for congestion. Respiratory: Negative for cough, chest tightness and shortness of breath. Cardiovascular: Negative for chest pain, palpitations and leg swelling. Gastrointestinal: Negative for abdominal pain, anal bleeding, constipation, diarrhea and nausea. Genitourinary: Negative for difficulty urinating. Psychiatric/Behavioral: Negative. Prior to Visit Medications    Medication Sig Taking?  Authorizing Provider   NIFEdipine (ADALAT CC) 60 MG extended release tablet Take 1 tablet by used to authenticate this note. This visit was completed using [employer] with audio and visual capabilities:  Thu May 7 2020  1:22:19 PM  1:22:39 PM    00:00:19    Thu May 7 2020  1:21:20 PM  1:22:11 PM    00:00:50

## 2020-05-13 VITALS — SYSTOLIC BLOOD PRESSURE: 132 MMHG | DIASTOLIC BLOOD PRESSURE: 82 MMHG

## 2020-08-10 ENCOUNTER — OFFICE VISIT (OUTPATIENT)
Dept: FAMILY MEDICINE CLINIC | Age: 80
End: 2020-08-10
Payer: MEDICARE

## 2020-08-10 VITALS
BODY MASS INDEX: 28.63 KG/M2 | TEMPERATURE: 97 F | HEART RATE: 97 BPM | WEIGHT: 166.8 LBS | SYSTOLIC BLOOD PRESSURE: 152 MMHG | OXYGEN SATURATION: 97 % | DIASTOLIC BLOOD PRESSURE: 102 MMHG

## 2020-08-10 LAB — HBA1C MFR BLD: 11.1 %

## 2020-08-10 PROCEDURE — 99214 OFFICE O/P EST MOD 30 MIN: CPT | Performed by: FAMILY MEDICINE

## 2020-08-10 PROCEDURE — 83036 HEMOGLOBIN GLYCOSYLATED A1C: CPT | Performed by: FAMILY MEDICINE

## 2020-08-10 PROCEDURE — G8926 SPIRO NO PERF OR DOC: HCPCS | Performed by: FAMILY MEDICINE

## 2020-08-10 PROCEDURE — 1036F TOBACCO NON-USER: CPT | Performed by: FAMILY MEDICINE

## 2020-08-10 PROCEDURE — 4040F PNEUMOC VAC/ADMIN/RCVD: CPT | Performed by: FAMILY MEDICINE

## 2020-08-10 PROCEDURE — G8427 DOCREV CUR MEDS BY ELIG CLIN: HCPCS | Performed by: FAMILY MEDICINE

## 2020-08-10 PROCEDURE — 1123F ACP DISCUSS/DSCN MKR DOCD: CPT | Performed by: FAMILY MEDICINE

## 2020-08-10 PROCEDURE — 3023F SPIROM DOC REV: CPT | Performed by: FAMILY MEDICINE

## 2020-08-10 PROCEDURE — 1090F PRES/ABSN URINE INCON ASSESS: CPT | Performed by: FAMILY MEDICINE

## 2020-08-10 PROCEDURE — G8417 CALC BMI ABV UP PARAM F/U: HCPCS | Performed by: FAMILY MEDICINE

## 2020-08-10 PROCEDURE — G8399 PT W/DXA RESULTS DOCUMENT: HCPCS | Performed by: FAMILY MEDICINE

## 2020-08-10 RX ORDER — INSULIN LISPRO 100 [IU]/ML
INJECTION, SOLUTION INTRAVENOUS; SUBCUTANEOUS
Qty: 9 PEN | Refills: 5 | Status: SHIPPED | OUTPATIENT
Start: 2020-08-10 | End: 2020-08-11

## 2020-08-10 RX ORDER — INSULIN GLARGINE 100 [IU]/ML
50 INJECTION, SOLUTION SUBCUTANEOUS NIGHTLY
Qty: 15 PEN | Refills: 5
Start: 2020-08-10 | End: 2021-06-18 | Stop reason: SDUPTHER

## 2020-08-10 RX ORDER — ATORVASTATIN CALCIUM 80 MG/1
80 TABLET, FILM COATED ORAL DAILY
Qty: 90 TABLET | Refills: 3 | Status: SHIPPED | OUTPATIENT
Start: 2020-08-10 | End: 2021-08-16 | Stop reason: SDUPTHER

## 2020-08-10 RX ORDER — LEVOTHYROXINE SODIUM 0.03 MG/1
TABLET ORAL
Qty: 90 TABLET | Refills: 3 | Status: ON HOLD | OUTPATIENT
Start: 2020-08-10 | End: 2021-12-02 | Stop reason: HOSPADM

## 2020-08-10 RX ORDER — INDOMETHACIN 50 MG/1
50 CAPSULE ORAL 3 TIMES DAILY
Qty: 270 CAPSULE | Refills: 3 | Status: CANCELLED | OUTPATIENT
Start: 2020-08-10

## 2020-08-10 NOTE — PROGRESS NOTES
[facility] [address] [place] [address]  [address] [address]  Dept: [phone]  Dept Fax: [phone]  Loc: [phone]    Cinda Loredo is a 67 y.o. female who presents today for her medical conditions/complaints as noted below. Cinda Loredo is here for 3 Month Follow-Up        HPI:   CC: Here today to discuss the following:    She missed her lab appointment. Diabetes Mellitus  Has been compliant with medications. No side effects of medications since last visit. No polyuria, polydipsia, or vision changes since last visit. No symptomatic episodes of hypoglycemia. Basaglar 36 units at night. Hypothyroidism  Symptoms are stable. No temperature intolerance, fatigue, or mood disturbance from baseline. Complaint with current medication. Depression with Anxiety  Compliant with medications. No adverse effects from medication. Depression and anxiety symptoms are stable today. No suicidal or homicidal ideation. Excessive worry, insomnia, and anxiousness are stable. Energy, concentration, and apathy are stable. COPD  Compliant with medications. No adverse effects from medication. Symptoms are stable today. Has chronic shortness of breath and cough but at baseline today. No wheezing or chest tightness. MAGALY on CPAP  Compliant with CPAP. No daytime somnolence. Feels rested for the most part when wearing CPAP. Hyperlipidemia  Tolerating current cholesterol medication without side effects. No body aches. Attempting to reduce processed sugar and cholesterol from diet. Hypertension  Compliant with medications. No adverse effects from medication. No lightheadedness, palpitations, or chest pain.       HPI    Past Medical History:   Diagnosis Date    Arthritis     Psoriatic    Asthma     CAD (coronary artery disease)     CHF (congestive heart failure) (HCC)     COPD (chronic obstructive pulmonary disease) (HCC)     DM (diabetes mellitus) ([facility]     GERD (gastroesophageal reflux disease)     HTN (hypertension)     Hyperlipidemia     MI (myocardial infarction) ([facility]     x2    Thyroid disease       Past Surgical History:   Procedure Laterality Date    CATARACT REMOVAL      CHOLECYSTECTOMY      CORONARY ANGIOPLASTY  06/2018    Angioplasty to in-stent restenosis to the distal LAD    HIP SURGERY      HYSTERECTOMY      JOINT REPLACEMENT Right     Right knee replacement       Family History   Problem Relation Age of Onset    Heart Disease Mother     Heart Disease Father        Social History     Tobacco Use    Smoking status: Never Smoker    Smokeless tobacco: Never Used   Substance Use Topics    Alcohol use: No     Current Outpatient Medications   Medication Sig Dispense Refill    atorvastatin (LIPITOR) 80 MG tablet Take 1 tablet by mouth daily 90 tablet 3    levothyroxine (SYNTHROID) 25 MCG tablet TAKE 1 TABLET DAILY 90 tablet 3    Insulin Pen Needle 32G X 6 MM MISC One needle four times a day 360 each 11    insulin glargine (BASAGLAR KWIKPEN) 100 UNIT/ML injection pen Inject 50 Units into the skin nightly 15 pen 5    NIFEdipine (ADALAT CC) 60 MG extended release tablet Take 1 tablet by mouth daily 90 tablet 3    sertraline (ZOLOFT) 100 MG tablet TAKE 1 TABLET DAILY 90 tablet 0    omeprazole (PRILOSEC) 40 MG delayed release capsule Take 1 capsule by mouth daily 90 capsule 3    carvedilol (COREG) 25 MG tablet Take 1 tablet by mouth 2 times daily (with meals) 180 tablet 2    furosemide (LASIX) 20 MG tablet TAKE 1 TABLET DAILY 90 tablet 3    blood glucose test strips (FREESTYLE LITE) strip USE 4 STRIP TO CHECK GLUCOSE ONCE DAILY AS NEEDED 400 each 3    lisinopril (PRINIVIL;ZESTRIL) 40 MG tablet TAKE 1 TABLET DAILY 90 tablet 3    indomethacin (INDOCIN) 50 MG capsule Take 1 capsule by mouth 3 times daily 270 capsule 3    FREESTYLE LANCETS MISC USE TO CHECK BLOOD SUGAR 1-2 TIMES DAILY 100 each 3    ketoconazole (NIZORAL) 2 % shampoo Apply topically daily as needed. 120 mL 0    Insulin Pen Needle 32G X 6 MM MISC 1 Act by Does not apply route every evening 100 each 5    Omega-3 Fatty Acids (FISH OIL OMEGA-3 PO) Take by mouth      umeclidinium-vilanterol (ANORO ELLIPTA) 62.5-25 MCG/INH AEPB inhaler Inhale 1 puff into the lungs daily      fluticasone (FLONASE) 50 MCG/ACT nasal spray 1 spray by Each Nare route daily      cetirizine (ZYRTEC) 10 MG tablet Take 10 mg by mouth 2 times daily      montelukast (SINGULAIR) 10 MG tablet Take 1 tablet by mouth daily 90 tablet 3    azelastine HCl 0.15 % SOLN 2 sprays by Nasal route 2 times daily 90 mL 5    ipratropium (ATROVENT) 0.06 % nasal spray 2 sprays by Nasal route 3 times daily as needed for Rhinitis 3 Bottle 1    Blood Glucose Monitoring Suppl ADE 1 kit by Does not apply route daily ICD-10-CM: E11.9 1 Device 0    Lancet Device MISC 1 Device by Does not apply route once for 1 dose ICD-10-CM: E11.9 100 Device 0    clopidogrel (PLAVIX) 75 MG tablet Take 1 tablet by mouth daily 30 tablet 3    aspirin 81 MG tablet Take 162 mg by mouth daily       vitamin B-12 (CYANOCOBALAMIN) 1000 MCG tablet Take 1,000 mcg by mouth daily      Coenzyme Q10 (CO Q 10) 10 MG CAPS Take 1 capsule by mouth daily       GLUCOSAMINE-CALCIUM-VIT D PO Take 2 tablets by mouth daily       insulin aspart (NOVOLOG FLEXPEN) 100 UNIT/ML injection pen Inject 10 Units into the skin 3 times daily (before meals) 9 pen 3     No current facility-administered medications for this visit.       Allergies   Allergen Reactions    Tape [Adhesive Tape] Rash       Health Maintenance   Topic Date Due    Colon cancer screen colonoscopy  12/28/2016    DTaP/Tdap/Td vaccine (1 - Tdap) 11/20/2027 (Originally 12/28/1959)    Flu vaccine (1) 09/01/2020    Breast cancer screen  12/10/2020    Lipid screen  02/06/2021    Annual Wellness Visit (AWV)  02/06/2021    Potassium monitoring  02/06/2021    Creatinine monitoring  02/06/2021  DEXA (modify frequency per FRAX score)  Completed    Shingles Vaccine  Completed    Pneumococcal 65+ years Vaccine  Completed    Hepatitis A vaccine  Aged Out    Hib vaccine  Aged Out    Meningococcal (ACWY) vaccine  Aged Out       Subjective:      Review of Systems    Review of Systems   Constitutional: Negative for chills and fever. HENT: Negative for congestion. Respiratory: Negative for cough, chest tightness and shortness of breath. Cardiovascular: Negative for chest pain, palpitations and leg swelling. Gastrointestinal: Negative for abdominal pain, anal bleeding, constipation, diarrhea and nausea. Genitourinary: Negative for difficulty urinating. Psychiatric/Behavioral: Negative. SeeHPI for visit specific review of symptoms. All others negative      Objective:   BP (!) 152/102   Pulse 97   Temp 97 °F (36.1 °C)   Wt 166 lb 12.8 oz (75.7 kg)   SpO2 97%   BMI 28.63 kg/m²   Physical Exam    Physical Exam   Constitutional: She appears well-developed. Does not appear ill. Eyes: Pupils are equal, round, and reactive to light. Conjunctiva and Lids normal.  Neck: Normal range of motion. Neck supple. No masses. Neck Symmetric. Normal tracheal position. No thyroid enlargement  Cardiovascular: Normal rate and regular rhythm. Exam reveals no friction rub. Carotid arteries: no bruit observed. No murmur heard. Respiratory:  Effort normal and breath sounds normal. No respiratory distress. No wheezes. No rales. No use of accessory muscles or intercostal retractions. Abdominal: Soft. Bowel sounds are normal. exhibits no distension. There is no tenderness. There is no rebound and no guarding. Musculoskeletal: exhibits no edema. Normal gait. Neurological: alert. Psychiatric: normal mood and affect. Her behavior is normal. Normal judgement and insight observed.       Recent Results (from the past 672 hour(s))   POCT glycosylated hemoglobin (Hb A1C)    Collection Time: 08/10/20 12:00 AM   Result Value Ref Range    Hemoglobin A1C 11.1 %       Lab Results   Component Value Date    LABA1C 11.1 08/10/2020    LABA1C 9.7 (H) 02/06/2020    LABA1C 8.9 (H) 06/17/2019     Lab Results   Component Value Date    LABMICR 13.90 02/06/2020    LDLCALC see below 02/06/2020    CREATININE 1.2 (H) 02/06/2020     Lab Results   Component Value Date    CHOL 132 (L) 02/06/2020    CHOL 116 (L) 06/17/2019    CHOL 116 (L) 09/12/2018     Lab Results   Component Value Date    TRIG 513 (H) 02/06/2020    TRIG 255 (H) 06/17/2019    TRIG 243 (H) 09/12/2018     Lab Results   Component Value Date    HDL 40 (L) 02/06/2020    HDL 30 (L) 06/17/2019    HDL 37 (L) 09/12/2018     Lab Results   Component Value Date    LDLCALC see below 02/06/2020    LDLCALC 35 06/17/2019    LDLCALC 30 09/12/2018     No results found for: LABVLDL, VLDL  No results found for: [facility]      Lab Results   Component Value Date    TSH 3.220 02/06/2020         Assessment & Plan: The following diagnoses and conditions are stable with no further orders unless indicated:  1. Type 2 diabetes mellitus without complication, without long-term current use of insulin ([facility])  Tremendous increase in her A1c in the past year. Advised her to provide me with her glucose readings every 2 weeks and will see in the office monthly until we can get some movement  Increase Basaglar to 50 units  Add NovoLog 10 units 3 times daily  - Hemoglobin A1C; Future  - Comprehensive Metabolic Panel; Future  - Microalbumin / Creatinine Urine Ratio; Future  - Insulin Pen Needle 32G X 6 MM MISC; One needle four times a day  Dispense: 360 each; Refill: 11  - insulin glargine (BASAGLAR KWIKPEN) 100 UNIT/ML injection pen; Inject 50 Units into the skin nightly  Dispense: 15 pen; Refill: 5  - POCT glycosylated hemoglobin (Hb A1C)    2.  Primary hypothyroidism  Refilled her thyroid medication and suggested she get a TSH and T4  - levothyroxine (SYNTHROID) 25 MCG tablet; TAKE 1 TABLET DAILY Dispense: 90 tablet; Refill: 3  - TSH without Reflex; Future  - T4, Free; Future    3. Depression with anxiety  Overall she feels her symptoms are stable    4. Chronic obstructive pulmonary disease, unspecified COPD type ([address]  Stable    5. MAGALY on CPAP  Compliant and satisfied with results    6. Hypercholesterolemia  Continue with high intensity statin therapy  Lab Results   Component Value Date    CHOL 132 (L) 02/06/2020    CHOL 116 (L) 06/17/2019    CHOL 116 (L) 09/12/2018     Lab Results   Component Value Date    TRIG 513 (H) 02/06/2020    TRIG 255 (H) 06/17/2019    TRIG 243 (H) 09/12/2018     Lab Results   Component Value Date    HDL 40 (L) 02/06/2020    HDL 30 (L) 06/17/2019    HDL 37 (L) 09/12/2018     Lab Results   Component Value Date    LDLCALC see below 02/06/2020    LDLCALC 35 06/17/2019    LDLCALC 30 09/12/2018     No results found for: LABVLDL, VLDL  No results found for: CHOLHDLRATIO  Suggested she return the lab  - atorvastatin (LIPITOR) 80 MG tablet; Take 1 tablet by mouth daily  Dispense: 90 tablet; Refill: 3  - Lipid Panel; Future    7. Essential hypertension  Blood pressure is elevated today as well. She states she may have forgotten to take her Coreg and lisinopril- CBC Auto Differential; Future  Advised her to check her blood pressure daily          Return in about 1 month (around 9/10/2020) for Routine follow up - 15 minute visit, In Office. Discussed use, benefit, and side effects of prescribed medications. All patient questions answered. Pt voiced understanding. Reviewed health maintenance. Instructedto continue current medications, diet and exercise. Patient agreed with treatmentplan. Follow up as directed.        Note dictated using [id number] [place] [employer]

## 2020-08-11 RX ORDER — INSULIN ASPART 100 [IU]/ML
10 INJECTION, SOLUTION INTRAVENOUS; SUBCUTANEOUS
Qty: 9 PEN | Refills: 3 | Status: SHIPPED | OUTPATIENT
Start: 2020-08-11 | End: 2021-06-18 | Stop reason: ALTCHOICE

## 2020-08-17 ENCOUNTER — TELEPHONE (OUTPATIENT)
Dept: FAMILY MEDICINE CLINIC | Age: 80
End: 2020-08-17

## 2020-08-17 NOTE — TELEPHONE ENCOUNTER
Pt fell and hit her head last week in the parking lot at the [facility]   She was treated there and was told to make an appt with her provider   She wants to be seen asap by dr Katherin Sandoval   Thanks

## 2020-08-19 ENCOUNTER — OFFICE VISIT (OUTPATIENT)
Dept: FAMILY MEDICINE CLINIC | Age: 80
End: 2020-08-19
Payer: MEDICARE

## 2020-08-19 VITALS
TEMPERATURE: 96.1 F | DIASTOLIC BLOOD PRESSURE: 62 MMHG | HEART RATE: 65 BPM | HEIGHT: 66 IN | BODY MASS INDEX: 26.65 KG/M2 | SYSTOLIC BLOOD PRESSURE: 124 MMHG | OXYGEN SATURATION: 96 % | WEIGHT: 165.8 LBS

## 2020-08-19 PROCEDURE — 4040F PNEUMOC VAC/ADMIN/RCVD: CPT | Performed by: FAMILY MEDICINE

## 2020-08-19 PROCEDURE — G8417 CALC BMI ABV UP PARAM F/U: HCPCS | Performed by: FAMILY MEDICINE

## 2020-08-19 PROCEDURE — G8427 DOCREV CUR MEDS BY ELIG CLIN: HCPCS | Performed by: FAMILY MEDICINE

## 2020-08-19 PROCEDURE — G8399 PT W/DXA RESULTS DOCUMENT: HCPCS | Performed by: FAMILY MEDICINE

## 2020-08-19 PROCEDURE — 1036F TOBACCO NON-USER: CPT | Performed by: FAMILY MEDICINE

## 2020-08-19 PROCEDURE — 1090F PRES/ABSN URINE INCON ASSESS: CPT | Performed by: FAMILY MEDICINE

## 2020-08-19 PROCEDURE — 1123F ACP DISCUSS/DSCN MKR DOCD: CPT | Performed by: FAMILY MEDICINE

## 2020-08-19 PROCEDURE — 99214 OFFICE O/P EST MOD 30 MIN: CPT | Performed by: FAMILY MEDICINE

## 2020-08-19 NOTE — PATIENT INSTRUCTIONS
We are committed to providing you with the best care possible. In order to help us achieve these goals please remember to bring all medications, herbal products, and over the counter supplements with you to each visit. If your provider has ordered testing for you, please be sure to follow up with our office if you have not received results within 7 days after the testing took place. *If you receive a survey after visiting one of our offices, please take time to share your experience concerning your physician office visit. These surveys are confidential and no health information about you is shared.   We are eager to improve for you and we are counting on your feedback to help make that happen.'

## 2020-08-19 NOTE — PROGRESS NOTES
[facility] PHYSICIAN SERVICES  [facility] FAMILY MEDICINE  [address] [facility] [address]  [address]  Dept: [phone]  Dept Fax: [phone]  Loc: [phone]    Subjective:   Sheena Phoenix is a 67 y.o. female who presents today for her medical conditions/complaints as noted below. Sheena Phoenix is c/o of Follow-up (fall)        HPI:   Patient of Osbaldo Muse MD presents for follow-up of fall. She was at an appointment at the [place] in [address] 6 days ago, when she fell in the parking lot. She states she fell face forward from her wheelchair. She does not think that she passed out. Then \"there were 100 people around me, and they took me to the ER, where the did a pretty thorough work-up and cleaned me up good. \"  Unfortunately I do not have any records at the time I am seeing her, despite multiple calls (3) today to the [place]. She was given a Tdap, no prescriptions. They told her to \"avoid watching TV, I am not sure why. \"    Since then, she continues to have confusion, decrease in balance and coordination, as well as her vision is affected. She believes she had an MRI, CAT scans, and lab work done at the [place].     Past Medical History:   Diagnosis Date    Arthritis     Psoriatic    Asthma     CAD (coronary artery disease)     CHF (congestive heart failure) (HCC)     COPD (chronic obstructive pulmonary disease) (HCC)     [facility] ([facility]     GERD (gastroesophageal reflux disease)     HTN (hypertension)     Hyperlipidemia     MI [facility]myocardial infarction) ([facility]     x2    Thyroid disease      Past Surgical History:   Procedure Laterality Date    CATARACT REMOVAL      CHOLECYSTECTOMY      CORONARY ANGIOPLASTY  06/2018    Angioplasty to in-stent restenosis to the distal LAD    HIP SURGERY      HYSTERECTOMY      JOINT REPLACEMENT Right     Right knee replacement     Family History   Problem Relation Age of Onset    Heart Disease Mother     Heart Disease Father      Social History     Tobacco Use    Smoking status: Never Smoker    Smokeless tobacco: Never Used   Substance Use Topics    Alcohol use: No      Current Outpatient Medications   Medication Sig Dispense Refill    insulin aspart (NOVOLOG FLEXPEN) 100 UNIT/ML injection pen Inject 10 Units into the skin 3 times daily (before meals) 9 pen 3    atorvastatin (LIPITOR) 80 MG tablet Take 1 tablet by mouth daily 90 tablet 3    levothyroxine (SYNTHROID) 25 MCG tablet TAKE 1 TABLET DAILY 90 tablet 3    Insulin Pen Needle 32G X 6 MM MISC One needle four times a day 360 each 11    insulin glargine (BASAGLAR KWIKPEN) 100 UNIT/ML injection pen Inject 50 Units into the skin nightly 15 pen 5    NIFEdipine (ADALAT CC) 60 MG extended release tablet Take 1 tablet by mouth daily 90 tablet 3    sertraline (ZOLOFT) 100 MG tablet TAKE 1 TABLET DAILY 90 tablet 0    omeprazole (PRILOSEC) 40 MG delayed release capsule Take 1 capsule by mouth daily 90 capsule 3    carvedilol (COREG) 25 MG tablet Take 1 tablet by mouth 2 times daily (with meals) 180 tablet 2    furosemide (LASIX) 20 MG tablet TAKE 1 TABLET DAILY 90 tablet 3    blood glucose test strips (FREESTYLE LITE) strip USE 4 STRIP TO CHECK GLUCOSE ONCE DAILY AS NEEDED 400 each 3    lisinopril (PRINIVIL;ZESTRIL) 40 MG tablet TAKE 1 TABLET DAILY 90 tablet 3    indomethacin (INDOCIN) 50 MG capsule Take 1 capsule by mouth 3 times daily 270 capsule 3    FREESTYLE LANCETS MISC USE TO CHECK BLOOD SUGAR 1-2 TIMES DAILY 100 each 3    ketoconazole (NIZORAL) 2 % shampoo Apply topically daily as needed.  120 mL 0    Insulin Pen Needle 32G X 6 MM MISC 1 Act by Does not apply route every evening 100 each 5    Omega-3 Fatty Acids (FISH OIL OMEGA-3 PO) Take by mouth      umeclidinium-vilanterol (ANORO ELLIPTA) 62.5-25 MCG/INH AEPB inhaler Inhale 1 puff into the lungs daily      fluticasone (FLONASE) 50 MCG/ACT nasal spray 1 spray by Each Nare route daily      cetirizine (ZYRTEC) 10 MG tablet Take 10 mg by mouth 2 times daily      montelukast (SINGULAIR) 10 MG tablet Take 1 tablet by mouth daily 90 tablet 3    azelastine HCl 0.15 % SOLN 2 sprays by Nasal route 2 times daily 90 mL 5    Blood Glucose Monitoring Suppl ADE 1 kit by Does not apply route daily ICD-10-CM: E11.9 1 Device 0    Lancet Device MISC 1 Device by Does not apply route once for 1 dose ICD-10-CM: E11.9 100 Device 0    clopidogrel (PLAVIX) 75 MG tablet Take 1 tablet by mouth daily 30 tablet 3    aspirin 81 MG tablet Take 162 mg by mouth daily       vitamin B-12 (CYANOCOBALAMIN) 1000 MCG tablet Take 1,000 mcg by mouth daily      Coenzyme Q10 (CO Q 10) 10 MG CAPS Take 1 capsule by mouth daily       GLUCOSAMINE-CALCIUM-VIT D PO Take 2 tablets by mouth daily       ipratropium (ATROVENT) 0.06 % nasal spray 2 sprays by Nasal route 3 times daily as needed for Rhinitis 3 Bottle 1     No current facility-administered medications for this visit. Allergies   Allergen Reactions    Tape Floy Croon Tape] Rash       Review of Systems   Constitutional: Negative for chills and fever. HENT: Negative for facial swelling and mouth sores. Eyes: Negative for discharge and itching. Respiratory: Negative for apnea and stridor. Cardiovascular: Negative for chest pain and palpitations. Gastrointestinal: Negative for nausea and vomiting. Endocrine: Negative for cold intolerance and heat intolerance. Genitourinary: Negative for frequency and urgency. Musculoskeletal: Negative for arthralgias and back pain. Skin: Positive for wound. Negative for color change and rash. Neurological: Positive for headaches. See HPI for visit specific review of symptoms. All others negative      Objective:   /62   Pulse 65   Temp 96.1 °F (35.6 °C)   Ht 5' 6\" (1.676 m)   Wt 165 lb 12.8 oz (75.2 kg)   SpO2 96%   BMI 26.76 kg/m²   Physical Exam  Vitals signs reviewed. Constitutional:       Appearance: She is well-developed. normal with the exception of scalp contusion. Her CAT scans showed degenerative disc disease. Over 50% of the total visit time of 25 minutes was spent on counseling and/or coordination of care of -review of medical records, updating of chart, discussion of symptoms and plan-  1. Fall, subsequent encounter    2. Concussion without loss of consciousness, initial encounter           Return for already scheduled follow-up. Discussed use, benefit, and side effects of prescribed medications. All patient questions answered. Pt voiced understanding. Reviewed health maintenance. Instructed to continue current medications, diet and exercise. Patient agreedwith treatment plan. Follow up as directed. Old records reviewed, where available.     Miley Ramirez MD    Dictated using [address]

## 2020-08-23 ENCOUNTER — TELEPHONE (OUTPATIENT)
Dept: FAMILY MEDICINE CLINIC | Age: 80
End: 2020-08-23

## 2020-08-23 ASSESSMENT — ENCOUNTER SYMPTOMS
EYE ITCHING: 0
APNEA: 0
EYE DISCHARGE: 0
BACK PAIN: 0
STRIDOR: 0
NAUSEA: 0
COLOR CHANGE: 0
VOMITING: 0
FACIAL SWELLING: 0

## 2020-09-28 ENCOUNTER — OFFICE VISIT (OUTPATIENT)
Dept: FAMILY MEDICINE CLINIC | Age: 80
End: 2020-09-28
Payer: MEDICARE

## 2020-09-28 VITALS
BODY MASS INDEX: 27.12 KG/M2 | HEART RATE: 66 BPM | SYSTOLIC BLOOD PRESSURE: 126 MMHG | DIASTOLIC BLOOD PRESSURE: 76 MMHG | TEMPERATURE: 96.9 F | OXYGEN SATURATION: 96 % | WEIGHT: 168 LBS

## 2020-09-28 PROCEDURE — 1036F TOBACCO NON-USER: CPT | Performed by: FAMILY MEDICINE

## 2020-09-28 PROCEDURE — 1090F PRES/ABSN URINE INCON ASSESS: CPT | Performed by: FAMILY MEDICINE

## 2020-09-28 PROCEDURE — G8399 PT W/DXA RESULTS DOCUMENT: HCPCS | Performed by: FAMILY MEDICINE

## 2020-09-28 PROCEDURE — G8926 SPIRO NO PERF OR DOC: HCPCS | Performed by: FAMILY MEDICINE

## 2020-09-28 PROCEDURE — 3023F SPIROM DOC REV: CPT | Performed by: FAMILY MEDICINE

## 2020-09-28 PROCEDURE — 90694 VACC AIIV4 NO PRSRV 0.5ML IM: CPT | Performed by: FAMILY MEDICINE

## 2020-09-28 PROCEDURE — 99214 OFFICE O/P EST MOD 30 MIN: CPT | Performed by: FAMILY MEDICINE

## 2020-09-28 PROCEDURE — G8427 DOCREV CUR MEDS BY ELIG CLIN: HCPCS | Performed by: FAMILY MEDICINE

## 2020-09-28 PROCEDURE — G8417 CALC BMI ABV UP PARAM F/U: HCPCS | Performed by: FAMILY MEDICINE

## 2020-09-28 PROCEDURE — 4040F PNEUMOC VAC/ADMIN/RCVD: CPT | Performed by: FAMILY MEDICINE

## 2020-09-28 PROCEDURE — 1123F ACP DISCUSS/DSCN MKR DOCD: CPT | Performed by: FAMILY MEDICINE

## 2020-09-28 PROCEDURE — G0008 ADMIN INFLUENZA VIRUS VAC: HCPCS | Performed by: FAMILY MEDICINE

## 2020-09-28 RX ORDER — OCTISALATE, AVOBENZONE, HOMOSALATE, AND OCTOCRYLENE 29.4; 29.4; 49; 25.48 MG/ML; MG/ML; MG/ML; MG/ML
LOTION TOPICAL
Qty: 30 CAPSULE | Refills: 5 | Status: SHIPPED | OUTPATIENT
Start: 2020-09-28

## 2020-09-28 NOTE — PATIENT INSTRUCTIONS
We are committed to providing you with the best care possible. In order to help us achieve these goals please remember to bring all medications, herbal products, and over the counter supplements with you to each visit. If your provider has ordered testing for you, please be sure to follow up with our office if you have not received results within 7 days after the testing took place. *If you receive a survey after visiting one of our offices, please take time to share your experience concerning your physician office visit. These surveys are confidential and no health information about you is shared. We are eager to improve for you and we are counting on your feedback to help make that happen.  _______________________________________________________________    Increase basaglar to 56 units at night  Continue with novolog 10 units before meals.

## 2020-09-28 NOTE — PROGRESS NOTES
[facility] PHYSICIAN SERVICES  [facility] FAMILY MEDICINE  [address] [address] [place] [address]  [address] [address]  Dept: [phone]  Dept Fax: [phone]  Loc: [phone]    Chuy Junior is a 67 y.o. female who presents today for her medical conditions/complaints as noted below. Chuy Junior is here for 1 Month Follow-Up        HPI:   CC: Here today to discuss the following:    Diabetes Mellitus  Has been compliant with medications. No side effects of medications since last visit. No polyuria, polydipsia, or vision changes since last visit. No symptomatic episodes of hypoglycemia. Medcications:  Basaglar:  50  Novolog: 10 with meals. Average glucose was 150 according to her. No hypoglycemia. Highest reading 212. Hypothyroidism  Symptoms are stable. No temperature intolerance, fatigue, or mood disturbance from baseline. Complaint with current medication. Skin lesion on chin. She has a history obstructive sleep apnea but did not wear the mask. Is been years since her last test.  She is requesting to be retested. She has daytime somnolence. Depression with Anxiety  Compliant with medications. No adverse effects from medication. Depression and anxiety symptoms are stable today. No suicidal or homicidal ideation. Excessive worry, insomnia, and anxiousness are stable. Energy, concentration, and apathy are stable. Hypertension  Compliant with medications. No adverse effects from medication. No lightheadedness, palpitations, or chest pain. COPD  Compliant with medications. No adverse effects from medication. Symptoms are stable today. Has chronic shortness of breath and cough but at baseline today. No wheezing or chest tightness. Gastroesophageal Reflux Disease  Symptoms currently under control. Medication adequately controls his symptoms. No hematochezia or melena.          HPI    Past Medical History:   Diagnosis Date    Arthritis     Psoriatic    Asthma     CAD (coronary artery disease)     CHF (congestive heart failure) (HCC)     COPD (chronic obstructive pulmonary disease) (HCC)     [facility] mellitus[facility]     GERD (gastroesophageal reflux disease)     HTN (hypertension)     Hyperlipidemia     MI (myocardial infarction) ([facility] [address]     x2    Thyroid disease       Past Surgical History:   Procedure Laterality Date    CATARACT REMOVAL      CHOLECYSTECTOMY      CORONARY ANGIOPLASTY  06/2018    Angioplasty to in-stent restenosis to the distal LAD    HIP SURGERY      HYSTERECTOMY      JOINT REPLACEMENT Right     Right knee replacement       Family History   Problem Relation Age of Onset    Heart Disease Mother     Heart Disease Father        Social History     Tobacco Use    Smoking status: Never Smoker    Smokeless tobacco: Never Used   Substance Use Topics    Alcohol use: No     Current Outpatient Medications   Medication Sig Dispense Refill    Probiotic Product (ALIGN) 4 MG CAPS Once capsule daily 30 capsule 5    sertraline (ZOLOFT) 100 MG tablet TAKE 1 TABLET DAILY 90 tablet 2    insulin aspart (NOVOLOG FLEXPEN) 100 UNIT/ML injection pen Inject 10 Units into the skin 3 times daily (before meals) 9 pen 3    atorvastatin (LIPITOR) 80 MG tablet Take 1 tablet by mouth daily 90 tablet 3    levothyroxine (SYNTHROID) 25 MCG tablet TAKE 1 TABLET DAILY 90 tablet 3    Insulin Pen Needle 32G X 6 MM MISC One needle four times a day 360 each 11    insulin glargine (BASAGLAR KWIKPEN) 100 UNIT/ML injection pen Inject 50 Units into the skin nightly 15 pen 5    NIFEdipine (ADALAT CC) 60 MG extended release tablet Take 1 tablet by mouth daily 90 tablet 3    omeprazole (PRILOSEC) 40 MG delayed release capsule Take 1 capsule by mouth daily 90 capsule 3    carvedilol (COREG) 25 MG tablet Take 1 tablet by mouth 2 times daily (with meals) 180 tablet 2    furosemide (LASIX) 20 MG tablet TAKE 1 TABLET DAILY 90 tablet 3    blood glucose test strips (FREESTYLE LITE) strip USE 4 STRIP TO CHECK GLUCOSE ONCE DAILY AS NEEDED 400 each 3    lisinopril (PRINIVIL;ZESTRIL) 40 MG tablet TAKE 1 TABLET DAILY 90 tablet 3    indomethacin (INDOCIN) 50 MG capsule Take 1 capsule by mouth 3 times daily 270 capsule 3    FREESTYLE LANCETS MISC USE TO CHECK BLOOD SUGAR 1-2 TIMES DAILY 100 each 3    ketoconazole (NIZORAL) 2 % shampoo Apply topically daily as needed. 120 mL 0    Insulin Pen Needle 32G X 6 MM MISC 1 Act by Does not apply route every evening 100 each 5    Omega-3 Fatty Acids (FISH OIL OMEGA-3 PO) Take by mouth      umeclidinium-vilanterol (ANORO ELLIPTA) 62.5-25 MCG/INH AEPB inhaler Inhale 1 puff into the lungs daily      fluticasone (FLONASE) 50 MCG/ACT nasal spray 1 spray by Each Nare route daily      cetirizine (ZYRTEC) 10 MG tablet Take 10 mg by mouth 2 times daily      montelukast (SINGULAIR) 10 MG tablet Take 1 tablet by mouth daily 90 tablet 3    azelastine HCl 0.15 % SOLN 2 sprays by Nasal route 2 times daily 90 mL 5    Blood Glucose Monitoring Suppl ADE 1 kit by Does not apply route daily ICD-10-CM: E11.9 1 Device 0    Lancet Device MISC 1 Device by Does not apply route once for 1 dose ICD-10-CM: E11.9 100 Device 0    clopidogrel (PLAVIX) 75 MG tablet Take 1 tablet by mouth daily 30 tablet 3    aspirin 81 MG tablet Take 162 mg by mouth daily       vitamin B-12 (CYANOCOBALAMIN) 1000 MCG tablet Take 1,000 mcg by mouth daily      Coenzyme Q10 (CO Q 10) 10 MG CAPS Take 1 capsule by mouth daily       GLUCOSAMINE-CALCIUM-VIT D PO Take 2 tablets by mouth daily       ipratropium (ATROVENT) 0.06 % nasal spray 2 sprays by Nasal route 3 times daily as needed for Rhinitis 3 Bottle 1     No current facility-administered medications for this visit.       Allergies   Allergen Reactions    Tape [Adhesive Tape] Rash       Health Maintenance   Topic Date Due    Colon cancer screen colonoscopy  12/28/2016    DTaP/Tdap/Td vaccine (1 - Tdap) 11/20/2027 (Originally Neurological: alert. Psychiatric: normal mood and affect. Her behavior is normal. Normal judgement and insight observed. Skin: Crusted lesion on her chin  No results found for this or any previous visit (from the past 672 hour(s)). Lab Results   Component Value Date    LABA1C 11.1 08/10/2020     No results found for: EAG      Assessment & Plan: The following diagnoses and conditions are stable with no further orders unless indicated:  1. Type 2 diabetes mellitus without complication, without long-term current use of insulin ([facility])  Increase her Basaglar to 56 units at night  Continue with NovoLog 10 units prior to meals  Would recommend checking blood sugar before breakfast, lunch, dinner and bedtime and sending results through [employer] or calling us. 2. Immunization due    - INFLUENZA, QUADV, ADJUVANTED, 65 YRS =, IM, PF, PREFILL SYR, 0.5ML (FLUAD)    3. Essential hypertension  BP Readings from Last 3 Encounters:   09/28/20 126/76   08/19/20 124/62   08/10/20 (!) 152/102     Stable    4. Depression with anxiety  Stable    5. Chronic obstructive pulmonary disease, unspecified COPD type ([facility] [address]  Stable    6. Gastroesophageal reflux disease without esophagitis  Stable    7. Skin lesion of face    - External Referral To Dermatology    8. Chronic insomnia  Stable    9. Daytime somnolence    - Home Sleep Study; Future    10. Hypersomnia due to medical condition     - Home Sleep Study; Future            Return in about 1 month (around 10/28/2020) for Routine follow up - 15 minute visit, In Office. Discussed use, benefit, and side effects of prescribed medications. All patient questions answered. Pt voiced understanding. Reviewed health maintenance. Instructedto continue current medications, diet and exercise. Patient agreed with treatmentplan. Follow up as directed.        Note dictated using [id number] [employer]

## 2020-09-28 NOTE — PROGRESS NOTES
Vaccine Information Sheet, \"Influenza - Inactivated\" OR \"Live - Intranasal\"  given to Gulshan Morin, or parent/legal guardian of  Gulshan Morin and verbalized understanding. Patient responses:    Have you ever had a reaction to a flu vaccine? No  Are you able to eat eggs without adverse effects? Yes  Do you have any current illness? No  Have you ever had Guillian Tryon Syndrome? No    Flu vaccine given per order. Please see immunization tab.

## 2020-10-10 ENCOUNTER — APPOINTMENT (OUTPATIENT)
Dept: GENERAL RADIOLOGY | Facility: HOSPITAL | Age: 80
End: 2020-10-10

## 2020-10-10 ENCOUNTER — HOSPITAL ENCOUNTER (EMERGENCY)
Facility: HOSPITAL | Age: 80
Discharge: HOME | End: 2020-10-10
Admitting: EMERGENCY MEDICINE

## 2020-10-10 ENCOUNTER — APPOINTMENT (OUTPATIENT)
Dept: CT IMAGING | Facility: HOSPITAL | Age: 80
End: 2020-10-10

## 2020-10-10 VITALS
SYSTOLIC BLOOD PRESSURE: 148 MMHG | HEIGHT: 66 IN | RESPIRATION RATE: 16 BRPM | TEMPERATURE: 98 F | BODY MASS INDEX: 26.36 KG/M2 | OXYGEN SATURATION: 98 % | WEIGHT: 164 LBS | HEART RATE: 88 BPM | DIASTOLIC BLOOD PRESSURE: 62 MMHG

## 2020-10-10 DIAGNOSIS — K52.9 GASTROENTERITIS: Primary | ICD-10-CM

## 2020-10-10 DIAGNOSIS — R31.9 URINARY TRACT INFECTION WITH HEMATURIA, SITE UNSPECIFIED: ICD-10-CM

## 2020-10-10 DIAGNOSIS — N39.0 URINARY TRACT INFECTION WITH HEMATURIA, SITE UNSPECIFIED: ICD-10-CM

## 2020-10-10 LAB
ALBUMIN SERPL-MCNC: 4.1 G/DL (ref 3.5–5.2)
ALBUMIN/GLOB SERPL: 1.4 G/DL
ALP SERPL-CCNC: 105 U/L (ref 39–117)
ALT SERPL W P-5'-P-CCNC: 21 U/L (ref 1–33)
ANION GAP SERPL CALCULATED.3IONS-SCNC: 13 MMOL/L (ref 5–15)
APTT PPP: 29.5 SECONDS (ref 24.1–35)
AST SERPL-CCNC: 17 U/L (ref 1–32)
BACTERIA UR QL AUTO: ABNORMAL /HPF
BASOPHILS # BLD AUTO: 0.03 10*3/MM3 (ref 0–0.2)
BASOPHILS NFR BLD AUTO: 0.2 % (ref 0–1.5)
BILIRUB SERPL-MCNC: 0.5 MG/DL (ref 0–1.2)
BILIRUB UR QL STRIP: NEGATIVE
BUN SERPL-MCNC: 16 MG/DL (ref 8–23)
BUN/CREAT SERPL: 18.8 (ref 7–25)
CALCIUM SPEC-SCNC: 9.5 MG/DL (ref 8.6–10.5)
CHLORIDE SERPL-SCNC: 104 MMOL/L (ref 98–107)
CLARITY UR: CLEAR
CO2 SERPL-SCNC: 19 MMOL/L (ref 22–29)
COLOR UR: YELLOW
CREAT SERPL-MCNC: 0.85 MG/DL (ref 0.57–1)
D-LACTATE SERPL-SCNC: 1.5 MMOL/L (ref 0.5–2)
DEPRECATED RDW RBC AUTO: 42.9 FL (ref 37–54)
EOSINOPHIL # BLD AUTO: 0.11 10*3/MM3 (ref 0–0.4)
EOSINOPHIL NFR BLD AUTO: 0.7 % (ref 0.3–6.2)
ERYTHROCYTE [DISTWIDTH] IN BLOOD BY AUTOMATED COUNT: 13.3 % (ref 12.3–15.4)
GFR SERPL CREATININE-BSD FRML MDRD: 65 ML/MIN/1.73
GLOBULIN UR ELPH-MCNC: 3 GM/DL
GLUCOSE SERPL-MCNC: 247 MG/DL (ref 65–99)
GLUCOSE UR STRIP-MCNC: NEGATIVE MG/DL
GRAN CASTS URNS QL MICRO: ABNORMAL /LPF
HCT VFR BLD AUTO: 41.4 % (ref 34–46.6)
HGB BLD-MCNC: 14 G/DL (ref 12–15.9)
HGB UR QL STRIP.AUTO: ABNORMAL
HOLD SPECIMEN: NORMAL
HYALINE CASTS UR QL AUTO: ABNORMAL /LPF
IMM GRANULOCYTES # BLD AUTO: 0.08 10*3/MM3 (ref 0–0.05)
IMM GRANULOCYTES NFR BLD AUTO: 0.5 % (ref 0–0.5)
INR PPP: 1.01 (ref 0.91–1.09)
KETONES UR QL STRIP: NEGATIVE
LEUKOCYTE ESTERASE UR QL STRIP.AUTO: NEGATIVE
LIPASE SERPL-CCNC: 13 U/L (ref 13–60)
LYMPHOCYTES # BLD AUTO: 0.74 10*3/MM3 (ref 0.7–3.1)
LYMPHOCYTES NFR BLD AUTO: 4.4 % (ref 19.6–45.3)
MCH RBC QN AUTO: 30.2 PG (ref 26.6–33)
MCHC RBC AUTO-ENTMCNC: 33.8 G/DL (ref 31.5–35.7)
MCV RBC AUTO: 89.2 FL (ref 79–97)
MONOCYTES # BLD AUTO: 0.86 10*3/MM3 (ref 0.1–0.9)
MONOCYTES NFR BLD AUTO: 5.1 % (ref 5–12)
NEUTROPHILS NFR BLD AUTO: 15.09 10*3/MM3 (ref 1.7–7)
NEUTROPHILS NFR BLD AUTO: 89.1 % (ref 42.7–76)
NITRITE UR QL STRIP: POSITIVE
NRBC BLD AUTO-RTO: 0 /100 WBC (ref 0–0.2)
PH UR STRIP.AUTO: <=5 [PH] (ref 5–8)
PLATELET # BLD AUTO: 249 10*3/MM3 (ref 140–450)
PMV BLD AUTO: 9.4 FL (ref 6–12)
POTASSIUM SERPL-SCNC: 3.8 MMOL/L (ref 3.5–5.2)
PROT SERPL-MCNC: 7.1 G/DL (ref 6–8.5)
PROT UR QL STRIP: ABNORMAL
PROTHROMBIN TIME: 12.9 SECONDS (ref 11.9–14.6)
RBC # BLD AUTO: 4.64 10*6/MM3 (ref 3.77–5.28)
RBC # UR: ABNORMAL /HPF
REF LAB TEST METHOD: ABNORMAL
SARS-COV-2 RDRP RESP QL NAA+PROBE: NOT DETECTED
SODIUM SERPL-SCNC: 136 MMOL/L (ref 136–145)
SP GR UR STRIP: >1.03 (ref 1–1.03)
SQUAMOUS #/AREA URNS HPF: ABNORMAL /HPF
TROPONIN T SERPL-MCNC: <0.01 NG/ML (ref 0–0.03)
UROBILINOGEN UR QL STRIP: ABNORMAL
WBC # BLD AUTO: 16.91 10*3/MM3 (ref 3.4–10.8)
WBC UR QL AUTO: ABNORMAL /HPF
WHOLE BLOOD HOLD SPECIMEN: NORMAL
WHOLE BLOOD HOLD SPECIMEN: NORMAL

## 2020-10-10 PROCEDURE — 83690 ASSAY OF LIPASE: CPT | Performed by: NURSE PRACTITIONER

## 2020-10-10 PROCEDURE — 93005 ELECTROCARDIOGRAM TRACING: CPT | Performed by: NURSE PRACTITIONER

## 2020-10-10 PROCEDURE — 81001 URINALYSIS AUTO W/SCOPE: CPT | Performed by: NURSE PRACTITIONER

## 2020-10-10 PROCEDURE — 71275 CT ANGIOGRAPHY CHEST: CPT

## 2020-10-10 PROCEDURE — 25010000002 ONDANSETRON PER 1 MG: Performed by: NURSE PRACTITIONER

## 2020-10-10 PROCEDURE — C9803 HOPD COVID-19 SPEC COLLECT: HCPCS

## 2020-10-10 PROCEDURE — 85610 PROTHROMBIN TIME: CPT | Performed by: NURSE PRACTITIONER

## 2020-10-10 PROCEDURE — 99284 EMERGENCY DEPT VISIT MOD MDM: CPT

## 2020-10-10 PROCEDURE — 74177 CT ABD & PELVIS W/CONTRAST: CPT

## 2020-10-10 PROCEDURE — 0 IOPAMIDOL PER 1 ML: Performed by: NURSE PRACTITIONER

## 2020-10-10 PROCEDURE — 85730 THROMBOPLASTIN TIME PARTIAL: CPT | Performed by: NURSE PRACTITIONER

## 2020-10-10 PROCEDURE — 87086 URINE CULTURE/COLONY COUNT: CPT | Performed by: NURSE PRACTITIONER

## 2020-10-10 PROCEDURE — 83605 ASSAY OF LACTIC ACID: CPT | Performed by: NURSE PRACTITIONER

## 2020-10-10 PROCEDURE — 96374 THER/PROPH/DIAG INJ IV PUSH: CPT

## 2020-10-10 PROCEDURE — 84484 ASSAY OF TROPONIN QUANT: CPT | Performed by: NURSE PRACTITIONER

## 2020-10-10 PROCEDURE — 80053 COMPREHEN METABOLIC PANEL: CPT | Performed by: NURSE PRACTITIONER

## 2020-10-10 PROCEDURE — 87186 SC STD MICRODIL/AGAR DIL: CPT | Performed by: NURSE PRACTITIONER

## 2020-10-10 PROCEDURE — 87077 CULTURE AEROBIC IDENTIFY: CPT | Performed by: NURSE PRACTITIONER

## 2020-10-10 PROCEDURE — 87635 SARS-COV-2 COVID-19 AMP PRB: CPT

## 2020-10-10 PROCEDURE — 71045 X-RAY EXAM CHEST 1 VIEW: CPT

## 2020-10-10 PROCEDURE — 87040 BLOOD CULTURE FOR BACTERIA: CPT | Performed by: NURSE PRACTITIONER

## 2020-10-10 PROCEDURE — 85025 COMPLETE CBC W/AUTO DIFF WBC: CPT | Performed by: NURSE PRACTITIONER

## 2020-10-10 PROCEDURE — 93010 ELECTROCARDIOGRAM REPORT: CPT | Performed by: INTERNAL MEDICINE

## 2020-10-10 RX ORDER — ONDANSETRON 4 MG/1
4 TABLET, ORALLY DISINTEGRATING ORAL EVERY 6 HOURS PRN
Qty: 12 TABLET | Refills: 0 | Status: SHIPPED | OUTPATIENT
Start: 2020-10-10 | End: 2020-10-13

## 2020-10-10 RX ORDER — SODIUM CHLORIDE 0.9 % (FLUSH) 0.9 %
10 SYRINGE (ML) INJECTION AS NEEDED
Status: DISCONTINUED | OUTPATIENT
Start: 2020-10-10 | End: 2020-10-10 | Stop reason: HOSPADM

## 2020-10-10 RX ORDER — ONDANSETRON 2 MG/ML
4 INJECTION INTRAMUSCULAR; INTRAVENOUS ONCE
Status: COMPLETED | OUTPATIENT
Start: 2020-10-10 | End: 2020-10-10

## 2020-10-10 RX ORDER — CEFDINIR 300 MG/1
300 CAPSULE ORAL 2 TIMES DAILY
Qty: 20 CAPSULE | Refills: 0 | Status: SHIPPED | OUTPATIENT
Start: 2020-10-10 | End: 2020-10-20

## 2020-10-10 RX ADMIN — IOPAMIDOL 100 ML: 755 INJECTION, SOLUTION INTRAVENOUS at 15:52

## 2020-10-10 RX ADMIN — SODIUM CHLORIDE 1000 ML: 9 INJECTION, SOLUTION INTRAVENOUS at 15:26

## 2020-10-10 RX ADMIN — ONDANSETRON HYDROCHLORIDE 4 MG: 2 SOLUTION INTRAMUSCULAR; INTRAVENOUS at 15:22

## 2020-10-10 NOTE — DISCHARGE INSTRUCTIONS
Drink plenty of fluid.  Clear liquids and advance diet as tolerated.  Follow diet to alleviate diarrhea.  Tylenol as needed for pain/fever. Medication as ordered. Follow up with Pcp - call for appointment. Return to ED if condition does not improve or worsens

## 2020-10-10 NOTE — ED PROVIDER NOTES
"Subjective   79 yof presents with c/o generalized abdomen pain, n/v/d.  She states this morning she developed abdomen pain that she states 'just hurts' along with vomiting and diarrhea.  She states she has no documented fever but she 'felt sweaty' and weak.  She also had some SOB.  She has had a cough. She denies chest pain.  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  She has no abdomen or CVA tenderness.           Review of Systems   Constitutional: Negative for activity change, appetite change, fatigue and fever.   HENT: Negative for congestion, ear pain, facial swelling and sore throat.    Eyes: Negative for discharge and visual disturbance.   Respiratory: Negative for apnea, chest tightness, shortness of breath, wheezing and stridor.    Cardiovascular: Negative for chest pain and palpitations.   Gastrointestinal: Positive for abdominal pain, diarrhea and vomiting. Negative for abdominal distention and nausea.   Genitourinary: Negative for difficulty urinating and dysuria.   Musculoskeletal: Negative for arthralgias and myalgias.   Skin: Negative for rash and wound.   Neurological: Negative for dizziness and seizures.   Psychiatric/Behavioral: Negative for agitation and confusion.       History reviewed. No pertinent past medical history.    Allergies   Allergen Reactions   • Adhesive Tape Other (See Comments)   • Elastic Bandages & [Zinc] Other (See Comments)     \"Elastic in bra\"       History reviewed. No pertinent surgical history.    No family history on file.    Social History     Socioeconomic History   • Marital status:      Spouse name: Not on file   • Number of children: Not on file   • Years of education: Not on file   • Highest education level: Not on file   Tobacco Use   • Smoking status: Never Smoker   Substance and Sexual Activity   • Drug use: No   • Sexual activity: Defer           Objective   Physical Exam  Vitals signs and nursing " note reviewed.   Constitutional:       Appearance: She is well-developed.   HENT:      Head: Normocephalic.   Eyes:      Pupils: Pupils are equal, round, and reactive to light.   Neck:      Musculoskeletal: Normal range of motion and neck supple.   Cardiovascular:      Rate and Rhythm: Normal rate and regular rhythm.      Heart sounds: No murmur.   Pulmonary:      Effort: Pulmonary effort is normal.      Breath sounds: Normal breath sounds.   Abdominal:      General: Bowel sounds are normal. There is no distension.      Palpations: Abdomen is soft. There is no mass.      Tenderness: There is no abdominal tenderness. There is no right CVA tenderness or left CVA tenderness.   Musculoskeletal: Normal range of motion.   Skin:     General: Skin is warm and dry.   Neurological:      Mental Status: She is alert and oriented to person, place, and time.         Procedures            Current Facility-Administered Medications:   •  [COMPLETED] Insert peripheral IV, , , Once **AND** sodium chloride 0.9 % flush 10 mL, 10 mL, Intravenous, PRN, Shoulders, Kristee Croft, APRN    Current Outpatient Medications:   •  albuterol (PROVENTIL HFA;VENTOLIN HFA) 108 (90 Base) MCG/ACT inhaler, Inhale., Disp: , Rfl:   •  aspirin 81 MG chewable tablet, Chew 81 mg Daily., Disp: , Rfl:   •  atorvastatin (LIPITOR) 80 MG tablet, Take 80 mg by mouth Daily., Disp: , Rfl:   •  azelastine (ASTELIN) 0.1 % nasal spray, 2 sprays into the nostril(s) as directed by provider 2 (Two) Times a Day. Use in each nostril as directed, Disp: , Rfl:   •  carvedilol (COREG) 12.5 MG tablet, Take 12.5 mg by mouth 2 (Two) Times a Day With Meals., Disp: , Rfl:   •  cefdinir (OMNICEF) 300 MG capsule, Take 1 capsule by mouth 2 (Two) Times a Day for 10 days., Disp: 20 capsule, Rfl: 0  •  clopidogrel (PLAVIX) 75 MG tablet, Take 75 mg by mouth Daily., Disp: , Rfl:   •  coenzyme Q10 100 MG capsule, Take 100 mg by mouth Daily., Disp: , Rfl:   •  Empagliflozin 25 MG tablet, Take  " by mouth., Disp: , Rfl:   •  fexofenadine (ALLEGRA) 180 MG tablet, Take 180 mg by mouth Daily., Disp: , Rfl:   •  indomethacin (INDOCIN) 50 MG capsule, Take 50 mg by mouth 3 (Three) Times a Day As Needed for Mild Pain ., Disp: , Rfl:   •  ipratropium (ATROVENT) 0.06 % nasal spray, 2 sprays into the nostril(s) as directed by provider Every 12 (Twelve) Hours., Disp: , Rfl:   •  levothyroxine (SYNTHROID, LEVOTHROID) 25 MCG tablet, Take 25 mcg by mouth Daily., Disp: , Rfl:   •  linagliptin (TRADJENTA) 5 MG tablet tablet, Take 5 mg by mouth Daily., Disp: , Rfl:   •  lisinopril (PRINIVIL,ZESTRIL) 40 MG tablet, Take 40 mg by mouth Daily., Disp: , Rfl:   •  montelukast (SINGULAIR) 10 MG tablet, Take 10 mg by mouth Every Night., Disp: , Rfl:   •  omeprazole (priLOSEC) 40 MG capsule, Take 40 mg by mouth Daily., Disp: , Rfl:   •  ondansetron ODT (ZOFRAN-ODT) 4 MG disintegrating tablet, Place 1 tablet on the tongue Every 6 (Six) Hours As Needed for Nausea or Vomiting for up to 3 days., Disp: 12 tablet, Rfl: 0  •  sertraline (ZOLOFT) 50 MG tablet, Take 50 mg by mouth Daily., Disp: , Rfl:   •  vitamin B-12 (CYANOCOBALAMIN) 1000 MCG tablet, Take 1,000 mcg by mouth Daily., Disp: , Rfl:     Vital signs:  /62 (BP Location: Right arm, Patient Position: Lying)   Pulse 88   Temp 98 °F (36.7 °C) (Oral)   Resp 16   Ht 167.6 cm (66\")   Wt 74.4 kg (164 lb)   SpO2 98%   BMI 26.47 kg/m²        ED LAB RESULTS:   Lab Results (last 24 hours)     Procedure Component Value Units Date/Time    [facility] Blood Culture Bottle Set [[id number]] Collected: 10/10/20 1430    Specimen: Blood from Arm, Left Updated: 10/10/20 1530     Extra Tube Hold for add-ons.     Comment: Auto resulted.       COVID PRE-OP / PRE-PROCEDURE SCREENING ORDER (NO ISOLATION) - Swab, Nasal Cavity [[id number]]  (Normal) Collected: 10/10/20 1430    Specimen: Swab from Nasal Cavity Updated: 10/10/20 1526    Narrative:      The following orders were created for panel order " COVID PRE-OP / PRE-PROCEDURE SCREENING ORDER (NO ISOLATION) - Swab, Nasal Cavity.  Procedure                               Abnormality         Status                     ---------                               -----------         ------                     COVID-19, ABBOTT IN-HOUS...[[id number]]  Normal              Final result                 Please view results for these tests on the individual orders.    COVID-19, ABBOTT IN-HOUSE,NP Swab (NO TRANSPORT MEDIA) 2 HR TAT - Swab, Nasal Cavity [[id number]]  (Normal) Collected: 10/10/20 1430    Specimen: Swab from Nasal Cavity Updated: 10/10/20 1526     COVID19 Not Detected    Narrative:      Fact sheet for providers: https://www.fda.gov/media/268919/download     Fact sheet for patients: https://www.fda.gov/media/662767/download    CBC & Differential [[id number]]  (Abnormal) Collected: 10/10/20 1434    Specimen: Blood Updated: 10/10/20 1500    Narrative:      The following orders were created for panel order CBC & Differential.  Procedure                               Abnormality         Status                     ---------                               -----------         ------                     CBC Auto Differential[[id number]]        Abnormal            Final result                 Please view results for these tests on the individual orders.    Comprehensive Metabolic Panel [[id number]]  (Abnormal) Collected: 10/10/20 1434    Specimen: Blood Updated: 10/10/20 1517     Glucose 247 mg/dL      BUN 16 mg/dL      Creatinine 0.85 mg/dL      Sodium 136 mmol/L      Potassium 3.8 mmol/L      Comment: Slight hemolysis detected by analyzer. Results may be affected.        Chloride 104 mmol/L      CO2 19.0 mmol/L      Calcium 9.5 mg/dL      Total Protein 7.1 g/dL      Albumin 4.10 g/dL      ALT (SGPT) 21 U/L      AST (SGOT) 17 U/L      Alkaline Phosphatase 105 U/L      Total Bilirubin 0.5 mg/dL      eGFR Non African Amer 65 mL/min/1.73      Globulin 3.0 gm/dL      A/G Ratio 1.4  g/dL      BUN/Creatinine Ratio 18.8     Anion Gap 13.0 mmol/L     Narrative:      GFR Normal >60  Chronic Kidney Disease <60  Kidney Failure <15      Protime-INR [[id number]]  (Normal) Collected: 10/10/20 1434    Specimen: Blood Updated: 10/10/20 1503     Protime 12.9 Seconds      INR 1.01    aPTT [[id number]]  (Normal) Collected: 10/10/20 1434    Specimen: Blood Updated: 10/10/20 1503     PTT 29.5 seconds     Lipase [[id number]]  (Normal) Collected: 10/10/20 1434    Specimen: Blood Updated: 10/10/20 1513     Lipase 13 U/L     Troponin [[id number]]  (Normal) Collected: 10/10/20 1434    Specimen: Blood Updated: 10/10/20 1516     Troponin T <0.010 ng/mL     Narrative:      Troponin T Reference Range:  <= 0.03 ng/mL-   Negative for AMI  >0.03 ng/mL-     Abnormal for myocardial necrosis.  Clinicians would have to utilize clinical acumen, EKG, Troponin and serial changes to determine if it is an Acute Myocardial Infarction or myocardial injury due to an underlying chronic condition.       Results may be falsely decreased if patient taking Biotin.      Lactic Acid, Plasma [[id number]]  (Normal) Collected: 10/10/20 1434    Specimen: Blood Updated: 10/10/20 1527     Lactate 1.5 mmol/L     CBC Auto Differential [[id number]]  (Abnormal) Collected: 10/10/20 1434    Specimen: Blood Updated: 10/10/20 1500     WBC 16.91 10*3/mm3      RBC 4.64 10*6/mm3      Hemoglobin 14.0 g/dL      Hematocrit 41.4 %      MCV 89.2 fL      MCH 30.2 pg      MCHC 33.8 g/dL      RDW 13.3 %      RDW-SD 42.9 fl      MPV 9.4 fL      Platelets 249 10*3/mm3      Neutrophil % 89.1 %      Lymphocyte % 4.4 %      Monocyte % 5.1 %      Eosinophil % 0.7 %      Basophil % 0.2 %      Immature Grans % 0.5 %      Neutrophils, Absolute 15.09 10*3/mm3      Lymphocytes, Absolute 0.74 10*3/mm3      Monocytes, Absolute 0.86 10*3/mm3      Eosinophils, Absolute 0.11 10*3/mm3      Basophils, Absolute 0.03 10*3/mm3      Immature Grans, Absolute 0.08 10*3/mm3      nRBC 0.0 /100  WBC     Blood Culture - Blood, Blood, Venous Line [[id number]] Collected: 10/10/20 1437    Specimen: Blood, Venous Line Updated: 10/10/20 1503    Urinalysis With Culture If Indicated - Urine, Clean Catch [[id number]]  (Abnormal) Collected: 10/10/20 1750    Specimen: Urine, Clean Catch Updated: 10/10/20 1803     Color, UA Yellow     Appearance, UA Clear     pH, UA <=5.0     Specific Gravity, UA >1.030     Glucose, UA Negative     Ketones, UA Negative     Bilirubin, UA Negative     Blood, UA Trace     Protein,  mg/dL (2+)     Leuk Esterase, UA Negative     Nitrite, UA Positive     Urobilinogen, UA 0.2 E.U./dL    Urinalysis, Microscopic Only - Urine, Clean Catch [[id number]]  (Abnormal) Collected: 10/10/20 1750    Specimen: Urine, Clean Catch Updated: 10/10/20 1815     RBC, UA 0-2 /HPF      WBC, UA 6-12 /HPF      Bacteria, UA 3+ /HPF      Squamous Epithelial Cells, UA 3-6 /HPF      Hyaline Casts, UA 3-6 /LPF      Granular Casts, UA 0-2 /LPF      Methodology Manual Light Microscopy    Urine Culture - Urine, Urine, Clean Catch [[id number]] Collected: 10/10/20 1750    Specimen: Urine, Clean Catch Updated: 10/10/20 1815             IMAGING RESULTS  CT Angiogram Chest   ED Interpretation   See results below      Preliminary Result   1. No evidence of pulmonary embolus.   2. There is cardiomegaly with mild pulmonary vascular congestion. Patchy   bibasilar atelectasis is present. There is some bilateral bronchial wall   thickening. No evidence of acute lobar pneumonia or effusion.   3. Extensive coronary calcifications are present. No evidence of   pericardial effusion..           This report was finalized on 10/10/2020 16:19 by Dr. Sergio Post MD.      CT Abdomen Pelvis With Contrast   ED Interpretation   See results below      Preliminary Result   1. Nonspecific bowel gas pattern with some fluid-filled small bowel   loops with scattered air-fluid levels. This may represent an ongoing   gastroenteritis. No evidence  of mechanical obstruction. The appendix is   identified and is normal in appearance. No localized inflammatory   process or discrete fluid collection is identified.   2.. Bibasilar atelectasis. Coronary calcifications and cardiomegaly are   present.   3. Tiny hypodensity within the right lobe of the liver is too small to   fully characterize but likely benign. Liver is otherwise homogeneous in   density.   4. The patient's undergone prior hysterectomy and cholecystectomy.   5. No evidence of nephrolithiasis or obstructive uropathy.   6. Diverticulosis of the sigmoid colon without evidence of   diverticulitis.           This report was finalized on 10/10/2020 16:30 by Dr. Sergio Post MD.      XR Chest 1 View   ED Interpretation   See results below      Final Result   . No acute disease.   This report was finalized on 10/10/2020 15:39 by Dr. Sergio Post MD.                       ED Course  ED Course as of Oct 10 2046   Sat Oct 10, 2020   1800 She has not vomited or had diarrhea while in the emergency department. I discussed the patient's history, assessment and testing results with Dr Mcmullen.  She will be dc'd home and treated for gastroenteritis and a UTI.  The patient voiced understanding of d'c instructions and testing results.    [KS]      ED Course User Index  [KS] Katelyn Puga, MANOJ                                           MDM  Number of Diagnoses or Management Options  Gastroenteritis: minor  Urinary tract infection with hematuria, site unspecified: minor     Amount and/or Complexity of Data Reviewed  Clinical lab tests: ordered and reviewed  Tests in the radiology section of CPT®: ordered and reviewed  Discuss the patient with other providers: yes  Independent visualization of images, tracings, or specimens: yes    Risk of Complications, Morbidity, and/or Mortality  Presenting problems: minimal  Diagnostic procedures: minimal  Management options: minimal    Patient Progress  Patient  progress: improved      Final diagnoses:   Gastroenteritis   Urinary tract infection with hematuria, site unspecified            Shoulders, Katelyn Ragland, APRN  10/10/20 2047

## 2020-10-12 ENCOUNTER — NURSE TRIAGE (OUTPATIENT)
Dept: CALL CENTER | Facility: HOSPITAL | Age: 80
End: 2020-10-12

## 2020-10-12 LAB — BACTERIA SPEC AEROBE CULT: ABNORMAL

## 2020-10-12 NOTE — TELEPHONE ENCOUNTER
"Spoke with Josephine at pharmacy, they have no order, this patient has never been there before.  Called Alyssa, ER charge nurse, she will take care of it and she will call the patient to let her know.  Patient phone is [phone].     Reason for Disposition  • [1] Prescription not at pharmacy AND [2] was prescribed by PCP recently    Additional Information  • Negative: Drug overdose and triager unable to answer question  • Negative: Caller requesting information unrelated to medicine  • Negative: Caller requesting a prescription for Strep throat and has a positive culture result  • Negative: Rash while taking a medication or within 3 days of stopping it  • Negative: Immunization reaction suspected  • Negative: [1] Asthma and [2] having symptoms of asthma (cough, wheezing, etc.)  • Negative: [1] Influenza symptoms AND [2] anti-viral med prescription request, such as Tamiflu  • Negative: [1] Symptom of illness (e.g., headache, abdominal pain, earache, vomiting) AND [2] more than mild  • Negative: MORE THAN A DOUBLE DOSE of a prescription or over-the-counter (OTC) drug  • Negative: [1] DOUBLE DOSE (an extra dose or lesser amount) of over-the-counter (OTC) drug AND [2] any symptoms (e.g., dizziness, nausea, pain, sleepiness)  • Negative: [1] DOUBLE DOSE (an extra dose or lesser amount) of prescription drug AND [2] any symptoms (e.g., dizziness, nausea, pain, sleepiness)  • Negative: Took another person's prescription drug  • Negative: [1] DOUBLE DOSE (an extra dose or lesser amount) of prescription drug AND [2] NO symptoms (Exception: a double dose of antibiotics)  • Negative: Diabetes drug error or overdose (e.g., took wrong type of insulin or took extra dose)  • Negative: [1] Request for URGENT new prescription or refill of \"essential\" medication (i.e., likelihood of harm to patient if not taken) AND [2] triager unable to fill per unit policy    Answer Assessment - Initial Assessment Questions  1.   NAME of MEDICATION: " "\"What medicine are you calling about?\"      She is not sure what was ordered.    2.   QUESTION: \"What is your question?\"      \"My pharmacy does not have my medication ordered from the ER\".  Notes states ordered generic omnicef and generic zofran.    3.   PRESCRIBING HCP: \"Who prescribed it?\" Reason: if prescribed by specialist, call should be referred to that group.      Dr. Mcmullen signed off on it  4. SYMPTOMS: \"Do you have any symptoms?\"      Diagnosed with inflammation of intestines and UTI   5. SEVERITY: If symptoms are present, ask \"Are they mild, moderate or severe?\"      Moderate  6.  PREGNANCY:  \"Is there any chance that you are pregnant?\" \"When was your last menstrual period?\"      NA    Protocols used: MEDICATION QUESTION CALL-ADULT-      "

## 2020-10-14 ENCOUNTER — NURSE TRIAGE (OUTPATIENT)
Dept: CALL CENTER | Facility: HOSPITAL | Age: 80
End: 2020-10-14

## 2020-10-14 NOTE — TELEPHONE ENCOUNTER
"Caller advised there is no information about her having to isolation on the D/C Summary. Caller  in the background told her that they were not told to self isolate.     Reason for Disposition  • Health Information question, no triage required and triager able to answer question    Additional Information  • Negative: [1] Caller is not with the adult (patient) AND [2] reporting urgent symptoms  • Negative: Lab result questions  • Negative: Medication questions  • Negative: Caller can't be reached by phone  • Negative: Caller has already spoken to PCP or another triager  • Negative: RN needs further essential information from caller in order to complete triage  • Negative: Requesting regular office appointment  • Negative: [1] Caller requesting NON-URGENT health information AND [2] PCP's office is the best resource    Answer Assessment - Initial Assessment Questions  1. REASON FOR CALL or QUESTION: \"What is your reason for calling today?\" or \"How can I best help you?\" or \"What question do you have that I can help answer?\"      Caller asking how long they had to self isolate.    Protocols used: INFORMATION ONLY CALL - NO TRIAGE-ADULT-      "

## 2020-10-15 LAB — BACTERIA SPEC AEROBE CULT: NORMAL

## 2020-10-28 ENCOUNTER — TELEPHONE (OUTPATIENT)
Dept: FAMILY MEDICINE CLINIC | Age: 80
End: 2020-10-28

## 2020-10-28 NOTE — TELEPHONE ENCOUNTER
Pt called and states she continues to have diarrhea it has been for 2 weeks. Pt is taking Probiotic and Imodium and following a bland diet. Anything else she can do?  Pt has appt 11/12

## 2020-11-06 ENCOUNTER — VIRTUAL VISIT (OUTPATIENT)
Dept: FAMILY MEDICINE CLINIC | Age: 80
End: 2020-11-06
Payer: MEDICARE

## 2020-11-06 PROCEDURE — 99442 PR PHYS/QHP TELEPHONE EVALUATION 11-20 MIN: CPT | Performed by: FAMILY MEDICINE

## 2020-11-06 NOTE — PROGRESS NOTES
[facility])  Would recommend checking blood sugar before breakfast, lunch, dinner and bedtime and sending results through [employer] or calling us. 3. Essential hypertension  Continue to check blood pressure  BP Readings from Last 3 Encounters:   09/28/20 126/76   08/19/20 124/62   08/10/20 (!) 152/102     Blood pressures have been stable    4. Depression with anxiety  Stable    5. Gastroesophageal reflux disease without esophagitis  Stable      No follow-ups on file. I affirm this is a Patient Initiated Episode with an Established Patient who has not had a related appointment within my department in the past 7 days or scheduled within the next 24 hours.     Patient identification was verified at the start of the visit: Yes    Total Time: minutes: 11-20 minutes    Note: not billable if this call serves to triage the patient into an appointment for the relevant concern      Bhupendra Gates     Pertinent History and Information      Past Medical History:   Diagnosis Date    Arthritis     Psoriatic    Asthma     CAD (coronary artery disease)     CHF (congestive heart failure) ([address]     COPD (chronic obstructive pulmonary disease) ([address]     DM (diabetes mellitus) ([address]     GERD (gastroesophageal reflux disease)     HTN (hypertension)     Hyperlipidemia     MI (myocardial infarction) ([address]     x2    Thyroid disease       Past Surgical History:   Procedure Laterality Date    CATARACT REMOVAL      CHOLECYSTECTOMY      CORONARY ANGIOPLASTY  06/2018    Angioplasty to in-stent restenosis to the distal LAD    HIP SURGERY      HYSTERECTOMY      JOINT REPLACEMENT Right     Right knee replacement       Family History   Problem Relation Age of Onset    Heart Disease Mother     Heart Disease Father        Social History     Tobacco Use    Smoking status: Never Smoker    Smokeless tobacco: Never Used   Substance Use Topics    Alcohol use: No     Current Outpatient Medications   Medication Sig Dispense Refill  Probiotic Product (ALIGN) 4 MG CAPS Once capsule daily 30 capsule 5    sertraline (ZOLOFT) 100 MG tablet TAKE 1 TABLET DAILY 90 tablet 2    insulin aspart (NOVOLOG FLEXPEN) 100 UNIT/ML injection pen Inject 10 Units into the skin 3 times daily (before meals) 9 pen 3    atorvastatin (LIPITOR) 80 MG tablet Take 1 tablet by mouth daily 90 tablet 3    levothyroxine (SYNTHROID) 25 MCG tablet TAKE 1 TABLET DAILY 90 tablet 3    Insulin Pen Needle 32G X 6 MM MISC One needle four times a day 360 each 11    insulin glargine (BASAGLAR KWIKPEN) 100 UNIT/ML injection pen Inject 50 Units into the skin nightly 15 pen 5    NIFEdipine (ADALAT CC) 60 MG extended release tablet Take 1 tablet by mouth daily 90 tablet 3    omeprazole (PRILOSEC) 40 MG delayed release capsule Take 1 capsule by mouth daily 90 capsule 3    carvedilol (COREG) 25 MG tablet Take 1 tablet by mouth 2 times daily (with meals) 180 tablet 2    furosemide (LASIX) 20 MG tablet TAKE 1 TABLET DAILY 90 tablet 3    blood glucose test strips (FREESTYLE LITE) strip USE 4 STRIP TO CHECK GLUCOSE ONCE DAILY AS NEEDED 400 each 3    lisinopril (PRINIVIL;ZESTRIL) 40 MG tablet TAKE 1 TABLET DAILY 90 tablet 3    indomethacin (INDOCIN) 50 MG capsule Take 1 capsule by mouth 3 times daily 270 capsule 3    FREESTYLE LANCETS MISC USE TO CHECK BLOOD SUGAR 1-2 TIMES DAILY 100 each 3    ketoconazole (NIZORAL) 2 % shampoo Apply topically daily as needed.  120 mL 0    Insulin Pen Needle 32G X 6 MM MISC 1 Act by Does not apply route every evening 100 each 5    Omega-3 Fatty Acids (FISH OIL OMEGA-3 PO) Take by mouth      umeclidinium-vilanterol (ANORO ELLIPTA) 62.5-25 MCG/INH AEPB inhaler Inhale 1 puff into the lungs daily      fluticasone (FLONASE) 50 MCG/ACT nasal spray 1 spray by Each Nare route daily      cetirizine (ZYRTEC) 10 MG tablet Take 10 mg by mouth 2 times daily      montelukast (SINGULAIR) 10 MG tablet Take 1 tablet by mouth daily 90 tablet 3    azelastine HCl 0.15 % SOLN 2 sprays by Nasal route 2 times daily 90 mL 5    ipratropium (ATROVENT) 0.06 % nasal spray 2 sprays by Nasal route 3 times daily as needed for Rhinitis 3 Bottle 1    Blood Glucose Monitoring Suppl ADE 1 kit by Does not apply route daily ICD-10-CM: E11.9 1 Device 0    Lancet Device MISC 1 Device by Does not apply route once for 1 dose ICD-10-CM: E11.9 100 Device 0    clopidogrel (PLAVIX) 75 MG tablet Take 1 tablet by mouth daily 30 tablet 3    aspirin 81 MG tablet Take 162 mg by mouth daily       vitamin B-12 (CYANOCOBALAMIN) 1000 MCG tablet Take 1,000 mcg by mouth daily      Coenzyme Q10 (CO Q 10) 10 MG CAPS Take 1 capsule by mouth daily       GLUCOSAMINE-CALCIUM-VIT D PO Take 2 tablets by mouth daily        No current facility-administered medications for this visit. Allergies   Allergen Reactions    Tape [place] Tape] Rash       No results found for this or any previous visit (from the past 672 hour(s)).

## 2020-11-10 ENCOUNTER — VIRTUAL VISIT (OUTPATIENT)
Dept: FAMILY MEDICINE CLINIC | Age: 80
End: 2020-11-10
Payer: MEDICARE

## 2020-11-10 PROCEDURE — 99442 PR PHYS/QHP TELEPHONE EVALUATION 11-20 MIN: CPT | Performed by: FAMILY MEDICINE

## 2020-11-10 NOTE — PROGRESS NOTES
Gracia Loera is a 67 y.o. female evaluated via telephone on 11/10/2020. Consent:  She and/or health care decision maker is aware that that she may receive a bill for this telephone service, depending on her insurance coverage, and has provided verbal consent to proceed: Yes      Documentation:  I communicated with the patient and/or health care decision maker about the following:  Details of this discussion including any medical advice provided:    S: He was diagnosed with Covid on November 2. She states her symptoms have resolved. She feels like she is back to her normal health. Has had no coughing wheezing shortness of breath fever chills. Diabetes Mellitus  Has been compliant with medications. No side effects of medications since last visit. No polyuria, polydipsia, or vision changes since last visit. No symptomatic episodes of hypoglycemia. Hypertension  Compliant with medications. No adverse effects from medication. No lightheadedness, palpitations, or chest pain. Depression with Anxiety  Compliant with medications. No adverse effects from medication. Depression and anxiety symptoms are stable today. No suicidal or homicidal ideation. Excessive worry, insomnia, and anxiousness are stable. Energy, concentration, and apathy are stable. Gastroesophageal Reflux Disease  Symptoms currently under control. Medication adequately controls his symptoms. No hematochezia or melena. Review of Systems   Constitutional: Negative for chills and fever. HENT: Negative for congestion. Respiratory: Negative for cough, chest tightness and shortness of breath. Cardiovascular: Negative for chest pain, palpitations and leg swelling. Gastrointestinal: Negative for abdominal pain, anal bleeding, constipation, diarrhea and nausea. Genitourinary: Negative for difficulty urinating. Psychiatric/Behavioral: Negative. O: No verbal distress    Assessment/Plan  1.  COVID-19 virus infection  She is on quarantine until November 13    2. Type 2 diabetes mellitus without complication, without long-term current use of insulin ([facility])  Blood sugar readings have improved over the past 2 weeks. We will continue with her current regimen advised to check her blood sugar before every meal and nightly to contact me in 2 weeks with readings    3. Essential hypertension  Blood pressures have been stable    4. Depression with anxiety  Stable    5. Gastroesophageal reflux disease without esophagitis  Stable      Return in about 3 months (around 2/10/2021) for AWV - 30 minute visit; labs 1 week before appt . I affirm this is a Patient Initiated Episode with an Established Patient who has not had a related appointment within my department in the past 7 days or scheduled within the next 24 hours.     Patient identification was verified at the start of the visit: Yes    Total Time: minutes: 11-20 minutes    Note: not billable if this call serves to triage the patient into an appointment for the relevant concern      Rhys Hence     Pertinent History and Information      Past Medical History:   Diagnosis Date    Arthritis     Psoriatic    Asthma     CAD (coronary artery disease)     CHF (congestive heart failure) ([address]     COPD (chronic obstructive pulmonary disease) ([address]     DM (diabetes mellitus) ([address]     GERD (gastroesophageal reflux disease)     HTN (hypertension)     Hyperlipidemia     MI (myocardial infarction) ([address]     x2    Thyroid disease       Past Surgical History:   Procedure Laterality Date    CATARACT REMOVAL      CHOLECYSTECTOMY      CORONARY ANGIOPLASTY  06/2018    Angioplasty to in-stent restenosis to the distal LAD    HIP SURGERY      HYSTERECTOMY      JOINT REPLACEMENT Right     Right knee replacement       Family History   Problem Relation Age of Onset    Heart Disease Mother     Heart Disease Father        Social History     Tobacco Use    Smoking status: Never Smoker    Smokeless tobacco: Never Used   Substance Use Topics    Alcohol use: No     Current Outpatient Medications   Medication Sig Dispense Refill    Probiotic Product (ALIGN) 4 MG CAPS Once capsule daily 30 capsule 5    sertraline (ZOLOFT) 100 MG tablet TAKE 1 TABLET DAILY 90 tablet 2    insulin aspart (NOVOLOG FLEXPEN) 100 UNIT/ML injection pen Inject 10 Units into the skin 3 times daily (before meals) 9 pen 3    atorvastatin (LIPITOR) 80 MG tablet Take 1 tablet by mouth daily 90 tablet 3    levothyroxine (SYNTHROID) 25 MCG tablet TAKE 1 TABLET DAILY 90 tablet 3    Insulin Pen Needle 32G X 6 MM MISC One needle four times a day 360 each 11    insulin glargine (BASAGLAR KWIKPEN) 100 UNIT/ML injection pen Inject 50 Units into the skin nightly 15 pen 5    NIFEdipine (ADALAT CC) 60 MG extended release tablet Take 1 tablet by mouth daily 90 tablet 3    omeprazole (PRILOSEC) 40 MG delayed release capsule Take 1 capsule by mouth daily 90 capsule 3    carvedilol (COREG) 25 MG tablet Take 1 tablet by mouth 2 times daily (with meals) 180 tablet 2    furosemide (LASIX) 20 MG tablet TAKE 1 TABLET DAILY 90 tablet 3    blood glucose test strips (FREESTYLE LITE) strip USE 4 STRIP TO CHECK GLUCOSE ONCE DAILY AS NEEDED 400 each 3    lisinopril (PRINIVIL;ZESTRIL) 40 MG tablet TAKE 1 TABLET DAILY 90 tablet 3    indomethacin (INDOCIN) 50 MG capsule Take 1 capsule by mouth 3 times daily 270 capsule 3    FREESTYLE LANCETS MISC USE TO CHECK BLOOD SUGAR 1-2 TIMES DAILY 100 each 3    ketoconazole (NIZORAL) 2 % shampoo Apply topically daily as needed.  120 mL 0    Insulin Pen Needle 32G X 6 MM MISC 1 Act by Does not apply route every evening 100 each 5    Omega-3 Fatty Acids (FISH OIL OMEGA-3 PO) Take by mouth      umeclidinium-vilanterol (ANORO ELLIPTA) 62.5-25 MCG/INH AEPB inhaler Inhale 1 puff into the lungs daily      fluticasone (FLONASE) 50 MCG/ACT nasal spray 1 spray by Each Nare route daily      cetirizine

## 2020-11-19 RX ORDER — BLOOD-GLUCOSE METER
KIT MISCELLANEOUS
Qty: 400 EACH | Refills: 3 | Status: SHIPPED | OUTPATIENT
Start: 2020-11-19 | End: 2021-07-13 | Stop reason: SDUPTHER

## 2020-11-19 RX ORDER — SYRING-NEEDL,DISP,INSUL,0.3 ML 30 GX5/16"
1 SYRINGE, EMPTY DISPOSABLE MISCELLANEOUS ONCE
Qty: 100 DEVICE | Refills: 3 | Status: ON HOLD | OUTPATIENT
Start: 2020-11-19 | End: 2021-12-02 | Stop reason: HOSPADM

## 2020-12-17 RX ORDER — LISINOPRIL 40 MG/1
TABLET ORAL
Qty: 90 TABLET | Refills: 3 | Status: SHIPPED | OUTPATIENT
Start: 2020-12-17 | End: 2021-11-07

## 2021-01-08 RX ORDER — CARVEDILOL 25 MG/1
25 TABLET ORAL 2 TIMES DAILY WITH MEALS
Qty: 180 TABLET | Refills: 2 | Status: SHIPPED | OUTPATIENT
Start: 2021-01-08 | End: 2021-11-07

## 2021-01-22 ENCOUNTER — TELEPHONE (OUTPATIENT)
Dept: FAMILY MEDICINE CLINIC | Age: 81
End: 2021-01-22

## 2021-01-22 DIAGNOSIS — R29.898 MUSCULAR DECONDITIONING: Primary | ICD-10-CM

## 2021-01-22 NOTE — TELEPHONE ENCOUNTER
Received a call from Inderjit at the [facility]. She stated patient wanted Physical therapy ordered to help her build strength walking. Please advise.

## 2021-02-25 ENCOUNTER — HOSPITAL ENCOUNTER (OUTPATIENT)
Dept: PHYSICAL THERAPY | Age: 81
Setting detail: THERAPIES SERIES
Discharge: HOME | End: 2021-02-25
Payer: MEDICARE

## 2021-02-25 PROCEDURE — 97110 THERAPEUTIC EXERCISES: CPT

## 2021-02-25 PROCEDURE — 97162 PT EVAL MOD COMPLEX 30 MIN: CPT

## 2021-02-25 NOTE — PROGRESS NOTES
Physical Therapy  Initial Assessment  Date: 2021  Patient Name: Julisa Godwin  MRN: [id number]  : 1940     Treatment Diagnosis: Deconditioning      Subjective   General  Chart Reviewed: Yes  Patient assessed for rehabilitation services?: Yes  Additional Pertinent Hx: Patient being seen for deconditioning. PMHx included recent COVID infection, HTN, CAD, COPD, DM, arthritis  Response To Previous Treatment: Not applicable  Family / Caregiver Present: No  Referring Practitioner: Willam Sal MD  Referral Date : 21  Diagnosis: Muscular deconditioning (R29.898)  Follows Commands: Within Functional Limits  PT Visit Information  Onset Date: 21  PT Insurance Information: Medicare, [employer]  Total # of Visits Approved: 12  Total # of Visits to Date: 1  Plan of Care/Certification Expiration Date: 21  Progress Note Due Date: 21  Subjective  Subjective: Ms Mirlande Brownlee reports she has [place] and lost her  recently. She states she doesn't have enough energy lately and just can't get rested. Pain Screening  Patient Currently in Pain: No  Vital Signs  Patient Currently in Pain: No    Vision/Hearing  Vision  Vision: Within Functional Limits  Hearing  Hearing: Within functional limits    Orientation  Orientation  Overall Orientation Status: Within Normal Limits    Social/Functional History  Social/Functional History  Type of Home: Assisted living  Home Access: Level entry  Bathroom Equipment: Toilet raiser;Grab bars around toilet  Home Equipment: Roll About; [employer] Help From: Family  Active : Yes  Leisure & Hobbies: reading, word games, trivia    Objective     Observation/Palpation  Posture: Fair    AROM RLE (degrees)  RLE AROM: WFL  AROM LLE (degrees)  LLE AROM : WFL  AROM RUE (degrees)  RUE AROM : WFL  AROM LUE (degrees)  LUE AROM : WFL    Strength RLE  R Hip Flexion: 5/5  R Hip ABduction: 4+/5  R Hip ADduction: 5/5  R Knee Flexion: 5/5  R Knee Extension: 5/5  R Ankle Dorsiflexion: 5/5  Strength LLE  L Hip Flexion: 4+/5  L Hip ABduction: 4+/5  L Knee Flexion: 5/5  L Knee Extension: 5/5  L Ankle Dorsiflexion: 4+/5     Additional Measures  Special Tests: LEFS -  28.8%  QuickDASH -   15.9%  Sensation  Overall Sensation Status: WFL     Transfers  Sit to Stand: Modified independent  Stand to sit: Modified independent  Bed to Chair: Modified independent       Ambulation  Ambulation?: Yes  Ambulation 1  Surface: level tile  Device: No Device  Assistance: Independent  Gait Deviations: Slow Lisa;Decreased step length;Decreased arm swing;Staggers  Distance: 230' in 2 minutes, 110' in 2 minutes  Comments: Limited by fatigue  Balance  Posture: Fair  Exercises  Exercise 1: Ambulation - work up to 6 MWT - able to walk 2 minutes x 2 today 230' 110'  - will likely require frequent rest breaks at first  Exercise 2: Toe raises  Exercise 3: Hip abduction standing  Exercise 4: Marching  Exercise 5: Mini squats  Exercise 6: Steps  Exercise 7: Elbow 3 ways  Exercise 8: Shoulder theraband flexion, extension, abduction  Exercise 9: Sidestepping in  bars  Exercise 10: Forward/backward walking in  bars  Exercise 11: Repeated side to stand  Exercise 12: Bilateral ER with theraband  Exercise 13: Slouch with overcorrection  Exercise 14: Ergometer  Exercise 15: Another ambulation if able                      Assessment   Conditions Requiring Skilled Therapeutic Intervention  Body structures, Functions, Activity limitations: Decreased functional mobility ; Decreased ADL status; Decreased balance;Decreased strength;Decreased high-level IADLs;Decreased endurance  Assessment: Ms Paige Malcolm presents to therapy with diagnosis of deconditioning. Initial evaluation showed decreased endurance with ambulation, decreased safety with transfers, decreased balance and risk to fall with balance testing, and decreased functional mobility.  Patient will benefit from skilled therapy to address these impairments and improve quality of life.  Treatment Diagnosis: Deconditioning  Prognosis: Good  Decision Making: Medium Complexity  REQUIRES PT FOLLOW UP: Yes  Treatment Initiated : therapeutic exercise  Discharge Recommendations: Continue to assess pending progress  Activity Tolerance  Activity Tolerance: Patient limited by endurance; Patient limited by fatigue  Activity Tolerance: Required frequent rest breaks throughout         Plan   Plan  Times per week: 2  Plan weeks: 4-6  Current Treatment Recommendations: Strengthening, Neuromuscular Re-education, IADL Training, Home Exercise Program, ROM, Manual Therapy - Soft Tissue Mobilization, Safety Education & Training, Balance Training, Endurance Training, Patient/Caregiver Education & Training, Functional Mobility Training, Manual Therapy - Joint Manipulation, Gait Training, Transfer Training, Modalities, Stair training, Pain Management      Goals  Short term goals  Time Frame for Short term goals: 3-4 weeks  Short term goal 1: Patient will be independent with HEP  Short term goal 2: Patient will report perform 4 sit to  30\" with no UE use as needed for functional mobility  Short term goal 3: Patient will ambulate 4 minutes without need for rest break as needed for functional mobility  Long term goals  Time Frame for Long term goals : 6-8 weeks  Long term goal 1: Patient will improve score on LEFS to 15% impairment or less to show decreased disability  Long term goal 2: Patient will improve score on SCHERER to 46/56 to show decreased risk to fall  Long term goal 3: Patient will complete full 6 MWT with no rest breaks and at least 400' to improve functional mobility  Long term goal 4: Patient will report decreased difficulty with ambulating down hallway to [facility] apartment at [address] [facility]   Time In [address] [facility]         Minutes 35         Timed Code Treatment Minutes: [address] [facility], PT   Electronically signed by Holly Martell, PT on 2/25/2021 at 3:58 PM

## 2021-03-01 ENCOUNTER — HOSPITAL ENCOUNTER (OUTPATIENT)
Dept: PHYSICAL THERAPY | Age: 81
Setting detail: THERAPIES SERIES
Discharge: HOME | End: 2021-03-01
Payer: MEDICARE

## 2021-03-01 PROCEDURE — 97110 THERAPEUTIC EXERCISES: CPT

## 2021-03-01 NOTE — PROGRESS NOTES
Daily Treatment Note  Date: 3/1/2021  Patient Name: Dalia Engel  MRN: [id number]     :   1940    Subjective:   General  Chart Reviewed: Yes  Additional Pertinent Hx: Patient being seen for deconditioning. PMHx included recent COVID infection, HTN, CAD, COPD, DM, arthritis  Response To Previous Treatment: Not applicable  Family / Caregiver Present: No  Referring Practitioner: Alma Lomax MD  PT Visit Information  Onset Date: 21  PT Insurance Information: Medicare, [employer]  Total # of Visits Approved: 12  Total # of Visits to Date: 2  Plan of Care/Certification Expiration Date: 21  Progress Note Due Date: 21  Subjective  Subjective: Doing ok. Pain Screening  Patient Currently in Pain: No  Vital Signs  Patient Currently in Pain: No       Treatment Activities:    Exercises  Exercise 1: Ambulation - work up to 6 MWT - able to walk 204 feet   twice with one seated rest in 6 minutes- will likely require frequent rest breaks at first  Exercise 2: Toe raises  x 10  Exercise 3: Hip abduction standing  x 10  Exercise 4: Marching  x10  Exercise 5: Mini squats  x 10  Exercise 6: Steps  4 inch  forward and lateral  x 10 each  Exercise 7: Elbow 3 ways  with 1 pound weight  x 10 each  Exercise 8: Shoulder  flexion, extension, abduction  with red theraband  x 10 each  Exercise 9: Sidestepping in  bars  x 3  Exercise 10: Forward/backward walking in  bars  x 3  Exercise 11: Repeated sit to stand  x 10  Exercise 12: Bilateral ER with red theraband  x 10 each  Exercise 13: Slouch with overcorrection  x 10  Exercise 14: Ergometer  legs only  level 1  Exercise 15: Another ambulation if able         Assessment:   Conditions Requiring Skilled Therapeutic Intervention  Body structures, Functions, Activity limitations: Decreased functional mobility ; Decreased ADL status; Decreased balance;Decreased strength;Decreased high-level IADLs;Decreased endurance  Assessment: Initiated exericse per evaluating therapist. She required moderate to maximal verbal cues to perform exericses correctly. She was able to complete about three exericses before she needed a rest break. She was slightly SOA after first 204 feet not so much after second 204 feet. She was able to complete ham shoulder exericse before the need for a seated rest break. She was able to tolerate 5 minutes at level 1 on ergometer. She reported a sightly discomfort in left shoulder post session.   Treatment Diagnosis: Deconditioning  Prognosis: Good  REQUIRES PT FOLLOW UP: Yes  Treatment Initiated : therapeutic exercise  Discharge Recommendations: Continue to assess pending progress      Goals:  Short term goals  Time Frame for Short term goals: 3-4 weeks  Short term goal 1: Patient will be independent with HEP  Short term goal 2: Patient will report perform 4 sit to  30\" with no UE use as needed for functional mobility  Short term goal 3: Patient will ambulate 4 minutes without need for rest break as needed for functional mobility  Long term goals  Time Frame for Long term goals : 6-8 weeks  Long term goal 1: Patient will improve score on LEFS to 15% impairment or less to show decreased disability  Long term goal 2: Patient will improve score on SCHERER to 46/56 to show decreased risk to fall  Long term goal 3: Patient will complete full 6 MWT with no rest breaks and at least 400' to improve functional mobility  Long term goal 4: Patient will report decreased difficulty with ambulating down hallway to reach [address]:    Plan  Times per week: 2  Plan weeks: 4-6  Current Treatment Recommendations: Strengthening, Neuromuscular Re-education, IADL Training, Home Exercise Program, ROM, Manual Therapy - Soft Tissue Mobilization, Safety Education & Training, Balance Training, Endurance Training, Patient/Caregiver Education & Training, Functional Mobility Training, Manual Therapy - Joint Manipulation, Gait Training, Transfer Training, Modalities, Stair training, Pain Management  Timed Code Treatment Minutes: 48 Minutes     Therapy Time   Individual Concurrent Group Co-treatment   Time In 1104         Time Out 1152         Minutes 48         Timed Code Treatment Minutes: [address] PTA    Electronically signed by Priscilla Mcintosh PTA on 3/1/2021 at 11:53 AM

## 2021-03-08 ENCOUNTER — APPOINTMENT (OUTPATIENT)
Dept: PHYSICAL THERAPY | Age: 81
End: 2021-03-08
Payer: MEDICARE

## 2021-03-11 ENCOUNTER — APPOINTMENT (OUTPATIENT)
Dept: PHYSICAL THERAPY | Age: 81
End: 2021-03-11
Payer: MEDICARE

## 2021-03-16 ENCOUNTER — HOSPITAL ENCOUNTER (OUTPATIENT)
Dept: GENERAL RADIOLOGY | Age: 81
Discharge: HOME | End: 2021-03-16
Payer: MEDICARE

## 2021-03-16 ENCOUNTER — OFFICE VISIT (OUTPATIENT)
Dept: FAMILY MEDICINE CLINIC | Age: 81
End: 2021-03-16
Payer: MEDICARE

## 2021-03-16 VITALS
SYSTOLIC BLOOD PRESSURE: 120 MMHG | WEIGHT: 161.4 LBS | BODY MASS INDEX: 26.05 KG/M2 | TEMPERATURE: 95.7 F | HEART RATE: 75 BPM | OXYGEN SATURATION: 95 % | DIASTOLIC BLOOD PRESSURE: 62 MMHG

## 2021-03-16 DIAGNOSIS — N18.31 TYPE 2 DIABETES MELLITUS WITH STAGE 3A CHRONIC KIDNEY DISEASE, WITHOUT LONG-TERM CURRENT USE OF INSULIN (HCC): ICD-10-CM

## 2021-03-16 DIAGNOSIS — E11.22 TYPE 2 DIABETES MELLITUS WITH STAGE 3A CHRONIC KIDNEY DISEASE, WITHOUT LONG-TERM CURRENT USE OF INSULIN (HCC): ICD-10-CM

## 2021-03-16 DIAGNOSIS — F43.21 GRIEF REACTION: ICD-10-CM

## 2021-03-16 DIAGNOSIS — R53.1 GENERALIZED WEAKNESS: ICD-10-CM

## 2021-03-16 DIAGNOSIS — R06.02 SHORTNESS OF BREATH: Primary | ICD-10-CM

## 2021-03-16 DIAGNOSIS — R06.02 SHORTNESS OF BREATH: ICD-10-CM

## 2021-03-16 DIAGNOSIS — R19.7 DIARRHEA, UNSPECIFIED TYPE: ICD-10-CM

## 2021-03-16 LAB
ALBUMIN SERPL-MCNC: 3.8 G/DL (ref 3.5–5.2)
ALP BLD-CCNC: 125 U/L (ref 35–104)
ALT SERPL-CCNC: 13 U/L (ref 5–33)
ANION GAP SERPL CALCULATED.3IONS-SCNC: 12 MMOL/L (ref 7–19)
AST SERPL-CCNC: 13 U/L (ref 5–32)
BASOPHILS ABSOLUTE: 0 K/UL (ref 0–0.2)
BASOPHILS RELATIVE PERCENT: 0.4 % (ref 0–1)
BILIRUB SERPL-MCNC: 0.4 MG/DL (ref 0.2–1.2)
BUN BLDV-MCNC: 17 MG/DL (ref 8–23)
CALCIUM SERPL-MCNC: 9.3 MG/DL (ref 8.8–10.2)
CHLORIDE BLD-SCNC: 105 MMOL/L (ref 98–111)
CO2: 24 MMOL/L (ref 22–29)
CREAT SERPL-MCNC: 0.9 MG/DL (ref 0.5–0.9)
EOSINOPHILS ABSOLUTE: 0.5 K/UL (ref 0–0.6)
EOSINOPHILS RELATIVE PERCENT: 6.6 % (ref 0–5)
FERRITIN: 56.4 NG/ML (ref 13–150)
FOLATE: 11.4 NG/ML (ref 4.8–37.3)
GFR AFRICAN AMERICAN: >59
GFR NON-AFRICAN AMERICAN: >60
GLUCOSE BLD-MCNC: 189 MG/DL (ref 74–109)
HBA1C MFR BLD: 7.3 % (ref 4–6)
HCT VFR BLD CALC: 40.4 % (ref 37–47)
HEMOGLOBIN: 13.1 G/DL (ref 12–16)
IMMATURE GRANULOCYTES #: 0 K/UL
IRON: 76 UG/DL (ref 37–145)
LYMPHOCYTES ABSOLUTE: 1.3 K/UL (ref 1.1–4.5)
LYMPHOCYTES RELATIVE PERCENT: 18.7 % (ref 20–40)
MCH RBC QN AUTO: 29.4 PG (ref 27–31)
MCHC RBC AUTO-ENTMCNC: 32.4 G/DL (ref 33–37)
MCV RBC AUTO: 90.8 FL (ref 81–99)
MONOCYTES ABSOLUTE: 0.6 K/UL (ref 0–0.9)
MONOCYTES RELATIVE PERCENT: 8.7 % (ref 0–10)
NEUTROPHILS ABSOLUTE: 4.4 K/UL (ref 1.5–7.5)
NEUTROPHILS RELATIVE PERCENT: 65.5 % (ref 50–65)
PDW BLD-RTO: 14 % (ref 11.5–14.5)
PLATELET # BLD: 230 K/UL (ref 130–400)
PMV BLD AUTO: 10.1 FL (ref 9.4–12.3)
POTASSIUM SERPL-SCNC: 3.8 MMOL/L (ref 3.5–5)
RBC # BLD: 4.45 M/UL (ref 4.2–5.4)
SODIUM BLD-SCNC: 141 MMOL/L (ref 136–145)
T4 FREE: 1.05 NG/DL (ref 0.93–1.7)
TOTAL PROTEIN: 7.2 G/DL (ref 6.6–8.7)
TSH SERPL DL<=0.05 MIU/L-ACNC: 1.9 UIU/ML (ref 0.27–4.2)
VITAMIN B-12: >2000 PG/ML (ref 211–946)
VITAMIN D 25-HYDROXY: 31.9 NG/ML
WBC # BLD: 6.8 K/UL (ref 4.8–10.8)

## 2021-03-16 PROCEDURE — 1123F ACP DISCUSS/DSCN MKR DOCD: CPT | Performed by: CLINICAL NURSE SPECIALIST

## 2021-03-16 PROCEDURE — G8417 CALC BMI ABV UP PARAM F/U: HCPCS | Performed by: CLINICAL NURSE SPECIALIST

## 2021-03-16 PROCEDURE — 3051F HG A1C>EQUAL 7.0%<8.0%: CPT | Performed by: CLINICAL NURSE SPECIALIST

## 2021-03-16 PROCEDURE — 71046 X-RAY EXAM CHEST 2 VIEWS: CPT

## 2021-03-16 PROCEDURE — G8399 PT W/DXA RESULTS DOCUMENT: HCPCS | Performed by: CLINICAL NURSE SPECIALIST

## 2021-03-16 PROCEDURE — 4040F PNEUMOC VAC/ADMIN/RCVD: CPT | Performed by: CLINICAL NURSE SPECIALIST

## 2021-03-16 PROCEDURE — 1036F TOBACCO NON-USER: CPT | Performed by: CLINICAL NURSE SPECIALIST

## 2021-03-16 PROCEDURE — 1090F PRES/ABSN URINE INCON ASSESS: CPT | Performed by: CLINICAL NURSE SPECIALIST

## 2021-03-16 PROCEDURE — G8427 DOCREV CUR MEDS BY ELIG CLIN: HCPCS | Performed by: CLINICAL NURSE SPECIALIST

## 2021-03-16 PROCEDURE — G8484 FLU IMMUNIZE NO ADMIN: HCPCS | Performed by: CLINICAL NURSE SPECIALIST

## 2021-03-16 PROCEDURE — 99214 OFFICE O/P EST MOD 30 MIN: CPT | Performed by: CLINICAL NURSE SPECIALIST

## 2021-03-16 NOTE — PROGRESS NOTES
SUBJECTIVE:  Kala Jiménez is a 79 y.o. who presents today for Diarrhea      HPI    Ms Michelle Escobar presents today with ongoing fatigue, shortness of breath with exertion and some memory loss and diarrhea since having COVID-19 in October. She was referred to outpatient PT for physical deconditioning but kept forgetting her appointments. She is eating good, sense of taste and smell are still suboptimal. She has normal, formed stool but at times loose in the morning. No fever or chills. Underlying type 2 DM, due for those labs. No noted hypoglycemia. Admits she may be depressed as well. 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 She does feel lonely without him. She is having to take care of more chores as well.     Past Medical History:   Diagnosis Date    Arthritis     Psoriatic    Asthma     CAD (coronary artery disease)     CHF (congestive heart failure) (HCC)     [facility]     DM (diabetes mellitus) [facility] [facility]     GERD (gastroesophageal reflux disease)     HTN (hypertension)     Hyperlipidemia     MI (myocardial infarction) ([facility] [address]     x2    Thyroid disease      Past Surgical History:   Procedure Laterality Date    CATARACT REMOVAL      CHOLECYSTECTOMY      CORONARY ANGIOPLASTY  06/2018    Angioplasty to in-stent restenosis to the distal LAD    HIP SURGERY      HYSTERECTOMY      JOINT REPLACEMENT Right     Right knee replacement     Family History   Problem Relation Age of Onset    Heart Disease Mother     Heart Disease Father      Social History     Tobacco Use    Smoking status: Never Smoker    Smokeless tobacco: Never Used   Substance Use Topics    Alcohol use: No     Current Outpatient Medications   Medication Sig Dispense Refill    carvedilol (COREG) 25 MG tablet Take 1 tablet by mouth 2 times daily (with meals) 180 tablet 2    lisinopril (PRINIVIL;ZESTRIL) 40 MG tablet TAKE 1 TABLET DAILY 90 tablet 3    blood glucose test strips (FREESTYLE LITE) strip USE 4 STRIP TO CHECK GLUCOSE ONCE DAILY AS NEEDED 400 each 3    Lancet Device MISC 1 Device by Does not apply route once for 1 dose ICD-10-CM: E11.9 100 Device 3    Probiotic Product (ALIGN) 4 MG CAPS Once capsule daily 30 capsule 5    sertraline (ZOLOFT) 100 MG tablet TAKE 1 TABLET DAILY 90 tablet 2    insulin aspart (NOVOLOG FLEXPEN) 100 UNIT/ML injection pen Inject 10 Units into the skin 3 times daily (before meals) 9 pen 3    atorvastatin (LIPITOR) 80 MG tablet Take 1 tablet by mouth daily 90 tablet 3    levothyroxine (SYNTHROID) 25 MCG tablet TAKE 1 TABLET DAILY 90 tablet 3    Insulin Pen Needle 32G X 6 MM MISC One needle four times a day 360 each 11    insulin glargine (BASAGLAR KWIKPEN) 100 UNIT/ML injection pen Inject 50 Units into the skin nightly 15 pen 5    NIFEdipine (ADALAT CC) 60 MG extended release tablet Take 1 tablet by mouth daily 90 tablet 3    omeprazole (PRILOSEC) 40 MG delayed release capsule Take 1 capsule by mouth daily 90 capsule 3    indomethacin (INDOCIN) 50 MG capsule Take 1 capsule by mouth 3 times daily 270 capsule 3    FREESTYLE LANCETS MISC USE TO CHECK BLOOD SUGAR 1-2 TIMES DAILY 100 each 3    ketoconazole (NIZORAL) 2 % shampoo Apply topically daily as needed.  120 mL 0    Insulin Pen Needle 32G X 6 MM MISC 1 Act by Does not apply route every evening 100 each 5    Omega-3 Fatty Acids (FISH OIL OMEGA-3 PO) Take by mouth      umeclidinium-vilanterol (ANORO ELLIPTA) 62.5-25 MCG/INH AEPB inhaler Inhale 1 puff into the lungs daily      fluticasone (FLONASE) 50 MCG/ACT nasal spray 1 spray by Each Nare route daily      cetirizine (ZYRTEC) 10 MG tablet Take 10 mg by mouth 2 times daily      montelukast (SINGULAIR) 10 MG tablet Take 1 tablet by mouth daily 90 tablet 3    azelastine HCl 0.15 % SOLN 2 sprays by Nasal route 2 times daily 90 mL 5    ipratropium (ATROVENT) 0.06 % nasal spray 2 sprays by Nasal route 3 times daily as needed for Rhinitis 3 Bottle 1    Blood Glucose Monitoring Suppl ADE 1 kit by Does not apply route daily ICD-10-CM: E11.9 1 Device 0    clopidogrel (PLAVIX) 75 MG tablet Take 1 tablet by mouth daily 30 tablet 3    aspirin 81 MG tablet Take 162 mg by mouth daily       vitamin B-12 (CYANOCOBALAMIN) 1000 MCG tablet Take 1,000 mcg by mouth daily      Coenzyme Q10 (CO Q 10) 10 MG CAPS Take 1 capsule by mouth daily       GLUCOSAMINE-CALCIUM-VIT D PO Take 2 tablets by mouth daily        No current facility-administered medications for this visit. Allergies   Allergen Reactions    Tape Domenic El Tape] Rash       Review of Systems   Constitutional: Positive for fatigue. Negative for appetite change, chills, diaphoresis and fever. HENT: Positive for congestion. Negative for ear pain, facial swelling, hearing loss, postnasal drip, sinus pressure, sore throat and trouble swallowing. Eyes: Negative for pain, discharge, redness and visual disturbance. Respiratory: Positive for cough and shortness of breath. Negative for chest tightness and wheezing. Cardiovascular: Negative for chest pain, palpitations and leg swelling. Gastrointestinal: Positive for diarrhea. Negative for abdominal pain, constipation, nausea and vomiting. Endocrine: Negative for cold intolerance and heat intolerance. Genitourinary: Negative for difficulty urinating, dysuria, flank pain, frequency, hematuria and urgency. Musculoskeletal: Negative for arthralgias, back pain, joint swelling and neck pain. Skin: Negative for color change and rash. Neurological: Negative for dizziness, syncope, weakness, light-headedness and headaches. Hematological: Negative for adenopathy. Does not bruise/bleed easily. Psychiatric/Behavioral: Positive for dysphoric mood. Negative for confusion, sleep disturbance and suicidal ideas. The patient is not nervous/anxious.          OBJECTIVE:  /62   Pulse 75   Temp 95.7 °F (35.4 °C) (Temporal)   Wt 161 lb 6.4 oz (73.2 kg)   SpO2 95%   BMI 26.05 kg/m²    Physical Exam  Vitals signs reviewed. Constitutional:       Appearance: She is well-developed. She is not ill-appearing or toxic-appearing. HENT:      Head: Normocephalic and atraumatic. Eyes:      General:         Right eye: No discharge. Left eye: No discharge. Conjunctiva/sclera: Conjunctivae normal.      Pupils: Pupils are equal, round, and reactive to light. Neck:      Musculoskeletal: Normal range of motion and neck supple. Thyroid: No thyromegaly. Trachea: No tracheal deviation. Cardiovascular:      Rate and Rhythm: Normal rate and regular rhythm. Heart sounds: No murmur. Pulmonary:      Effort: Pulmonary effort is normal. No respiratory distress. Breath sounds: Normal breath sounds. No wheezing or rales. Abdominal:      General: Bowel sounds are normal. There is no distension. Palpations: Abdomen is soft. Tenderness: There is no abdominal tenderness. There is no rebound. Genitourinary:     Vagina: Normal.   Musculoskeletal: Normal range of motion. General: No deformity. Right lower leg: No edema. Left lower leg: No edema. Lymphadenopathy:      Cervical: No cervical adenopathy. Skin:     General: Skin is warm and dry. Findings: No erythema or rash. Neurological:      General: No focal deficit present. Mental Status: She is alert and oriented to person, place, and time. Mental status is at baseline. Motor: No weakness. Gait: Gait normal.   Psychiatric:         Mood and Affect: Mood normal.         Behavior: Behavior normal.         Thought Content:  Thought content normal.         Judgment: Judgment normal.         XR CHEST (2 VW)  Narrative: EXAMINATION: XR CHEST (2 VW) 3/16/2021 12:56 PM  HISTORY: Shortness of breath  COMPARISON: 1/3/2019  FINDINGS:  The heart and mediastinal contours appear normal. There appear to be  coronary artery stents. There is minimal atelectasis in the lung  bases. The lungs otherwise appear clear but hyperinflated. There is  diffuse coarsening of the interstitial markings. There is no  appreciable pneumothorax or definite pleural effusion. Impression: Bibasilar atelectasis superimposed upon chronic  interstitial changes. Signed by Dr Elvis Coker on 3/16/2021 12:58 PM    Lab Results   Component Value Date    WBC 6.8 03/16/2021    HGB 13.1 03/16/2021    HCT 40.4 03/16/2021    MCV 90.8 03/16/2021     03/16/2021     Lab Results   Component Value Date     03/16/2021    K 3.8 03/16/2021     03/16/2021    CO2 24 03/16/2021    BUN 17 03/16/2021    CREATININE 0.9 03/16/2021    GLUCOSE 189 (H) 03/16/2021    CALCIUM 9.3 03/16/2021    PROT 7.2 03/16/2021    LABALBU 3.8 03/16/2021    BILITOT 0.4 03/16/2021    ALKPHOS 125 (H) 03/16/2021    AST 13 03/16/2021    ALT 13 03/16/2021    LABGLOM >60 03/16/2021    GFRAA >59 03/16/2021       Lab Results   Component Value Date    LABA1C 7.3 (H) 03/16/2021     No results found for: EAG  Lab Results   Component Value Date    TSH 1.900 03/16/2021     Lab Results   Component Value Date    IRON 76 03/16/2021    FERRITIN 56.4 03/16/2021     Lab Results   Component Value Date    QBYCFIQJ51 >2000 (H) 03/16/2021     Lab Results   Component Value Date    VITD25 31.9 03/16/2021       ASSESSMENT/PLAN:  1. Shortness of breath  - XR CHEST (2 VW); Future    2. Generalized weakness  - CBC Auto Differential; Future  - Comprehensive Metabolic Panel; Future  - Ferritin; Future  - Folate; Future  - Iron; Future  - Vitamin B12; Future  - TSH without Reflex; Future  - T4, Free; Future  - Vitamin D 25 Hydroxy; Future    3. Type 2 diabetes mellitus with stage 3a chronic kidney disease, without long-term current use of insulin (HCC)  - Hemoglobin A1C; Future    4. Diarrhea, unspecified type    5.  Grief reaction    Patient with residual diarrhea, shortness of breath and fatigue 4 months after COVID-19 infection. Seems residual as work up negative including labs and CXR. I would encourage formal PT, may need to offer [facility] PT. She may also have some depression related to grief. Offer counseling services as well. Return in about 3 months (around 6/16/2021).

## 2021-03-17 ENCOUNTER — TELEPHONE (OUTPATIENT)
Dept: FAMILY MEDICINE CLINIC | Age: 81
End: 2021-03-17

## 2021-03-17 ASSESSMENT — ENCOUNTER SYMPTOMS
CHEST TIGHTNESS: 0
NAUSEA: 0
COLOR CHANGE: 0
WHEEZING: 0
FACIAL SWELLING: 0
COUGH: 1
VOMITING: 0
DIARRHEA: 1
BACK PAIN: 0
TROUBLE SWALLOWING: 0
EYE DISCHARGE: 0
EYE REDNESS: 0
SORE THROAT: 0
SINUS PRESSURE: 0
SHORTNESS OF BREATH: 1
EYE PAIN: 0
CONSTIPATION: 0
ABDOMINAL PAIN: 0

## 2021-03-25 NOTE — PROGRESS NOTES
[facility]  OUTPATIENT PHYSICAL THERAPY  DISCHARGE SUMMARY    Date: 3/25/2021  Patient Name: Dai Newby        MRN: [id number]    ACCOUNT #: [de-identified]  : 1940  (79 y.o.)  Gender: female   Referring Practitioner: Kim Gurrola MD  Diagnosis: Muscular deconditioning (R29.898)  Referral Date : 21  Treatment Diagnosis: Deconditioning    Total # of Visits Approved: 12  Total # of Visits to Date: 2    Subjective  Subjective: Doing ok. Additional Pertinent Hx: Patient being seen for deconditioning. PMHx included recent COVID infection, HTN, CAD, COPD, DM, arthritis    Objective  Treatments received include: Gait training, therapeutic exercise  See objective/subjective data in goals    Assessment  Assessment: Initiated exericse per evaluating therapist.   She required moderate to maximal verbal cues to perform exericses correctly. She was able to complete about three exericses before she needed a rest break. She was slightly SOA after first 204 feet not so much after second 204 feet. She was able to complete ham shoulder exericse before the need for a seated rest break. She was able to tolerate 5 minutes at level 1 on ergometer. She reported a sightly discomfort in left shoulder post session. Plan     Patient is discharged from therapy at this time due to patient no show and cancellation policy. Patient had two no shows in a row and also did not reschedule.       Goals  Short term goals  Time Frame for Short term goals: 3-4 weeks  Short term goal 1: Patient will be independent with HEP  Short term goal 2: Patient will report perform 4 sit to  30\" with no UE use as needed for functional mobility  Short term goal 3: Patient will ambulate 4 minutes without need for rest break as needed for functional mobility    Long term goals  Time Frame for Long term goals : 6-8 weeks  Long term goal 1: Patient will improve score on LEFS to 15% impairment or less to show decreased disability  Long term goal 2: Patient will improve score on SCHERER to 46/56 to show decreased risk to fall  Long term goal 3: Patient will complete full 6 MWT with no rest breaks and at least 400' to improve functional mobility  Long term goal 4: Patient will report decreased difficulty with ambulating down hallway to reach apartment [address] to assess goals at this time due to unexpected discharge.     Electronically signed by Cayla Hansen PT on 3/25/2021 at 9:32 AM

## 2021-04-09 RX ORDER — OMEPRAZOLE 40 MG/1
CAPSULE, DELAYED RELEASE ORAL
Qty: 90 CAPSULE | Refills: 3 | Status: ON HOLD | OUTPATIENT
Start: 2021-04-09 | End: 2021-12-02 | Stop reason: SDUPTHER

## 2021-06-17 ENCOUNTER — OFFICE VISIT (OUTPATIENT)
Dept: FAMILY MEDICINE CLINIC | Age: 81
End: 2021-06-17
Payer: MEDICARE

## 2021-06-17 VITALS
SYSTOLIC BLOOD PRESSURE: 106 MMHG | DIASTOLIC BLOOD PRESSURE: 60 MMHG | BODY MASS INDEX: 26.52 KG/M2 | HEART RATE: 62 BPM | OXYGEN SATURATION: 95 % | HEIGHT: 66 IN | WEIGHT: 165 LBS

## 2021-06-17 DIAGNOSIS — E11.22 TYPE 2 DIABETES MELLITUS WITH STAGE 3A CHRONIC KIDNEY DISEASE, WITHOUT LONG-TERM CURRENT USE OF INSULIN (HCC): ICD-10-CM

## 2021-06-17 DIAGNOSIS — M79.10 MYALGIA: ICD-10-CM

## 2021-06-17 DIAGNOSIS — N18.31 TYPE 2 DIABETES MELLITUS WITH STAGE 3A CHRONIC KIDNEY DISEASE, WITHOUT LONG-TERM CURRENT USE OF INSULIN (HCC): ICD-10-CM

## 2021-06-17 DIAGNOSIS — R06.00 DYSPNEA, UNSPECIFIED TYPE: ICD-10-CM

## 2021-06-17 DIAGNOSIS — I10 ESSENTIAL HYPERTENSION: ICD-10-CM

## 2021-06-17 DIAGNOSIS — E78.00 HYPERCHOLESTEROLEMIA: ICD-10-CM

## 2021-06-17 DIAGNOSIS — J44.9 CHRONIC OBSTRUCTIVE PULMONARY DISEASE, UNSPECIFIED COPD TYPE (HCC): ICD-10-CM

## 2021-06-17 DIAGNOSIS — R06.00 DYSPNEA, UNSPECIFIED TYPE: Primary | ICD-10-CM

## 2021-06-17 DIAGNOSIS — F41.8 DEPRESSION WITH ANXIETY: ICD-10-CM

## 2021-06-17 DIAGNOSIS — E03.9 PRIMARY HYPOTHYROIDISM: ICD-10-CM

## 2021-06-17 DIAGNOSIS — M1A.0710 CHRONIC GOUT OF RIGHT FOOT, UNSPECIFIED CAUSE: ICD-10-CM

## 2021-06-17 DIAGNOSIS — K21.9 GASTROESOPHAGEAL REFLUX DISEASE WITHOUT ESOPHAGITIS: ICD-10-CM

## 2021-06-17 DIAGNOSIS — Z00.00 ANNUAL PHYSICAL EXAM: ICD-10-CM

## 2021-06-17 LAB
ALBUMIN SERPL-MCNC: 3.9 G/DL (ref 3.5–5.2)
ALP BLD-CCNC: 130 U/L (ref 35–104)
ALT SERPL-CCNC: 14 U/L (ref 5–33)
ANION GAP SERPL CALCULATED.3IONS-SCNC: 13 MMOL/L (ref 7–19)
AST SERPL-CCNC: 13 U/L (ref 5–32)
BASOPHILS ABSOLUTE: 0 K/UL (ref 0–0.2)
BASOPHILS RELATIVE PERCENT: 0.4 % (ref 0–1)
BILIRUB SERPL-MCNC: 0.3 MG/DL (ref 0.2–1.2)
BUN BLDV-MCNC: 23 MG/DL (ref 8–23)
CALCIUM SERPL-MCNC: 9.5 MG/DL (ref 8.8–10.2)
CHLORIDE BLD-SCNC: 99 MMOL/L (ref 98–111)
CHOLESTEROL, TOTAL: 121 MG/DL (ref 160–199)
CO2: 23 MMOL/L (ref 22–29)
CREAT SERPL-MCNC: 0.8 MG/DL (ref 0.5–0.9)
CREATININE URINE: 60.2 MG/DL (ref 4.2–622)
EOSINOPHILS ABSOLUTE: 0.4 K/UL (ref 0–0.6)
EOSINOPHILS RELATIVE PERCENT: 5.1 % (ref 0–5)
GFR AFRICAN AMERICAN: >59
GFR NON-AFRICAN AMERICAN: >60
GLUCOSE BLD-MCNC: 205 MG/DL (ref 74–109)
HBA1C MFR BLD: 8.3 % (ref 4–6)
HCT VFR BLD CALC: 37.5 % (ref 37–47)
HDLC SERPL-MCNC: 39 MG/DL (ref 65–121)
HEMOGLOBIN: 12.4 G/DL (ref 12–16)
IMMATURE GRANULOCYTES #: 0 K/UL
LDL CHOLESTEROL CALCULATED: 37 MG/DL
LYMPHOCYTES ABSOLUTE: 1.7 K/UL (ref 1.1–4.5)
LYMPHOCYTES RELATIVE PERCENT: 21.4 % (ref 20–40)
MCH RBC QN AUTO: 29.8 PG (ref 27–31)
MCHC RBC AUTO-ENTMCNC: 33.1 G/DL (ref 33–37)
MCV RBC AUTO: 90.1 FL (ref 81–99)
MICROALBUMIN UR-MCNC: <1.2 MG/DL (ref 0–19)
MICROALBUMIN/CREAT UR-RTO: NORMAL MG/G
MONOCYTES ABSOLUTE: 0.7 K/UL (ref 0–0.9)
MONOCYTES RELATIVE PERCENT: 8.2 % (ref 0–10)
NEUTROPHILS ABSOLUTE: 5.1 K/UL (ref 1.5–7.5)
NEUTROPHILS RELATIVE PERCENT: 64.5 % (ref 50–65)
PDW BLD-RTO: 14 % (ref 11.5–14.5)
PLATELET # BLD: 205 K/UL (ref 130–400)
PMV BLD AUTO: 10.8 FL (ref 9.4–12.3)
POTASSIUM SERPL-SCNC: 4 MMOL/L (ref 3.5–5)
PRO-BNP: 514 PG/ML (ref 0–1800)
RBC # BLD: 4.16 M/UL (ref 4.2–5.4)
SEDIMENTATION RATE, ERYTHROCYTE: 34 MM/HR (ref 0–25)
SODIUM BLD-SCNC: 135 MMOL/L (ref 136–145)
T4 FREE: 1.01 NG/DL (ref 0.93–1.7)
TOTAL PROTEIN: 7.2 G/DL (ref 6.6–8.7)
TRIGL SERPL-MCNC: 224 MG/DL (ref 0–149)
TSH SERPL DL<=0.05 MIU/L-ACNC: 3.83 UIU/ML (ref 0.27–4.2)
WBC # BLD: 7.9 K/UL (ref 4.8–10.8)

## 2021-06-17 PROCEDURE — G8417 CALC BMI ABV UP PARAM F/U: HCPCS | Performed by: FAMILY MEDICINE

## 2021-06-17 PROCEDURE — G8427 DOCREV CUR MEDS BY ELIG CLIN: HCPCS | Performed by: FAMILY MEDICINE

## 2021-06-17 PROCEDURE — G8926 SPIRO NO PERF OR DOC: HCPCS | Performed by: FAMILY MEDICINE

## 2021-06-17 PROCEDURE — 1123F ACP DISCUSS/DSCN MKR DOCD: CPT | Performed by: FAMILY MEDICINE

## 2021-06-17 PROCEDURE — 4040F PNEUMOC VAC/ADMIN/RCVD: CPT | Performed by: FAMILY MEDICINE

## 2021-06-17 PROCEDURE — G8399 PT W/DXA RESULTS DOCUMENT: HCPCS | Performed by: FAMILY MEDICINE

## 2021-06-17 PROCEDURE — 3023F SPIROM DOC REV: CPT | Performed by: FAMILY MEDICINE

## 2021-06-17 PROCEDURE — 1090F PRES/ABSN URINE INCON ASSESS: CPT | Performed by: FAMILY MEDICINE

## 2021-06-17 PROCEDURE — G0439 PPPS, SUBSEQ VISIT: HCPCS | Performed by: FAMILY MEDICINE

## 2021-06-17 PROCEDURE — 3052F HG A1C>EQUAL 8.0%<EQUAL 9.0%: CPT | Performed by: FAMILY MEDICINE

## 2021-06-17 PROCEDURE — 99214 OFFICE O/P EST MOD 30 MIN: CPT | Performed by: FAMILY MEDICINE

## 2021-06-17 PROCEDURE — 1036F TOBACCO NON-USER: CPT | Performed by: FAMILY MEDICINE

## 2021-06-17 RX ORDER — VALACYCLOVIR HYDROCHLORIDE 1 G/1
2000 TABLET, FILM COATED ORAL 2 TIMES DAILY
Qty: 12 TABLET | Refills: 0 | Status: SHIPPED | OUTPATIENT
Start: 2021-06-17 | End: 2021-06-20

## 2021-06-17 RX ORDER — COLCHICINE 0.6 MG/1
TABLET ORAL
Qty: 9 TABLET | Refills: 3 | Status: SHIPPED | OUTPATIENT
Start: 2021-06-17 | End: 2021-06-21 | Stop reason: SDUPTHER

## 2021-06-17 ASSESSMENT — PATIENT HEALTH QUESTIONNAIRE - PHQ9
8. MOVING OR SPEAKING SO SLOWLY THAT OTHER PEOPLE COULD HAVE NOTICED. OR THE OPPOSITE, BEING SO FIGETY OR RESTLESS THAT YOU HAVE BEEN MOVING AROUND A LOT MORE THAN USUAL: 0
4. FEELING TIRED OR HAVING LITTLE ENERGY: 0
SUM OF ALL RESPONSES TO PHQ QUESTIONS 1-9: 4
SUM OF ALL RESPONSES TO PHQ QUESTIONS 1-9: 5
5. POOR APPETITE OR OVEREATING: 0
SUM OF ALL RESPONSES TO PHQ QUESTIONS 1-9: 4
1. LITTLE INTEREST OR PLEASURE IN DOING THINGS: 2
SUM OF ALL RESPONSES TO PHQ9 QUESTIONS 1 & 2: 4
2. FEELING DOWN, DEPRESSED OR HOPELESS: 2
SUM OF ALL RESPONSES TO PHQ QUESTIONS 1-9: 4
SUM OF ALL RESPONSES TO PHQ QUESTIONS 1-9: 4
1. LITTLE INTEREST OR PLEASURE IN DOING THINGS: 2
SUM OF ALL RESPONSES TO PHQ QUESTIONS 1-9: 4
7. TROUBLE CONCENTRATING ON THINGS, SUCH AS READING THE NEWSPAPER OR WATCHING TELEVISION: 0
SUM OF ALL RESPONSES TO PHQ QUESTIONS 1-9: 5
SUM OF ALL RESPONSES TO PHQ QUESTIONS 1-9: 5
2. FEELING DOWN, DEPRESSED OR HOPELESS: 2
SUM OF ALL RESPONSES TO PHQ QUESTIONS 1-9: 4
SUM OF ALL RESPONSES TO PHQ9 QUESTIONS 1 & 2: 4
9. THOUGHTS THAT YOU WOULD BE BETTER OFF DEAD, OR OF HURTING YOURSELF: 0
6. FEELING BAD ABOUT YOURSELF - OR THAT YOU ARE A FAILURE OR HAVE LET YOURSELF OR YOUR FAMILY DOWN: 0
10. IF YOU CHECKED OFF ANY PROBLEMS, HOW DIFFICULT HAVE THESE PROBLEMS MADE IT FOR YOU TO DO YOUR WORK, TAKE CARE OF THINGS AT HOME, OR GET ALONG WITH OTHER PEOPLE: 1
2. FEELING DOWN, DEPRESSED OR HOPELESS: 2
SUM OF ALL RESPONSES TO PHQ9 QUESTIONS 1 & 2: 4
3. TROUBLE FALLING OR STAYING ASLEEP: 1
1. LITTLE INTEREST OR PLEASURE IN DOING THINGS: 2

## 2021-06-17 ASSESSMENT — LIFESTYLE VARIABLES: HOW OFTEN DO YOU HAVE A DRINK CONTAINING ALCOHOL: 0

## 2021-06-17 NOTE — PATIENT INSTRUCTIONS
honestly with your doctor. This is the best way to understand the decisions you will need to make as your health changes. Know that you can always change your mind. · Ask your doctor about commonly used life-support measures. These include tube feedings, breathing machines, and fluids given through a vein (IV). Understanding these treatments will help you decide whether you want them. · You may choose to have these life-supporting treatments for a limited time. This allows a trial period to see whether they will help you. You may also decide that you want your doctor to take only certain measures to keep you alive. It is important to spell out these conditions so that your doctor and family understand them. · Talk to your doctor about how long you are likely to live. He or she may be able to give you an idea of what usually happens with your specific illness. · Think about preparing papers that state your wishes. This way there will not be any confusion about what you want. You can change your instructions at any time. Which papers should you prepare? Advance directives are legal papers that tell doctors how you want to be cared for at the end of your life. You do not need a  to write these papers. Ask your doctor or your state health department for information on how to write your advance directives. They may have the forms for each of these types of papers. Make sure your doctor has a copy of these on file, and give a copy to a family member or close friend. · Consider a do-not-resuscitate order (DNR). This order asks that no extra treatments be done if your heart stops or you stop breathing. Extra treatments may include cardiopulmonary resuscitation (CPR), electrical shock to restart your heart, or a machine to breathe for you. If you decide to have a DNR order, ask your doctor to explain and write it. Place the order in your home where everyone can easily see it. · Consider a living will.  A living will explains your wishes about life support and other treatments at the end of your life if you become unable to speak for yourself. Living nash tell doctors to use or not use treatments that would keep you alive. You must have one or two witnesses or a notary present when you sign this form. · Consider a durable power of  for health care. This allows you to name a person to make decisions about your care if you are not able to. Most people ask a close friend or family member. Talk to this person about the kinds of treatments you want and those that you do not want. Make sure this person understands your wishes. These legal papers are simple to change. Tell your doctor what you want to change, and ask him or her to make a note in your medical file. Give your family updated copies of the papers. Where can you learn more? Go to https://chpepiceweb.[employer]. org and sign in to your [address] account. Enter P184 in the Etohum box to learn more about \"Advance Care Planning: Care Instructions. \"     If you do not have an account, please click on the \"Sign Up Now\" link. Current as of: September 24, 2016  Content Version: 11.5  © 6592-3567 Healthwise, [employer]. Care instructions adapted under license by [facility] ([facility]). If you have questions about a medical condition or this instruction, always ask your healthcare professional. [place] [address] any warranty or liability for your use of this information. Learning About Living [place]  What is a living will? A living will is a legal form you use to write down the kind of care you want at the end of your life. It is used by the health professionals who will treat you if you aren't able to decide for yourself. If you put your wishes in writing, your loved ones and others will know what kind of care you want. They won't need to guess. This can ease your mind and be helpful to others.   A living will is not the same warranty or liability for your use of this information. Learning About Durable Power of  for Health Care  What is a durable power of  for health care? A durable power of  for health care is one type of the legal forms called advance directives. It lets you decide who you want to make treatment decisions for you if you cannot speak or decide for yourself. The person you choose is called your health care agent. Another type of advance directive is a living will. It lets you write down what kinds of treatment or life support you want or do not want. What should you think about when choosing a health care agent? Choose your health care agent carefully. This person may or may not be a family member. Talk to the person before you make your final decision. Make sure he or she is comfortable with this responsibility. It's a good idea to choose someone who:  · Is at least 25years old. · Knows you well and understands what makes life meaningful for you. · Understands your Caodaism and moral values. · Will do what you want, not what he or she wants. · Will be able to make difficult choices at a stressful time. · Will be able to refuse or stop treatment, if that is what you would want, even if you could die. · Will be firm and confident with health professionals if needed. · Will ask questions to get necessary information. · Lives near you or agrees to travel to you if needed. Your family may help you make medical decisions while you can still be part of that process. But it is important to choose one person to be your health care agent in case you are not able to make decisions for yourself. If you don't fill out the legal form and name a health care agent, the decisions your family can make may be limited. Who will make decisions for you if you do not have a health care agent?   If you don't have a health care agent or a living will, your family members may disagree about your medical care. And then some medical professionals who may not know you as well might have to make decisions for you. In some cases, a  makes the decisions. When you name a health care agent, it is very clear who has the power to make health decisions for you. How do you name a health care agent? You name your health care agent on a legal form. It is usually called a durable power of  for health care. Ask your hospital, state bar association, or office on aging where to find these forms. You must sign the form to make it legal. Some states require you to get the form notarized. This means that a person called a  watches you sign the form and then he or she signs the form. Some states also require that two or more witnesses sign the form. Be sure to tell your family members and doctors who your health care agent is. Keep your forms in a safe place. But make sure that your loved ones know where the forms are. This could be in your desk where you keep other important papers. Make sure your doctor has a copy of your forms. Where can you learn more? Go to https://chpepiceweb.[employer]. org and sign in to your G3 account. Enter [id number] in the EZ-Ticket box to learn more about \"Learning About Durable Power of  for Health Care. \"     If you do not have an account, please click on the \"Sign Up Now\" link. Current as of: September 24, 2016  Content Version: 11.5  © 5984-8300 Healthwise, Inc[employer]. Care instructions adapted under license by [facility] ([facility]). If you have questions about a medical condition or this instruction, always ask your healthcare professional. [place] [address] any warranty or liability for your use of this information. _______________________________________________________________    Home Safety Tips    Each year, thousands of older Americans fall at home. Many of them are seriously injured, and some are disabled.  In , more than 8,500 people over age 72  because of falls. Falls are often due to hazards that are easy to overlook but easy to fix. This checklist will help you find and fix those hazards in your home. The checklist asks about hazards found in each room of your home. For each hazard, the checklist tells you how to fix the problem. At the end of the checklist, you will find other tips for preventing falls. o Look at the floor in each room  o When he walks through room do you have to walk around furniture? - Ask someone to move the furniture to clear your past  o You have throw rugs on the floor? - Remove the rugs or use double-sided tape or nonslip backing on the rugs so they dont slip  o Are papers, magazines, books, shoes, boxes, blankets, towels, or other objects on the floor?  -  things that are on the floor. Always keep objects off the floor  o Do you have to walk over or around cords or wires (like cords from lamps, extension cords, or telephone cords)? - Coil or tape cords and wires next to the wall so you cant trip over them. Have an  put in another outlet. o Are papers, shoes, books, or other objects on the stairs? -  things on the stairs. Always keep objects off the stairs. o Are some steps broken or uneven?   - Fix loose or uneven steps. o Are you missing a light over the stairway?   - Have a  or an  put in an overhead light at the top and bottom of the stairs. o Has the stairway light bulb burned out?   - Have a friend or family member change the light bulb.   o Do you have only one light switch for your stairs (only at the top or at the bottom of the stairs)? - Have a  or an  put in a light switch at the top and bottom of the stairs. You can get light switches that glow  o Are the handrails loose or broken? Is there a handrail on only one side of the stairs? - Fix loose handrails or put in new ones.  Make sure handrails are on both sides of the stairs and are as long as the stairs. o Is the carpet on the steps loose or torn? - Make sure the carpet is firmly attached to every step or remove the carpet and attach non-slip rubber treads on the stairs. o Look at your kitchen and eating Look at your kitchen and eating area. Are the things you use often on high shelves? - Move items in your cabinets. Keep things you use often on the lower shelves (about waist high). - Is your step stool unsteady? - Get a new, steady step stool with a bar to hold on to. Never use a chair as a step stool.   - Is the light near the bed hard to reach?   - Place a lamp close to the bed where it is easy to reach.  o Is the path from your bed to the bathroom dark?   - Put in a night-light so you can see where youre walking. Some nightlights go on by themselves after dark  o Is the tub or shower floor slippery?   - Put a non-slip rubber mat or selfstick strips on the floor of the tub or shower. o Do you have some support when you get in and out of the tub or up from the toilet?   - Have a  or a schmid put in a grab bar inside the tub and next to the toilet.  o Exercise regularly. Exercise makes you stronger and improves your balance and coordination. o Have your doctor or pharmacist look at all the medicines you take, even over-the-counter medicines. Some medicines can make you sleepy or dizzy. o Have your vision checked at least once a year by an eye doctor. Poor vision can increase your risk of falling.   o Get up slowly after you sit or lie down.  o Wear sturdy shoes with thin, non-slip soles. Avoid slippers and running shoes with thick soles. o Improve the lighting in your home. Use brighter light bulbs (at least 60 sanchez). Use lamp shades or frosted bulbs to reduce glare. o Use reflecting tape at the top and bottom of the stairs so you can see them better.    o Paint doorsills a different color to prevent tripping.  o Keep emergency numbers in large print near each phone.   o Put a phone near the floor in case you fall and cant get up.   o Think about wearing an alarm device that will bring help in case you fall and cant get up.    www.cdc.gov/safeusa

## 2021-06-17 NOTE — PROGRESS NOTES
Medicare Annual Wellness Visit - Subsequent    Name: Satya Galan Date: 2021   MRN: [id number] Sex: Female   Age: 79 y.o. Ethnicity: Non-/Non    : 1940 Race: Geri Hurtado is here for   Chief Complaint   Patient presents with   [facility]        Screenings for behavioral, psychosocial and functional/safety risks, and cognitive dysfunction are all negative except as indicated below. These results, as well as other patient data from the [address] form, are documented in Flowsheets linked to this Encounter. Allergies   Allergen Reactions    Tape Tavarez Shires Tape] Rash       Prior to Visit Medications    Medication Sig Taking? Authorizing Provider   omeprazole (PRILOSEC) 40 MG delayed release capsule TAKE 1 CAPSULE DAILY Yes Sissy Christensen MD   carvedilol (COREG) 25 MG tablet Take 1 tablet by mouth 2 times daily (with meals) Yes Sissy Christensen MD   lisinopril (PRINIVIL;ZESTRIL) 40 MG tablet TAKE 1 TABLET DAILY Yes Sissy Christensen MD   blood glucose test strips (FREESTYLE LITE) strip USE 4 STRIP TO CHECK GLUCOSE ONCE DAILY AS NEEDED Yes Sissy Christensen MD   Lancet Device MISC 1 Device by Does not apply route once for 1 dose ICD-10-CM: E11.9 Yes Sissy Christensen MD   Probiotic Product (ALIGN) 4 MG CAPS Once capsule daily Yes Sissy Christensen MD   atorvastatin (LIPITOR) 80 MG tablet Take 1 tablet by mouth daily Yes Sissy Christensen MD   levothyroxine (SYNTHROID) 25 MCG tablet TAKE 1 TABLET DAILY Yes Sissy Christensen MD   Insulin Pen Needle 32G X 6 MM MISC One needle four times a day Yes Sissy Christensen MD   FREESTYLE LANCETS MISC USE TO CHECK BLOOD SUGAR 1-2 TIMES DAILY Yes Sissy Christensen MD   ketoconazole (NIZORAL) 2 % shampoo Apply topically daily as needed.  Yes Sissy Christensen MD   umeclidinium-vilanterol (ANORO ELLIPTA) 62.5-25 MCG/INH AEPB inhaler Inhale 1 puff into the lungs daily Yes Merced Bernard MD   fluticasone Gala Bimroman) 50 MCG/ACT nasal spray 1 spray by Each Nare route daily Yes Historical Provider, MD   cetirizine (ZYRTEC) 10 MG tablet Take 10 mg by mouth 2 times daily Yes Historical Provider, MD   azelastine HCl 0.15 % SOLN 2 sprays by Nasal route 2 times daily Yes Juliana Cunningham MD   Blood Glucose Monitoring Suppl ADE 1 kit by Does not apply route daily ICD-10-CM: E11.9 Yes Juliana Cunningham MD   Coenzyme Q10 (CO Q 10) 10 MG CAPS Take 1 capsule by mouth daily  Yes Historical Provider, MD   GLUCOSAMINE-CALCIUM-VIT D PO Take 2 tablets by mouth daily  Yes Historical Provider, MD   sertraline (ZOLOFT) 100 MG tablet TAKE 1 TABLET DAILY  Patient not taking: Reported on 6/17/2021  Juliana Cunningham MD   insulin aspart (NOVOLOG FLEXPEN) 100 UNIT/ML injection pen Inject 10 Units into the skin 3 times daily (before meals)  Patient not taking: Reported on 6/17/2021  Juliana Cunningham MD   insulin glargine (BASAGLAR KWIKPEN) 100 UNIT/ML injection pen Inject 50 Units into the skin nightly  Patient not taking: Reported on 6/17/2021  Juliana Cunningham MD   NIFEdipine (ADALAT CC) 60 MG extended release tablet Take 1 tablet by mouth daily  Juliana Cunningham MD   indomethacin (INDOCIN) 50 MG capsule Take 1 capsule by mouth 3 times daily  Patient not taking: Reported on 6/17/2021  SHARI Dagile - NP   Insulin Pen Needle 32G X 6 MM MISC 1 Act by Does not apply route every evening  Juliana Cunningham MD   Omega-3 Fatty Acids (FISH OIL OMEGA-3 PO) Take by mouth  Patient not taking: Reported on 6/17/2021  Historical Provider, MD   montelukast (SINGULAIR) 10 MG tablet Take 1 tablet by mouth daily  Patient not taking: Reported on 6/17/2021  Juliana Cunningham MD   ipratropium (ATROVENT) 0.06 % nasal spray 2 sprays by Nasal route 3 times daily as needed for Rhinitis  SHARI Alberto   clopidogrel (PLAVIX) 75 MG tablet Take 1 tablet by mouth daily  Patient not taking: Reported on 6/17/2021  Jem Caicedo MD   aspirin 81 MG tablet Take 162 mg by mouth daily   Patient not taking: Reported on 6/17/2021  Historical Provider, MD   vitamin B-12 (CYANOCOBALAMIN) 1000 MCG tablet Take 1,000 mcg by mouth daily  Patient not taking: Reported on 6/17/2021  Historical Provider, MD       Past Medical History:   Diagnosis Date    Arthritis     Psoriatic    Asthma     CAD (coronary artery disease)     [facility]     COPD [facility]chronic obstructive pulmonary disease) [facility]     [facility]     GERD (gastroesophageal reflux disease)     HTN (hypertension)     Hyperlipidemia     MI (myocardial infarction) ([facility]     x2    Thyroid disease        Past Surgical History:   Procedure Laterality Date    CATARACT REMOVAL      CHOLECYSTECTOMY      CORONARY ANGIOPLASTY  06/2018    Angioplasty to in-stent restenosis to the distal LAD    HIP SURGERY      HYSTERECTOMY      JOINT REPLACEMENT Right     Right knee replacement       Family History   Problem Relation Age of Onset    Heart Disease Mother     Heart Disease Father        CareTeam (Including outside providers/suppliers regularly involved in providing care):   Patient Care Team:  Jimenez Morrison MD as PCP - General (Family Medicine)  Jimenez Morrison MD as PCP - [facility] Empaneled Provider  Orquidea Mondragon MD as Consulting Physician (Interventional Cardiology)    Wt Readings from Last 3 Encounters:   06/17/21 165 lb (74.8 kg)   03/16/21 161 lb 6.4 oz (73.2 kg)   09/28/20 168 lb (76.2 kg)     Vitals:    06/17/21 1450   BP: 106/60   Pulse: 62   SpO2: 95%   Weight: 165 lb (74.8 kg)   Height: 5' 6\" (1.676 m)           The following problems were reviewed today and where indicated follow up appointments were made and/or referrals ordered.     Risk Factor Screenings with Interventions     Fall Risk:  2 or more falls in past year?: (!) yes  Fall with injury in past year?: no  Fall Risk Interventions:    · Discussed home safety tips    Depression:  PHQ-2 Score: 4  Depression Interventions:  · She feels her mood is stable and not interested in starting medication today    Anxiety:     Anxiety Interventions:  · Not indicated    Cognitive:  Clock Drawing Test (CDT) Score: Normal  Cognitive Impairment Interventions:  · Not indicated    Substance Abuse:  Social History     Socioeconomic History    Marital status:      Spouse name: Not on file    Number of children: Not on file    Years of education: Not on file    Highest education level: Not on file   Occupational History    Not on file   Tobacco Use    Smoking status: Never Smoker    Smokeless tobacco: Never Used   Vaping Use    Vaping Use: Never used   Substance and Sexual Activity    Alcohol use: No    Drug use: No    Sexual activity: Not on file   Other Topics Concern    Not on file   Social History Narrative    Not on file     Social Determinants of Health     Financial Resource Strain:     Difficulty of Paying Living Expenses:    Food Insecurity:     Worried About Running Out of Food in the Last Year:     Ran Out of Food in the Last Year:    Transportation Needs:     Lack of Transportation (Medical):  Lack of Transportation (Non-Medical):    Physical Activity:     Days of Exercise per Week:     Minutes of Exercise per Session:    Stress:     Feeling of Stress :    Social Connections:     Frequency of Communication with Friends and Family:     Frequency of Social Gatherings with Friends and Family:     Attends [employer]ist Services:     Active Member of Clubs or Organizations:     Attends Club or Organization Meetings:     Marital Status:    Intimate Partner Violence:     Fear of Current or Ex-Partner:     Emotionally Abused:     Physically Abused:     Sexually Abused:       Audit Questionnaire: Screen for Alcohol Misuse  How often do you have a drink containing alcohol?: Never  Substance Abuse Interventions:  · Not indicated    Health Risk Assessment:     General  In general, how would you say your health is?: Good  In the past 7 days, have you experienced any of the following? New or Increased Pain, New or Increased Fatigue, Loneliness, Social Isolation, Stress or Anger?: (!) New or Increased Pain, Loneliness  Do you get the social and emotional support that you need?: (!) No  Do you have a Living Will?: Yes  General Health Risk Interventions:  · Discussed local [facility] center    Health Habits/Nutrition  Do you exercise for at least 20 minutes 2-3 times per week?: Yes  Have you lost any weight without trying in the past 3 months?: (!) Yes  Do you eat only one meal per day?: No  Have you seen the dentist within the past year?: Yes  Body mass index is 26.63 kg/m². Health Habits/Nutrition Interventions:  · Encouraged proper diet    Hearing/Vision  Do you or your family notice any trouble with your hearing that hasn't been managed with hearing aids?: (!) Yes  Do you have difficulty driving, watching TV, or doing any of your daily activities because of your eyesight?: No  Have you had an eye exam within the past year?: Yes  Hearing/Vision Interventions:  · Recommend she speak to an audiologist    Safety  Do you have working smoke detectors?: Yes  Have all throw rugs been removed or fastened?: (!) No  Do you have non-slip mats or surfaces in all bathtubs/showers?: Yes  Do all of your stairways have a railing or banister?: Yes  Are your doorways, halls and stairs free of clutter?: Yes  Do you always fasten your seatbelt when you are in a car?: Yes  Safety Interventions:  · Home safety tips provided    ADLs  In the past 7 days, did you need help from others to perform any of the following everyday activities? Eating, dressing, grooming, bathing, toileting, or walking/balance?: None  In the past 7 days, did you need help from others to take care of any of the following?  Laundry, housekeeping, banking/finances, shopping, telephone use, food preparation, transportation, or taking medications?: (!) Banking/Finances, Food Preparation  ADL Interventions:  · Home safety tips provided    Personalized Preventive Plan   Current Health Maintenance Status  Immunization History   Administered Date(s) Administered    COVID-19, Jack Hoskins, PF, 30mcg/0.3mL 01/11/2021, 02/01/2021    Influenza, High Dose (Fluzone 65 yrs and older) 11/01/2017, 09/20/2018    Influenza, Quadv, adjuvanted, 65 yrs +, IM, PF (Fluad) 09/28/2020    Influenza, Triv, inactivated, subunit, adjuvanted, IM (Fluad 65 yrs and older) 09/30/2019    Pneumococcal Conjugate 13-valent ([id number]) 10/31/2016    Pneumococcal Polysaccharide ([id number]) 10/31/2017    Td, unspecified formulation 11/20/2017    Zoster Recombinant (Shingrix) 04/23/2018, 06/28/2018        Health Maintenance   Topic Date Due    Hepatitis C screen  Never done    Colon cancer screen colonoscopy  Never done    Annual Wellness Visit (AWV)  Never done    Breast cancer screen  12/10/2020    Lipid screen  02/06/2021    DTaP/Tdap/Td vaccine (1 - Tdap) 11/20/2027 (Originally 12/28/1959)    Potassium monitoring  03/16/2022    Creatinine monitoring  03/16/2022    DEXA (modify frequency per FRAX score)  Completed    Flu vaccine  Completed    Shingles Vaccine  Completed    Pneumococcal 65+ years Vaccine  Completed    COVID-19 Vaccine  Completed    Hepatitis A vaccine  Aged Out    Hib vaccine  Aged Out    Meningococcal (ACWY) vaccine  Aged Out       Recommendations for oBaz Due: see orders.   Recommended screening schedule for the next 5-10 years is provided to the patient in written form: see Patient Instructions/AVS.

## 2021-06-17 NOTE — PROGRESS NOTES
[facility] PHYSICIAN SERVICES  [facility] FAMILY MEDICINE  [address] [facility] [address] [address]  Dept: [phone]  Dept Fax: [phone]  Loc: [phone]    Joon Kilgore is a 79 y.o. female who presents today for her medical conditions/complaints as noted below. Joon Kilgore is here for Medicare AWV        HPI:   CC: Here today to discuss the following:    She has been experiencing gout off-and-on in bilateral feet for the past week. At the moment, she feels her pain is much better. She is having no swelling or redness. She continues to have issues with chronic neuropathy of the bilateral lower extremities. Symptoms include numbness and occasional burning. Hyperlipidemia  Tolerating current cholesterol medication without side effects. No body aches. Attempting to reduce processed sugar and cholesterol from diet. Hypothyroidism  Symptoms are stable. No temperature intolerance, fatigue, or mood disturbance from baseline. Complaint with current medication. Diabetes Mellitus  She stopped her insulin. She states she simply wanted to have her laboratory work drawn to see how \"bad\" her diabetes. Hypertension  Compliant with medications. No adverse effects from medication. No lightheadedness, palpitations, or chest pain.             HPI    Past Medical History:   Diagnosis Date    Arthritis     Psoriatic    Asthma     CAD (coronary artery disease)     CHF (congestive heart failure) (HCC)     COPD (chronic obstructive pulmonary disease) ([address]     DM (diabetes mellitus) ([address]     GERD (gastroesophageal reflux disease)     HTN (hypertension)     Hyperlipidemia     MI (myocardial infarction) ([address]     x2    Thyroid disease       Past Surgical History:   Procedure Laterality Date    CATARACT REMOVAL      CHOLECYSTECTOMY      CORONARY ANGIOPLASTY  06/2018    Angioplasty to in-stent restenosis to the distal LAD    HIP SURGERY      HYSTERECTOMY      JOINT REPLACEMENT Right     Right knee replacement       Family History   Problem Relation Age of Onset    Heart Disease Mother     Heart Disease Father        Social History     Tobacco Use    Smoking status: Never Smoker    Smokeless tobacco: Never Used   Substance Use Topics    Alcohol use: No     Current Outpatient Medications   Medication Sig Dispense Refill    Misc. Devices MISC Diabetic shoes  DIAGNOSIS: E11.42 1 Device 0    omeprazole (PRILOSEC) 40 MG delayed release capsule TAKE 1 CAPSULE DAILY 90 capsule 3    carvedilol (COREG) 25 MG tablet Take 1 tablet by mouth 2 times daily (with meals) 180 tablet 2    lisinopril (PRINIVIL;ZESTRIL) 40 MG tablet TAKE 1 TABLET DAILY 90 tablet 3    blood glucose test strips (FREESTYLE LITE) strip USE 4 STRIP TO CHECK GLUCOSE ONCE DAILY AS NEEDED 400 each 3    Lancet Device MISC 1 Device by Does not apply route once for 1 dose ICD-10-CM: E11.9 100 Device 3    Probiotic Product (ALIGN) 4 MG CAPS Once capsule daily 30 capsule 5    atorvastatin (LIPITOR) 80 MG tablet Take 1 tablet by mouth daily 90 tablet 3    levothyroxine (SYNTHROID) 25 MCG tablet TAKE 1 TABLET DAILY 90 tablet 3    Insulin Pen Needle 32G X 6 MM MISC One needle four times a day 360 each 11    FREESTYLE LANCETS MISC USE TO CHECK BLOOD SUGAR 1-2 TIMES DAILY 100 each 3    ketoconazole (NIZORAL) 2 % shampoo Apply topically daily as needed.  120 mL 0    umeclidinium-vilanterol (ANORO ELLIPTA) 62.5-25 MCG/INH AEPB inhaler Inhale 1 puff into the lungs daily      fluticasone (FLONASE) 50 MCG/ACT nasal spray 1 spray by Each Nare route daily      cetirizine (ZYRTEC) 10 MG tablet Take 10 mg by mouth 2 times daily      azelastine HCl 0.15 % SOLN 2 sprays by Nasal route 2 times daily 90 mL 5    Blood Glucose Monitoring Suppl ADE 1 kit by Does not apply route daily ICD-10-CM: E11.9 1 Device 0    Coenzyme Q10 (CO Q 10) 10 MG CAPS Take 1 capsule by mouth daily       GLUCOSAMINE-CALCIUM-VIT D PO Take 2 tablets by mouth daily       colchicine (COLCRYS) 0.6 MG tablet 2 tablets now then 1 tablet two hours later, repeat for 3 days if pain persists. 9 tablet 3    insulin glargine (BASAGLAR KWIKPEN) 100 UNIT/ML injection pen Inject 20 Units into the skin nightly 6 pen 5    NIFEdipine (ADALAT CC) 60 MG extended release tablet Take 1 tablet by mouth daily 90 tablet 3    Omega-3 Fatty Acids (FISH OIL OMEGA-3 PO) Take by mouth (Patient not taking: Reported on 6/17/2021)      montelukast (SINGULAIR) 10 MG tablet Take 1 tablet by mouth daily (Patient not taking: Reported on 6/17/2021) 90 tablet 3    clopidogrel (PLAVIX) 75 MG tablet Take 1 tablet by mouth daily (Patient not taking: Reported on 6/17/2021) 30 tablet 3    aspirin 81 MG tablet Take 162 mg by mouth daily  (Patient not taking: Reported on 6/17/2021)      vitamin B-12 (CYANOCOBALAMIN) 1000 MCG tablet Take 1,000 mcg by mouth daily (Patient not taking: Reported on 6/17/2021)       No current facility-administered medications for this visit. Allergies   Allergen Reactions    Tape Otto [place] Tape] Rash       Health Maintenance   Topic Date Due    Hepatitis C screen  Never done    Colon cancer screen colonoscopy  Never done    Annual Wellness Visit (AWV)  Never done    Breast cancer screen  12/10/2020    DTaP/Tdap/Td vaccine (1 - Tdap) 11/20/2027 (Originally 12/28/1959)    Lipid screen  06/17/2022    Potassium monitoring  06/17/2022    Creatinine monitoring  06/17/2022    DEXA (modify frequency per FRAX score)  Completed    Flu vaccine  Completed    Shingles Vaccine  Completed    Pneumococcal 65+ years Vaccine  Completed    COVID-19 Vaccine  Completed    Hepatitis A vaccine  Aged Out    Hib vaccine  Aged Out    Meningococcal (ACWY) vaccine  Aged Out       Subjective:      Review of Systems    Review of Systems   Constitutional: Negative for chills and fever. HENT: Negative for congestion.     Respiratory: Negative for cough, chest tightness and shortness of Peptide; Future  She was previously seen pulmonology at [facility]. Advised her to reestablish with them. 2. Type 2 diabetes mellitus with stage 3a chronic kidney disease, without long-term current use of insulin ([facility]  Check the following lab studies today and prior to next visit  Encouraged her to check her blood sugars before every meal and nightly for adjustment of medication  We will follow-up on the following lab studies  - Comprehensive Metabolic Panel; Future  - Hemoglobin A1C; Future  - Microalbumin / Creatinine Urine Ratio; Future  - Misc. Devices MISC; Diabetic shoes  DIAGNOSIS: E11.42  Dispense: 1 Device; Refill: 0  - Microalbumin / Creatinine Urine Ratio; Future  - Hemoglobin A1C; Future  - Comprehensive Metabolic Panel; Future    3. Depression with anxiety  Overall symptoms are stable    4. Gastroesophageal reflux disease without esophagitis  She continues to take omeprazole daily    5. Chronic obstructive pulmonary disease, unspecified COPD type ([facility]  Advised her to reestablish with her pulmonologist.  She is not currently using any inhalers    6. Primary hypothyroidism  Check TSH and T4 now and prior to next visit  - T4, Free; Future  - TSH without Reflex; Future  - T4, Free; Future  - TSH without Reflex; Future    7. Essential hypertension  BP Readings from Last 3 Encounters:   06/17/21 106/60   03/16/21 120/62   09/28/20 126/76     Stable  - CBC Auto Differential; Future  - CBC Auto Differential; Future    8. Hypercholesterolemia  We will check lipids today and again prior to next visit  - Lipid Panel; Future  - Lipid Panel; Future    9. Myalgia    - Sedimentation Rate; Future    10. Chronic gout of right foot, unspecified cause  Encouraged her to increase fluid intake  Discussed low purine diet we will check uric acid level next visit  - Uric Acid;  Future    Suggested she take colchicine 1.2 mg at next flareup and repeat 0.16 mg 2 hours later if symptoms persist        Return in about 3 months (around 9/17/2021) for 30 minute visit - routine follow up, In Office. Discussed use, benefit, and side effects of prescribed medications. All patient questions answered. Pt voiced understanding. Reviewed health maintenance. Instructedto continue current medications, diet and exercise. Patient agreed with treatmentplan. Follow up as directed. Note dictated using Dragon Dictation software  Sometimes this dictation software makes erroneous transcriptions.

## 2021-06-18 DIAGNOSIS — E11.9 TYPE 2 DIABETES MELLITUS WITHOUT COMPLICATION, WITHOUT LONG-TERM CURRENT USE OF INSULIN (HCC): ICD-10-CM

## 2021-06-18 RX ORDER — INSULIN GLARGINE 100 [IU]/ML
20 INJECTION, SOLUTION SUBCUTANEOUS NIGHTLY
Qty: 6 PEN | Refills: 5
Start: 2021-06-18 | End: 2021-06-21 | Stop reason: SDUPTHER

## 2021-06-21 DIAGNOSIS — E11.9 TYPE 2 DIABETES MELLITUS WITHOUT COMPLICATION, WITHOUT LONG-TERM CURRENT USE OF INSULIN (HCC): ICD-10-CM

## 2021-06-21 RX ORDER — COLCHICINE 0.6 MG/1
TABLET ORAL
Qty: 9 TABLET | Refills: 3 | Status: ON HOLD
Start: 2021-06-21 | End: 2021-12-02 | Stop reason: HOSPADM

## 2021-06-21 RX ORDER — INSULIN GLARGINE 100 [IU]/ML
20 INJECTION, SOLUTION SUBCUTANEOUS NIGHTLY
Qty: 6 PEN | Refills: 5 | Status: SHIPPED | OUTPATIENT
Start: 2021-06-21 | End: 2021-11-04 | Stop reason: SDUPTHER

## 2021-06-25 DIAGNOSIS — E11.9 TYPE 2 DIABETES MELLITUS WITHOUT COMPLICATION, WITHOUT LONG-TERM CURRENT USE OF INSULIN (HCC): Primary | ICD-10-CM

## 2021-06-25 DIAGNOSIS — N18.31 TYPE 2 DIABETES MELLITUS WITH STAGE 3A CHRONIC KIDNEY DISEASE, WITHOUT LONG-TERM CURRENT USE OF INSULIN (HCC): ICD-10-CM

## 2021-06-25 DIAGNOSIS — E11.22 TYPE 2 DIABETES MELLITUS WITH STAGE 3A CHRONIC KIDNEY DISEASE, WITHOUT LONG-TERM CURRENT USE OF INSULIN (HCC): ICD-10-CM

## 2021-07-05 ENCOUNTER — HOSPITAL ENCOUNTER (EMERGENCY)
Age: 81
Discharge: HOME | End: 2021-07-05
Attending: EMERGENCY MEDICINE
Payer: MEDICARE

## 2021-07-05 ENCOUNTER — OFFICE VISIT (OUTPATIENT)
Dept: URGENT CARE | Age: 81
End: 2021-07-05
Payer: MEDICARE

## 2021-07-05 ENCOUNTER — APPOINTMENT (OUTPATIENT)
Dept: CT IMAGING | Age: 81
End: 2021-07-05
Payer: MEDICARE

## 2021-07-05 VITALS
RESPIRATION RATE: 18 BRPM | SYSTOLIC BLOOD PRESSURE: 120 MMHG | HEART RATE: 68 BPM | TEMPERATURE: 98.5 F | DIASTOLIC BLOOD PRESSURE: 64 MMHG | BODY MASS INDEX: 25.62 KG/M2 | WEIGHT: 159.4 LBS | OXYGEN SATURATION: 97 % | HEIGHT: 66 IN

## 2021-07-05 VITALS
BODY MASS INDEX: 25.71 KG/M2 | DIASTOLIC BLOOD PRESSURE: 69 MMHG | TEMPERATURE: 97.8 F | SYSTOLIC BLOOD PRESSURE: 149 MMHG | WEIGHT: 160 LBS | HEART RATE: 71 BPM | HEIGHT: 66 IN | OXYGEN SATURATION: 97 % | RESPIRATION RATE: 16 BRPM

## 2021-07-05 DIAGNOSIS — R42 ORTHOSTATIC DIZZINESS: ICD-10-CM

## 2021-07-05 DIAGNOSIS — E11.65 UNCONTROLLED TYPE 2 DIABETES MELLITUS WITH HYPERGLYCEMIA (HCC): Primary | ICD-10-CM

## 2021-07-05 DIAGNOSIS — R19.7 DIARRHEA, UNSPECIFIED TYPE: ICD-10-CM

## 2021-07-05 DIAGNOSIS — E11.649 HYPOGLYCEMIA ASSOCIATED WITH TYPE 2 DIABETES MELLITUS (HCC): Primary | ICD-10-CM

## 2021-07-05 LAB
ADENOVIRUS F 40 41 PCR: NOT DETECTED
ALBUMIN SERPL-MCNC: 4 G/DL (ref 3.5–5.2)
ALP BLD-CCNC: 114 U/L (ref 35–104)
ALT SERPL-CCNC: 17 U/L (ref 5–33)
ANION GAP SERPL CALCULATED.3IONS-SCNC: 11 MMOL/L (ref 7–19)
APTT: 37.5 SEC (ref 26–36.2)
AST SERPL-CCNC: 17 U/L (ref 5–32)
ASTROVIRUS PCR: NOT DETECTED
BASOPHILS ABSOLUTE: 0 K/UL (ref 0–0.2)
BASOPHILS RELATIVE PERCENT: 0.4 % (ref 0–1)
BILIRUB SERPL-MCNC: 0.5 MG/DL (ref 0.2–1.2)
BILIRUBIN URINE: NEGATIVE
BLOOD, URINE: NEGATIVE
BUN BLDV-MCNC: 11 MG/DL (ref 8–23)
CALCIUM SERPL-MCNC: 9.4 MG/DL (ref 8.8–10.2)
CAMPYLOBACTER PCR: NOT DETECTED
CHLORIDE BLD-SCNC: 103 MMOL/L (ref 98–111)
CHP ED QC CHECK: ABNORMAL
CLARITY: CLEAR
CLOSTRIDIUM DIFFICILE, PCR: NOT DETECTED
CO2: 23 MMOL/L (ref 22–29)
COLOR: YELLOW
CREAT SERPL-MCNC: 0.7 MG/DL (ref 0.5–0.9)
CRYPTOSPORIDIUM PCR: NOT DETECTED
CYCLOSPORA CAYETANENSIS PCR: NOT DETECTED
E COLI ENTEROAGGREGATIVE PCR: NOT DETECTED
E COLI ENTEROPATHOGENIC PCR: NOT DETECTED
E COLI ENTEROTOXIGENIC PCR: NOT DETECTED
E COLI SHIGELLA/ENTEROINVASIVE PCR: NOT DETECTED
ENTAMOEBA HISTOLYTICA PCR: NOT DETECTED
EOSINOPHILS ABSOLUTE: 0.4 K/UL (ref 0–0.6)
EOSINOPHILS RELATIVE PERCENT: 5 % (ref 0–5)
GFR AFRICAN AMERICAN: >59
GFR NON-AFRICAN AMERICAN: >60
GIARDIA LAMBLIA PCR: NOT DETECTED
GLUCOSE BLD-MCNC: 183 MG/DL (ref 70–99)
GLUCOSE BLD-MCNC: 186 MG/DL (ref 74–109)
GLUCOSE BLD-MCNC: 189 MG/DL
GLUCOSE URINE: NEGATIVE MG/DL
HCT VFR BLD CALC: 40 % (ref 37–47)
HEMOGLOBIN: 13.4 G/DL (ref 12–16)
IMMATURE GRANULOCYTES #: 0 K/UL
INR BLD: 1.03 (ref 0.88–1.18)
KETONES, URINE: NEGATIVE MG/DL
LEUKOCYTE ESTERASE, URINE: NEGATIVE
LYMPHOCYTES ABSOLUTE: 1.5 K/UL (ref 1.1–4.5)
LYMPHOCYTES RELATIVE PERCENT: 21.4 % (ref 20–40)
MCH RBC QN AUTO: 29.6 PG (ref 27–31)
MCHC RBC AUTO-ENTMCNC: 33.5 G/DL (ref 33–37)
MCV RBC AUTO: 88.5 FL (ref 81–99)
MONOCYTES ABSOLUTE: 0.6 K/UL (ref 0–0.9)
MONOCYTES RELATIVE PERCENT: 8.6 % (ref 0–10)
NEUTROPHILS ABSOLUTE: 4.5 K/UL (ref 1.5–7.5)
NEUTROPHILS RELATIVE PERCENT: 64.2 % (ref 50–65)
NITRITE, URINE: NEGATIVE
NOROVIRUS GI GII PCR: NOT DETECTED
PDW BLD-RTO: 13.7 % (ref 11.5–14.5)
PERFORMED ON: ABNORMAL
PH UA: 5.5 (ref 5–8)
PLATELET # BLD: 194 K/UL (ref 130–400)
PLESIOMONAS SHIGELLOIDES PCR: NOT DETECTED
PMV BLD AUTO: 10.1 FL (ref 9.4–12.3)
POTASSIUM REFLEX MAGNESIUM: 3.7 MMOL/L (ref 3.5–5)
PROTEIN UA: NEGATIVE MG/DL
PROTHROMBIN TIME: 13.7 SEC (ref 12–14.6)
RBC # BLD: 4.52 M/UL (ref 4.2–5.4)
ROTAVIRUS A PCR: NOT DETECTED
SALMONELLA PCR: NOT DETECTED
SAPOVIRUS PCR: NOT DETECTED
SHIGA-LIKE TOXIN-PRODUCING E. COLI (STEC) STX1/STX2: NOT DETECTED
SODIUM BLD-SCNC: 137 MMOL/L (ref 136–145)
SPECIFIC GRAVITY UA: 1.03 (ref 1–1.03)
TOTAL PROTEIN: 6.9 G/DL (ref 6.6–8.7)
UROBILINOGEN, URINE: 0.2 E.U./DL
VIBRIO CHOLERAE PCR: NOT DETECTED
VIBRIO PCR: NOT DETECTED
WBC # BLD: 7 K/UL (ref 4.8–10.8)
YERSINIA ENTEROCOLITICA PCR: NOT DETECTED

## 2021-07-05 PROCEDURE — 85730 THROMBOPLASTIN TIME PARTIAL: CPT

## 2021-07-05 PROCEDURE — 85610 PROTHROMBIN TIME: CPT

## 2021-07-05 PROCEDURE — 0097U HC GI PTHGN MULT REV TRANS & AMP PRB TECH 22 TRGT: CPT

## 2021-07-05 PROCEDURE — 82962 GLUCOSE BLOOD TEST: CPT | Performed by: NURSE PRACTITIONER

## 2021-07-05 PROCEDURE — 74177 CT ABD & PELVIS W/CONTRAST: CPT

## 2021-07-05 PROCEDURE — 80053 COMPREHEN METABOLIC PANEL: CPT

## 2021-07-05 PROCEDURE — 36415 COLL VENOUS BLD VENIPUNCTURE: CPT

## 2021-07-05 PROCEDURE — 6360000004 HC RX CONTRAST MEDICATION: Performed by: EMERGENCY MEDICINE

## 2021-07-05 PROCEDURE — 96360 HYDRATION IV INFUSION INIT: CPT

## 2021-07-05 PROCEDURE — 99282 EMERGENCY DEPT VISIT SF MDM: CPT

## 2021-07-05 PROCEDURE — 2580000003 HC RX 258: Performed by: EMERGENCY MEDICINE

## 2021-07-05 PROCEDURE — 81003 URINALYSIS AUTO W/O SCOPE: CPT

## 2021-07-05 PROCEDURE — 85025 COMPLETE CBC W/AUTO DIFF WBC: CPT

## 2021-07-05 PROCEDURE — 82947 ASSAY GLUCOSE BLOOD QUANT: CPT

## 2021-07-05 RX ORDER — 0.9 % SODIUM CHLORIDE 0.9 %
1000 INTRAVENOUS SOLUTION INTRAVENOUS ONCE
Status: COMPLETED | OUTPATIENT
Start: 2021-07-05 | End: 2021-07-05

## 2021-07-05 RX ADMIN — SODIUM CHLORIDE 1000 ML: 9 INJECTION, SOLUTION INTRAVENOUS at 14:27

## 2021-07-05 RX ADMIN — IOPAMIDOL 90 ML: 755 INJECTION, SOLUTION INTRAVENOUS at 14:01

## 2021-07-05 ASSESSMENT — ENCOUNTER SYMPTOMS
BLOOD IN STOOL: 0
ABDOMINAL DISTENTION: 0
CHEST TIGHTNESS: 0
NAUSEA: 0
SHORTNESS OF BREATH: 0
VOMITING: 0
SORE THROAT: 0
CONSTIPATION: 0
WHEEZING: 0
ABDOMINAL PAIN: 1
RECTAL PAIN: 0
DIARRHEA: 1
ANAL BLEEDING: 0

## 2021-07-05 ASSESSMENT — PAIN SCALES - GENERAL: PAINLEVEL_OUTOF10: 3

## 2021-07-05 NOTE — ED PROVIDER NOTES
Attending Supervisory Note/Shared Visit   I have personally performed a face to face diagnostic evaluation on this patient. I have reviewed the mid-levels findings and agree. Ms. Bonilla Lloyd is a 80 yo F that presents to the ED for DM blood sugar issues and chronic diarrhea. Pt reports low blood sugars in the evening and then high in the mornings. Tells me the other night her blood sugar was 22 once and 44 another night but she was awake and alert but felt a little dizzy. Has seen PCP and supposedly sent different medication to pharmacy but this was never picked up. Patient supposed to be on sliding scale and basilar at night but only using the 20 unit basilar injection at night. She has never calibrated her glucometer. She generally has a poor understanding of her DM and how to manage it it seems unfortunately. Have instructed her to decreased basilar to 17 units nightly for now until can follow up with Dr. Sarah Sims this week. Son and pt also concerned about ongoing diarrhea for months but worse over past week. Non bloody stools. No relief with immodium. CT abd grossly neg. GI panel is negative. Pt stable for DC and outpt follow up      FINAL IMPRESSION      1. Uncontrolled type 2 diabetes mellitus with hyperglycemia ([facility]    2.  Diarrhea, unspecified type          Aidan Kincaid MD  Attending Emergency Physician        Royal Lucien MD  07/05/21 1700

## 2021-07-05 NOTE — PROGRESS NOTES
[address] [facility] URGENT CARE  [address] [facility]  [address] [address]  Dept: [phone]  Dept Fax: Sam Morrisonum: [phone]    Amilcar Montoya is a 79 y.o. female who presents today for her medical conditions/complaintsas noted below. Amilcar Montoya is c/o of Diarrhea and Hypoglycemia        HPI:     Diarrhea   This is a new problem. Episode onset: x3 days. Episode frequency: 4 times daily. The problem has been unchanged. The stool consistency is described as watery. The patient states that diarrhea does not awaken her from sleep. Associated symptoms include abdominal pain (cramping associated with diarrhea) and chills. Pertinent negatives include no fever or vomiting. Nothing aggravates the symptoms. There are no known risk factors. She has tried nothing for the symptoms. Hypoglycemia  This is a new (Patient is type 2 diabetic on insulin therapy. ) problem. Episode onset: x3 days. on 7/3 glucose was 22 upon awakening. on 7/4 it was 44. The problem occurs intermittently. The problem has been waxing and waning. Associated symptoms include abdominal pain (cramping associated with diarrhea), chills, fatigue and vertigo (upon walking). Pertinent negatives include no chest pain, congestion, fever, nausea, numbness, sore throat, vomiting or weakness. Treatments tried: patient has stopped insulin as of Saturday. Patient lives at the [facility]. She states she has not been going down to eat and they have not been bringing her food trays. She has had a banana to eat today.      Past Medical History:   Diagnosis Date    Arthritis     Psoriatic    Asthma     CAD (coronary artery disease)     CHF (congestive heart failure) (HCC)     COPD (chronic obstructive pulmonary disease) (HCC)     DM (diabetes mellitus) [facility]     GERD (gastroesophageal reflux disease)     HTN (hypertension)     Hyperlipidemia     MI (myocardial infarction) ([facility]     x2    Thyroid disease umeclidinium-vilanterol (ANORO ELLIPTA) 62.5-25 MCG/INH AEPB inhaler Inhale 1 puff into the lungs daily      fluticasone (FLONASE) 50 MCG/ACT nasal spray 1 spray by Each Nare route daily      cetirizine (ZYRTEC) 10 MG tablet Take 10 mg by mouth 2 times daily      azelastine HCl 0.15 % SOLN 2 sprays by Nasal route 2 times daily 90 mL 5    Blood Glucose Monitoring Suppl ADE 1 kit by Does not apply route daily ICD-10-CM: E11.9 1 Device 0    Coenzyme Q10 (CO Q 10) 10 MG CAPS Take 1 capsule by mouth daily       GLUCOSAMINE-CALCIUM-VIT D PO Take 2 tablets by mouth daily       Lancet Device MISC 1 Device by Does not apply route once for 1 dose ICD-10-CM: E11.9 100 Device 3    Omega-3 Fatty Acids (FISH OIL OMEGA-3 PO) Take by mouth (Patient not taking: Reported on 6/17/2021)      montelukast (SINGULAIR) 10 MG tablet Take 1 tablet by mouth daily (Patient not taking: Reported on 6/17/2021) 90 tablet 3    clopidogrel (PLAVIX) 75 MG tablet Take 1 tablet by mouth daily (Patient not taking: Reported on 6/17/2021) 30 tablet 3    aspirin 81 MG tablet Take 162 mg by mouth daily  (Patient not taking: Reported on 6/17/2021)      vitamin B-12 (CYANOCOBALAMIN) 1000 MCG tablet Take 1,000 mcg by mouth daily (Patient not taking: Reported on 6/17/2021)       No current facility-administered medications for this visit.      Allergies   Allergen Reactions    Tape Grace Rice Tape] Rash       Health Maintenance   Topic Date Due    Hepatitis C screen  Never done    Colon cancer screen colonoscopy  Never done    Breast cancer screen  12/10/2020    DTaP/Tdap/Td vaccine (1 - Tdap) 11/20/2027 (Originally 11/21/2017)    Flu vaccine (1) 09/01/2021    Lipid screen  06/17/2022    Potassium monitoring  06/17/2022    Creatinine monitoring  06/17/2022    Annual Wellness Visit (AWV)  06/18/2022    DEXA (modify frequency per FRAX score)  Completed    Shingles Vaccine  Completed    Pneumococcal 65+ years Vaccine  Completed    COVID-19 deficit present. Mental Status: She is alert and oriented to person, place, and time. Motor: No weakness. Psychiatric:         Mood and Affect: Mood normal.       /64 Comment: Standing  Pulse 68   Temp 98.5 °F (36.9 °C)   Resp 18   Ht 5' 6\" (1.676 m)   Wt 159 lb 6.4 oz (72.3 kg)   SpO2 97%   BMI 25.73 kg/m²     Assessment:       Diagnosis Orders   1. Hypoglycemia associated with type 2 diabetes mellitus (HCC)  POCT Glucose   2. Diarrhea, unspecified type     3. Orthostatic dizziness         Plan:      Orders Placed This Encounter   Procedures    POCT Glucose     Results for orders placed or performed in visit on 21   POCT Glucose   Result Value Ref Range    Glucose 189 mg/dL    QC OK? Patient also relates that her  recently  and she is \"very depressed. \" Denies SI. Orthostatics positive, patient had drop in blood pressure in each position change and was symptomatic. I do believe patient would benefit from lab work and IV fluids today as she appears to be dehydrated. It is also concerning that she is having severe episodes of hypoglycemia. Advised to go to ER today for further work up. Patient VU. Transportation called to drive her to ER. Report given to lydia Limon RN in ED. No follow-ups on file. No orders of the defined types were placed in this encounter. Patient given educational materials- see patient instructions. Discussed use, benefit, and side effects of prescribedmedications. All patient questions answered. Pt voiced understanding. Reviewedhealth maintenance. Instructed to continue current medications, diet and exercise. Patient agreed with treatment plan. Follow up as directed. There are no Patient Instructions on file for this visit.       Electronically signed by SHARI Khalil CNP on 2021 at 9:57 AM

## 2021-07-06 ASSESSMENT — ENCOUNTER SYMPTOMS
PHOTOPHOBIA: 0
COLOR CHANGE: 0
EYE DISCHARGE: 0
RHINORRHEA: 0
COUGH: 0
ABDOMINAL PAIN: 1
SHORTNESS OF BREATH: 0
EYE PAIN: 0
ABDOMINAL DISTENTION: 0
DIARRHEA: 1
NAUSEA: 1
SORE THROAT: 0
BACK PAIN: 0
APNEA: 0
VOMITING: 1

## 2021-07-06 NOTE — ED PROVIDER NOTES
[facility]  eMERGENCYdEPARTMENT eNCOUnter      Pt Name: Santo Olson  MRN: [id number]  [place] 1940  Date of evaluation: 7/5/2021  Provider:NICHOLAS Scott    CHIEF COMPLAINT       Chief Complaint   Patient presents with    Hypoglycemia    Hypotension         HISTORY OF PRESENT ILLNESS  (Location/Symptom, Timing/Onset, Context/Setting, Quality, Duration, Modifying Factors, Severity.)   Santo Olson is a 79 y.o. female who presents to the emergency department with comments of hypoglycemia in the evening and hyper in the morning. She states 2 nights ago she checked her sugar and it was 22 following night was 44 sent here by urgent care for further work-up she is alert oriented here with her son who is confirming mental status baseline talking about intermittent diarrhea for the past 6 months poor compliance with insulin she states that her BP has been elevated as well as high as 1 40-1 50 on the top number. She denies any headache no vision changes denies any significant abdominal pain she does have mild lower vague discomfort denies any frequency or polydipsia. Afebrile no chills. Ambulatory lives alone. HPI    Nursing Notes were reviewed and I agree. REVIEW OF SYSTEMS    (2-9 systems for level 4, 10 or more for level 5)     Review of Systems   Constitutional: Negative for activity change, appetite change, chills and fever. HENT: Negative for congestion, postnasal drip, rhinorrhea and sore throat. Eyes: Negative for photophobia, pain, discharge and visual disturbance. Respiratory: Negative for apnea, cough and shortness of breath. Cardiovascular: Negative for chest pain and leg swelling. Gastrointestinal: Positive for abdominal pain, diarrhea, nausea and vomiting. Negative for abdominal distention. Genitourinary: Negative for vaginal bleeding. Musculoskeletal: Negative for arthralgias, back pain, joint swelling, neck pain and neck stiffness.    Skin: Negative for color change and rash. Neurological: Negative for dizziness, syncope, facial asymmetry and headaches. Hematological: Negative for adenopathy. Does not bruise/bleed easily. Psychiatric/Behavioral: Negative for agitation, behavioral problems and confusion. Except as noted above the remainder of the review of systems was reviewed and negative. PAST MEDICAL HISTORY     Past Medical History:   Diagnosis Date    Arthritis     Psoriatic    Asthma     CAD (coronary artery disease)     CHF (congestive heart failure) (HCC)     [facility]     [facility]     GERD (gastroesophageal reflux disease)     HTN (hypertension)     Hyperlipidemia     MI [facility]my[facility] infarction) [facility]     x2    Thyroid disease          SURGICAL HISTORY       Past Surgical History:   Procedure Laterality Date    CATARACT REMOVAL      CHOLECYSTECTOMY      CORONARY ANGIOPLASTY  06/2018    Angioplasty to in-stent restenosis to the distal LAD    HIP SURGERY      HYSTERECTOMY      JOINT REPLACEMENT Right     Right knee replacement         CURRENT MEDICATIONS       Discharge Medication List as of 7/5/2021  3:53 PM      CONTINUE these medications which have NOT CHANGED    Details   !! Misc. Devices MISC Disp-1 Device, R-0, PrintDiabetic shoes     E11.9 E11.22      !! Misc. Devices MISC Disp-1 Device, R-0, PrintDiabetic shoes DIAGNOSIS: E11.42      colchicine (COLCRYS) 0.6 MG tablet 2 tablets now then 1 tablet two hours later, repeat for 3 days if pain persists. , Disp-9 tablet, R-3Normal      insulin glargine (BASAGLAR KWIKPEN) 100 UNIT/ML injection pen Inject 20 Units into the skin nightly, Disp-6 pen, R-5Normal      omeprazole (PRILOSEC) 40 MG delayed release capsule TAKE 1 CAPSULE DAILY, Disp-90 capsule, R-3Normal      carvedilol (COREG) 25 MG tablet Take 1 tablet by mouth 2 times daily (with meals), Disp-180 tablet, R-2Normal      lisinopril (PRINIVIL;ZESTRIL) 40 MG tablet TAKE 1 TABLET DAILY, Disp-90 tablet, R-3Normal      blood glucose test strips (FREESTYLE LITE) strip USE 4 STRIP TO CHECK GLUCOSE ONCE DAILY AS NEEDED, Disp-400 each, R-3Normal      Lancet Device MISC ONCE Starting Thu 11/19/2020, Disp-100 Device, R-3, NormalICD-10-CM: E11.9      Probiotic Product (ALIGN) 4 MG CAPS Once capsule daily, Disp-30 capsule,R-5Or similar probiotic such as culturelle if you don't have align. Normal      atorvastatin (LIPITOR) 80 MG tablet Take 1 tablet by mouth daily, Disp-90 tablet,R-3Normal      levothyroxine (SYNTHROID) 25 MCG tablet TAKE 1 TABLET DAILY, Disp-90 tablet,R-3Normal      Insulin Pen Needle 32G X 6 MM MISC Disp-360 each,R-11, NormalOne needle four times a day      NIFEdipine (ADALAT CC) 60 MG extended release tablet Take 1 tablet by mouth daily, Disp-90 tablet, R-3Normal      FREESTYLE LANCETS MISC Disp-100 each, R-3, NormalUSE TO CHECK BLOOD SUGAR 1-2 TIMES DAILY      ketoconazole (NIZORAL) 2 % shampoo Apply topically daily as needed. , Disp-120 mL, R-0, Normal      Omega-3 Fatty Acids (FISH OIL OMEGA-3 PO) Take by mouthHistorical Med      umeclidinium-vilanterol (ANORO ELLIPTA) 62.5-25 MCG/INH AEPB inhaler Inhale 1 puff into the lungs dailyHistorical Med      fluticasone (FLONASE) 50 MCG/ACT nasal spray 1 spray by Each Nare route dailyHistorical Med      cetirizine (ZYRTEC) 10 MG tablet Take 10 mg by mouth 2 times dailyHistorical Med      montelukast (SINGULAIR) 10 MG tablet Take 1 tablet by mouth daily, Disp-90 tablet, R-3Normal      azelastine HCl 0.15 % SOLN 2 sprays by Nasal route 2 times daily, Disp-90 mL, R-5Normal      Blood Glucose Monitoring Suppl ADE DAILY Starting Mon 4/23/2018, Disp-1 Device, R-0, NormalICD-10-CM: E11.9      clopidogrel (PLAVIX) 75 MG tablet Take 1 tablet by mouth daily, Disp-30 tablet, R-3Normal      aspirin 81 MG tablet Take 162 mg by mouth daily Historical Med      vitamin B-12 (CYANOCOBALAMIN) 1000 MCG tablet Take 1,000 mcg by mouth dailyHistorical Med      Coenzyme Q10 (CO Q 10) 10 MG CAPS Take 1 capsule by mouth daily Historical Med      GLUCOSAMINE-CALCIUM-VIT D PO Take 2 tablets by mouth daily Historical Med       !! - Potential duplicate medications found. Please discuss with provider. ALLERGIES     Tape Henretta Perfect tape]    FAMILY HISTORY       Family History   Problem Relation Age of Onset    Heart Disease Mother     Heart Disease Father           SOCIAL HISTORY       Social History     Socioeconomic History    Marital status:      Spouse name: None    Number of children: None    Years of education: None    Highest education level: None   Occupational History    None   Tobacco Use    Smoking status: Never Smoker    Smokeless tobacco: Never Used   Vaping Use    Vaping Use: Never used   Substance and Sexual Activity    Alcohol use: No    Drug use: No    Sexual activity: None   Other Topics Concern    None   Social History Narrative    None     Social Determinants of Health     Financial Resource Strain:     Difficulty of Paying Living Expenses:    Food Insecurity:     Worried About Running Out of Food in the Last Year:     Ran Out of Food in the Last Year:    Transportation Needs:     Lack of Transportation (Medical):      Lack of Transportation (Non-Medical):    Physical Activity:     Days of Exercise per Week:     Minutes of Exercise per Session:    Stress:     Feeling of Stress :    Social Connections:     Frequency of Communication with Friends and Family:     Frequency of Social Gatherings with Friends and Family:     Attends [employer] Services:     Active Member of Clubs or Organizations:     Attends Club or Organization Meetings:     Marital Status:    Intimate Partner Violence:     Fear of Current or Ex-Partner:     Emotionally Abused:     Physically Abused:     Sexually Abused:        SCREENINGS           PHYSICAL EXAM    (up to 7 forlevel 4, 8 or more for level 5)     ED Triage Vitals [07/05/21 1032]   BP Temp Temp Source Pulse Resp SpO2 Height Weight   120/82 97.8 °F (36.6 °C) Oral 71 16 97 % 5' 6\" (1.676 m) 160 lb (72.6 kg)       Physical Exam  Vitals and nursing note reviewed. Constitutional:       General: She is not in acute distress. Appearance: Normal appearance. She is well-developed. She is not diaphoretic. HENT:      Head: Normocephalic and atraumatic. Right Ear: Tympanic membrane, ear canal and external ear normal.      Left Ear: Tympanic membrane, ear canal and external ear normal.      Nose: Nose normal.      Mouth/Throat:      Mouth: Mucous membranes are moist.      Pharynx: No oropharyngeal exudate. Eyes:      General:         Right eye: No discharge. Left eye: No discharge. Pupils: Pupils are equal, round, and reactive to light. Neck:      Thyroid: No thyromegaly. Cardiovascular:      Rate and Rhythm: Normal rate and regular rhythm. Pulses: Normal pulses. Heart sounds: Normal heart sounds. No murmur heard. No friction rub. Pulmonary:      Effort: Pulmonary effort is normal. No respiratory distress. Breath sounds: Normal breath sounds. No stridor. No wheezing. Abdominal:      General: Abdomen is flat. Bowel sounds are normal. There is no distension. Palpations: Abdomen is soft. Tenderness: There is no abdominal tenderness. Musculoskeletal:         General: Normal range of motion. Cervical back: Normal range of motion and neck supple. Skin:     General: Skin is warm and dry. Capillary Refill: Capillary refill takes less than 2 seconds. Findings: No rash. Neurological:      General: No focal deficit present. Mental Status: She is alert and oriented to person, place, and time. Mental status is at baseline. Cranial Nerves: No cranial nerve deficit. Sensory: No sensory deficit.       Coordination: Coordination normal.   Psychiatric:         Mood and Affect: Mood normal.         Behavior: within normal range or notreturned as of this dictation. RE-ASSESSMENT        EMERGENCY DEPARTMENT COURSE and DIFFERENTIAL DIAGNOSIS/MDM:   Vitals:    Vitals:    07/05/21 1032 07/05/21 1301 07/05/21 1416   BP: 120/82 (!) 149/69    Pulse: 71     Resp: 16     Temp: 97.8 °F (36.6 °C)     TempSrc: Oral     SpO2: 97%  97%   Weight: 160 lb (72.6 kg)     Height: 5' 6\" (1.676 m)         MDM  Patient's blood pressure remained stable here she has diarrhea with no specified type as the stool panel does not show anything acute nothing acute appreciated on her CT abdomen. Her blood sugar is elevated today but not significantly so. It sounds like it is more of an issue at night when she is eating ice cream and other high sugar foods my attending has seen my patient alongside myself has educated her on insulin usage blood sugar sliding scale appropriate techniques and wants her to follow closely with Dr. Nic Ovalle. We will discharge at this time. PROCEDURES:    Procedures      FINAL IMPRESSION      1. Uncontrolled type 2 diabetes mellitus with hyperglycemia ([address]    2.  Diarrhea, unspecified type          DISPOSITION/PLAN   DISPOSITION Decision To Discharge 07/05/2021 03:31:08 PM      PATIENT REFERRED TO:  [facility] [place] [facility] EMERGENCY DEPT  [facility]  [phone]    If symptoms worsen    Whitney Benoit MD  [address] [address] [phone]      for sugar and BP control      DISCHARGE MEDICATIONS:  Discharge Medication List as of 7/5/2021  3:53 PM          (Please note that portions of this note were completed with a voice recognition program.  Efforts were made to edit the dictations but occasionallywords are mis-transcribed.)    Betty Stewart [address] [place] [address]  07/06/21 5501

## 2021-07-08 DIAGNOSIS — G57.93 NEUROPATHY OF BOTH FEET: ICD-10-CM

## 2021-07-08 DIAGNOSIS — R09.89 POOR CIRCULATION OF EXTREMITY: Primary | ICD-10-CM

## 2021-07-13 DIAGNOSIS — N18.30 TYPE 2 DIABETES MELLITUS WITH STAGE 3 CHRONIC KIDNEY DISEASE, WITHOUT LONG-TERM CURRENT USE OF INSULIN (HCC): ICD-10-CM

## 2021-07-13 DIAGNOSIS — E11.22 TYPE 2 DIABETES MELLITUS WITH STAGE 3 CHRONIC KIDNEY DISEASE, WITHOUT LONG-TERM CURRENT USE OF INSULIN (HCC): ICD-10-CM

## 2021-07-13 RX ORDER — BLOOD-GLUCOSE METER
KIT MISCELLANEOUS
Qty: 400 EACH | Refills: 3 | Status: ON HOLD
Start: 2021-07-13 | End: 2021-12-02 | Stop reason: HOSPADM

## 2021-07-13 NOTE — TELEPHONE ENCOUNTER
Frankey Tulio called to request a refill on her medication.       Last office visit : 6/17/2021   Next office visit : 10/7/2021     Requested Prescriptions     Pending Prescriptions Disp Refills    blood glucose test strips (FREESTYLE LITE) strip 400 each 3     Sig: USE 4 STRIP TO CHECK GLUCOSE ONCE DAILY AS NEEDED            Liseth Monzon MA

## 2021-08-16 DIAGNOSIS — E78.00 HYPERCHOLESTEROLEMIA: ICD-10-CM

## 2021-08-16 RX ORDER — ATORVASTATIN CALCIUM 80 MG/1
80 TABLET, FILM COATED ORAL DAILY
Qty: 90 TABLET | Refills: 3 | Status: ON HOLD | OUTPATIENT
Start: 2021-08-16 | End: 2021-12-02 | Stop reason: HOSPADM

## 2021-10-07 ENCOUNTER — OFFICE VISIT (OUTPATIENT)
Dept: FAMILY MEDICINE CLINIC | Age: 81
End: 2021-10-07
Payer: MEDICARE

## 2021-10-07 VITALS
DIASTOLIC BLOOD PRESSURE: 72 MMHG | BODY MASS INDEX: 26.03 KG/M2 | HEIGHT: 66 IN | HEART RATE: 72 BPM | SYSTOLIC BLOOD PRESSURE: 118 MMHG | OXYGEN SATURATION: 97 % | WEIGHT: 162 LBS | TEMPERATURE: 97.7 F

## 2021-10-07 DIAGNOSIS — D64.9 ANEMIA, UNSPECIFIED TYPE: ICD-10-CM

## 2021-10-07 DIAGNOSIS — M70.61 GREATER TROCHANTERIC BURSITIS OF RIGHT HIP: ICD-10-CM

## 2021-10-07 DIAGNOSIS — Z91.81 AT HIGH RISK FOR FALLS: ICD-10-CM

## 2021-10-07 DIAGNOSIS — E78.00 HYPERCHOLESTEROLEMIA: ICD-10-CM

## 2021-10-07 DIAGNOSIS — F41.8 DEPRESSION WITH ANXIETY: ICD-10-CM

## 2021-10-07 DIAGNOSIS — N18.31 TYPE 2 DIABETES MELLITUS WITH STAGE 3A CHRONIC KIDNEY DISEASE, WITHOUT LONG-TERM CURRENT USE OF INSULIN (HCC): Primary | ICD-10-CM

## 2021-10-07 DIAGNOSIS — I10 ESSENTIAL HYPERTENSION: ICD-10-CM

## 2021-10-07 DIAGNOSIS — E11.22 TYPE 2 DIABETES MELLITUS WITH STAGE 3A CHRONIC KIDNEY DISEASE, WITHOUT LONG-TERM CURRENT USE OF INSULIN (HCC): Primary | ICD-10-CM

## 2021-10-07 DIAGNOSIS — E03.9 PRIMARY HYPOTHYROIDISM: ICD-10-CM

## 2021-10-07 DIAGNOSIS — K21.9 GASTROESOPHAGEAL REFLUX DISEASE WITHOUT ESOPHAGITIS: ICD-10-CM

## 2021-10-07 PROCEDURE — 4040F PNEUMOC VAC/ADMIN/RCVD: CPT | Performed by: FAMILY MEDICINE

## 2021-10-07 PROCEDURE — G8428 CUR MEDS NOT DOCUMENT: HCPCS | Performed by: FAMILY MEDICINE

## 2021-10-07 PROCEDURE — G8417 CALC BMI ABV UP PARAM F/U: HCPCS | Performed by: FAMILY MEDICINE

## 2021-10-07 PROCEDURE — 99214 OFFICE O/P EST MOD 30 MIN: CPT | Performed by: FAMILY MEDICINE

## 2021-10-07 PROCEDURE — G8399 PT W/DXA RESULTS DOCUMENT: HCPCS | Performed by: FAMILY MEDICINE

## 2021-10-07 PROCEDURE — 3052F HG A1C>EQUAL 8.0%<EQUAL 9.0%: CPT | Performed by: FAMILY MEDICINE

## 2021-10-07 PROCEDURE — G0008 ADMIN INFLUENZA VIRUS VAC: HCPCS | Performed by: FAMILY MEDICINE

## 2021-10-07 PROCEDURE — 1036F TOBACCO NON-USER: CPT | Performed by: FAMILY MEDICINE

## 2021-10-07 PROCEDURE — 90694 VACC AIIV4 NO PRSRV 0.5ML IM: CPT | Performed by: FAMILY MEDICINE

## 2021-10-07 PROCEDURE — G8484 FLU IMMUNIZE NO ADMIN: HCPCS | Performed by: FAMILY MEDICINE

## 2021-10-07 PROCEDURE — 1090F PRES/ABSN URINE INCON ASSESS: CPT | Performed by: FAMILY MEDICINE

## 2021-10-07 PROCEDURE — 1123F ACP DISCUSS/DSCN MKR DOCD: CPT | Performed by: FAMILY MEDICINE

## 2021-10-07 RX ORDER — LOPERAMIDE HYDROCHLORIDE 2 MG/1
2 CAPSULE ORAL DAILY
Qty: 30 CAPSULE | Refills: 5 | Status: SHIPPED | OUTPATIENT
Start: 2021-10-07 | End: 2021-10-08 | Stop reason: SDUPTHER

## 2021-10-07 RX ORDER — SERTRALINE HYDROCHLORIDE 25 MG/1
25 TABLET, FILM COATED ORAL DAILY
Qty: 30 TABLET | Refills: 0 | Status: SHIPPED | OUTPATIENT
Start: 2021-10-07 | End: 2021-10-08 | Stop reason: SDUPTHER

## 2021-10-08 DIAGNOSIS — F41.8 DEPRESSION WITH ANXIETY: ICD-10-CM

## 2021-10-08 RX ORDER — LOPERAMIDE HYDROCHLORIDE 2 MG/1
2 CAPSULE ORAL DAILY
Qty: 30 CAPSULE | Refills: 5 | Status: SHIPPED | OUTPATIENT
Start: 2021-10-08 | End: 2021-11-04 | Stop reason: ALTCHOICE

## 2021-10-08 RX ORDER — SERTRALINE HYDROCHLORIDE 25 MG/1
25 TABLET, FILM COATED ORAL DAILY
Qty: 30 TABLET | Refills: 0 | Status: SHIPPED | OUTPATIENT
Start: 2021-10-08 | End: 2021-11-04 | Stop reason: SDUPTHER

## 2021-10-08 NOTE — TELEPHONE ENCOUNTER
Diaz Corby called to request a refill on her medication.       Last office visit : 10/7/2021   Next office visit : 10/25/2021     Requested Prescriptions     Signed Prescriptions Disp Refills    sertraline (ZOLOFT) 25 MG tablet 30 tablet 0     Sig: Take 1 tablet by mouth daily     Authorizing Provider: Sesar Marinelli     Ordering User: Donna Mtz loperamide (RA ANTI-DIARRHEAL) 2 MG capsule 30 capsule 5     Sig: Take 1 capsule by mouth daily     Authorizing Provider: Sesar Marinelli     Ordering User: Susana Finn

## 2021-10-26 ENCOUNTER — TELEPHONE (OUTPATIENT)
Dept: FAMILY MEDICINE CLINIC | Age: 81
End: 2021-10-26

## 2021-10-26 NOTE — TELEPHONE ENCOUNTER
----- Message from Xi Land sent at 10/25/2021  4:47 PM CDT -----  Subject: Message to Provider    QUESTIONS  Information for Provider? Please call Sherice back. She has been awaiting a   phone call back from Dr. Lopez and missed it twice.  ---------------------------------------------------------------------------  --------------  [address]  What is the best way for the office to contact you? OK to leave message on   voicemail  Preferred Call Back Phone Number? [phone]  ---------------------------------------------------------------------------  --------------  SCRIPT ANSWERS  Relationship to Patient?  Self

## 2021-10-29 ENCOUNTER — TELEPHONE (OUTPATIENT)
Dept: FAMILY MEDICINE CLINIC | Age: 81
End: 2021-10-29

## 2021-10-29 NOTE — TELEPHONE ENCOUNTER
Patient called to let Dr Maryuri Whalen know that the medicine he put her on is working out just fine.

## 2021-11-04 ENCOUNTER — OFFICE VISIT (OUTPATIENT)
Dept: FAMILY MEDICINE CLINIC | Age: 81
End: 2021-11-04
Payer: MEDICARE

## 2021-11-04 VITALS
TEMPERATURE: 97.2 F | DIASTOLIC BLOOD PRESSURE: 68 MMHG | SYSTOLIC BLOOD PRESSURE: 102 MMHG | HEART RATE: 54 BPM | OXYGEN SATURATION: 95 %

## 2021-11-04 DIAGNOSIS — I10 ESSENTIAL HYPERTENSION: ICD-10-CM

## 2021-11-04 DIAGNOSIS — E11.22 TYPE 2 DIABETES MELLITUS WITH STAGE 3A CHRONIC KIDNEY DISEASE, WITHOUT LONG-TERM CURRENT USE OF INSULIN (HCC): Primary | ICD-10-CM

## 2021-11-04 DIAGNOSIS — E03.9 PRIMARY HYPOTHYROIDISM: ICD-10-CM

## 2021-11-04 DIAGNOSIS — M25.551 RIGHT HIP PAIN: ICD-10-CM

## 2021-11-04 DIAGNOSIS — N18.31 TYPE 2 DIABETES MELLITUS WITH STAGE 3A CHRONIC KIDNEY DISEASE, WITHOUT LONG-TERM CURRENT USE OF INSULIN (HCC): Primary | ICD-10-CM

## 2021-11-04 DIAGNOSIS — E78.00 HYPERCHOLESTEROLEMIA: ICD-10-CM

## 2021-11-04 DIAGNOSIS — F41.8 DEPRESSION WITH ANXIETY: ICD-10-CM

## 2021-11-04 PROCEDURE — 1036F TOBACCO NON-USER: CPT | Performed by: FAMILY MEDICINE

## 2021-11-04 PROCEDURE — 1090F PRES/ABSN URINE INCON ASSESS: CPT | Performed by: FAMILY MEDICINE

## 2021-11-04 PROCEDURE — 99214 OFFICE O/P EST MOD 30 MIN: CPT | Performed by: FAMILY MEDICINE

## 2021-11-04 PROCEDURE — G8417 CALC BMI ABV UP PARAM F/U: HCPCS | Performed by: FAMILY MEDICINE

## 2021-11-04 PROCEDURE — G8484 FLU IMMUNIZE NO ADMIN: HCPCS | Performed by: FAMILY MEDICINE

## 2021-11-04 PROCEDURE — 1123F ACP DISCUSS/DSCN MKR DOCD: CPT | Performed by: FAMILY MEDICINE

## 2021-11-04 PROCEDURE — G8399 PT W/DXA RESULTS DOCUMENT: HCPCS | Performed by: FAMILY MEDICINE

## 2021-11-04 PROCEDURE — 3052F HG A1C>EQUAL 8.0%<EQUAL 9.0%: CPT | Performed by: FAMILY MEDICINE

## 2021-11-04 PROCEDURE — G8428 CUR MEDS NOT DOCUMENT: HCPCS | Performed by: FAMILY MEDICINE

## 2021-11-04 PROCEDURE — 4040F PNEUMOC VAC/ADMIN/RCVD: CPT | Performed by: FAMILY MEDICINE

## 2021-11-04 RX ORDER — INSULIN GLARGINE 100 [IU]/ML
40 INJECTION, SOLUTION SUBCUTANEOUS NIGHTLY
Qty: 12 PEN | Refills: 5 | Status: ON HOLD | OUTPATIENT
Start: 2021-11-04 | End: 2021-12-02 | Stop reason: SDUPTHER

## 2021-11-04 RX ORDER — SERTRALINE HYDROCHLORIDE 25 MG/1
25 TABLET, FILM COATED ORAL DAILY
Qty: 90 TABLET | Refills: 3 | Status: ON HOLD | OUTPATIENT
Start: 2021-11-04 | End: 2021-12-02 | Stop reason: SDUPTHER

## 2021-11-04 NOTE — PROGRESS NOTES
[facility] PHYSICIAN SERVICES  [facility] FAMILY MEDICINE  [address] [facility] [address] [address]  Dept: [phone]  Dept Fax: [phone]  Loc: [phone]    Tammi Pretty is a 79 y.o. female who presents today for her medical conditions/complaints as noted below. Tammi Pretty is here for Follow-up        HPI:   CC: Here today to discuss the following:    Diabetes Mellitus  Has been compliant with medications. No side effects of medications since last visit. No polyuria, polydipsia, or vision changes since last visit. No symptomatic episodes of hypoglycemia. Basaglar 20 units qhs. She brings her glucose readings in today. Her blood sugars range from 1 20-1 50 in the morning  Highest glucose reading is 220    She is satisfied with sertraline. No adverse effects. She is satisfied with 25 mg. She continues to have issues with right hip pain. She has a history of trochanteric bursitis. She has been seen by orthopedic surgery.   She had a steroid injection in the past.          HPI    Past Medical History:   Diagnosis Date    Arthritis     Psoriatic    Asthma     CAD (coronary artery disease)     CHF (congestive heart failure) (HCC)     COPD (chronic obstructive pulmonary disease) (HCC)     DM (diabetes mellitus) ([address]     GERD (gastroesophageal reflux disease)     HTN (hypertension)     Hyperlipidemia     [facility] infarction) [facility]     x2    Thyroid disease       Past Surgical History:   Procedure Laterality Date    CATARACT REMOVAL      CHOLECYSTECTOMY      CORONARY ANGIOPLASTY  06/2018    Angioplasty to in-stent restenosis to the distal LAD    HIP SURGERY      HYSTERECTOMY      JOINT REPLACEMENT Right     Right knee replacement       Family History   Problem Relation Age of Onset    Heart Disease Mother     Heart Disease Father        Social History     Tobacco Use    Smoking status: Never Smoker    Smokeless tobacco: Never Used   Substance Use others negative      Objective:   /68   Pulse 54   Temp 97.2 °F (36.2 °C)   SpO2 95%   Physical Exam  Physical Exam   Constitutional: She appears well-developed. Does not appear ill. Neck: Normal range of motion. Neck supple. No masses. Neck Symmetric. Normal tracheal position. No thyroid enlargement  Cardiovascular: Normal rate and regular rhythm. Exam reveals no friction rub. Carotid arteries: no bruit observed. No murmur heard. Respiratory:  Effort normal and breath sounds normal. No respiratory distress. No wheezes. No rales. No use of accessory muscles or intercostal retractions. Neurological: alert. Psychiatric: normal mood and affect. Her behavior is normal. Normal judgement and insight observed. No results found for this or any previous visit (from the past 672 hour(s)). Assessment & Plan: The following diagnoses and conditions are stable with no further orders unless indicated:  1. Type 2 diabetes mellitus with stage 3a chronic kidney disease, without long-term current use of insulin ([facility])  Lab Results   Component Value Date    LABA1C 8.9 (H) 10/07/2021    LABA1C 8.3 (H) 06/17/2021    LABA1C 7.3 (H) 03/16/2021     Lab Results   Component Value Date    LABMICR <1.20 06/17/2021    LDLCALC 46 10/07/2021    CREATININE 0.8 10/07/2021     Increase her Basaglar to 26 units at night  See back in 1 month  Continue checking glucose before every meal  She has difficulty remembering to use mealtime insulin  Encouraged consistency with meals and compliance with her basal insulin    2. Depression with anxiety  Continue with Zoloft 25 mg daily  - sertraline (ZOLOFT) 25 MG tablet; Take 1 tablet by mouth daily  Dispense: 90 tablet; Refill: 3    3. Primary hypothyroidism  Lab Results   Component Value Date    TSH 1.720 10/07/2021    T4FREE 1.14 10/07/2021     Symptoms are stable    4.  Essential hypertension  BP Readings from Last 3 Encounters:   11/04/21 102/68   10/07/21 118/72 07/05/21 (!) 149/69     Blood pressure stable    5. Hypercholesterolemia  Lab Results   Component Value Date    CHOL 137 (L) 10/07/2021    CHOL 121 (L) 06/17/2021    CHOL 132 (L) 02/06/2020     Lab Results   Component Value Date    TRIG 243 (H) 10/07/2021    TRIG 224 (H) 06/17/2021    TRIG 513 (H) 02/06/2020     Lab Results   Component Value Date    HDL 42 (L) 10/07/2021    HDL 39 (L) 06/17/2021    HDL 40 (L) 02/06/2020     Lab Results   Component Value Date    LDLCALC 46 10/07/2021    LDLCALC 37 06/17/2021    [address] see below 02/06/2020     No results found for: LABVLDL, VLDL  No results found for: CHOLHDLRATIO  Stable    6. Right hip pain  Voltaren gel            Return in about 1 month (around 12/4/2021) for Routine follow up - 15 minute visit. Discussed use, benefit, and side effects of prescribed medications. All patient questions answered. Pt voiced understanding. Reviewed health maintenance. Instructedto continue current medications, diet and exercise. Patient agreed with treatmentplan. Follow up as directed. Note dictated using Dragon Dictation software  Sometimes this dictation software makes erroneous transcriptions.

## 2021-11-07 DIAGNOSIS — I10 ESSENTIAL HYPERTENSION: ICD-10-CM

## 2021-11-07 RX ORDER — LISINOPRIL 40 MG/1
TABLET ORAL
Qty: 90 TABLET | Refills: 3 | Status: ON HOLD | OUTPATIENT
Start: 2021-11-07 | End: 2021-12-02 | Stop reason: HOSPADM

## 2021-11-07 RX ORDER — CARVEDILOL 25 MG/1
TABLET ORAL
Qty: 180 TABLET | Refills: 2 | Status: ON HOLD | OUTPATIENT
Start: 2021-11-07 | End: 2021-12-02 | Stop reason: HOSPADM

## 2021-11-20 ENCOUNTER — APPOINTMENT (OUTPATIENT)
Dept: MRI IMAGING | Age: 81
DRG: 065 | End: 2021-11-20
Payer: MEDICARE

## 2021-11-20 ENCOUNTER — APPOINTMENT (OUTPATIENT)
Dept: CT IMAGING | Age: 81
DRG: 065 | End: 2021-11-20
Payer: MEDICARE

## 2021-11-20 ENCOUNTER — APPOINTMENT (OUTPATIENT)
Dept: GENERAL RADIOLOGY | Age: 81
DRG: 065 | End: 2021-11-20
Payer: MEDICARE

## 2021-11-20 ENCOUNTER — HOSPITAL ENCOUNTER (INPATIENT)
Age: 81
LOS: 3 days | Discharge: NURSING, REHAB OR LONG-TERM CARE | DRG: 065 | End: 2021-11-23
Attending: EMERGENCY MEDICINE | Admitting: INTERNAL MEDICINE
Payer: MEDICARE

## 2021-11-20 DIAGNOSIS — R73.9 HYPERGLYCEMIA: ICD-10-CM

## 2021-11-20 DIAGNOSIS — I63.9 CEREBROVASCULAR ACCIDENT (CVA), UNSPECIFIED MECHANISM (HCC): Primary | ICD-10-CM

## 2021-11-20 DIAGNOSIS — N17.9 AKI (ACUTE KIDNEY INJURY) (HCC): ICD-10-CM

## 2021-11-20 LAB
ACETONE BLOOD: NEGATIVE
ALBUMIN SERPL-MCNC: 4.1 G/DL (ref 3.5–5.2)
ALP BLD-CCNC: 131 U/L (ref 35–104)
ALT SERPL-CCNC: 15 U/L (ref 5–33)
ANION GAP SERPL CALCULATED.3IONS-SCNC: 14 MMOL/L (ref 7–19)
AST SERPL-CCNC: 17 U/L (ref 5–32)
BASE EXCESS VENOUS: -5 MMOL/L
BASOPHILS ABSOLUTE: 0 K/UL (ref 0–0.2)
BASOPHILS RELATIVE PERCENT: 0.1 % (ref 0–1)
BILIRUB SERPL-MCNC: 0.4 MG/DL (ref 0.2–1.2)
BILIRUBIN URINE: NEGATIVE
BLOOD, URINE: NEGATIVE
BUN BLDV-MCNC: 36 MG/DL (ref 8–23)
CALCIUM SERPL-MCNC: 9 MG/DL (ref 8.8–10.2)
CARBOXYHEMOGLOBIN: 2.9 %
CHLORIDE BLD-SCNC: 100 MMOL/L (ref 98–111)
CHP ED QC CHECK: YES
CLARITY: CLEAR
CO2: 16 MMOL/L (ref 22–29)
COLOR: YELLOW
CREAT SERPL-MCNC: 1 MG/DL (ref 0.5–0.9)
EOSINOPHILS ABSOLUTE: 0 K/UL (ref 0–0.6)
EOSINOPHILS RELATIVE PERCENT: 0.1 % (ref 0–5)
GFR AFRICAN AMERICAN: >59
GFR NON-AFRICAN AMERICAN: 53
GLUCOSE BLD-MCNC: 276 MG/DL (ref 70–99)
GLUCOSE BLD-MCNC: 284 MG/DL (ref 70–99)
GLUCOSE BLD-MCNC: 417 MG/DL (ref 74–109)
GLUCOSE BLD-MCNC: 419 MG/DL
GLUCOSE URINE: =>1000 MG/DL
HCO3 VENOUS: 18 MMOL/L (ref 23–29)
HCT VFR BLD CALC: 40.7 % (ref 37–47)
HEMOGLOBIN: 14.1 G/DL (ref 12–16)
IMMATURE GRANULOCYTES #: 0.1 K/UL
INR BLD: 0.99 (ref 0.88–1.18)
KETONES, URINE: NEGATIVE MG/DL
LEUKOCYTE ESTERASE, URINE: NEGATIVE
LV EF: 58 %
LVEF MODALITY: NORMAL
LYMPHOCYTES ABSOLUTE: 1 K/UL (ref 1.1–4.5)
LYMPHOCYTES RELATIVE PERCENT: 8.5 % (ref 20–40)
MCH RBC QN AUTO: 31.8 PG (ref 27–31)
MCHC RBC AUTO-ENTMCNC: 34.6 G/DL (ref 33–37)
MCV RBC AUTO: 91.7 FL (ref 81–99)
METHEMOGLOBIN VENOUS: 0.9 %
MONOCYTES ABSOLUTE: 1 K/UL (ref 0–0.9)
MONOCYTES RELATIVE PERCENT: 8.7 % (ref 0–10)
NEUTROPHILS ABSOLUTE: 9.7 K/UL (ref 1.5–7.5)
NEUTROPHILS RELATIVE PERCENT: 82.1 % (ref 50–65)
NITRITE, URINE: NEGATIVE
O2 CONTENT, VEN: 16 ML/DL
O2 SAT, VEN: 88 %
PCO2, VEN: 29 MMHG (ref 40–50)
PDW BLD-RTO: 13.2 % (ref 11.5–14.5)
PERFORMED ON: ABNORMAL
PERFORMED ON: ABNORMAL
PH UA: 5 (ref 5–8)
PH VENOUS: 7.41 (ref 7.35–7.45)
PLATELET # BLD: 239 K/UL (ref 130–400)
PMV BLD AUTO: 9.8 FL (ref 9.4–12.3)
PO2, VEN: 54 MMHG
POTASSIUM REFLEX MAGNESIUM: 4.8 MMOL/L (ref 3.5–5)
PROTEIN UA: NEGATIVE MG/DL
PROTHROMBIN TIME: 13.3 SEC (ref 12–14.6)
RBC # BLD: 4.44 M/UL (ref 4.2–5.4)
SARS-COV-2, NAAT: NOT DETECTED
SODIUM BLD-SCNC: 130 MMOL/L (ref 136–145)
SPECIFIC GRAVITY UA: >=1.045 (ref 1–1.03)
TOTAL PROTEIN: 6.9 G/DL (ref 6.6–8.7)
TROPONIN: <0.01 NG/ML (ref 0–0.03)
UROBILINOGEN, URINE: 0.2 E.U./DL
WBC # BLD: 11.8 K/UL (ref 4.8–10.8)

## 2021-11-20 PROCEDURE — 92610 EVALUATE SWALLOWING FUNCTION: CPT

## 2021-11-20 PROCEDURE — 6360000002 HC RX W HCPCS: Performed by: NURSE PRACTITIONER

## 2021-11-20 PROCEDURE — 6360000004 HC RX CONTRAST MEDICATION: Performed by: EMERGENCY MEDICINE

## 2021-11-20 PROCEDURE — 93306 TTE W/DOPPLER COMPLETE: CPT

## 2021-11-20 PROCEDURE — 81003 URINALYSIS AUTO W/O SCOPE: CPT

## 2021-11-20 PROCEDURE — 82009 KETONE BODYS QUAL: CPT

## 2021-11-20 PROCEDURE — 84484 ASSAY OF TROPONIN QUANT: CPT

## 2021-11-20 PROCEDURE — 70551 MRI BRAIN STEM W/O DYE: CPT

## 2021-11-20 PROCEDURE — 70498 CT ANGIOGRAPHY NECK: CPT

## 2021-11-20 PROCEDURE — 1210000000 HC MED SURG R&B

## 2021-11-20 PROCEDURE — 93005 ELECTROCARDIOGRAM TRACING: CPT | Performed by: EMERGENCY MEDICINE

## 2021-11-20 PROCEDURE — 6370000000 HC RX 637 (ALT 250 FOR IP): Performed by: NURSE PRACTITIONER

## 2021-11-20 PROCEDURE — 87635 SARS-COV-2 COVID-19 AMP PRB: CPT

## 2021-11-20 PROCEDURE — 36415 COLL VENOUS BLD VENIPUNCTURE: CPT

## 2021-11-20 PROCEDURE — 82803 BLOOD GASES ANY COMBINATION: CPT

## 2021-11-20 PROCEDURE — 99284 EMERGENCY DEPT VISIT MOD MDM: CPT

## 2021-11-20 PROCEDURE — 92523 SPEECH SOUND LANG COMPREHEN: CPT

## 2021-11-20 PROCEDURE — 70496 CT ANGIOGRAPHY HEAD: CPT

## 2021-11-20 PROCEDURE — 85610 PROTHROMBIN TIME: CPT

## 2021-11-20 PROCEDURE — 85025 COMPLETE CBC W/AUTO DIFF WBC: CPT

## 2021-11-20 PROCEDURE — 80053 COMPREHEN METABOLIC PANEL: CPT

## 2021-11-20 PROCEDURE — 70450 CT HEAD/BRAIN W/O DYE: CPT

## 2021-11-20 PROCEDURE — 71045 X-RAY EXAM CHEST 1 VIEW: CPT

## 2021-11-20 PROCEDURE — 82947 ASSAY GLUCOSE BLOOD QUANT: CPT

## 2021-11-20 PROCEDURE — 99223 1ST HOSP IP/OBS HIGH 75: CPT | Performed by: PSYCHIATRY & NEUROLOGY

## 2021-11-20 PROCEDURE — 2580000003 HC RX 258: Performed by: EMERGENCY MEDICINE

## 2021-11-20 RX ORDER — PYRIDOXINE HCL (VITAMIN B6) 50 MG
TABLET ORAL
COMMUNITY
End: 2021-11-20

## 2021-11-20 RX ORDER — ASPIRIN 300 MG/1
300 SUPPOSITORY RECTAL DAILY
Status: DISCONTINUED | OUTPATIENT
Start: 2021-11-20 | End: 2021-11-23 | Stop reason: HOSPADM

## 2021-11-20 RX ORDER — LISINOPRIL 20 MG/1
40 TABLET ORAL DAILY
Status: DISCONTINUED | OUTPATIENT
Start: 2021-11-20 | End: 2021-11-20

## 2021-11-20 RX ORDER — ONDANSETRON 4 MG/1
4 TABLET, ORALLY DISINTEGRATING ORAL EVERY 8 HOURS PRN
Status: DISCONTINUED | OUTPATIENT
Start: 2021-11-20 | End: 2021-11-23 | Stop reason: HOSPADM

## 2021-11-20 RX ORDER — LEVOTHYROXINE SODIUM 0.03 MG/1
25 TABLET ORAL DAILY
Status: DISCONTINUED | OUTPATIENT
Start: 2021-11-20 | End: 2021-11-23 | Stop reason: HOSPADM

## 2021-11-20 RX ORDER — IPRATROPIUM BROMIDE AND ALBUTEROL SULFATE 2.5; .5 MG/3ML; MG/3ML
1 SOLUTION RESPIRATORY (INHALATION) EVERY 4 HOURS PRN
Status: DISCONTINUED | OUTPATIENT
Start: 2021-11-20 | End: 2021-11-23 | Stop reason: HOSPADM

## 2021-11-20 RX ORDER — CHOLECALCIFEROL (VITAMIN D3) 125 MCG
1000 CAPSULE ORAL DAILY
Status: DISCONTINUED | OUTPATIENT
Start: 2021-11-20 | End: 2021-11-23 | Stop reason: HOSPADM

## 2021-11-20 RX ORDER — ONDANSETRON 2 MG/ML
4 INJECTION INTRAMUSCULAR; INTRAVENOUS EVERY 6 HOURS PRN
Status: DISCONTINUED | OUTPATIENT
Start: 2021-11-20 | End: 2021-11-23 | Stop reason: HOSPADM

## 2021-11-20 RX ORDER — NICOTINE POLACRILEX 4 MG
15 LOZENGE BUCCAL PRN
Status: DISCONTINUED | OUTPATIENT
Start: 2021-11-20 | End: 2021-11-23 | Stop reason: HOSPADM

## 2021-11-20 RX ORDER — CETIRIZINE HYDROCHLORIDE 10 MG/1
10 TABLET ORAL 2 TIMES DAILY
Status: DISCONTINUED | OUTPATIENT
Start: 2021-11-20 | End: 2021-11-23 | Stop reason: HOSPADM

## 2021-11-20 RX ORDER — DEXTROSE MONOHYDRATE 25 G/50ML
12.5 INJECTION, SOLUTION INTRAVENOUS PRN
Status: DISCONTINUED | OUTPATIENT
Start: 2021-11-20 | End: 2021-11-23 | Stop reason: HOSPADM

## 2021-11-20 RX ORDER — POLYETHYLENE GLYCOL 3350 17 G/17G
17 POWDER, FOR SOLUTION ORAL DAILY PRN
Status: DISCONTINUED | OUTPATIENT
Start: 2021-11-20 | End: 2021-11-23 | Stop reason: HOSPADM

## 2021-11-20 RX ORDER — CLOPIDOGREL BISULFATE 75 MG/1
75 TABLET ORAL DAILY
Status: DISCONTINUED | OUTPATIENT
Start: 2021-11-20 | End: 2021-11-23 | Stop reason: HOSPADM

## 2021-11-20 RX ORDER — SERTRALINE HYDROCHLORIDE 25 MG/1
25 TABLET, FILM COATED ORAL DAILY
Status: DISCONTINUED | OUTPATIENT
Start: 2021-11-20 | End: 2021-11-23 | Stop reason: HOSPADM

## 2021-11-20 RX ORDER — UBIDECARENONE 100 MG
100 CAPSULE ORAL DAILY
Status: DISCONTINUED | OUTPATIENT
Start: 2021-11-20 | End: 2021-11-23 | Stop reason: HOSPADM

## 2021-11-20 RX ORDER — LABETALOL HYDROCHLORIDE 5 MG/ML
10 INJECTION, SOLUTION INTRAVENOUS EVERY 10 MIN PRN
Status: DISCONTINUED | OUTPATIENT
Start: 2021-11-20 | End: 2021-11-23 | Stop reason: HOSPADM

## 2021-11-20 RX ORDER — CARVEDILOL 25 MG/1
25 TABLET ORAL 2 TIMES DAILY WITH MEALS
Status: DISCONTINUED | OUTPATIENT
Start: 2021-11-20 | End: 2021-11-20

## 2021-11-20 RX ORDER — PANTOPRAZOLE SODIUM 40 MG/1
40 TABLET, DELAYED RELEASE ORAL
Status: DISCONTINUED | OUTPATIENT
Start: 2021-11-21 | End: 2021-11-23 | Stop reason: HOSPADM

## 2021-11-20 RX ORDER — ATORVASTATIN CALCIUM 80 MG/1
80 TABLET, FILM COATED ORAL NIGHTLY
Status: DISCONTINUED | OUTPATIENT
Start: 2021-11-20 | End: 2021-11-23 | Stop reason: HOSPADM

## 2021-11-20 RX ORDER — DEXTROSE MONOHYDRATE 50 MG/ML
100 INJECTION, SOLUTION INTRAVENOUS PRN
Status: DISCONTINUED | OUTPATIENT
Start: 2021-11-20 | End: 2021-11-23 | Stop reason: HOSPADM

## 2021-11-20 RX ORDER — ASPIRIN 81 MG/1
81 TABLET ORAL DAILY
Status: DISCONTINUED | OUTPATIENT
Start: 2021-11-20 | End: 2021-11-23 | Stop reason: HOSPADM

## 2021-11-20 RX ORDER — 0.9 % SODIUM CHLORIDE 0.9 %
1000 INTRAVENOUS SOLUTION INTRAVENOUS ONCE
Status: COMPLETED | OUTPATIENT
Start: 2021-11-20 | End: 2021-11-20

## 2021-11-20 RX ORDER — INSULIN GLARGINE 100 [IU]/ML
20 INJECTION, SOLUTION SUBCUTANEOUS NIGHTLY
Status: DISCONTINUED | OUTPATIENT
Start: 2021-11-20 | End: 2021-11-23 | Stop reason: HOSPADM

## 2021-11-20 RX ADMIN — ENOXAPARIN SODIUM 40 MG: 100 INJECTION SUBCUTANEOUS at 16:30

## 2021-11-20 RX ADMIN — SODIUM CHLORIDE 1000 ML: 9 INJECTION, SOLUTION INTRAVENOUS at 11:06

## 2021-11-20 RX ADMIN — IOPAMIDOL 90 ML: 755 INJECTION, SOLUTION INTRAVENOUS at 10:28

## 2021-11-20 RX ADMIN — ATORVASTATIN CALCIUM 80 MG: 80 TABLET, FILM COATED ORAL at 20:37

## 2021-11-20 RX ADMIN — INSULIN GLARGINE 20 UNITS: 100 INJECTION, SOLUTION SUBCUTANEOUS at 20:54

## 2021-11-20 RX ADMIN — ASPIRIN 81 MG: 81 TABLET, COATED ORAL at 16:30

## 2021-11-20 RX ADMIN — CLOPIDOGREL BISULFATE 75 MG: 75 TABLET ORAL at 16:30

## 2021-11-20 ASSESSMENT — ENCOUNTER SYMPTOMS
BACK PAIN: 0
COUGH: 0
ABDOMINAL DISTENTION: 0
PHOTOPHOBIA: 0
SORE THROAT: 0
CONSTIPATION: 0
BLOOD IN STOOL: 0
RHINORRHEA: 0
SINUS PAIN: 0
SHORTNESS OF BREATH: 0
ABDOMINAL PAIN: 0
NAUSEA: 0
DIARRHEA: 0
VOMITING: 0
WHEEZING: 0
TROUBLE SWALLOWING: 0

## 2021-11-20 ASSESSMENT — PAIN SCALES - GENERAL: PAINLEVEL_OUTOF10: 0

## 2021-11-20 NOTE — CONSULTS
(PRINIVIL;ZESTRIL) 40 MG tablet TAKE 1 TABLET DAILY 11/7/21   Na Hawkins MD   sertraline (ZOLOFT) 25 MG tablet Take 1 tablet by mouth daily 11/4/21   Na Hawknis MD   insulin glargine [facility]) 100 UNIT/ML injection pen Inject 40 Units into the skin nightly 11/4/21   Na Hawkins MD   diclofenac sodium (VOLTAREN) 1 % GEL Apply 4 g topically 4 times daily 11/4/21   Na Hawkins MD   atorvastatin (LIPITOR) 80 MG tablet Take 1 tablet by mouth daily 8/16/21   Na Hawkins MD   blood glucose test strips (FREESTYLE LITE) strip USE 4 STRIP TO CHECK GLUCOSE ONCE DAILY AS NEEDED 7/13/21   Na Hawkins MD   Diabetic Shoe MISC by Does not apply route 7/8/21   Na Hawkins MD   Misc. Devices MISC Diabetic shoes     E11.9 E11.22 6/25/21   Na Hawkins MD   Misc. Devices MISC Diabetic shoes  DIAGNOSIS: E11.42 6/23/21   Na Hawkins MD   colchicine (COLCRYS) 0.6 MG tablet 2 tablets now then 1 tablet two hours later, repeat for 3 days if pain persists.  6/21/21   Na Hawkins MD   omeprazole (PRILOSEC) 40 MG delayed release capsule TAKE 1 CAPSULE DAILY 4/9/21   Na Hawkins MD   Lancet Device MISC 1 Device by Does not apply route once for 1 dose ICD-10-CM: E11.9 11/19/20 10/7/21  Na Hawkins MD   Probiotic Product (ALIGN) 4 MG CAPS Once capsule daily 9/28/20   Na Hawkins MD   levothyroxine (SYNTHROID) 25 MCG tablet TAKE 1 TABLET DAILY 8/10/20   Na Hawkins MD   Insulin Pen Needle 32G X 6 MM MISC One needle four times a day 8/10/20   Na Hawkins MD   FREESTYLE LANCETS MISC USE TO CHECK BLOOD SUGAR 1-2 TIMES DAILY 6/21/19   Na Hawkins MD   Omega-3 Fatty Acids (FISH OIL OMEGA-3 PO) Take by mouth     Historical Provider, MD   umeclidinium-vilanterol (ANORO ELLIPTA) 62.5-25 MCG/INH AEPB inhaler Inhale 1 puff into the lungs daily  Patient not taking: Reported on 10/7/2021    Historical Provider, MD   fluticasone (FLONASE) 50 MCG/ACT nasal spray 1 spray by Each Nare route daily    Historical Provider, MD   cetirizine (ZYRTEC) 10 MG tablet Take 10 mg by mouth 2 times daily    Historical Provider, MD   Blood Glucose Monitoring Suppl ADE 1 kit by Does not apply route daily ICD-10-CM: E11.9 4/23/18   Kirti Shin MD   clopidogrel (PLAVIX) 75 MG tablet Take 1 tablet by mouth daily  Patient not taking: Reported on 6/17/2021 12/24/17   Selam Gramajo MD   aspirin 81 MG tablet Take 162 mg by mouth daily   Patient not taking: Reported on 10/7/2021    Historical Provider, MD   vitamin B-12 (CYANOCOBALAMIN) 1000 MCG tablet Take 1,000 mcg by mouth daily     Historical Provider, MD   Coenzyme Q10 (CO Q 10) 10 MG CAPS Take 1 capsule by mouth daily     Historical Provider, MD   GLUCOSAMINE-CALCIUM-VIT D PO Take 2 tablets by mouth daily     Historical Provider, MD       Current Medications:  Current Facility-Administered Medications: 0.9 % sodium chloride bolus, 1,000 mL, IntraVENous, Once    Allergies:  Zinc and Tape [adhesive tape]    Habits:  TOBACCO:   reports that she has never smoked. She has never used smokeless tobacco.  ETOH:   reports no history of alcohol use. DRUGS:    Social History     Substance and Sexual Activity   Drug Use No       SOCIAL HISTORY:   Kerri Manuel is recently , lives in [facility], and is retired. FAMILY HISTORY:       Problem Relation Age of Onset    Heart Disease Mother     Heart Disease Father        REVIEW OF SYSTEMS:  Review of Systems   Constitutional: Negative for chills and fever. HENT: Positive for hearing loss. Negative for tinnitus. Eyes: Negative for photophobia and visual disturbance. Respiratory: Negative for cough and shortness of breath. Cardiovascular: Positive for palpitations. Negative for chest pain. Gastrointestinal: Negative for nausea and vomiting. Endocrine: Negative for polydipsia and polyuria. Genitourinary: Positive for frequency and urgency.    Musculoskeletal: Positive for arthralgias. Negative for back pain. Skin: Negative for rash and wound. Allergic/Immunologic: Negative for environmental allergies and food allergies. Neurological: Positive for speech difficulty, weakness, numbness and headaches. Negative for dizziness, tremors, seizures, syncope, facial asymmetry and light-headedness. Hematological: Negative for adenopathy. Does not bruise/bleed easily. Psychiatric/Behavioral: Negative for dysphoric mood. The patient is not nervous/anxious. PHYSICAL EXAMINATION:  Vitals: /68   Pulse 74   Resp 18   Ht 5' 6\" (1.676 m)   Wt 162 lb (73.5 kg)   SpO2 97%   BMI 26.15 kg/m²   General appearance:  She is an elderly woman who appears in no distress. Skin: Skin color, texture, turgor normal. No rashes or lesions  HEENT: Head: Normal, normocephalic, atraumatic. Neck:no adenopathy, no carotid bruit, no JVD, supple, symmetrical, trachea midline and thyroid not enlarged, symmetric, no tenderness/mass/nodules  Lungs: clear to auscultation bilaterally  Heart: regular rate and rhythm, S1, S2 normal, no murmur, click, rub or gallop  Abdomen: soft, non-tender; bowel sounds normal; no masses,  no organomegaly  Extremities: extremities normal, atraumatic, no cyanosis or edema      NEUROLOGIC EXAMINATION:  Neurologic Exam     Mental Status   Oriented to person, place, and time. Speech: speech is normal   Level of consciousness: alert    Cranial Nerves     CN II   Visual fields full to confrontation. CN III, IV, VI   Pupils are equal, round, and reactive to light. Extraocular motions are normal.     CN VII   Facial expression full, symmetric.      CN VIII   Hearing: intact    CN IX, X   Palate: symmetric    CN XI   Right sternocleidomastoid strength: normal  Left sternocleidomastoid strength: normal  Right trapezius strength: normal  Left trapezius strength: normal    CN XII   Tongue: not atrophic  Fasciculations: absent  Tongue deviation: none    Motor Exam   Muscle bulk: normal  Overall muscle tone: normal  Right arm pronator drift: absent  Left arm pronator drift: absent    Strength   Right neck flexion: 5/5  Left neck flexion: 5/5  Right neck extension: 5/5  Left neck extension: 5/5  Right deltoid: 3/5  Left deltoid: 5/5  Right biceps: 3/5  Left biceps: 5/5  Right triceps: 3/5  Left triceps: 5/5  Right wrist flexion: 3/5  Left wrist flexion: 5/5  Right wrist extension: 3/5  Left wrist extension: 5/5  Right interossei: 3/5  Left interossei: 5/5  Right iliopsoas: 3/5  Left iliopsoas: 5/5  Right quadriceps: 4/5  Left quadriceps: 5/5  Right glutei: 3/5  Left glutei: 5/5  Right anterior tibial: 3/5  Left anterior tibial: 5/5  Right posterior tibial: 4/5  Left posterior tibial: 5/5  Right peroneal: 3/5  Left peroneal: 5/5  Right gastroc: 4/5  Left gastroc: 5/5    Sensory Exam   Right arm light touch: -2.  Left arm light touch: normal  Right leg light touch: -2.  Right arm vibration: normal  Left arm vibration: normal  Right leg vibration: normal  Left leg vibration: normal  Right arm pinprick: -2.  Left arm pinprick: normal  Right leg pinprick: -2.  Left leg pinprick: normal    Gait, Coordination, and Reflexes     Coordination   Finger to nose coordination: normal    Tremor   Resting tremor: absent  Intention tremor: absent  Action tremor: absent    Reflexes   Right brachioradialis: 1+  Left brachioradialis: 1+  Right biceps: 1+  Right triceps: 1+  Left triceps: 1+  Right patellar: 1+  Left patellar: 1+  Right achilles: 0  Left achilles: 0  Right plantar: equivocal  Left plantar: normal  Right Chamorro: absent  Left Chamorro: absent  Right ankle clonus: absent  Left ankle clonus: absent  Rapid alternating movements are impaired in right extremities.        ADDITIONAL REVIEW:  CBC:    Recent Labs     11/20/21  1001   WBC 11.8*   HGB 14.1        BMP:     Recent Labs     11/20/21  1001 11/20/21  1023   *  --    K 4.8  --      --    CO2 16*  --    BUN 36*  -- CREATININE 1.0*  --    GLUCOSE 417* 419     Hepatic:  Recent Labs     11/20/21  1001   AST 17   ALT 15   BILITOT 0.4   ALKPHOS 131*     Troponin T:    Recent Labs     11/20/21  1001   TROPONINI <0.01     INR:    Recent Labs     11/20/21  1001   INR 0.99     Narrative   CT HEAD WO CONTRAST 11/20/2021 10:12 AM   HISTORY: Right-sided weakness   COMPARISON: None    DOSE LENGTH PRODUCT: 838 mGy cm   TECHNIQUE: Helical tomographic images of the brain were obtained   without the use of intravenous contrast. Automated exposure control   was also utilized to decrease patient radiation dose. FINDINGS:   There is no evidence of evolving large vascular territory infarct. No   visualized intra-axial or extra-axial hemorrhage. No mass lesion is   identified. Normal size and configuration of the ventricular system. The basal cisterns are symmetric. Posterior fossa structures are   unremarkable. The included orbits and their contents are unremarkable. The   visualized paranasal sinuses, mastoid air cells and middle ear   cavities are clear. The visualized osseous structures and overlying   soft tissues of the skull and face are unremarkable.        Impression   1. No acute intracranial process.       Signed by Dr Abbey Daniels, CTA NECK W CONTRAST   HISTORY: Aphasia   COMPARISON: None    DLP: 1531 mGy cm   TECHNIQUE: Following the uneventful administration of Isovue contrast,   serial helical tomographic images of the head and neck were obtained   following angiogram protocol. Multiplanar MIP and 3D reconstructions   were provided for review.  Automated exposure control was also   utilized to decrease patient radiation dose. FINDINGS:    ANGIOGRAM:   Typical three-vessel branching pattern off the aortic arch. . There is   no flow-limiting stenosis at the great vessel origins. Normal course   and caliber of the common carotid arteries.    The minimum luminal diameter at the left internal carotid origin   measures 4.2 mm, in comparison to a more normal downstream caliber of   4.0 mm. The minimum luminal diameter of the right internal carotid artery   origin measures 3.0 mm, in comparison to a more normal downstream   caliber of 3.7 mm. Vertebral arteries are patent. The vertebral arteries are codominant. Densely calcified plaque identified as the vertebral arteries fonseca   the dura at the level of the skull base. Intracranial vertebral   arteries are normal in caliber. The basilar artery is patent. Distal carotid arteries are patent. Short segment moderate   atheromatous narrowing along the supraclinoid segment of the right   distal ICA. Bilateral anterior and middle cerebral artery branching   appear symmetric. Basilar artery is normal in caliber. The posterior   cerebral artery branching is symmetric. No large vessel occlusion. OTHER FINDINGS: No intracranial hemorrhage or mass effect. Orbital   contents are unremarkable. The paranasal sinuses are clear. No   cervical adenopathy.       Impression   1. No flow-limiting stenosis or arterial occlusion in the neck. In   particular, there is no flow-limiting stenosis at the carotid   bifurcations according to the NASCET assessment. 2. Short segment moderate atheromatous narrowing along the   supraclinoid segment of the right distal ICA. No intracranial large   vessel occlusion. Signed by Dr Amrita Hearn, CTA NECK W CONTRAST   HISTORY: Aphasia   COMPARISON: None    DLP: 1531 mGy cm   TECHNIQUE: Following the uneventful administration of Isovue contrast,   serial helical tomographic images of the head and neck were obtained   following angiogram protocol. Multiplanar MIP and 3D reconstructions   were provided for review.  Automated exposure control was also   utilized to decrease patient radiation dose. FINDINGS:    ANGIOGRAM:   Typical three-vessel branching pattern off the aortic arch. . There is no flow-limiting stenosis at the great vessel origins. Normal course   and caliber of the common carotid arteries. The minimum luminal diameter at the left internal carotid origin   measures 4.2 mm, in comparison to a more normal downstream caliber of   4.0 mm. The minimum luminal diameter of the right internal carotid artery   origin measures 3.0 mm, in comparison to a more normal downstream   caliber of 3.7 mm. Vertebral arteries are patent. The vertebral arteries are codominant. Densely calcified plaque identified as the vertebral arteries fonseca   the dura at the level of the skull base. Intracranial vertebral   arteries are normal in caliber. The basilar artery is patent. Distal carotid arteries are patent. Short segment moderate   atheromatous narrowing along the supraclinoid segment of the right   distal ICA. Bilateral anterior and middle cerebral artery branching   appear symmetric. Basilar artery is normal in caliber. The posterior   cerebral artery branching is symmetric. No large vessel occlusion. OTHER FINDINGS: No intracranial hemorrhage or mass effect. Orbital   contents are unremarkable. The paranasal sinuses are clear. No   cervical adenopathy.       Impression   1. No flow-limiting stenosis or arterial occlusion in the neck. In   particular, there is no flow-limiting stenosis at the carotid   bifurcations according to the NASCET assessment. 2. Short segment moderate atheromatous narrowing along the   supraclinoid segment of the right distal ICA. No intracranial large   vessel occlusion. Signed by Dr Van Dhillon:  1. Left thalamocapsular stroke  2. Hypertension  3. Hyperlipidemia  4. Diabetes    PLAN:  1. Echo  2. Telemetry   3. Labs   4.  PT/OT/ST Berenice Abrams M.D.

## 2021-11-20 NOTE — ED PROVIDER NOTES
[facility]  eMERGENCY dEPARTMENT eNCOUnter      Pt Name: Robert Urena  MRN: [id number]  [place] 1940  Date of evaluation: 11/20/2021  Provider: Jenni Carson MD    [address] [place] [address]       Chief Complaint   Patient presents with    Aphasia     Slurred speech since this morning at 0700. New onset right sided weakness additionally with facial numbness         HISTORY OF PRESENT ILLNESS   (Location/Symptom, Timing/Onset,Context/Setting, Quality, Duration, Modifying Factors, Severity)  Note limiting factors. Robert Urena is a 79 y.o. female who presents to the emergency department for concern of possible stroke. Patient reports she went to bed last night around 9 PM and slept until 6 AM when she got up to go to the bathroom. She tells me she was half asleep but does not recall noticing any right-sided weakness but isnt sure. She then slept another hour and woke around 0700 and noticed had slurred speech and right sided weakness. She went and checked her sugar and she tells me it was 25 so she aint some sugar packets and peanut butter etc then went into the 400s. Son has not talked to her since Thursday. Patient lives at [facility]. No prior hx of cva. HPI    NursingNotes were reviewed. REVIEW OF SYSTEMS    (2-9 systems for level 4, 10 or more for level 5)     Review of Systems   Constitutional: Negative for chills and fever. HENT: Negative for rhinorrhea and sore throat. Respiratory: Negative for cough and shortness of breath. Cardiovascular: Negative for chest pain and leg swelling. Gastrointestinal: Negative for abdominal pain, diarrhea, nausea and vomiting. Genitourinary: Negative for dysuria, frequency and urgency. Musculoskeletal: Negative for back pain and neck pain. Neurological: Positive for facial asymmetry, speech difficulty and weakness. Negative for dizziness and headaches. All other systems reviewed and are negative.            PAST MEDICALHISTORY     Past Medical History:   Diagnosis Date    Arthritis     Psoriatic    Asthma     CAD (coronary artery disease)     CHF (congestive heart failure) (HCC)     COPD (chronic obstructive pulmonary disease) (HCC)     DM (diabetes mellitus) ([facility]     GERD (gastroesophageal reflux disease)     HTN (hypertension)     Hyperlipidemia     [facility]     x2    Thyroid disease          SURGICAL HISTORY       Past Surgical History:   Procedure Laterality Date    CATARACT REMOVAL      CHOLECYSTECTOMY      CORONARY ANGIOPLASTY  06/2018    Angioplasty to in-stent restenosis to the distal LAD    HIP SURGERY      HYSTERECTOMY      JOINT REPLACEMENT Right     Right knee replacement         CURRENT MEDICATIONS     Previous Medications    ASPIRIN 81 MG TABLET    Take 162 mg by mouth daily     ATORVASTATIN (LIPITOR) 80 MG TABLET    Take 1 tablet by mouth daily    BLOOD GLUCOSE MONITORING SUPPL ADE    1 kit by Does not apply route daily ICD-10-CM: E11.9    BLOOD GLUCOSE TEST STRIPS (FREESTYLE LITE) STRIP    USE 4 STRIP TO CHECK GLUCOSE ONCE DAILY AS NEEDED    CARVEDILOL (COREG) 25 MG TABLET    TAKE 1 TABLET TWICE DAILY  WITH MEALS    CETIRIZINE (ZYRTEC) 10 MG TABLET    Take 10 mg by mouth 2 times daily    CLOPIDOGREL (PLAVIX) 75 MG TABLET    Take 1 tablet by mouth daily    COENZYME Q10 (CO Q 10) 10 MG CAPS    Take 1 capsule by mouth daily     COLCHICINE (COLCRYS) 0.6 MG TABLET    2 tablets now then 1 tablet two hours later, repeat for 3 days if pain persists.     DIABETIC SHOE MISC    by Does not apply route    DICLOFENAC SODIUM (VOLTAREN) 1 % GEL    Apply 4 g topically 4 times daily    FLUTICASONE (FLONASE) 50 MCG/ACT NASAL SPRAY    1 spray by Each Nare route daily    FREESTYLE LANCETS MISC    USE TO CHECK BLOOD SUGAR 1-2 TIMES DAILY    GLUCOSAMINE-CALCIUM-VIT D PO    Take 2 tablets by mouth daily     INSULIN GLARGINE (BASAGLAR KWIKPEN) 100 UNIT/ML INJECTION PEN    Inject 40 Units into the skin nightly INSULIN PEN NEEDLE 32G X 6 MM MISC    One needle four times a day    LANCET DEVICE MISC    1 Device by Does not apply route once for 1 dose ICD-10-CM: E11.9    LEVOTHYROXINE (SYNTHROID) 25 MCG TABLET    TAKE 1 TABLET DAILY    LISINOPRIL (PRINIVIL;ZESTRIL) 40 MG TABLET    TAKE 1 TABLET DAILY    MISC. DEVICES MISC    Diabetic shoes  DIAGNOSIS: E11.42    MISC. DEVICES MISC    Diabetic shoes     E11.9 E11.22    OMEGA-3 FATTY ACIDS (FISH OIL OMEGA-3 PO)    Take by mouth     OMEPRAZOLE (PRILOSEC) 40 MG DELAYED RELEASE CAPSULE    TAKE 1 CAPSULE DAILY    PROBIOTIC PRODUCT (ALIGN) 4 MG CAPS    Once capsule daily    SERTRALINE (ZOLOFT) 25 MG TABLET    Take 1 tablet by mouth daily    UMECLIDINIUM-VILANTEROL (ANORO ELLIPTA) 62.5-25 MCG/INH AEPB INHALER    Inhale 1 puff into the lungs daily    VITAMIN B-12 (CYANOCOBALAMIN) 1000 MCG TABLET    Take 1,000 mcg by mouth daily        ALLERGIES     Zinc and Tape [adhesive tape]    FAMILY HISTORY       Family History   Problem Relation Age of Onset    Heart Disease Mother     Heart Disease Father           SOCIAL HISTORY       Social History     Socioeconomic History    Marital status:      Spouse name: None    Number of children: None    Years of education: None    Highest education level: None   Occupational History    None   Tobacco Use    Smoking status: Never Smoker    Smokeless tobacco: Never Used   Vaping Use    Vaping Use: Never used   Substance and Sexual Activity    Alcohol use: No    Drug use: No    Sexual activity: None   Other Topics Concern    None   Social History Narrative    None     Social Determinants of Health     Financial Resource Strain:     Difficulty of Paying Living Expenses: Not on file   Food Insecurity:     Worried About Running Out of Food in the Last Year: Not on file    Fara of Food in the Last Year: Not on file   Transportation Needs:     Lack of Transportation (Medical):  Not on file    Lack of Transportation (Non-Medical): Not on file   Physical Activity:     Days of Exercise per Week: Not on file    Minutes of Exercise per Session: Not on file   Stress:     Feeling of Stress : Not on file   Social Connections:     Frequency of Communication with Friends and Family: Not on file    Frequency of Social Gatherings with Friends and Family: Not on file    Attends Sabianism Services: Not on file    Active Member of [employer] or Organizations: Not on file    Attends Club or Organization Meetings: Not on file    Marital Status: Not on file   Intimate Partner Violence:     Fear of Current or Ex-Partner: Not on file    Emotionally Abused: Not on file    Physically Abused: Not on file    Sexually Abused: Not on file   Housing Stability:     Unable to Pay for Housing in the Last Year: Not on file    Number of [place] in the Last Year: Not on file    Unstable Housing in the Last Year: Not on file       SCREENINGS   NIH Stroke Scale  Interval: Baseline  Level of Consciousness (1a. ): Alert  LOC Questions (1b. ): Answers both correctly  LOC Commands (1c. ): Performs both tasks correctly  Best Gaze (2. ): Normal  Visual (3. ): No visual loss  Facial Palsy (4. ): (!) Minor paralysis  Motor Arm, Left (5a. ): No drift  Motor Arm, Right (5b. ): No drift  Motor Leg, Left (6a. ): No drift  Motor Leg, Right (6b. ): No drift  Limb Ataxia (7. ): Absent  Sensory (8. ): Normal  Best Language (9. ): Mild to moderate aphasia  Dysarthria (10. ): Mild to moderate, slurs some words  Extinction and Inattention (11): No abnormality  Total: 3         PHYSICAL EXAM    (up to 7 for level 4, 8 or more for level 5)     ED Triage Vitals [11/20/21 0959]   BP Temp Temp Source Pulse Resp SpO2 Height Weight   125/68 -- Oral 74 18 97 % 5' 6\" (1.676 m) 162 lb (73.5 kg)       Physical Exam  Vitals and nursing note reviewed. Constitutional:       General: She is not in acute distress. Appearance: Normal appearance. She is well-developed.  She is not ill-appearing or diaphoretic. HENT:      Head: Normocephalic and atraumatic. Right Ear: External ear normal.      Left Ear: External ear normal.      Nose: Nose normal.      Mouth/Throat:      Mouth: Mucous membranes are moist.   Eyes:      Conjunctiva/sclera: Conjunctivae normal.   Neck:      Trachea: No tracheal deviation. Cardiovascular:      Rate and Rhythm: Normal rate and regular rhythm. Heart sounds: Normal heart sounds. No murmur heard. Pulmonary:      Effort: No respiratory distress. Breath sounds: Normal breath sounds. No wheezing or rales. Abdominal:      Palpations: Abdomen is soft. There is no mass. Tenderness: There is no abdominal tenderness. Musculoskeletal:         General: Normal range of motion. Cervical back: Normal range of motion. Right lower leg: No edema. Left lower leg: No edema. Skin:     General: Skin is warm and dry. Neurological:      Mental Status: She is alert and oriented to person, place, and time. GCS: GCS eye subscore is 4. GCS verbal subscore is 5. GCS motor subscore is 6. Cranial Nerves: Dysarthria and facial asymmetry present. Sensory: Sensory deficit present. Motor: Weakness present. Coordination: Coordination normal.      Comments: Right nasolabial flattening    Right sided weakness 3/5    Slurred speech         DIAGNOSTIC RESULTS     EKG: All EKG's areinterpreted by the Emergency Department Physician who either signs or Co-signs this chart in the absence of a cardiologist.    70 normal sinus rhythm no obvious ST changes nondiagnostic EKG    RADIOLOGY:  Non-plain film images such as CT, Ultrasound and MRI are read by the radiologist. Plain radiographic images are visualized and preliminarily interpreted bythe emergency physician with the below findings:      XR CHEST PORTABLE   Final Result   1. Shallow inspiration with low lung volumes.    2. Mild interstitial coarsening in the lung bases, perhaps a mild   interstitial edema. No consolidation or pleural effusion. Signed by Dr Dee Dee Meyers   Final Result   1. No flow-limiting stenosis or arterial occlusion in the neck. In   particular, there is no flow-limiting stenosis at the carotid   bifurcations according to the NASCET assessment. 2. Short segment moderate atheromatous narrowing along the   supraclinoid segment of the right distal ICA. No intracranial large   vessel occlusion. Signed by Dr Dee Dee Meyers   Final Result   1. No flow-limiting stenosis or arterial occlusion in the neck. In   particular, there is no flow-limiting stenosis at the carotid   bifurcations according to the NASCET assessment. 2. Short segment moderate atheromatous narrowing along the   supraclinoid segment of the right distal ICA. No intracranial large   vessel occlusion. Signed by Dr Dee Dee Meyers   Final Result   1. No acute intracranial process. Signed by Dr Davis Settler:  Labs Reviewed   CBC WITH AUTO DIFFERENTIAL - Abnormal; Notable for the following components:       Result Value    WBC 11.8 (*)     MCH 31.8 (*)     Neutrophils % 82.1 (*)     Lymphocytes % 8.5 (*)     Neutrophils Absolute 9.7 (*)     Lymphocytes Absolute 1.0 (*)     Monocytes Absolute 1.00 (*)     All other components within normal limits   COMPREHENSIVE METABOLIC PANEL W/ REFLEX TO MG FOR LOW K - Abnormal; Notable for the following components:    Sodium 130 (*)     CO2 16 (*)     Glucose 417 (*)     BUN 36 (*)     CREATININE 1.0 (*)     GFR Non-African American 53 (*)     Alkaline Phosphatase 131 (*)     All other components within normal limits   POCT GLUCOSE - Normal   COVID-19, RAPID   TROPONIN   PROTIME-INR   ACETONE   VENOUS BLD GAS   URINE RT REFLEX TO CULTURE       All other labs were within normal range or not returned as of this dictation.     EMERGENCY DEPARTMENT COURSE and DIFFERENTIAL

## 2021-11-20 NOTE — PROGRESS NOTES
Speech Language Pathology  Facility/Department: [facility] SURG SERVICES  Initial Speech/Language/Cognitive Assessment    NAME: Kylie Richmond  : 1940   MRN: [id number]  ADMISSION DATE: 2021  ADMITTING DIAGNOSIS: has Other fatigue; CAD in native artery; HTN (hypertension); Gastroesophageal reflux disease without esophagitis; Depression with anxiety; Type 2 diabetes mellitus with chronic kidney disease, without long-term current use of insulin ([facility] Pulmonary nodule; S/P left heart catheterization by percutaneous approach; COPD (chronic obstructive pulmonary disease) ([facility] Mixed hyperlipidemia; MAGALY (obstructive sleep apnea); Chronic kidney disease, stage III (moderate) ([facility] and Acute CVA (cerebrovascular accident) [facility]) on their problem list.      Date of Eval: 2021   Evaluating Therapist: GABRIELLE Padilla      Pain:  Pain Assessment  Pain Assessment: 0-10  Pain Level: 0    Assessment:  Cognitive Diagnosis: SLP to continue cognitive assessment. Pt informally assessed at bedside this date indicating orientation, attention skills functional.   Speech Diagnosis: Pt presented with mild dysarthria within conversation. Difficulty with longer speech utterances was evidenced by mechanism fatigue. Pt demonstrated mild difficulty with lingual coordination for all lingual palatal speech sounds. Consonant blends were also noted with mild distortions. Impressions:   Pt presented with mild speech intelligibility deficits and expressive language deficits. Informal cognitive assessment was initiated and pt demonstrated appropriate orientation, short-term memory recal skills; other cognitive processes to be assessed at a later time. Recommendations:  Requires SLP Intervention: Yes  Duration/Frequency of Treatment: 1x/day, 2-3 days  D/C Recommendations:  To be determined       Plan:   Goals:  Short-term Goals  Goal 1: Pt complete divergent/convergent naming tasks to improve thought organization during

## 2021-11-20 NOTE — H&P
[facility] - History & Physical      PCP: Twin Connelly MD    Date of Admission: 11/20/2021    Date of Service: 11/20/2021    Chief Complaint: Slurred speech, right-sided numbness and weakness    History Of Present Illness: The patient is a 79 y.o. female with past medical history of CAD, MI, CHF, COPD, DM, HTN, HLD, thyroid disorder, and GERD who presented to [address] [place] [address] ED complaining of slurred speech and right-sided numbness and weakness. Reports waking up at 6 AM not noticing any symptoms at that time. Ms. Shar Butler reported having right-sided numbness and weakness that started around 7 am this morning. Reported slurred speech as well. Stated she checked her sugar and was 25, therefore she ate peanut butter with lots of sugar in coffee this morning. Reported right hand numbness and weakness causing her coffee to spill. Reported family noticing right facial droop. States she lives at [facility]. Denies history of prior stroke. Denies change in vision. Denies shortness of breath, chest pain, palpitation, fever, chills, or cough. Work-up in ED revealed sodium 130, CO2 16, BUN 36, creatinine 1.0, GFR 53 (previous creatinine 0.8, GFR > 60 on 10/7/2021), glucose 419, alk phos 131, WBC 11.8, CT head no acute intracranial process. CTA head and neck revealed short segment moderate atheromatous narrowing along the supraclinoid segment of the right distal ICA. Chest x-ray showed shallow inspiration with low lung volumes, mild interstitial coarsening in the lung bases, perhaps a mild facial edema, no consolidation or pleural effusion. MRI brain showed acute posterior limb left internal capsule lacunar infarct.          Past Medical History:        Diagnosis Date    Acute CVA (cerebrovascular accident) ([facility] [address] 11/20/2021    Arthritis     Psoriatic    Asthma     CAD (coronary artery disease)     CHF (congestive heart failure) (HCC)     COPD (chronic obstructive pulmonary disease) ([facility]  [facility]     GERD (gastroesophageal reflux disease)     HTN (hypertension)     Hyperlipidemia     [facility]     x2    Thyroid disease        Past Surgical History:        Procedure Laterality Date    CATARACT REMOVAL      CHOLECYSTECTOMY      CORONARY ANGIOPLASTY  06/2018    Angioplasty to in-stent restenosis to the distal LAD    HIP SURGERY      HYSTERECTOMY      JOINT REPLACEMENT Right     Right knee replacement       Home Medications:  Prior to Admission medications    Medication Sig Start Date End Date Taking?  Authorizing Provider   carvedilol (COREG) 25 MG tablet TAKE 1 TABLET TWICE DAILY  WITH MEALS 11/7/21  Yes Meryle Bott, MD   lisinopril (PRINIVIL;ZESTRIL) 40 MG tablet TAKE 1 TABLET DAILY 11/7/21  Yes Meryle Bott, MD   sertraline (ZOLOFT) 25 MG tablet Take 1 tablet by mouth daily 11/4/21  Yes Meryle Bott, MD   insulin glargine (BASAGLAR KWIKPEN) 100 UNIT/ML injection pen Inject 40 Units into the skin nightly  Patient taking differently: Inject 20 Units into the skin nightly  11/4/21  Yes Meryle Bott, MD   atorvastatin (LIPITOR) 80 MG tablet Take 1 tablet by mouth daily 8/16/21  Yes Meryle Bott, MD   omeprazole (PRILOSEC) 40 MG delayed release capsule TAKE 1 CAPSULE DAILY 4/9/21  Yes Meryle Bott, MD   Probiotic Product (ALIGN) 4 MG CAPS Once capsule daily 9/28/20  Yes Meryle Bott, MD   Omega-3 Fatty Acids (FISH OIL OMEGA-3 PO) Take by mouth    Yes Historical Provider, MD   vitamin B-12 (CYANOCOBALAMIN) 1000 MCG tablet Take 1,000 mcg by mouth daily    Yes Historical Provider, MD   Coenzyme Q10 (CO Q 10) 10 MG CAPS Take 1 capsule by mouth daily    Yes Historical Provider, MD   GLUCOSAMINE-CALCIUM-VIT D PO Take 2 tablets by mouth daily    Yes Historical Provider, MD   diclofenac sodium (VOLTAREN) 1 % GEL Apply 4 g topically 4 times daily 11/4/21   Meryle Bott, MD   blood glucose test strips (FREESTYLE LITE) strip USE 4 STRIP TO CHECK GLUCOSE ONCE DAILY AS NEEDED 7/13/21   Reba Wu MD   Diabetic Shoe MISC by Does not apply route 7/8/21   Reba Wu MD   Misc. Devices MISC Diabetic shoes     E11.9 E11.22 6/25/21   Reba Wu MD   Misc. Devices MISC Diabetic shoes  DIAGNOSIS: E11.42 6/23/21   Reba Wu MD   colchicine (COLCRYS) 0.6 MG tablet 2 tablets now then 1 tablet two hours later, repeat for 3 days if pain persists. 6/21/21   Reba Wu MD   Lancet Device MISC 1 Device by Does not apply route once for 1 dose ICD-10-CM: E11.9 11/19/20 10/7/21  Reba Wu MD   levothyroxine (SYNTHROID) 25 MCG tablet TAKE 1 TABLET DAILY 8/10/20   Reba Wu MD   Insulin Pen Needle 32G X 6 MM MISC One needle four times a day 8/10/20   Reba Wu MD   FREESTYLE LANCETS MISC USE TO CHECK BLOOD SUGAR 1-2 TIMES DAILY 6/21/19   Reba Wu MD   umeclidinium-vilanterol [facility] ELLIPTA) 62.5-25 MCG/INH AEPB inhaler Inhale 1 puff into the lungs daily  Patient not taking: Reported on 10/7/2021    Historical Provider, MD   fluticasone (FLONASE) 50 MCG/ACT nasal spray 1 spray by Each Nare route daily    Historical Provider, MD   cetirizine (ZYRTEC) 10 MG tablet Take 10 mg by mouth 2 times daily    Historical Provider, MD   Blood Glucose Monitoring Suppl ADE 1 kit by Does not apply route daily ICD-10-CM: E11.9 4/23/18   Reba Wu MD   clopidogrel (PLAVIX) 75 MG tablet Take 1 tablet by mouth daily  Patient not taking: Reported on 6/17/2021 12/24/17   Vida Watters MD   aspirin 81 MG tablet Take 162 mg by mouth daily   Patient not taking: Reported on 10/7/2021    Historical Provider, MD       Allergies:    Zinc and Tape [adhesive tape]    Social History:    The patient currently lives at [address] [place] [address]:   reports that she has never smoked. She has never used smokeless tobacco.  Alcohol:   reports no history of alcohol use.   Illicit Drugs: denies    Family History:      Problem Relation Age of Onset    Heart Disease Mother     Heart Disease Father          Review of Systems   Constitutional: Negative for chills, diaphoresis, fatigue and fever. HENT: Negative for congestion, ear pain, sinus pain, sore throat and trouble swallowing. Eyes: Negative for visual disturbance. Respiratory: Negative for cough, shortness of breath and wheezing. Cardiovascular: Negative for chest pain, palpitations and leg swelling. Gastrointestinal: Negative for abdominal distention, abdominal pain, blood in stool, constipation, diarrhea, nausea and vomiting. Endocrine: Negative for cold intolerance and heat intolerance. Genitourinary: Negative for difficulty urinating, flank pain, frequency and urgency. Musculoskeletal: Negative for arthralgias and myalgias. Neurological: Positive for speech difficulty, weakness and numbness. Negative for dizziness, syncope, light-headedness and headaches. Reports right sided numbness, tingling, and weakness   Reported right facial droop    Hematological: Does not bruise/bleed easily. Psychiatric/Behavioral: Negative for agitation, confusion and dysphoric mood. Physical Examination:  BP (!) 178/71   Pulse 57   Temp 97.9 °F (36.6 °C)   Resp 14   Ht 5' 6\" (1.676 m)   Wt 162 lb (73.5 kg)   SpO2 97%   BMI 26.15 kg/m²     Physical Exam  Constitutional:       General: She is not in acute distress. Appearance: Normal appearance. She is not toxic-appearing or diaphoretic. HENT:      Head: Normocephalic and atraumatic. Right Ear: External ear normal.      Left Ear: External ear normal.      Nose: Nose normal. No congestion or rhinorrhea. Mouth/Throat:      Mouth: Mucous membranes are moist.      Pharynx: Oropharynx is clear. Eyes:      General: No scleral icterus. Extraocular Movements: Extraocular movements intact.       Conjunctiva/sclera: Conjunctivae normal.   Cardiovascular:      Rate and Rhythm: Normal rate and regular GLUCOSEU =>1000 11/20/2021         RAD:    CT HEAD WO CONTRAST  Result Date: 11/20/2021    1. No acute intracranial process. Signed by Dr Romayne Quint      XR CHEST PORTABLE  Result Date: 11/20/2021    1. Shallow inspiration with low lung volumes. 2. Mild interstitial coarsening in the lung bases, perhaps a mild interstitial edema. No consolidation or pleural effusion. Signed by Dr Kenny Berg  Result Date: 11/20/2021    1. No flow-limiting stenosis or arterial occlusion in the neck. In particular, there is no flow-limiting stenosis at the carotid bifurcations according to the NASCET assessment. 2. Short segment moderate atheromatous narrowing along the supraclinoid segment of the right distal ICA. No intracranial large vessel occlusion. Signed by Dr Kenny Berg  Result Date: 11/20/2021    1. No flow-limiting stenosis or arterial occlusion in the neck. In particular, there is no flow-limiting stenosis at the carotid bifurcations according to the NASCET assessment. 2. Short segment moderate atheromatous narrowing along the supraclinoid segment of the right distal ICA. No intracranial large vessel occlusion. Signed by Dr Romayne Quint      MRI BRAIN WO CONTRAST  Result Date: 11/20/2021    Impression: Acute posterior limb left internal capsule lacunar infarct. Signed by Dr Romayne Quint      Assessment/Plan:  Principal Problem:    Acute CVA (cerebrovascular accident) [facility])  Active Problems:    CAD in native artery    HTN (hypertension)    Gastroesophageal reflux disease without esophagitis    Type 2 diabetes mellitus with chronic kidney disease, without long-term current use of insulin (HCC)    COPD (chronic obstructive pulmonary disease) ([facility])    Mixed hyperlipidemia    CECILAI (acute kidney injury) ([facility] [address]  Resolved Problems:    * No resolved hospital problems.  *       Principal Problem:    Acute CVA (cerebrovascular accident) [facility])-    - Neurology consultation and

## 2021-11-20 NOTE — PROGRESS NOTES
Speech Language Pathology  Facility/Department: [facility] SURG SERVICES   CLINICAL BEDSIDE SWALLOW EVALUATION    NAME: Tammi Pretty  : 1940  MRN: [id number]    ADMISSION DATE: 2021  ADMITTING DIAGNOSIS: has Other fatigue; CAD in native artery; HTN (hypertension); Gastroesophageal reflux disease without esophagitis; Depression with anxiety; Type 2 diabetes mellitus with chronic kidney disease, without long-term current use of insulin ([facility] Pulmonary nodule; S/P left heart catheterization by percutaneous approach; COPD (chronic obstructive pulmonary disease) ([facility] Mixed hyperlipidemia; MAGALY (obstructive sleep apnea); Chronic kidney disease, stage III (moderate) ([facility] Acute CVA (cerebrovascular accident) ([facility] and CECILIA (acute kidney injury) ([facility] on their problem list.      Date of Eval: 2021  Evaluating Therapist: GABRIELLE Renner    Current Diet level:  Current Diet : NPO  Current Liquid Diet : NPO        Pain:  Pain Assessment  Pain Assessment: 0-10  Pain Level: 0    Reason for Referral  Tammi Pretty was referred for a bedside swallow evaluation to assess the efficiency of her swallow function, identify signs and symptoms of aspiration and make recommendations regarding safe dietary consistencies, effective compensatory strategies, and safe eating environment. Impression    Pt presented at the bedside with mild oral phase deficits and minimal to no pharyngeal phase deficits. Oral residue and pocketing with regular texture was observed; however soft solids, pt tolerated texture with increased lingual coordination for safe swallowing. Thin liquids were presented over several trials without overt s/s of aspiration at the bedside. At this time, SLP recommends pt trial soft and bite-sized diet with thin liquids. Meds whole with water. Treatment Plan  Requires SLP Intervention: Yes  Duration/Frequency of Treatment: 1x/day, 2-3 days  D/C Recommendations:  To be determined evidenced by residue that required verbal prompts to clear. Indicators of Pharyngeal Phase Dysfunction   Pharyngeal Phase  Pharyngeal Phase: WFL  Pharyngeal Phase   Pharyngeal: At the bedside, pt presented without overt s/s of aspiration. RN and pt reported coughing with thin liquids. Pt passed 3 oz water test with nursing and SLP. Pt demonstrated timely swallow initiation with thin liquids. Pt demonstrated functional LE. Prognosis  Individuals consulted  Consulted and agree with results and recommendations: Patient    Education  Patient Education: Reviewed diet  Patient Education Response: Verbalizes understanding              At this time, SLP recommends pt trial soft and bite-sized diet with thin liquids. Meds whole with water.         GABRIELLE Crespo  11/20/2021 4:13 PM     Electronically signed by GABRIELLE Crespo on 11/20/21 at 4:15 PM CST

## 2021-11-20 NOTE — ED NOTES
[address] to give heads up of possible code stroke  1000 Code Stroke called  1000 Called CT back to make sure they were ready and patient was taken over      Cipriano Tobias  11/20/21 1006

## 2021-11-21 LAB
ANION GAP SERPL CALCULATED.3IONS-SCNC: 13 MMOL/L (ref 7–19)
BUN BLDV-MCNC: 25 MG/DL (ref 8–23)
CALCIUM SERPL-MCNC: 9.4 MG/DL (ref 8.8–10.2)
CHLORIDE BLD-SCNC: 104 MMOL/L (ref 98–111)
CHOLESTEROL, TOTAL: 134 MG/DL (ref 160–199)
CO2: 21 MMOL/L (ref 22–29)
CREAT SERPL-MCNC: 0.7 MG/DL (ref 0.5–0.9)
GFR AFRICAN AMERICAN: >59
GFR NON-AFRICAN AMERICAN: >60
GLUCOSE BLD-MCNC: 157 MG/DL (ref 70–99)
GLUCOSE BLD-MCNC: 158 MG/DL (ref 74–109)
GLUCOSE BLD-MCNC: 196 MG/DL (ref 70–99)
GLUCOSE BLD-MCNC: 208 MG/DL (ref 70–99)
GLUCOSE BLD-MCNC: 297 MG/DL (ref 70–99)
HBA1C MFR BLD: 8.1 % (ref 4–6)
HCT VFR BLD CALC: 39.9 % (ref 37–47)
HDLC SERPL-MCNC: 48 MG/DL (ref 65–121)
HEMOGLOBIN: 13.7 G/DL (ref 12–16)
LDL CHOLESTEROL CALCULATED: 42 MG/DL
MCH RBC QN AUTO: 31.2 PG (ref 27–31)
MCHC RBC AUTO-ENTMCNC: 34.3 G/DL (ref 33–37)
MCV RBC AUTO: 90.9 FL (ref 81–99)
P2Y12 RESULT: 210 PRU (ref 194–418)
PDW BLD-RTO: 13.2 % (ref 11.5–14.5)
PERFORMED ON: ABNORMAL
PLATELET # BLD: 225 K/UL (ref 130–400)
PMV BLD AUTO: 9.5 FL (ref 9.4–12.3)
POTASSIUM REFLEX MAGNESIUM: 4.2 MMOL/L (ref 3.5–5)
RBC # BLD: 4.39 M/UL (ref 4.2–5.4)
SODIUM BLD-SCNC: 138 MMOL/L (ref 136–145)
TRIGL SERPL-MCNC: 222 MG/DL (ref 0–149)
WBC # BLD: 11.1 K/UL (ref 4.8–10.8)

## 2021-11-21 PROCEDURE — 83036 HEMOGLOBIN GLYCOSYLATED A1C: CPT

## 2021-11-21 PROCEDURE — 1180000000 HC REHAB R&B

## 2021-11-21 PROCEDURE — 80048 BASIC METABOLIC PNL TOTAL CA: CPT

## 2021-11-21 PROCEDURE — 82947 ASSAY GLUCOSE BLOOD QUANT: CPT

## 2021-11-21 PROCEDURE — 36415 COLL VENOUS BLD VENIPUNCTURE: CPT

## 2021-11-21 PROCEDURE — 6360000002 HC RX W HCPCS: Performed by: NURSE PRACTITIONER

## 2021-11-21 PROCEDURE — 80061 LIPID PANEL: CPT

## 2021-11-21 PROCEDURE — 85576 BLOOD PLATELET AGGREGATION: CPT

## 2021-11-21 PROCEDURE — 1210000000 HC MED SURG R&B

## 2021-11-21 PROCEDURE — 97530 THERAPEUTIC ACTIVITIES: CPT

## 2021-11-21 PROCEDURE — 99223 1ST HOSP IP/OBS HIGH 75: CPT | Performed by: PSYCHIATRY & NEUROLOGY

## 2021-11-21 PROCEDURE — 97162 PT EVAL MOD COMPLEX 30 MIN: CPT

## 2021-11-21 PROCEDURE — 6370000000 HC RX 637 (ALT 250 FOR IP): Performed by: NURSE PRACTITIONER

## 2021-11-21 PROCEDURE — 85027 COMPLETE CBC AUTOMATED: CPT

## 2021-11-21 PROCEDURE — 97110 THERAPEUTIC EXERCISES: CPT

## 2021-11-21 RX ADMIN — CYANOCOBALAMIN TAB 500 MCG 1000 MCG: 500 TAB at 09:50

## 2021-11-21 RX ADMIN — INSULIN GLARGINE 20 UNITS: 100 INJECTION, SOLUTION SUBCUTANEOUS at 21:12

## 2021-11-21 RX ADMIN — SERTRALINE HYDROCHLORIDE 25 MG: 25 TABLET ORAL at 09:50

## 2021-11-21 RX ADMIN — INSULIN LISPRO 1 UNITS: 100 INJECTION, SOLUTION INTRAVENOUS; SUBCUTANEOUS at 18:00

## 2021-11-21 RX ADMIN — Medication 100 MG: at 09:49

## 2021-11-21 RX ADMIN — LEVOTHYROXINE SODIUM 25 MCG: 25 TABLET ORAL at 05:35

## 2021-11-21 RX ADMIN — ASPIRIN 81 MG: 81 TABLET, COATED ORAL at 09:50

## 2021-11-21 RX ADMIN — ATORVASTATIN CALCIUM 80 MG: 80 TABLET, FILM COATED ORAL at 21:12

## 2021-11-21 RX ADMIN — ENOXAPARIN SODIUM 40 MG: 100 INJECTION SUBCUTANEOUS at 16:59

## 2021-11-21 RX ADMIN — INSULIN LISPRO 1 UNITS: 100 INJECTION, SOLUTION INTRAVENOUS; SUBCUTANEOUS at 21:13

## 2021-11-21 RX ADMIN — CLOPIDOGREL BISULFATE 75 MG: 75 TABLET ORAL at 09:50

## 2021-11-21 RX ADMIN — PANTOPRAZOLE SODIUM 40 MG: 40 TABLET, DELAYED RELEASE ORAL at 09:49

## 2021-11-21 ASSESSMENT — ENCOUNTER SYMPTOMS
SORE THROAT: 0
DIARRHEA: 0
VOMITING: 0
WHEEZING: 0
NAUSEA: 0
BLOOD IN STOOL: 0
CONSTIPATION: 0
SINUS PAIN: 0
COUGH: 0
ABDOMINAL DISTENTION: 0
ABDOMINAL PAIN: 0
TROUBLE SWALLOWING: 0
SHORTNESS OF BREATH: 0

## 2021-11-21 ASSESSMENT — PAIN SCALES - GENERAL: PAINLEVEL_OUTOF10: 0

## 2021-11-21 NOTE — PROGRESS NOTES
[address], [address]  [place]  Phone [phone]     Neurology Progress Note  2021 1:08 PM  Information:   Patient Name: Shawn Doyle  :   1940  Age:   79 y.o. MRN:   [id number]  Account #:  [de-identified]  Admit Date:   2021  Today:  21     ADMIT DX:   Acute CVA (cerebrovascular accident) [facility])    Subjective:     Shawn Doyle is a 79y.o. year old woman with a history of hypertension, hyperlipidemia, diabetes, and cardiovascular disease who was admitted  with right sided weakness, numbness, and slurred speech. Interval History:   She says she is about the same. She still has slurred speech and right arm and leg weakness and numbness.       Objective:     Past Medical History:  Past Medical History:   Diagnosis Date    Acute CVA (cerebrovascular accident) ([facility] 2021    Arthritis     Psoriatic    Asthma     CAD (coronary artery disease)     CHF (congestive heart failure) (HCC)     COPD (chronic obstructive pulmonary disease) (HCC)     DM (diabetes mellitus) (HCC)     GERD (gastroesophageal reflux disease)     HTN (hypertension)     Hyperlipidemia     MI (myocardial infarction) ([facility]     x2    Thyroid disease        Past Surgical History:   Procedure Laterality Date    CATARACT REMOVAL      CHOLECYSTECTOMY      CORONARY ANGIOPLASTY  2018    Angioplasty to in-stent restenosis to the distal LAD    HIP SURGERY      HYSTERECTOMY      JOINT REPLACEMENT Right     Right knee replacement       Family History   Problem Relation Age of Onset    Heart Disease Mother     Heart Disease Father        Social History     Socioeconomic History    Marital status:      Spouse name: Not on file    Number of children: Not on file    Years of education: Not on file    Highest education level: Not on file   Occupational History    Not on file   Tobacco Use    Smoking status: Never Smoker    Smokeless tobacco: Never Used Vaping Use    Vaping Use: Never used   Substance and Sexual Activity    Alcohol use: No    Drug use: No    Sexual activity: Not on file   Other Topics Concern    Not on file   Social History Narrative    Not on file     Social Determinants of Health     Financial Resource Strain:     Difficulty of Paying Living Expenses: Not on file   Food Insecurity:     Worried About Running Out of Food in the Last Year: Not on file    Fara of Food in the Last Year: Not on file   Transportation Needs:     Lack of Transportation (Medical): Not on file    Lack of Transportation (Non-Medical):  Not on file   Physical Activity:     Days of Exercise per Week: Not on file    Minutes of Exercise per Session: Not on file   Stress:     Feeling of Stress : Not on file   Social Connections:     Frequency of Communication with Friends and Family: Not on file    Frequency of Social Gatherings with Friends and Family: Not on file    Attends Holiness Services: Not on file    Active Member of Clubs or Organizations: Not on file    Attends Club or Organization Meetings: Not on file    Marital Status: Not on file   Intimate Partner Violence:     Fear of Current or Ex-Partner: Not on file    Emotionally Abused: Not on file    Physically Abused: Not on file    Sexually Abused: Not on file   Housing Stability:     Unable to Pay for Housing in the Last Year: Not on file    Number of Places Lived in the Last Year: Not on file    Unstable Housing in the Last Year: Not on file       Medications:   dextrose        enoxaparin  40 mg SubCUTAneous Daily    aspirin  81 mg Oral Daily    Or    aspirin  300 mg Rectal Daily    atorvastatin  80 mg Oral Nightly    cetirizine  10 mg Oral BID    clopidogrel  75 mg Oral Daily    coenzyme Q10  100 mg Oral Daily    levothyroxine  25 mcg Oral Daily    pantoprazole  40 mg Oral QAM AC    sertraline  25 mg Oral Daily    vitamin B-12  1,000 mcg Oral Daily    insulin glargine  20 Range    POC Glucose 297 (H) 70 - 99 mg/dl    Performed on [employer]      Narrative   MRI BRAIN WO CONTRAST 11/20/2021 11:45 AM   HISTORY: Right-sided weakness, facial droop   Comparison: None.     Technique: Multiplanar imaging of the brain was performed in a routine   fashion without intravenous contrast.   Findings:    Posterior limb left internal capsule lacunar infarct. There is no   intra-axial or extra-axial hemorrhage. No visualized mass lesion or   mass effect. Mild chronic small vessel ischemic change. Normal size   and configuration of the ventricular system for patient age. The   posterior fossa structures are unremarkable. The pituitary gland and   sella are unremarkable. The major intracranial flow voids are   preserved. The orbits are unremarkable. The paranasal sinuses and mastoids are   clear. Marrow signal in the calvarium and skull base appears normal.       Impression   Impression:    Acute posterior limb left internal capsule lacunar infarct. Signed by Dr Adam Gee  Transthoracic Echocardiography Report (TTE)      Demographics      Patient Name  Hailey Mitchell Date of Study          11/20/2021      MRN           [id number]         Gender                 Female      Date of Birth 1940     Room Number            OUW-6908      Age           79 year(s)      Height:       66 inches      Referring Physician    Harshal Pantoja      Weight:       155 pounds     Sonographer            Emely Medina RDCS      BSA:          1.79 m^2       Interpreting Physician Roma Hannah MD      BMI:          25.02 kg/m^2     Procedure     Type of Study      TTE procedure:ECHO NO CONTRAST WITH DOP/COLR. Study Location: Echo Lab  Technical Quality: Adequate visualization     Patient Status: Inpatient     Contrast Medium: Bubble Study.     Rhythm: Within normal limits      Conclusions      Summary   Structurally normal mitral valve. Mild mitral regurgitation is present. Structurally normal aortic valve. Tricuspid valve is structurally normal.   Normal left ventricular size with preserved LV function and an estimated   ejection fraction of approximately 55-60%. Mild concentric left ventricular hypertrophy. No regional wall motion abnormalities. Impaired relaxation compatible with diastolic dysfunction. ( reversed E/A   ratio)      Signature      ----------------------------------------------------------------   Electronically signed by Sepideh Leigh MD(Interpreting   physician) on 11/21/2021 08:53 AM    Diet:  ADULT DIET; Dysphagia - Soft and Bite Sized    Examination:  Vitals: /64   Pulse 67   Temp 96.6 °F (35.9 °C) (Temporal)   Resp 18   Ht 5' 6\" (1.676 m)   Wt 162 lb (73.5 kg)   SpO2 97%   BMI 26.15 kg/m²      Intake/Output Summary (Last 24 hours) at 11/21/2021 1308  Last data filed at 11/21/2021 0416  Gross per 24 hour   Intake 250 ml   Output 250 ml   Net 0 ml     General appearance:  She is an elderly woman who appears in no distress. HEENT: Head: Normal, normocephalic, atraumatic. Neck:no adenopathy, no carotid bruit, no JVD, supple, symmetrical, trachea midline and thyroid not enlarged, symmetric, no tenderness/mass/nodules  Lungs: clear to auscultation bilaterally  Heart: regular rate and rhythm, S1, S2 normal, no murmur, click, rub or gallop  Abdomen: soft, non-tender; bowel sounds normal; no masses,  no organomegaly  Extremities: extremities normal, atraumatic, no cyanosis or edema  Neurologic:  Alert, oriented. Mild dysarthria. Speech is fluent. EOMI, PERRL, VFF. No facial weakness. Limb strength testing shows mild right arm and leg weakness. Pronator drift is positive on the right. Rapid alternating movements are impaired on the right. Finger to nose testing shows mild right dysmetria. Assessment:   1. Left thalamocapsular stroke  2. Hypertension  3. Hyperlipidemia  4. Diabetes    Plan:   1.  Start ASA/Plavix  2. Continue Lipitor  3. Continue PT/OT/ST. May need rehab if not well enough to go home tomorrow     Discharge planning:   Considering Rehab    Reviewed treatment plans with the patient and/or family. 35 minutes spent in face to face interaction and coordination of care.      Electronically signed by Brandie Bustillo MD on 11/21/2021 at 1:08 PM

## 2021-11-21 NOTE — PROGRESS NOTES
Physical Therapy    Facility/Department: [facility] SURG SERVICES  Initial Assessment    NAME: Tom Pugh  : 1940  MRN: [id number]    Date of Service: 2021    Discharge Recommendations:  Continue to assess pending progress, 24 hour supervision or assist, Patient would benefit from continued therapy after discharge        Assessment   Body structures, Functions, Activity limitations: Decreased functional mobility ; Decreased strength; Decreased safe awareness; Decreased cognition; Decreased sensation; Decreased posture; Decreased balance; Decreased coordination  Assessment: Pt. will benefit from cont. PT to decrease impairments. Pt. a fall risk and should not attempt mobility on her own at this time. Anticipate pt will need rehab prior to being able to d/c back to [facility]. Pt. needs to attempt amb. with RW, but would be safer with A x 2 due to poor coordination with RU/LE. Treatment Diagnosis: impaired gait and mobility  Prognosis: Good  Decision Making: Medium Complexity  PT Education: Goals; PT Role; Plan of Care; Precautions; Gait Training; Adaptive Device Training; General Safety; Functional Mobility Training; Transfer Training  Patient Education: use of call light and staff A for mobility and needs  Barriers to Learning: impulsivity, decreased safety awareness  REQUIRES PT FOLLOW UP: Yes  Activity Tolerance  Activity Tolerance: Patient limited by cognitive status; Patient Tolerated treatment well  Activity Tolerance: distracted and needs redirection, impulsive       Patient Diagnosis(es): The primary encounter diagnosis was Cerebrovascular accident (CVA), unspecified mechanism ([address] Diagnoses of Hyperglycemia and CECILIA (acute kidney injury) ([address] were also pertinent to this visit.      has a past medical history of Acute CVA (cerebrovascular accident) ([address] Arthritis, Asthma, CAD (coronary artery disease), CHF (congestive heart failure) ([address] COPD (chronic obstructive pulmonary disease) ([address] DM (diabetes mellitus) ([facility] GERD (gastroesophageal reflux disease), HTN (hypertension), Hyperlipidemia, MI (myocardial infarction) ([facility] [address] [facility] and Thyroid disease. has a past surgical history that includes Hysterectomy; joint replacement (Right); Cholecystectomy; hip surgery; Cataract removal; and Coronary angioplasty (06/2018). Restrictions  Restrictions/Precautions  Restrictions/Precautions: Fall Risk  Required Braces or Orthoses?: No  Vision/Hearing  Vision: Within Functional Limits  Hearing: Exceptions to [facility] Exceptions: Hard of hearing/hearing concerns; Bilateral hearing aid     Subjective  General  Chart Reviewed: Yes  Patient assessed for rehabilitation services?: Yes  Response To Previous Treatment: Not applicable  Family / Caregiver Present: No  Referring Practitioner: SHARI Squires CNP, Dr. Camden Carbone  Referral Date : 11/20/21  Diagnosis: CVA, hyperglycemia, CECILIA  Follows Commands: Impaired  Other (Comment): impulsive and needs redirection, simple commands  General Comment  Comments: Scott CALLEJAS PT and present during 1st portion of mobility  Subjective  Subjective: Pt. states she is tired of laying in the bed and is incontinent, so bed is wet. Pt. feels dizzy getting up to [facility].   Pain Screening  Patient Currently in Pain: Denies  Vital Signs  Patient Currently in Pain: Denies  Pre Treatment Pain Screening  Intervention List: Patient able to continue with treatment    Orientation     Social/Functional History  Social/Functional History  Lives With: Alone  Type of Home: Assisted living [facility])  Home Equipment: 4 wheeled walker  ADL Assistance: Independent  Ambulation Assistance: Independent (uses rollator for longer distances, but reports it collapsed with her on it recently)  Transfer Assistance: Independent  Active : No  Mode of Transportation: Bus  Additional Comments: goes to appointments and shopping on a bus  Cognition   Cognition  Overall Cognitive Status: Exceptions  Arousal/Alertness: Appropriate responses to stimuli  Following Commands: Follows one step commands with repetition; Follows one step commands with increased time  Attention Span: Difficulty attending to directions  Memory: Decreased recall of precautions; Decreased recall of recent events  Safety Judgement: Decreased awareness of need for assistance; Decreased awareness of need for safety  Problem Solving: Assistance required to generate solutions; Assistance required to implement solutions; Decreased awareness of errors  Insights: Decreased awareness of deficits  Initiation: Requires cues for some  Sequencing: Requires cues for some    Objective     Observation/Palpation  Posture: Fair    AROM RLE (degrees)  RLE AROM: WFL  AROM LLE (degrees)  LLE AROM : WFL  AROM RUE (degrees)  RUE AROM : WFL  AROM LUE (degrees)  LUE AROM : WFL  Strength RLE  Strength RLE: WFL  Comment: 4/5  Strength LLE  Strength LLE: WFL  Comment: 5/5  Strength RUE  Strength RUE: Exception  Comment: grossly 3+/5  Strength LUE  Strength LUE: WFL  Comment: 5/5  Motor Control  Gross Motor?: Exceptions  Comments: poor coordination of RUE/RLE. Able to oppose fingers with thumb on B hands, but R hand is slowed. Sensation  Overall Sensation Status: Impaired (Decreased sensation to light touch RU/LE)  Bed mobility  Supine to Sit: Stand by assistance (extended time and use of bed rail)  Sit to Supine: Unable to assess  Scooting: Stand by assistance  Comment: sat on EOB x 5 mins SBA  Transfers  Sit to Stand: Minimal Assistance  Stand to sit: Minimal Assistance  Bed to Chair: Moderate assistance  Comment: bed to [facility] to chair. Pt's gown changed, depends removed. Pt washed front of body and PT washed back. Pt. sat on BSC to urinate, but began to feel dizzy and transfered to chair. Some difficulty advancing RLE. Pt. needs v. cues for hand placement during transfers, pt reaches out and grabs PT's elbow and then shoulder.   Ambulation  Ambulation?: No     Balance  Posture: Fair  Sitting - Static: Fair; +  Sitting - Dynamic: Fair; -  Standing - Static: Poor; +  Standing - Dynamic: Poor        Plan   Plan  Times per week: 3-7  Times per day: Daily  Plan weeks: 2  Current Treatment Recommendations: Strengthening, Balance Training, Functional Mobility Training, Transfer Training, Gait Training, Safety Education & Training, Positioning, Equipment Evaluation, Education, & procurement, Patient/Caregiver Education & Training  Plan Comment: cont. PT as pt is able with progressive mobility and safety training.   Safety Devices  Type of devices: Gait belt, Patient at risk for falls, Nurse notified, Call light within reach, Left in chair, Chair alarm in place (set up for breakfast)    G-Code       OutComes Score                                                  AM-PAC Score             Goals  Short term goals  Time Frame for Short term goals: 2 wks  Short term goal 1: supine to sit indep  Short term goal 2: sit to stand indep  Short term goal 3: amb. 200' with RW SBA  Short term goal 4: bed to chair SBA  Patient Goals   Patient goals : go home       Therapy Time   Individual Concurrent Group Co-treatment   Time In           Time Out           Minutes                   Corrie Dance, PT    Electronically signed by Corrie Dance, PT on 11/21/2021 at 10:21 AM

## 2021-11-21 NOTE — PROGRESS NOTES
[facility] Hospitalists      Patient:  Wilder Mattson  YOB: 1940  Date of Service: 11/21/2021  MRN: [id number]   Acct: [de-identified]   Primary Care Physician: Reba Wu MD  Advance Directive: Full Code  Admit Date: 11/20/2021       Hospital Day: 1     Portions of this note have been copied forward, however, changed to reflect the most current clinical status of this patient. CHIEF COMPLAINT Right sided weakness    SUBJECTIVE: Remains weak and unable to transfer to the toilet without assistance. She is able to move her right side but it is weaker than her left. She denies chest pain shortness of breath or dizziness. She denies headache. CUMULATIVE HOSPITAL COURSE:  Admitted on 11/20/2021 for complaints of slurred speech and right-sided weakness with numbness. Apparently had issues with her blood sugar stating that her blood sugar was 25, therefore she peanut butter and sugar with lots of coffee. Stated at that time that the right sided weakness caused her coffee to spill. Family noticed a right-sided facial droop. She currently lives at an assisted living [address] [place] [address] in [place]. ED work-up revealed a sodium 130, CO2 16, BUN 16, creatinine 1, glucose 419 and a WBC of 11.8. CT showed no acute process. CTA showed short segment moderate atheromatous narrowing along the supraclinoid segment of the right distal ICA. MRI showed acute posterior limb internal capsule lacunar infarct. 11/21/21-right-sided weakness improved, however still unable to get up to bedside commode without assistance. Blood glucoses have improved running below 200. BUN improved at 25 and creatinine at 0.7 which is at her baseline. Statin, ASA, Plavix initiated. Tolerating soft bite-size pieces with thins per the recommendation of SLP. Neurology recommends possible rehab if unable to do ADLs in a.m. Review of Systems:   Review of Systems   Constitutional: Positive for fatigue.  Negative for chills, diaphoresis and fever. HENT: Negative for congestion, ear pain, sinus pain, sore throat and trouble swallowing. Eyes: Negative for visual disturbance. Respiratory: Negative for cough, shortness of breath and wheezing. Cardiovascular: Negative for chest pain, palpitations and leg swelling. Gastrointestinal: Negative for abdominal distention, abdominal pain, blood in stool, constipation, diarrhea, nausea and vomiting. Endocrine: Negative for cold intolerance and heat intolerance. Genitourinary: Negative for difficulty urinating, flank pain, frequency and urgency. Musculoskeletal: Negative for arthralgias and myalgias. Neurological: Positive for weakness. Negative for dizziness, syncope, light-headedness, numbness and headaches. Hematological: Does not bruise/bleed easily. Psychiatric/Behavioral: Negative for agitation, confusion and dysphoric mood. 14 point review of systems is negative except as specifically addressed above. Objective:   VITALS:  /64   Pulse 67   Temp 96.6 °F (35.9 °C) (Temporal)   Resp 18   Ht 5' 6\" (1.676 m)   Wt 162 lb (73.5 kg)   SpO2 97%   BMI 26.15 kg/m²   24HR INTAKE/OUTPUT:    Intake/Output Summary (Last 24 hours) at 11/21/2021 1702  Last data filed at 11/21/2021 0416  Gross per 24 hour   Intake 250 ml   Output 250 ml   Net 0 ml       Physical Exam  Constitutional:       General: She is not in acute distress. Appearance: Normal appearance. She is not toxic-appearing or diaphoretic. HENT:      Head: Normocephalic and atraumatic. Right Ear: External ear normal.      Left Ear: External ear normal.      Nose: Nose normal. No congestion or rhinorrhea. Mouth/Throat:      Mouth: Mucous membranes are moist.      Pharynx: Oropharynx is clear. Eyes:      General: No scleral icterus. Extraocular Movements: Extraocular movements intact.       Conjunctiva/sclera: Conjunctivae normal.   Cardiovascular:      Rate and Rhythm: Normal rate and regular rhythm. Pulses: Normal pulses. Heart sounds: Normal heart sounds. No murmur heard. No friction rub. No gallop. Pulmonary:      Effort: Pulmonary effort is normal. No respiratory distress. Breath sounds: Normal breath sounds. No wheezing, rhonchi or rales. Abdominal:      General: Abdomen is flat. Bowel sounds are normal. There is no distension. Palpations: Abdomen is soft. Tenderness: There is no abdominal tenderness. Musculoskeletal:         General: No swelling. Normal range of motion. Cervical back: Normal range of motion and neck supple. Right lower leg: No edema. Left lower leg: No edema. Skin:     General: Skin is warm and dry. Coloration: Skin is not jaundiced. Findings: No erythema, lesion or rash. Neurological:      General: No focal deficit present. Mental Status: She is alert and oriented to person, place, and time. Mental status is at baseline. Cranial Nerves: No cranial nerve deficit. Sensory: No sensory deficit. Motor: Weakness (Right sided, but able to move extremities) present. Gait: Gait abnormal.   Psychiatric:         Mood and Affect: Mood normal.         Behavior: Behavior normal.         Thought Content: Thought content normal.         Judgment: Judgment normal.             Medications:      dextrose        enoxaparin  40 mg SubCUTAneous Daily    aspirin  81 mg Oral Daily    Or    aspirin  300 mg Rectal Daily    atorvastatin  80 mg Oral Nightly    cetirizine  10 mg Oral BID    clopidogrel  75 mg Oral Daily    coenzyme Q10  100 mg Oral Daily    levothyroxine  25 mcg Oral Daily    pantoprazole  40 mg Oral QAM AC    sertraline  25 mg Oral Daily    vitamin B-12  1,000 mcg Oral Daily    insulin glargine  20 Units SubCUTAneous Nightly     ondansetron **OR** ondansetron, polyethylene glycol, labetalol, glucose, dextrose, glucagon (rDNA), dextrose, ipratropium-albuterol  ADULT DIET;  Dysphagia - Soft and Bite Sized     Lab and other Data:     Recent Labs     11/20/21  1001 11/21/21  0426   WBC 11.8* 11.1*   HGB 14.1 13.7    225     Recent Labs     11/20/21  1001 11/20/21  1023 11/21/21  0426   *  --  138   K 4.8  --  4.2     --  104   CO2 16*  --  21*   BUN 36*  --  25*   CREATININE 1.0*  --  0.7   GLUCOSE 417* 419 158*     Recent Labs     11/20/21  1001   AST 17   ALT 15   BILITOT 0.4   ALKPHOS 131*     Troponin T:   Recent Labs     11/20/21  1001   TROPONINI <0.01     Pro-BNP: No results for input(s): BNP in the last 72 hours. INR:   Recent Labs     11/20/21  1001   INR 0.99     UA:  Recent Labs     11/20/21  1104   COLORU YELLOW   PHUR 5.0   CLARITYU Clear   SPECGRAV >=1.045   LEUKOCYTESUR Negative   UROBILINOGEN 0.2   BILIRUBINUR Negative   BLOODU Negative   GLUCOSEU =>1000     A1C:   Recent Labs     11/21/21  0426   LABA1C 8.1*     ABG:No results for input(s): PHART, KMQ8KLJ, PO2ART, KFG8RCE, BEART, HGBAE, Z3LIOGAQ, CARBOXHGBART in the last 72 hours. RAD:   CT HEAD WO CONTRAST    Result Date: 11/20/2021  1. No acute intracranial process. Signed by Dr Paris [facility]    XR CHEST PORTABLE    Result Date: 11/20/2021  1. Shallow inspiration with low lung volumes. 2. Mild interstitial coarsening in the lung bases, perhaps a mild interstitial edema. No consolidation or pleural effusion. Signed by Dr August Urias    Result Date: 11/20/2021  1. No flow-limiting stenosis or arterial occlusion in the neck. In particular, there is no flow-limiting stenosis at the carotid bifurcations according to the NASCET assessment. 2. Short segment moderate atheromatous narrowing along the supraclinoid segment of the right distal ICA. No intracranial large vessel occlusion. Signed by Dr August Urias    Result Date: 11/20/2021  1. No flow-limiting stenosis or arterial occlusion in the neck.  In particular, there is no flow-limiting stenosis at the carotid bifurcations according to the NASCET assessment. 2. Short segment moderate atheromatous narrowing along the supraclinoid segment of the right distal ICA. No intracranial large vessel occlusion. Signed by Dr Eva Hameed    MRI BRAIN WO CONTRAST    Result Date: 11/20/2021  Impression: Acute posterior limb left internal capsule lacunar infarct. Signed by Dr Eva Hameed       Echo: Narrative & Impression  Transthoracic Echocardiography Report (TTE)      Demographics      Patient Name  Dunia Sena Date of Study          11/20/2021      MRN           [id number]         Gender                 Female      Date of Birth 1940     Room Number            TTW-1164      Age           79 year(s)      Height:       66 inches      Referring Physician    Merissa Hui      Weight:       155 pounds     Sonographer            Aissatou Laureano [facility]      BSA:          1.79 m^2       Interpreting Physician Vaughn James MD      BMI:          25.02 kg/m^2     Procedure     Type of Study      TTE procedure:ECHO NO CONTRAST WITH DOP/COLR. Study Location: Echo Lab  Technical Quality: Adequate visualization     Patient Status: Inpatient     Contrast Medium: Bubble Study.     Rhythm: Within normal limits      Conclusions      Summary   Structurally normal mitral valve. Mild mitral regurgitation is present. Structurally normal aortic valve. Tricuspid valve is structurally normal.   Normal left ventricular size with preserved LV function and an estimated   ejection fraction of approximately 55-60%. Mild concentric left ventricular hypertrophy. No regional wall motion abnormalities. Impaired relaxation compatible with diastolic dysfunction.  ( reversed E/A   ratio)      Signature      ----------------------------------------------------------------   Electronically signed by Vaughn James MD(Interpreting   physician) on 11/21/2021 08:53 AM   ----------------------------------------------------------------      Findings Mitral Valve   Structurally normal mitral valve. Mild mitral regurgitation is present. Aortic Valve   Structurally normal aortic valve. Tricuspid Valve   Tricuspid valve is structurally normal.      Pulmonic Valve   The pulmonic valve was not well visualized . Left Atrium   Normal size left atrium. Left Ventricle   Normal left ventricular size with preserved LV function and an estimated   ejection fraction of approximately 55-60%. Mild concentric left ventricular hypertrophy. No regional wall motion abnormalities. Impaired relaxation compatible with diastolic dysfunction. ( reversed E/A   ratio)      Right Atrium   Normal right atrial dimension with no evidence of thrombus or mass noted. Right Ventricle   Normal right ventricular size with preserved RV function. Pericardial Effusion   No evidence of significant pericardial effusion is noted. Pleural Effusion   No evidence of pleural effusion. Allergies    - Adhesive tape.     M-Mode Measurements (cm)      LVIDd: 5.09 cm                         LVIDs: 3.38 cm   IVSd: 1.19 cm   LVPWd: 1.14 cm                         AO Root Dimension: 1.8 cm   Rt. Vent. Dimension: 2.17 cm           LA: 3.8 cm   % Ejection Fraction: 60 %              LVOT: 2 cm     Doppler Measurements:      AV Peak Velocity:119 cm/s           MV Peak E-Wave: 46.7 cm/s   AV Peak Gradient: 5.66 mmHg         MV Peak A-Wave: 75.8 cm/s                                       MV E/A Ratio: 0.62 %                                       MV Peak Gradient: 0.87 mmHg      Assessment/Plan   Principal Problem:    Acute CVA (cerebrovascular accident) ([address]   -Continue soft bite-size dysphagia diet with thin liquids   -Plavix, ASA, statins per neurology recommendations   -Up with assist   -Case management consulted to assist with discharge   -Continue every 4 hours neuro checks   -A. m. labs   -Vitals per unit routine   -Strict I&O    Active Problems:    CAD in native artery    -Noted we will monitor      HTN (hypertension)   -Noted continue home meds      Gastroesophageal reflux disease without esophagitis   -Noted PPIs continued      Type 2 diabetes mellitus with chronic kidney disease, without long-term current use of insulin (HCC)   -Noted   -Continue Lantus nightly   -Low-dose corrective protocol before meals and at bedtime added      COPD (chronic obstructive pulmonary disease) ([facility]   -DuoNebs as needed shortness of breath every 4 hours      Mixed hyperlipidemia   Noted statin continued      CECILIA (acute kidney injury) ([facility]   -noted   -Improving   -A. m. labs      DVT Prophylaxis: Lovenox/Plavix    GI prophylaxis: Protonix    Disposition: SHARI Klein, 11/21/2021 5:02 PM

## 2021-11-22 LAB
ANION GAP SERPL CALCULATED.3IONS-SCNC: 15 MMOL/L (ref 7–19)
BUN BLDV-MCNC: 24 MG/DL (ref 8–23)
CALCIUM SERPL-MCNC: 9.2 MG/DL (ref 8.8–10.2)
CHLORIDE BLD-SCNC: 102 MMOL/L (ref 98–111)
CO2: 20 MMOL/L (ref 22–29)
CREAT SERPL-MCNC: 0.7 MG/DL (ref 0.5–0.9)
EKG P AXIS: 52 DEGREES
EKG P-R INTERVAL: 186 MS
EKG Q-T INTERVAL: 448 MS
EKG QRS DURATION: 122 MS
EKG QTC CALCULATION (BAZETT): 463 MS
EKG T AXIS: 86 DEGREES
GFR AFRICAN AMERICAN: >59
GFR NON-AFRICAN AMERICAN: >60
GLUCOSE BLD-MCNC: 133 MG/DL (ref 70–99)
GLUCOSE BLD-MCNC: 137 MG/DL (ref 74–109)
GLUCOSE BLD-MCNC: 163 MG/DL (ref 70–99)
GLUCOSE BLD-MCNC: 173 MG/DL (ref 70–99)
GLUCOSE BLD-MCNC: 193 MG/DL (ref 70–99)
HCT VFR BLD CALC: 42.1 % (ref 37–47)
HEMOGLOBIN: 14.2 G/DL (ref 12–16)
MCH RBC QN AUTO: 31.2 PG (ref 27–31)
MCHC RBC AUTO-ENTMCNC: 33.7 G/DL (ref 33–37)
MCV RBC AUTO: 92.5 FL (ref 81–99)
PDW BLD-RTO: 13.2 % (ref 11.5–14.5)
PERFORMED ON: ABNORMAL
PLATELET # BLD: 237 K/UL (ref 130–400)
PMV BLD AUTO: 9.7 FL (ref 9.4–12.3)
POTASSIUM REFLEX MAGNESIUM: 3.8 MMOL/L (ref 3.5–5)
RBC # BLD: 4.55 M/UL (ref 4.2–5.4)
SODIUM BLD-SCNC: 137 MMOL/L (ref 136–145)
WBC # BLD: 12.8 K/UL (ref 4.8–10.8)

## 2021-11-22 PROCEDURE — 80048 BASIC METABOLIC PNL TOTAL CA: CPT

## 2021-11-22 PROCEDURE — 97165 OT EVAL LOW COMPLEX 30 MIN: CPT

## 2021-11-22 PROCEDURE — 6370000000 HC RX 637 (ALT 250 FOR IP): Performed by: NURSE PRACTITIONER

## 2021-11-22 PROCEDURE — 85027 COMPLETE CBC AUTOMATED: CPT

## 2021-11-22 PROCEDURE — 36415 COLL VENOUS BLD VENIPUNCTURE: CPT

## 2021-11-22 PROCEDURE — 93010 ELECTROCARDIOGRAM REPORT: CPT | Performed by: INTERNAL MEDICINE

## 2021-11-22 PROCEDURE — 99232 SBSQ HOSP IP/OBS MODERATE 35: CPT | Performed by: PSYCHIATRY & NEUROLOGY

## 2021-11-22 PROCEDURE — 1210000000 HC MED SURG R&B

## 2021-11-22 PROCEDURE — 6360000002 HC RX W HCPCS: Performed by: NURSE PRACTITIONER

## 2021-11-22 PROCEDURE — 97535 SELF CARE MNGMENT TRAINING: CPT

## 2021-11-22 PROCEDURE — 82947 ASSAY GLUCOSE BLOOD QUANT: CPT

## 2021-11-22 PROCEDURE — 1180000000 HC REHAB R&B

## 2021-11-22 PROCEDURE — 97116 GAIT TRAINING THERAPY: CPT

## 2021-11-22 RX ADMIN — CLOPIDOGREL BISULFATE 75 MG: 75 TABLET ORAL at 08:32

## 2021-11-22 RX ADMIN — ENOXAPARIN SODIUM 40 MG: 100 INJECTION SUBCUTANEOUS at 15:06

## 2021-11-22 RX ADMIN — Medication 100 MG: at 08:32

## 2021-11-22 RX ADMIN — CETIRIZINE HYDROCHLORIDE 10 MG: 10 TABLET, FILM COATED ORAL at 20:29

## 2021-11-22 RX ADMIN — INSULIN LISPRO 1 UNITS: 100 INJECTION, SOLUTION INTRAVENOUS; SUBCUTANEOUS at 12:44

## 2021-11-22 RX ADMIN — SERTRALINE HYDROCHLORIDE 25 MG: 25 TABLET ORAL at 08:32

## 2021-11-22 RX ADMIN — LEVOTHYROXINE SODIUM 25 MCG: 25 TABLET ORAL at 05:48

## 2021-11-22 RX ADMIN — INSULIN LISPRO 1 UNITS: 100 INJECTION, SOLUTION INTRAVENOUS; SUBCUTANEOUS at 16:38

## 2021-11-22 RX ADMIN — CYANOCOBALAMIN TAB 500 MCG 1000 MCG: 500 TAB at 08:33

## 2021-11-22 RX ADMIN — ATORVASTATIN CALCIUM 80 MG: 80 TABLET, FILM COATED ORAL at 20:29

## 2021-11-22 RX ADMIN — ASPIRIN 81 MG: 81 TABLET, COATED ORAL at 08:33

## 2021-11-22 RX ADMIN — INSULIN GLARGINE 20 UNITS: 100 INJECTION, SOLUTION SUBCUTANEOUS at 23:13

## 2021-11-22 RX ADMIN — PANTOPRAZOLE SODIUM 40 MG: 40 TABLET, DELAYED RELEASE ORAL at 05:48

## 2021-11-22 RX ADMIN — CETIRIZINE HYDROCHLORIDE 10 MG: 10 TABLET, FILM COATED ORAL at 08:32

## 2021-11-22 ASSESSMENT — ENCOUNTER SYMPTOMS
SORE THROAT: 0
CONSTIPATION: 0
WHEEZING: 0
NAUSEA: 0
DIARRHEA: 0
TROUBLE SWALLOWING: 0
COUGH: 0
VOMITING: 0
BLOOD IN STOOL: 0
ABDOMINAL DISTENTION: 0
ABDOMINAL PAIN: 0
SINUS PAIN: 0
SHORTNESS OF BREATH: 0

## 2021-11-22 NOTE — CARE COORDINATION
Date / Time of Evaluation:   11/22/2021    1:00 PM  Assessment Completed by:   Cynthia Hamm RN, BSN      Patient Name:   May Del Real  MRN:   [id number]  YOB: 1940    Patient Admission Status:   Inpatient [101]    Patient Contact Information:    [address]  [phone] (home)   Telephone Information:   Mobile [phone]     Above information verified? [x]   Yes  []   No    (Best Practice:   Have patient/caregiver verify above address and phone number by stating out loud their current address and reachable phone number. Initial Assessment Completed at bedside with:      [x]   Patient  []   Family/Caregiver/Guardian   []   Other:      Current PCP:    Therese Fontanez MD    PCP verified? [x]   Yes  []   No    Emergency Contacts:    Extended Emergency Contact Information  Primary Emergency Contact: Jayce Lucero [address] Phone: [phone]  Mobile Phone: [phone]  Relation: Child  Secondary Emergency Contact: Nataly Srivastava  [place] Phone: [phone]  Relation: Other    Advance Directives:    Does Ms. Robert Lazo have an advance directive in her electronic medical record? []   Yes  [x]   No    Code Status:   Full Code      Have you been vaccinated for COVID-19 (SARS-CoV-2)?     [x]   Yes  []   No                   If so, when?   01/11/2021, 02/01/2021  Which :         [x]   Pfizer[employer]  []   Moderna  []   [employer]  []   Other:       Do you have any of the following unmet social needs that would keep you from returning home safely:    []   Yes  [x]   No                    Unmet Social Needs:           []   Living Situation/Housing  []   Food  []   Stroke Education   []   Utilities  []   Personal Safety  []   Financial Strain  []   Employment  []   Mental Health  []   Substance Abuse  []   Transportation Barriers    Additional Unmet Social Needs Notes:    NA    Financial:    Payor: Urban Silvia / Plan: Patricio Vega PART A AND B / Product Type: *No Product type* /     Pre-Cert required for SNF:     []   Yes  [x]   No    Have Long Term Care Insurance:      []   Yes  [x]   No      Pharmacy:    [facility], [address] [facility] [phone] - F [phone]  [address]  Phone: [phone] Fax: [phone]    [facility], [address], [address] [place] [phone] Yazan Cage [phone]  [address] [address]  Phone: [phone] Fax: [phone]    Potential assistance purchasing medications? []   Yes  [x]   No      ADLS:       Support System:   Children      Current Home Environment:       Steps:       []   Yes  [x]   No    If yes, how many? 0    Plans to RETURN to current housing:     []   Yes  [x]   No    Barriers to RETURNING to current housing:   R sided weakness    Currently ACTIVE with Home Health CARE:      []   Yes  [x]   No    Home Health Care Agency:   [facility]    DME Provider:   [facility]    Transition Plan:  Referral to Inpatient Acute Rehab    Transportation PLAN for Discharge:  family    Patient Deficits:    [x]   Yes   []   No    If yes:    []   Confusion/Memory  []   Visual  []   Motor/Sensory         [x]   Right arm         [x]   Right leg         []   Left arm         []   Left leg  [x]   Language/Speech         [x]   Aphasia         []   Dysarthria         []   Swallow    NIH Stroke Scale  Interval: Hand-off/Transfer  Level of Consciousness (1a. ): Alert  LOC Questions (1b. ):  Answers both correctly  LOC Commands (1c. ): Performs both tasks correctly  Best Gaze (2. ): Normal  Visual (3. ): No visual loss  Facial Palsy (4. ): Normal symmetrical movement  Motor Arm, Left (5a. ): No drift  Motor Arm, Right (5b. ): No drift  Motor Leg, Left (6a. ): No drift  Motor Leg, Right (6b. ): No drift  Limb Ataxia (7. ): Absent  Sensory (8. ): Normal  Best Language (9. ): No aphasia  Dysarthria (10. ): Normal  Extinction and Inattention (11): No abnormality  Total: 0    Adam Coma Scale  Eye Opening: Spontaneous  Best Verbal Response: Oriented  Best Motor Response: Obeys commands  Adam Coma Scale Score: 15    Patient Deficit Notes:     R sided weakness, working with PT/OT/SLP    Additional CM/SW Notes:   I spoke with Ms. Justo Parisi. She states that she resides at [address] [place] [address]. She states that she has a rollator and wheelchair at home. She has a referral to Inpatient Acute Rehab awaiting acceptance. I reviewed the Stroke booklet and pamphlets with Ms. Justo Parisi. She states understanding. Warner Daniel and/or her family were provided with choice of provider:    []   Yes   [x]   No        [x]   Stroke education booklet reviewed and given to patient/family/caregiver/guardian. All questions answered all questions answered appropriately and efficiently per family.       Fely De León RN, BSN  [facility] [address] [facility]  Phone: [phone]  Fax: [phone]    Electronically signed by Fely De León RN, BSN on 11/22/2021 at 1:05 PM

## 2021-11-22 NOTE — PROGRESS NOTES
Physical Therapy  Name: Lidia Caldera  MRN:  [id number]  Date of service:  11/22/2021 11/22/21 0901   Restrictions/Precautions   Restrictions/Precautions Fall Risk   Required Braces or Orthoses? No   General   Chart Reviewed Yes   Subjective   Subjective Pt agrees to work with therapy. Pain Screening   Patient Currently in Pain Denies   Bed Mobility   Supine to Sit Contact guard assistance   Transfers   Sit to Stand Contact guard assistance   Stand to sit Contact guard assistance   Ambulation   Ambulation? Yes   Ambulation 1   Surface level tile   Device Rolling Walker   Assistance Contact guard assistance   Quality of Gait slightly unsteady, no LOB, reciprocal gait pattern   Gait Deviations Slow Lisa; Decreased step length; Decreased step height   Distance 20'   Short term goals   Time Frame for Short term goals 2 wks   Short term goal 1 supine to sit indep   Short term goal 2 sit to stand indep   Short term goal 3 amb. 200' with RW SBA   Short term goal 4 bed to chair SBA   Conditions Requiring Skilled Therapeutic Intervention   Body structures, Functions, Activity limitations Decreased functional mobility ; Decreased strength; Decreased safe awareness; Decreased cognition; Decreased sensation; Decreased posture; Decreased balance; Decreased coordination   Assessment Pt progressing well with overall mobility, able to amb short distance in room with improved gait mechanics. Pt up to recliner and positioned for comfort with all needs in reach post gait. Activity Tolerance   Activity Tolerance Patient Tolerated treatment well   Safety Devices   Type of devices Call light within reach;  Chair alarm in place; Gait belt; Left in chair         Electronically signed by Aldo Jensen PTA on 11/22/2021 at 9:09 AM

## 2021-11-22 NOTE — PROGRESS NOTES
Occupational Therapy Initial Assessment  Date: 2021   Patient Name: Luis Fernando Jeffers  MRN: [id number]     : 1940    Date of Service: 2021    Discharge Recommendations:  Patient would benefit from continued therapy after discharge, 24 hour supervision or assist (Pt has referral in for 8th floor rehab)       Assessment   Assessment: OT evaluation completed and tx initiated. Pt is likely to make significant gains with continued skilled services due to PLOF and enthusiasm to participate in therapy. Pt has been chosen as a referral for 8th floor IP rehab. OT believes pt would benefit with this placement. OT Education: Precautions; Equipment; Transfer Training; ADL Adaptive Strategies; IADL Safety  Patient Education: Recommending repetition as pt progresses. REQUIRES OT FOLLOW UP: Yes  Activity Tolerance  Activity Tolerance: Patient Tolerated treatment well  Safety Devices  Safety Devices in place: Yes  Type of devices: Call light within reach; Chair alarm in place; Left in chair           Patient Diagnosis(es): The primary encounter diagnosis was Cerebrovascular accident (CVA), unspecified mechanism ([address] Diagnoses of Hyperglycemia and CECILIA (acute kidney injury) ([address] were also pertinent to this visit. has a past medical history of Acute CVA (cerebrovascular accident) ([address] Arthritis, Asthma, CAD (coronary artery disease), CHF (congestive heart failure) ([address] COPD (chronic obstructive pulmonary disease) ([address] DM (diabetes mellitus) ([address] GERD (gastroesophageal reflux disease), HTN (hypertension), Hyperlipidemia, MI (myocardial infarction) ([address] and Thyroid disease. has a past surgical history that includes Hysterectomy; joint replacement (Right); Cholecystectomy; hip surgery; Cataract removal; and Coronary angioplasty (2018).            Restrictions  Restrictions/Precautions  Restrictions/Precautions: Fall Risk  Required Braces or Orthoses?: No    Subjective   General  Chart Reviewed: Yes  Patient assessed for rehabilitation services?: Yes  Additional Pertinent Hx: Depression and anxiety; CKD; (R) TKA; HTN; arthritis; CAD; CHF; COPD; MIx2; thyroid dx; DM; asthma  Family / Caregiver Present: No  Diagnosis: Aphasia; (L) Thalamocapsular CVA  Patient Currently in Pain: Denies  Vital Signs  Patient Currently in Pain: Denies    Social/Functional History  Social/Functional History  Lives With: Alone  Type of Home: Assisted living ([facility]  Home Access: Level entry  Bathroom Shower/Tub: Walk-in shower (small-lip to enter)  Bathroom Toilet: Handicap height  Bathroom Equipment: Grab bars in shower, Grab bars around toilet  Bathroom Accessibility: Jack Harman: 4 wheeled walker  ADL Assistance: [address] [facility] [address]: Independent (uses rollator for longer distances, but reports it collapsed with her on it recently)  Transfer Assistance: Independent  Active : No  Mode of Transportation: Bus  Additional Comments: goes to appointments and shopping on a bus       Objective   Vision: Within Functional Limits  Hearing: Exceptions to [facility]  Hearing Exceptions: Hard of hearing/hearing concerns; Bilateral hearing aid    Orientation  Overall Orientation Status: Within Functional Limits     Balance  Sitting Balance: Supervision  Standing Balance: Contact guard assistance  Functional Mobility  Functional - Mobility Device: Rolling Walker  Assist Level: Contact guard assistance  Functional Mobility Comments: Within room  Toilet Transfers  Toilet - Technique: Stand step  Equipment Used: Raised toilet seat with rails  Toilet Transfer: Contact guard assistance  Toilet Transfers Comments: with r/w  ADL  Feeding: Independent; Setup  Grooming: Independent; Setup  UE Bathing: Supervision  LE Bathing: Contact guard assistance  UE Dressing: Supervision (in unsupported sitting; likely independent with support)  LE Dressing: Contact guard assistance  Toileting: Contact guard assistance  Coordination  Movements Are Fluid And Coordinated: Yes  Quality of Movement Other  Comment: Fine-motor movements at the hand deemed functional for BADLs; able to perform opposition and FTN. Observed sufficient functional use of utensils, hearing aids, and cell phone. Bed mobility  Supine to Sit: Supervision (extended time and use of bed rail)  Sit to Supine: Unable to assess  Scooting: Supervision  Transfers  Stand Step Transfers: Contact guard assistance  Sit to stand: Contact guard assistance  Stand to sit: Contact guard assistance  Transfer Comments: with r/w     Cognition  Overall Cognitive Status: Exceptions  Arousal/Alertness: Appropriate responses to stimuli  Attention Span: Difficulty attending to directions  Memory: Decreased recall of precautions; Decreased recall of recent events  Safety Judgement: Decreased awareness of need for assistance; Decreased awareness of need for safety  Problem Solving: Assistance required to generate solutions; Assistance required to implement solutions; Decreased awareness of errors  Insights: Decreased awareness of deficits  Initiation: Requires cues for some  Sequencing: Requires cues for some  Cognition Comment: Pt admitted to feeling she was doing things slower, like reading, compared to her PLOF. Assessed vision, unlikely that this is fully due to vision (pt reports no deficits). Sensation  Overall Sensation Status: WFL (Pt states that her r-sided sensation has improved.)        LUE AROM (degrees)  LUE AROM : WFL  RUE AROM (degrees)  RUE AROM : WFL  LUE Strength  Gross LUE Strength: WFL  L Hand General: 4+/5  RUE Strength  Gross RUE Strength: WFL  R Hand General: 4+/5  RUE Strength Comment: Minor difference seen between RUE and LUE, but both are functional.              Included Treatment  Tx consisted of: bed mobility; pt. education; transfer training; DME training; activity tolerance/balance challenges; functional ambulation; and LBD task. (Treatment time: 15 min)       Plan   Plan  Times per week: 3-5  Current Treatment Recommendations: Strengthening, Pain Management, Positioning, ROM, Safety Education & Training, Balance Training, Patient/Caregiver Education & Training, Self-Care / ADL, Cognitive/Perceptual Training, Functional Mobility Training, Equipment Evaluation, Education, & procurement, Home Management Training, Endurance Training, Neuromuscular Re-education    Goals  Short term goals  Time Frame for Short term goals: 1 week  Short term goal 1: Independently perform light, ambulatory ADLs with DME for 10 min. Short term goal 2: Complete toileting skills with independence and PRN DME use for support. Short term goal 3: Complete full body bathing with supervision for standing components. Short term goal 4: Perform dynamic ADL task in standing with supervision and PRN DME for support. Long term goals  Long term goal 1: Progress as tolerated.                Kristina James OTR/L  Electronically signed by Kristina James OTR/L on 11/22/2021 at 9:58 AM.

## 2021-11-22 NOTE — CARE COORDINATION
The [address] at [facility]  Notification of Admission Decision      [x] Patient has been accepted for admit to [facility] on : 11/23/21 Room 818      Please write discharge orders and summary prior to discharge. [] Patient acceptance to Rehab pending the following :    [] Eval in progress       [] Patient determined to be ineligible for services at [facility] because : We recommend you consider        Thank you for your referral, we appreciate you. If you have any questions, please feel   free to contact me at [phone].     Electronically Signed by Shruti Golden, Admissions Coordinator 11/22/2021 2:05 PM

## 2021-11-22 NOTE — PROGRESS NOTES
[address], [address]  [place], [place]  Phone [phone]     Neurology Progress Note  2021 9:07 AM  Information:   Patient Name: Suhail Byers  :   1940  Age:   79 y.o. MRN:   [id number]  Account #:  [de-identified]  Admit Date:   2021  Today:  21     ADMIT DX:   Acute CVA (cerebrovascular accident) [facility])    Subjective:     Jud Henley is a 79y.o. year old woman with a history of hypertension, hyperlipidemia, diabetes, and cardiovascular disease who was admitted  with right sided weakness, numbness, and slurred speech.      Interval History:   She was able to ambulate to the door this am with assistance. Her speech is some better. She has not required any assistance at her residence and does not know what they can provide there.     Objective:     Past Medical History:  Past Medical History:   Diagnosis Date    Acute [facility]A (cerebrovascular accident) ([facility] 2021    Arthritis     Psoriatic    Asthma     CAD (coronary artery disease)     CHF (congestive heart failure) (HCC)     COPD (chronic obstructive pulmonary disease) (HCC)     DM (diabetes mellitus) (HCC)     GERD (gastroesophageal reflux disease)     HTN (hypertension)     Hyperlipidemia     MI (myocardial infarction) ([facility]     x2    Thyroid disease        Past Surgical History:   Procedure Laterality Date    CATARACT REMOVAL      CHOLECYSTECTOMY      CORONARY ANGIOPLASTY  2018    Angioplasty to in-stent restenosis to the distal LAD    HIP SURGERY      HYSTERECTOMY      JOINT REPLACEMENT Right     Right knee replacement       Family History   Problem Relation Age of Onset    Heart Disease Mother     Heart Disease Father        Social History     Socioeconomic History    Marital status:      Spouse name: Not on file    Number of children: Not on file    Years of education: Not on file    Highest education level: Not on file   Occupational History    Not on file Tobacco Use    Smoking status: Never Smoker    Smokeless tobacco: Never Used   Vaping Use    Vaping Use: Never used   Substance and Sexual Activity    Alcohol use: No    Drug use: No    Sexual activity: Not on file   Other Topics Concern    Not on file   Social History Narrative    Not on file     Social Determinants of Health     Financial Resource Strain:     Difficulty of Paying Living Expenses: Not on file   Food Insecurity:     Worried About Running Out of Food in the Last Year: Not on file    Fara of Food in the Last Year: Not on file   Transportation Needs:     Lack of Transportation (Medical): Not on file    Lack of Transportation (Non-Medical):  Not on file   Physical Activity:     Days of Exercise per Week: Not on file    Minutes of Exercise per Session: Not on file   Stress:     Feeling of Stress : Not on file   Social Connections:     Frequency of Communication with Friends and Family: Not on file    Frequency of Social Gatherings with Friends and Family: Not on file    Attends Taoism Services: Not on file    Active Member of Clubs or Organizations: Not on file    Attends Club or Organization Meetings: Not on file    Marital Status: Not on file   Intimate Partner Violence:     Fear of Current or Ex-Partner: Not on file    Emotionally Abused: Not on file    Physically Abused: Not on file    Sexually Abused: Not on file   Housing Stability:     Unable to Pay for Housing in the Last Year: Not on file    Number of Places Lived in the Last Year: Not on file    Unstable Housing in the Last Year: Not on file       Medications:   dextrose        insulin lispro  0-6 Units SubCUTAneous TID WC    insulin lispro  0-3 Units SubCUTAneous Nightly    enoxaparin  40 mg SubCUTAneous Daily    aspirin  81 mg Oral Daily    Or    aspirin  300 mg Rectal Daily    atorvastatin  80 mg Oral Nightly    cetirizine  10 mg Oral BID    clopidogrel  75 mg Oral Daily    coenzyme Q10  100 mg Oral Daily    levothyroxine  25 mcg Oral Daily    pantoprazole  40 mg Oral QAM AC    sertraline  25 mg Oral Daily    vitamin B-12  1,000 mcg Oral Daily    insulin glargine  20 Units SubCUTAneous Nightly       Diagnostic Studies:  Reviewed all labs/diagnositcs since last 24hrs:  Recent Results (from the past 24 hour(s))   POCT Glucose    Collection Time: 11/21/21 11:08 AM   Result Value Ref Range    POC Glucose 297 (H) 70 - 99 mg/dl    Performed on [employer]    POCT Glucose    Collection Time: 11/21/21  4:44 PM   Result Value Ref Range    POC Glucose 196 (H) 70 - 99 mg/dl    Performed on [employer]    POCT Glucose    Collection Time: 11/21/21  7:54 PM   Result Value Ref Range    POC Glucose 208 (H) 70 - 99 mg/dl    Performed on [employer]    CBC    Collection Time: 11/22/21  4:20 AM   Result Value Ref Range    WBC 12.8 (H) 4.8 - 10.8 K/uL    RBC 4.55 4.20 - 5.40 M/uL    Hemoglobin 14.2 12.0 - 16.0 g/dL    Hematocrit 42.1 37.0 - 47.0 %    MCV 92.5 81.0 - 99.0 fL    MCH 31.2 (H) 27.0 - 31.0 pg    MCHC 33.7 33.0 - 37.0 g/dL    RDW 13.2 11.5 - 14.5 %    Platelets 593 792 - 255 K/uL    MPV 9.7 9.4 - 12.3 fL   Basic Metabolic Panel w/ Reflex to MG    Collection Time: 11/22/21  4:20 AM   Result Value Ref Range    Sodium 137 136 - 145 mmol/L    Potassium reflex Magnesium 3.8 3.5 - 5.0 mmol/L    Chloride 102 98 - 111 mmol/L    CO2 20 (L) 22 - 29 mmol/L    Anion Gap 15 7 - 19 mmol/L    Glucose 137 (H) 74 - 109 mg/dL    BUN 24 (H) 8 - 23 mg/dL    CREATININE 0.7 0.5 - 0.9 mg/dL    GFR Non-African American >60 >60    GFR African American >59 >59    Calcium 9.2 8.8 - 10.2 mg/dL   POCT Glucose    Collection Time: 11/22/21  7:23 AM   Result Value Ref Range    POC Glucose 133 (H) 70 - 99 mg/dl    Performed on [employer]        Diet:  ADULT DIET;  Dysphagia - Soft and Bite Sized    Examination:  Vitals: BP (!) 115/98   Pulse 68   Temp 97.3 °F (36.3 °C) (Temporal)   Resp 17   Ht 5' 6\" (1.676 m)   Wt 162 lb (73.5 kg)   SpO2 98% BMI 26.15 kg/m²      Intake/Output Summary (Last 24 hours) at 11/22/2021 1326  Last data filed at 11/22/2021 0790  Gross per 24 hour   Intake 125 ml   Output --   Net 125 ml     General appearance:  She is an elderly woman who appears in no distress. HEENT: Head: Normal, normocephalic, atraumatic. Neck:no adenopathy, no carotid bruit, no JVD, supple, symmetrical, trachea midline and thyroid not enlarged, symmetric, no tenderness/mass/nodules  Lungs: clear to auscultation bilaterally  Heart: regular rate and rhythm, S1, S2 normal, no murmur, click, rub or gallop  Abdomen: soft, non-tender; bowel sounds normal; no masses,  no organomegaly  Extremities: extremities normal, atraumatic, no cyanosis or edema  Neurologic:  Alert, oriented. Mild dysarthria. Speech is fluent. EOMI, PERRL, VFF. No facial weakness. Limb strength testing shows mild right arm and leg weakness. Pronator drift is positive on the right. Rapid alternating movements are impaired on the right. Finger to nose testing shows mild right dysmetria. Assessment:   1.         Left thalamocapsular stroke  2.         Hypertension  3.         Hyperlipidemia  4.         Diabetes  She is unable to stand and ambulate independently. She does not appear yet to be able to function independently. Plan:   1. Continue ASA/Plavix/Lipitor  2. Continue PT/OT/ST  3. Rehab consult     Discharge planning:   Considering Rehab    Reviewed treatment plans with the patient and/or family. 25 minutes spent in face to face interaction and coordination of care.      Electronically signed by Lela Wakefield MD on 11/22/2021 at 9:07 AM

## 2021-11-22 NOTE — PROGRESS NOTES
[facility] Hospitalists      Patient:  Gale Lang  YOB: 1940  Date of Service: 11/22/2021  MRN: [id number]   Acct: [de-identified]   Primary Care Physician: Rupert Burgos MD  Advance Directive: Full Code  Admit Date: 11/20/2021       Hospital Day: 2  Portions of this note have been copied forward, however, changed to reflect the most current clinical status of this patient. CHIEF COMPLAINT right-sided weakness    SUBJECTIVE: Patient is excited that she was able to complete a text message today. She states she was able to make a phone call. She even reports that she was able to walk to the door and back to her bed. CUMULATIVE HOSPITAL COURSE:  Admitted on 11/20/2021 for complaints of slurred speech and right-sided weakness with numbness. Stated her glucose was 25 early the morning of admission, therefore she peanut butter and sugar with lots of coffee. Stated at that time that the right sided weakness caused her coffee to spill, and family noticed a right-sided facial droop. She resides at an assisted living [address] [place] [address] in [place]. ED work-up revealed a sodium 130, CO2 16, BUN 16, creatinine 1, glucose 419 and a WBC of 11.8. CT showed no acute process. CTA showed short segment moderate atheromatous narrowing along the supraclinoid segment of the right distal ICA. MRI showed acute posterior limb internal capsule lacunar infarct. Neurology consulted. Echo completed and unremarkable. Triglycerides elevated. LDL and HDL within normal limits. A1c of 8.1. Stroke education ongoing. Speech greatly improved. Right-sided weakness markedly improved. Patient approved for inpatient rehab on 11/23. Review of Systems:   Review of Systems   Constitutional: Positive for fatigue. Negative for chills, diaphoresis and fever. HENT: Negative for congestion, ear pain, sinus pain, sore throat and trouble swallowing. Eyes: Negative for visual disturbance.    Respiratory: Negative for cough, shortness of breath and wheezing. Cardiovascular: Negative for chest pain, palpitations and leg swelling. Gastrointestinal: Negative for abdominal distention, abdominal pain, blood in stool, constipation, diarrhea, nausea and vomiting. Endocrine: Negative for cold intolerance and heat intolerance. Genitourinary: Negative for difficulty urinating, flank pain, frequency and urgency. Musculoskeletal: Negative for arthralgias and myalgias. Neurological: Positive for weakness. Negative for dizziness, syncope, light-headedness, numbness and headaches. Hematological: Does not bruise/bleed easily. Psychiatric/Behavioral: Negative for agitation, confusion and dysphoric mood. 14 point review of systems is negative except as specifically addressed above. Objective:   VITALS:  /79   Pulse 77   Temp 96.5 °F (35.8 °C) (Temporal)   Resp 18   Ht 5' 6\" (1.676 m)   Wt 162 lb (73.5 kg)   SpO2 99%   BMI 26.15 kg/m²   24HR INTAKE/OUTPUT:    Intake/Output Summary (Last 24 hours) at 11/22/2021 1511  Last data filed at 11/22/2021 1328  Gross per 24 hour   Intake 485 ml   Output --   Net 485 ml       Physical Exam  Constitutional:       General: She is not in acute distress. Appearance: Normal appearance. She is not toxic-appearing or diaphoretic. HENT:      Head: Normocephalic and atraumatic. Right Ear: External ear normal.      Left Ear: External ear normal.      Nose: Nose normal. No congestion or rhinorrhea. Mouth/Throat:      Mouth: Mucous membranes are moist.      Pharynx: Oropharynx is clear. Eyes:      General: No scleral icterus. Extraocular Movements: Extraocular movements intact. Conjunctiva/sclera: Conjunctivae normal.   Cardiovascular:      Rate and Rhythm: Normal rate and regular rhythm. Pulses: Normal pulses. Heart sounds: Normal heart sounds. No murmur heard. No friction rub. No gallop.     Pulmonary:      Effort: Pulmonary effort is normal. No respiratory distress. Breath sounds: Normal breath sounds. No wheezing, rhonchi or rales. Abdominal:      General: Abdomen is flat. Bowel sounds are normal. There is no distension. Palpations: Abdomen is soft. Tenderness: There is no abdominal tenderness. Musculoskeletal:         General: No swelling. Normal range of motion. Cervical back: Normal range of motion and neck supple. Right lower leg: No edema. Left lower leg: No edema. Skin:     General: Skin is warm and dry. Coloration: Skin is not jaundiced. Findings: No erythema, lesion or rash. Neurological:      General: No focal deficit present. Mental Status: She is alert and oriented to person, place, and time. Mental status is at baseline. Cranial Nerves: No cranial nerve deficit. Sensory: No sensory deficit. Motor: Weakness present. Gait: Gait abnormal.      Comments: Right upper extremity 4/5  Left upper extremity 5/5  Right lower extremity 4/5  Left lower extremity 5/5    Slight right-sided facial droop noted  Slurred speech NOT noted   Psychiatric:         Mood and Affect: Mood normal.         Behavior: Behavior normal.         Thought Content:  Thought content normal.         Judgment: Judgment normal.             Medications:      dextrose        insulin lispro  0-6 Units SubCUTAneous TID WC    insulin lispro  0-3 Units SubCUTAneous Nightly    enoxaparin  40 mg SubCUTAneous Daily    aspirin  81 mg Oral Daily    Or    aspirin  300 mg Rectal Daily    atorvastatin  80 mg Oral Nightly    cetirizine  10 mg Oral BID    clopidogrel  75 mg Oral Daily    coenzyme Q10  100 mg Oral Daily    levothyroxine  25 mcg Oral Daily    pantoprazole  40 mg Oral QAM AC    sertraline  25 mg Oral Daily    vitamin B-12  1,000 mcg Oral Daily    insulin glargine  20 Units SubCUTAneous Nightly     ondansetron **OR** ondansetron, polyethylene glycol, labetalol, glucose, dextrose, glucagon (rDNA), dextrose, ipratropium-albuterol  ADULT DIET; Dysphagia - Soft and Bite Sized     Lab and other Data:     Recent Labs     11/20/21  1001 11/21/21  0426 11/22/21  0420   WBC 11.8* 11.1* 12.8*   HGB 14.1 13.7 14.2    225 237     Recent Labs     11/20/21  1001 11/20/21  1001 11/20/21  1023 11/21/21  0426 11/22/21  0420   *  --   --  138 137   K 4.8  --   --  4.2 3.8     --   --  104 102   CO2 16*  --   --  21* 20*   BUN 36*  --   --  25* 24*   CREATININE 1.0*  --   --  0.7 0.7   GLUCOSE 417*   < > 419 158* 137*    < > = values in this interval not displayed. Recent Labs     11/20/21  1001   AST 17   ALT 15   BILITOT 0.4   ALKPHOS 131*     Troponin T:   Recent Labs     11/20/21  1001   TROPONINI <0.01     Pro-BNP: No results for input(s): BNP in the last 72 hours. INR:   Recent Labs     11/20/21  1001   INR 0.99     UA:  Recent Labs     11/20/21  1104   COLORU YELLOW   PHUR 5.0   CLARITYU Clear   SPECGRAV >=1.045   LEUKOCYTESUR Negative   UROBILINOGEN 0.2   BILIRUBINUR Negative   BLOODU Negative   GLUCOSEU =>1000     A1C:   Recent Labs     11/21/21 0426   LABA1C 8.1*     ABG:No results for input(s): PHART, FCQ0VGI, PO2ART, VFK8QLU, BEART, HGBAE, C3LUWRCG, CARBOXHGBART in the last 72 hours. RAD:   CT HEAD WO CONTRAST    Result Date: 11/20/2021  1. No acute intracranial process. Signed by Dr Michael Pyane    XR CHEST PORTABLE    Result Date: 11/20/2021  1. Shallow inspiration with low lung volumes. 2. Mild interstitial coarsening in the lung bases, perhaps a mild interstitial edema. No consolidation or pleural effusion. Signed by Dr Breanne Johnson    Result Date: 11/20/2021  1. No flow-limiting stenosis or arterial occlusion in the neck. In particular, there is no flow-limiting stenosis at the carotid bifurcations according to the NASCET assessment. 2. Short segment moderate atheromatous narrowing along the supraclinoid segment of the right distal ICA.  No intracranial large vessel occlusion. Signed by Dr Minor Harris    Result Date: 11/20/2021  1. No flow-limiting stenosis or arterial occlusion in the neck. In particular, there is no flow-limiting stenosis at the carotid bifurcations according to the NASCET assessment. 2. Short segment moderate atheromatous narrowing along the supraclinoid segment of the right distal ICA. No intracranial large vessel occlusion. Signed by Dr Nakul Hale    MRI BRAIN WO CONTRAST    Result Date: 11/20/2021  Impression: Acute posterior limb left internal capsule lacunar infarct. Signed by Dr Nakul Hale       Echo: Summary   Structurally normal mitral valve. Mild mitral regurgitation is present. Structurally normal aortic valve. Tricuspid valve is structurally normal.   Normal left ventricular size with preserved LV function and an estimated   ejection fraction of approximately 55-60%. Mild concentric left ventricular hypertrophy. No regional wall motion abnormalities. Impaired relaxation compatible with diastolic dysfunction.  ( reversed E/A   ratio)         Assessment/Plan   Principal Problem:    Acute CVA (cerebrovascular accident) ([address]   -PT OT   -Inpatient rehab consult, accepted for 11/23   -Continue aspirin and statin therapy   -Monitor glucose carefully   -Monitor blood pressure   -Fall precautions    Active Problems:    CAD in native artery   -Noted   -Continue statin therapy      HTN (hypertension)   -Allowing for permissive hypertension   -Monitor closely      Gastroesophageal reflux disease without esophagitis   -Continue Protonix      Type 2 diabetes mellitus with chronic kidney disease, without long-term current use of insulin ([facility])   -A1c 8.1   -Continue basal insulin   -Continue prandial and sliding scale insulin   -Continue Accu-Cheks   -Glycemia protocol in place   -Controlled diet      COPD (chronic obstructive pulmonary disease) ([facility])   -Noted      Mixed hyperlipidemia   -Continue statin therapy    Resolved Problems:    CECILIA (acute kidney injury) (HCC-resolved      DVT Prophylaxis: Lovenox    GI prophylaxis: Protonix    Renata Hampton, APRN - CNP, 11/22/2021 3:11 PM

## 2021-11-23 ENCOUNTER — HOSPITAL ENCOUNTER (INPATIENT)
Age: 81
LOS: 11 days | Discharge: HOME HEALTH | DRG: 057 | End: 2021-12-04
Attending: PSYCHIATRY & NEUROLOGY | Admitting: PSYCHIATRY & NEUROLOGY
Payer: MEDICARE

## 2021-11-23 VITALS
SYSTOLIC BLOOD PRESSURE: 148 MMHG | HEIGHT: 66 IN | RESPIRATION RATE: 16 BRPM | DIASTOLIC BLOOD PRESSURE: 106 MMHG | OXYGEN SATURATION: 96 % | TEMPERATURE: 95.2 F | BODY MASS INDEX: 26.03 KG/M2 | HEART RATE: 95 BPM | WEIGHT: 162 LBS

## 2021-11-23 DIAGNOSIS — R47.1 DYSARTHRIA: ICD-10-CM

## 2021-11-23 DIAGNOSIS — E78.2 MIXED HYPERLIPIDEMIA: ICD-10-CM

## 2021-11-23 DIAGNOSIS — I69.391 DYSPHAGIA DUE TO RECENT CEREBRAL INFARCTION: ICD-10-CM

## 2021-11-23 DIAGNOSIS — I63.9 ACUTE CVA (CEREBROVASCULAR ACCIDENT) (HCC): ICD-10-CM

## 2021-11-23 DIAGNOSIS — G81.90 HEMIPLEGIA AFFECTING DOMINANT SIDE (HCC): ICD-10-CM

## 2021-11-23 DIAGNOSIS — F41.8 DEPRESSION WITH ANXIETY: ICD-10-CM

## 2021-11-23 DIAGNOSIS — I10 PRIMARY HYPERTENSION: ICD-10-CM

## 2021-11-23 DIAGNOSIS — I63.9 ACUTE ISCHEMIC STROKE (HCC): Primary | ICD-10-CM

## 2021-11-23 PROBLEM — E03.9 ACQUIRED HYPOTHYROIDISM: Status: ACTIVE | Noted: 2021-11-23

## 2021-11-23 PROBLEM — I50.9 CHF (CONGESTIVE HEART FAILURE) (HCC): Status: ACTIVE | Noted: 2021-11-23

## 2021-11-23 PROBLEM — L40.50 PSORIATIC ARTHRITIS (HCC): Status: ACTIVE | Noted: 2021-11-23

## 2021-11-23 LAB
ANION GAP SERPL CALCULATED.3IONS-SCNC: 17 MMOL/L (ref 7–19)
BUN BLDV-MCNC: 23 MG/DL (ref 8–23)
CALCIUM SERPL-MCNC: 9.1 MG/DL (ref 8.8–10.2)
CHLORIDE BLD-SCNC: 102 MMOL/L (ref 98–111)
CO2: 16 MMOL/L (ref 22–29)
CREAT SERPL-MCNC: 0.9 MG/DL (ref 0.5–0.9)
GFR AFRICAN AMERICAN: >59
GFR NON-AFRICAN AMERICAN: >60
GLUCOSE BLD-MCNC: 137 MG/DL (ref 70–99)
GLUCOSE BLD-MCNC: 182 MG/DL (ref 70–99)
GLUCOSE BLD-MCNC: 183 MG/DL (ref 70–99)
GLUCOSE BLD-MCNC: 247 MG/DL (ref 74–109)
HCT VFR BLD CALC: 46 % (ref 37–47)
HEMOGLOBIN: 15.4 G/DL (ref 12–16)
INR BLD: 1.01 (ref 0.88–1.18)
MCH RBC QN AUTO: 31 PG (ref 27–31)
MCHC RBC AUTO-ENTMCNC: 33.5 G/DL (ref 33–37)
MCV RBC AUTO: 92.7 FL (ref 81–99)
PDW BLD-RTO: 13.5 % (ref 11.5–14.5)
PERFORMED ON: ABNORMAL
PLATELET # BLD: 288 K/UL (ref 130–400)
PMV BLD AUTO: 9.7 FL (ref 9.4–12.3)
POTASSIUM REFLEX MAGNESIUM: 4.1 MMOL/L (ref 3.5–5)
PREALBUMIN: 25 MG/DL (ref 20–40)
PROTHROMBIN TIME: 13.5 SEC (ref 12–14.6)
RBC # BLD: 4.96 M/UL (ref 4.2–5.4)
SODIUM BLD-SCNC: 135 MMOL/L (ref 136–145)
TSH REFLEX FT4: 2.4 UIU/ML (ref 0.35–5.5)
VITAMIN B-12: 1883 PG/ML (ref 211–946)
WBC # BLD: 17.1 K/UL (ref 4.8–10.8)

## 2021-11-23 PROCEDURE — 84443 ASSAY THYROID STIM HORMONE: CPT

## 2021-11-23 PROCEDURE — 82947 ASSAY GLUCOSE BLOOD QUANT: CPT

## 2021-11-23 PROCEDURE — 6360000002 HC RX W HCPCS: Performed by: PSYCHIATRY & NEUROLOGY

## 2021-11-23 PROCEDURE — 6370000000 HC RX 637 (ALT 250 FOR IP): Performed by: NURSE PRACTITIONER

## 2021-11-23 PROCEDURE — 97116 GAIT TRAINING THERAPY: CPT

## 2021-11-23 PROCEDURE — 1180000000 HC REHAB R&B

## 2021-11-23 PROCEDURE — 99223 1ST HOSP IP/OBS HIGH 75: CPT | Performed by: PSYCHIATRY & NEUROLOGY

## 2021-11-23 PROCEDURE — 84134 ASSAY OF PREALBUMIN: CPT

## 2021-11-23 PROCEDURE — 36415 COLL VENOUS BLD VENIPUNCTURE: CPT

## 2021-11-23 PROCEDURE — 97530 THERAPEUTIC ACTIVITIES: CPT

## 2021-11-23 PROCEDURE — 82607 VITAMIN B-12: CPT

## 2021-11-23 PROCEDURE — 85610 PROTHROMBIN TIME: CPT

## 2021-11-23 RX ORDER — SERTRALINE HYDROCHLORIDE 25 MG/1
25 TABLET, FILM COATED ORAL DAILY
Status: CANCELLED | OUTPATIENT
Start: 2021-11-23

## 2021-11-23 RX ORDER — DEXTROSE MONOHYDRATE 25 G/50ML
12.5 INJECTION, SOLUTION INTRAVENOUS PRN
Status: CANCELLED | OUTPATIENT
Start: 2021-11-23

## 2021-11-23 RX ORDER — ONDANSETRON 2 MG/ML
4 INJECTION INTRAMUSCULAR; INTRAVENOUS EVERY 6 HOURS PRN
Status: CANCELLED | OUTPATIENT
Start: 2021-11-23

## 2021-11-23 RX ORDER — DEXTROSE MONOHYDRATE 50 MG/ML
100 INJECTION, SOLUTION INTRAVENOUS PRN
Status: DISCONTINUED | OUTPATIENT
Start: 2021-11-23 | End: 2021-12-04 | Stop reason: HOSPADM

## 2021-11-23 RX ORDER — CHOLECALCIFEROL (VITAMIN D3) 125 MCG
1000 CAPSULE ORAL DAILY
Status: CANCELLED | OUTPATIENT
Start: 2021-11-23

## 2021-11-23 RX ORDER — ASPIRIN 81 MG/1
81 TABLET ORAL DAILY
Status: CANCELLED | OUTPATIENT
Start: 2021-11-23

## 2021-11-23 RX ORDER — LEVOTHYROXINE SODIUM 0.03 MG/1
25 TABLET ORAL DAILY
Status: DISCONTINUED | OUTPATIENT
Start: 2021-11-23 | End: 2021-12-04 | Stop reason: HOSPADM

## 2021-11-23 RX ORDER — LEVOTHYROXINE SODIUM 0.03 MG/1
1 TABLET ORAL DAILY
Status: CANCELLED | OUTPATIENT
Start: 2021-11-23

## 2021-11-23 RX ORDER — DEXTROSE MONOHYDRATE 50 MG/ML
100 INJECTION, SOLUTION INTRAVENOUS PRN
Status: CANCELLED | OUTPATIENT
Start: 2021-11-23

## 2021-11-23 RX ORDER — POLYETHYLENE GLYCOL 3350 17 G/17G
17 POWDER, FOR SOLUTION ORAL DAILY PRN
Status: DISCONTINUED | OUTPATIENT
Start: 2021-11-23 | End: 2021-12-04 | Stop reason: HOSPADM

## 2021-11-23 RX ORDER — IPRATROPIUM BROMIDE AND ALBUTEROL SULFATE 2.5; .5 MG/3ML; MG/3ML
1 SOLUTION RESPIRATORY (INHALATION) EVERY 4 HOURS PRN
Status: CANCELLED | OUTPATIENT
Start: 2021-11-23

## 2021-11-23 RX ORDER — NICOTINE POLACRILEX 4 MG
15 LOZENGE BUCCAL PRN
Status: CANCELLED | OUTPATIENT
Start: 2021-11-23

## 2021-11-23 RX ORDER — ACETAMINOPHEN 325 MG/1
650 TABLET ORAL EVERY 4 HOURS PRN
Status: DISCONTINUED | OUTPATIENT
Start: 2021-11-23 | End: 2021-12-04 | Stop reason: HOSPADM

## 2021-11-23 RX ORDER — ASPIRIN 81 MG/1
81 TABLET ORAL DAILY
Status: DISCONTINUED | OUTPATIENT
Start: 2021-11-23 | End: 2021-12-04 | Stop reason: HOSPADM

## 2021-11-23 RX ORDER — PANTOPRAZOLE SODIUM 40 MG/1
40 TABLET, DELAYED RELEASE ORAL
Status: DISCONTINUED | OUTPATIENT
Start: 2021-11-24 | End: 2021-12-04 | Stop reason: HOSPADM

## 2021-11-23 RX ORDER — CARVEDILOL 12.5 MG/1
12.5 TABLET ORAL 2 TIMES DAILY WITH MEALS
Status: CANCELLED | OUTPATIENT
Start: 2021-11-23

## 2021-11-23 RX ORDER — ATORVASTATIN CALCIUM 80 MG/1
80 TABLET, FILM COATED ORAL NIGHTLY
Status: DISCONTINUED | OUTPATIENT
Start: 2021-11-23 | End: 2021-12-04 | Stop reason: HOSPADM

## 2021-11-23 RX ORDER — LABETALOL HYDROCHLORIDE 5 MG/ML
10 INJECTION, SOLUTION INTRAVENOUS EVERY 10 MIN PRN
Status: CANCELLED | OUTPATIENT
Start: 2021-11-23

## 2021-11-23 RX ORDER — UBIDECARENONE 100 MG
100 CAPSULE ORAL DAILY
Status: CANCELLED | OUTPATIENT
Start: 2021-11-23

## 2021-11-23 RX ORDER — CETIRIZINE HYDROCHLORIDE 10 MG/1
10 TABLET ORAL 2 TIMES DAILY
Status: CANCELLED | OUTPATIENT
Start: 2021-11-23

## 2021-11-23 RX ORDER — UBIDECARENONE 100 MG
100 CAPSULE ORAL DAILY
Status: DISCONTINUED | OUTPATIENT
Start: 2021-11-23 | End: 2021-12-04 | Stop reason: HOSPADM

## 2021-11-23 RX ORDER — LEVOTHYROXINE SODIUM 0.03 MG/1
25 TABLET ORAL DAILY
Status: CANCELLED | OUTPATIENT
Start: 2021-11-23

## 2021-11-23 RX ORDER — NICOTINE POLACRILEX 4 MG
15 LOZENGE BUCCAL PRN
Status: DISCONTINUED | OUTPATIENT
Start: 2021-11-23 | End: 2021-12-04 | Stop reason: HOSPADM

## 2021-11-23 RX ORDER — ATORVASTATIN CALCIUM 80 MG/1
80 TABLET, FILM COATED ORAL NIGHTLY
Status: CANCELLED | OUTPATIENT
Start: 2021-11-23

## 2021-11-23 RX ORDER — CLOPIDOGREL BISULFATE 75 MG/1
75 TABLET ORAL DAILY
Status: DISCONTINUED | OUTPATIENT
Start: 2021-11-24 | End: 2021-12-04 | Stop reason: HOSPADM

## 2021-11-23 RX ORDER — IPRATROPIUM BROMIDE AND ALBUTEROL SULFATE 2.5; .5 MG/3ML; MG/3ML
1 SOLUTION RESPIRATORY (INHALATION) EVERY 4 HOURS PRN
Status: DISCONTINUED | OUTPATIENT
Start: 2021-11-23 | End: 2021-12-04 | Stop reason: HOSPADM

## 2021-11-23 RX ORDER — LEVOTHYROXINE SODIUM 0.03 MG/1
25 TABLET ORAL DAILY
Status: DISCONTINUED | OUTPATIENT
Start: 2021-11-23 | End: 2021-11-23

## 2021-11-23 RX ORDER — ONDANSETRON 4 MG/1
4 TABLET, ORALLY DISINTEGRATING ORAL EVERY 8 HOURS PRN
Status: DISCONTINUED | OUTPATIENT
Start: 2021-11-23 | End: 2021-12-04 | Stop reason: HOSPADM

## 2021-11-23 RX ORDER — CETIRIZINE HYDROCHLORIDE 10 MG/1
10 TABLET ORAL 2 TIMES DAILY
Status: DISCONTINUED | OUTPATIENT
Start: 2021-11-23 | End: 2021-11-25

## 2021-11-23 RX ORDER — PANTOPRAZOLE SODIUM 40 MG/1
40 TABLET, DELAYED RELEASE ORAL
Status: CANCELLED | OUTPATIENT
Start: 2021-11-24

## 2021-11-23 RX ORDER — CARVEDILOL 12.5 MG/1
12.5 TABLET ORAL 2 TIMES DAILY WITH MEALS
Status: DISCONTINUED | OUTPATIENT
Start: 2021-11-23 | End: 2021-12-04 | Stop reason: HOSPADM

## 2021-11-23 RX ORDER — ONDANSETRON 4 MG/1
4 TABLET, ORALLY DISINTEGRATING ORAL EVERY 8 HOURS PRN
Status: CANCELLED | OUTPATIENT
Start: 2021-11-23

## 2021-11-23 RX ORDER — POLYETHYLENE GLYCOL 3350 17 G/17G
17 POWDER, FOR SOLUTION ORAL DAILY PRN
Status: CANCELLED | OUTPATIENT
Start: 2021-11-23

## 2021-11-23 RX ORDER — ONDANSETRON 2 MG/ML
4 INJECTION INTRAMUSCULAR; INTRAVENOUS EVERY 6 HOURS PRN
Status: DISCONTINUED | OUTPATIENT
Start: 2021-11-23 | End: 2021-12-04 | Stop reason: HOSPADM

## 2021-11-23 RX ORDER — ASPIRIN 300 MG/1
300 SUPPOSITORY RECTAL DAILY
Status: DISCONTINUED | OUTPATIENT
Start: 2021-11-23 | End: 2021-11-23

## 2021-11-23 RX ORDER — CHOLECALCIFEROL (VITAMIN D3) 125 MCG
1000 CAPSULE ORAL DAILY
Status: DISCONTINUED | OUTPATIENT
Start: 2021-11-23 | End: 2021-12-04 | Stop reason: HOSPADM

## 2021-11-23 RX ORDER — INSULIN GLARGINE 100 [IU]/ML
20 INJECTION, SOLUTION SUBCUTANEOUS NIGHTLY
Status: DISCONTINUED | OUTPATIENT
Start: 2021-11-23 | End: 2021-12-04 | Stop reason: HOSPADM

## 2021-11-23 RX ORDER — INSULIN GLARGINE 100 [IU]/ML
20 INJECTION, SOLUTION SUBCUTANEOUS NIGHTLY
Status: CANCELLED | OUTPATIENT
Start: 2021-11-23

## 2021-11-23 RX ORDER — CLOPIDOGREL BISULFATE 75 MG/1
75 TABLET ORAL DAILY
Status: CANCELLED | OUTPATIENT
Start: 2021-11-23

## 2021-11-23 RX ORDER — ASPIRIN 300 MG/1
300 SUPPOSITORY RECTAL DAILY
Status: CANCELLED | OUTPATIENT
Start: 2021-11-23

## 2021-11-23 RX ORDER — DEXTROSE MONOHYDRATE 25 G/50ML
12.5 INJECTION, SOLUTION INTRAVENOUS PRN
Status: DISCONTINUED | OUTPATIENT
Start: 2021-11-23 | End: 2021-12-04 | Stop reason: HOSPADM

## 2021-11-23 RX ORDER — SERTRALINE HYDROCHLORIDE 25 MG/1
25 TABLET, FILM COATED ORAL DAILY
Status: DISCONTINUED | OUTPATIENT
Start: 2021-11-23 | End: 2021-12-04 | Stop reason: HOSPADM

## 2021-11-23 RX ADMIN — INSULIN LISPRO 1 UNITS: 100 INJECTION, SOLUTION INTRAVENOUS; SUBCUTANEOUS at 22:22

## 2021-11-23 RX ADMIN — ASPIRIN 81 MG: 81 TABLET, COATED ORAL at 09:15

## 2021-11-23 RX ADMIN — ENOXAPARIN SODIUM 40 MG: 100 INJECTION SUBCUTANEOUS at 17:40

## 2021-11-23 RX ADMIN — ATORVASTATIN CALCIUM 80 MG: 80 TABLET, FILM COATED ORAL at 22:21

## 2021-11-23 RX ADMIN — INSULIN LISPRO 1 UNITS: 100 INJECTION, SOLUTION INTRAVENOUS; SUBCUTANEOUS at 09:13

## 2021-11-23 RX ADMIN — SERTRALINE HYDROCHLORIDE 25 MG: 25 TABLET ORAL at 09:15

## 2021-11-23 RX ADMIN — CARVEDILOL 12.5 MG: 12.5 TABLET, FILM COATED ORAL at 17:40

## 2021-11-23 RX ADMIN — LEVOTHYROXINE SODIUM 25 MCG: 25 TABLET ORAL at 05:46

## 2021-11-23 RX ADMIN — PANTOPRAZOLE SODIUM 40 MG: 40 TABLET, DELAYED RELEASE ORAL at 05:46

## 2021-11-23 RX ADMIN — CLOPIDOGREL BISULFATE 75 MG: 75 TABLET ORAL at 09:15

## 2021-11-23 RX ADMIN — INSULIN GLARGINE 20 UNITS: 100 INJECTION, SOLUTION SUBCUTANEOUS at 22:21

## 2021-11-23 RX ADMIN — CETIRIZINE HYDROCHLORIDE 10 MG: 10 TABLET, FILM COATED ORAL at 09:14

## 2021-11-23 RX ADMIN — Medication 100 MG: at 09:15

## 2021-11-23 RX ADMIN — CYANOCOBALAMIN TAB 500 MCG 1000 MCG: 500 TAB at 09:15

## 2021-11-23 NOTE — PROGRESS NOTES
Physician Progress Note      Pauline Albert  AIMEE #:                  [id number]  :                       1940  ADMIT DATE:       2021 9:56 AM  DISCH DATE:  RESPONDING  PROVIDER #:        Miriam Herbert        QUERY TEXT:    Stage of Chronic Kidney Disease: Please provide further specificity, if known. Clinical indicators include: bun, creatinine, pro-bnp, bnp, chronic kidney   disease, gfr  Options provided:  -- Chronic kidney disease stage 1  -- Chronic kidney disease stage 2  -- Chronic kidney disease stage 3  -- Chronic kidney disease stage 3a  -- Chronic kidney disease stage 3b  -- Chronic kidney disease stage 4  -- Chronic kidney disease stage 5  -- Chronic kidney disease stage 5, requiring dialysis  -- End stage renal disease  -- Other - I will add my own diagnosis  -- Disagree - Not applicable / Not valid  -- Disagree - Clinically Unable to determine / Unknown        PROVIDER RESPONSE TEXT:    Provider dismissed this query because it was not applicable to the patient or   not a valid query.       Electronically signed by:  Miriam Herbert 2021 8:31 AM

## 2021-11-23 NOTE — DISCHARGE SUMMARY
Denis Lizama  :  1940  MRN:  [id number]    Admit date:  2021  Discharge date:  21    Discharging Physician:  Dr. Flaquita Bonilla    Advance Directive: Full Code    Consults: Keenan Rosales TO REHAB/TCU ADMISSION COORDINATOR  IP CONSULT TO CASE MANAGEMENT  IP CONSULT TO PHARMACY  PHARMACY TO CHANGE BASE FLUIDS     Primary Care Physician:  Cam Narayanan MD    Discharge Diagnoses:  Principal Problem:    Acute CVA (cerebrovascular accident) [facility])  Active Problems:    CAD in native artery    HTN (hypertension)    Gastroesophageal reflux disease without esophagitis    Type 2 diabetes mellitus with chronic kidney disease, without long-term current use of insulin (HCC)    COPD (chronic obstructive pulmonary disease) ([facility]    Mixed hyperlipidemia    CECILIA (acute kidney injury) ([facility]  Resolved Problems:    * No resolved hospital problems. *      Portions of this note have been copied forward, however, changed to reflect the most current clinical status of this patient. Hospital Course:   Admitted on 2021 for complaints of slurred speech and right-sided weakness with numbness.    Stated her glucose was 25 early the morning of admission, therefore she peanut butter and sugar with lots of coffee.  Stated at that time that the right sided weakness caused her coffee to spill, and family noticed a right-sided facial droop.  She resides at an assisted living [address] [place] [address] Road in [place].  ED work-up revealed a sodium 130, CO2 16, BUN 16, creatinine 1, glucose 419 and a WBC of 11.8.  CT showed no acute process. Rosa Primus showed short segment moderate atheromatous narrowing along the supraclinoid segment of the right distal ICA.  MRI showed acute posterior limb internal capsule lacunar infarct. Neurology consulted. Echo completed and unremarkable bubble study. Triglycerides elevated. LDL and HDL within normal limits. A1c of 8.1.   Stroke education ongoing. Speech greatly improved. Right-sided weakness markedly improved. Aspirin and plavix therapy resumed. High dose statin therapy already in use by patient and continued. Patient is not medically stable to discharge to [facility]. Significant Diagnostic Studies:   ECHO Complete 2D W Doppler W Color    Result Date: 11/21/2021  Transthoracic Echocardiography Report (TTE)  Demographics   Patient Name  Chadwick Velasquez Date of Study          11/20/2021   MRN           [id number]         Gender                 Female   Date of Birth 1940     Room Number            [id number]   Age           79 year(s)   Height:       66 inches      Referring Physician    Jerman Dias   Weight:       155 pounds     Sonographer            Remi Mae RDCS   BSA:          1.79 m^2       Interpreting Physician Margret David MD   BMI:          25.02 kg/m^2  Procedure Type of Study   TTE procedure:ECHO NO CONTRAST WITH DOP/COLR. Study Location: Echo Lab Technical Quality: Adequate visualization Patient Status: Inpatient Contrast Medium: Bubble Study. Rhythm: Within normal limits  Conclusions   Summary  Structurally normal mitral valve. Mild mitral regurgitation is present. Structurally normal aortic valve. Tricuspid valve is structurally normal.  Normal left ventricular size with preserved LV function and an estimated  ejection fraction of approximately 55-60%. Mild concentric left ventricular hypertrophy. No regional wall motion abnormalities. Impaired relaxation compatible with diastolic dysfunction. ( reversed E/A  ratio)   Signature   ----------------------------------------------------------------  Electronically signed by Margret David MD(Interpreting  physician) on 11/21/2021 08:53 AM  ----------------------------------------------------------------   Findings   Mitral Valve  Structurally normal mitral valve. Mild mitral regurgitation is present. Aortic Valve  Structurally normal aortic valve. Tricuspid Valve  Tricuspid valve is structurally normal.   Pulmonic Valve  The pulmonic valve was not well visualized . Left Atrium  Normal size left atrium. Left Ventricle  Normal left ventricular size with preserved LV function and an estimated  ejection fraction of approximately 55-60%. Mild concentric left ventricular hypertrophy. No regional wall motion abnormalities. Impaired relaxation compatible with diastolic dysfunction. ( reversed E/A  ratio)   Right Atrium  Normal right atrial dimension with no evidence of thrombus or mass noted. Right Ventricle  Normal right ventricular size with preserved RV function. Pericardial Effusion  No evidence of significant pericardial effusion is noted. Pleural Effusion  No evidence of pleural effusion. Allergies   - Adhesive tape. M-Mode Measurements (cm)   LVIDd: 5.09 cm                         LVIDs: 3.38 cm  IVSd: 1.19 cm  LVPWd: 1.14 cm                         AO Root Dimension: 1.8 cm  Rt. Vent. Dimension: 2.17 cm           LA: 3.8 cm  % Ejection Fraction: 60 %              LVOT: 2 cm  Doppler Measurements:   AV Peak Velocity:119 cm/s           MV Peak E-Wave: 46.7 cm/s  AV Peak Gradient: 5.66 mmHg         MV Peak A-Wave: 75.8 cm/s                                      MV E/A Ratio: 0.62 %                                      MV Peak Gradient: 0.87 mmHg      CT HEAD WO CONTRAST    Result Date: 11/20/2021  CT HEAD WO CONTRAST 11/20/2021 10:12 AM HISTORY: Right-sided weakness COMPARISON: None DOSE LENGTH PRODUCT: 838 mGy cm TECHNIQUE: Helical tomographic images of the brain were obtained without the use of intravenous contrast. Automated exposure control was also utilized to decrease patient radiation dose. FINDINGS: There is no evidence of evolving large vascular territory infarct. No visualized intra-axial or extra-axial hemorrhage. No mass lesion is identified. Normal size and configuration of the ventricular system. The basal cisterns are symmetric. Posterior fossa structures are unremarkable. The included orbits and their contents are unremarkable. The visualized paranasal sinuses, mastoid air cells and middle ear cavities are clear. The visualized osseous structures and overlying soft tissues of the skull and face are unremarkable. 1. No acute intracranial process. Signed by Dr Dawood Ortega    Result Date: 11/20/2021  XR CHEST PORTABLE 11/20/2021 10:31 AM HISTORY: Curvature  Technique: Single AP view of the chest COMPARISONS: Chest exam dated 3/16/2021 FINDINGS: Shallow inspiration with low lung volumes. Mild interstitial coarsening in the lung bases. No lung consolidation. No pleural effusion or pneumothorax. Cardiomediastinal silhouette and pulmonary vasculature are unremarkable. No acute bony abnormality. 1. Shallow inspiration with low lung volumes. 2. Mild interstitial coarsening in the lung bases, perhaps a mild interstitial edema. No consolidation or pleural effusion. Signed by Dr Jahaira Valencia    Result Date: 11/20/2021  CTA HEAD W CONTRAST, CTA NECK W CONTRAST HISTORY: Aphasia COMPARISON: None DLP: 1531 mGy cm TECHNIQUE: Following the uneventful administration of Isovue contrast, serial helical tomographic images of the head and neck were obtained following angiogram protocol. Multiplanar MIP and 3D reconstructions were provided for review. Automated exposure control was also utilized to decrease patient radiation dose. FINDINGS: ANGIOGRAM: Typical three-vessel branching pattern off the aortic arch. . There is no flow-limiting stenosis at the great vessel origins. Normal course and caliber of the common carotid arteries. The minimum luminal diameter at the left internal carotid origin measures 4.2 mm, in comparison to a more normal downstream caliber of 4.0 mm.  The minimum luminal diameter of the right internal carotid artery origin measures 3.0 mm, in comparison to a more normal downstream caliber of 3.7 mm. Vertebral arteries are patent. The vertebral arteries are codominant. Densely calcified plaque identified as the vertebral arteries fonseca the dura at the level of the skull base. Intracranial vertebral arteries are normal in caliber. The basilar artery is patent. Distal carotid arteries are patent. Short segment moderate atheromatous narrowing along the supraclinoid segment of the right distal ICA. Bilateral anterior and middle cerebral artery branching appear symmetric. Basilar artery is normal in caliber. The posterior cerebral artery branching is symmetric. No large vessel occlusion. OTHER FINDINGS: No intracranial hemorrhage or mass effect. Orbital contents are unremarkable. The paranasal sinuses are clear. No cervical adenopathy. 1. No flow-limiting stenosis or arterial occlusion in the neck. In particular, there is no flow-limiting stenosis at the carotid bifurcations according to the NASCET assessment. 2. Short segment moderate atheromatous narrowing along the supraclinoid segment of the right distal ICA. No intracranial large vessel occlusion. Signed by Dr Lyles Postal    Result Date: 11/20/2021  CTA HEAD W CONTRAST, CTA NECK W CONTRAST HISTORY: Aphasia COMPARISON: None DLP: 1531 mGy cm TECHNIQUE: Following the uneventful administration of Isovue contrast, serial helical tomographic images of the head and neck were obtained following angiogram protocol. Multiplanar MIP and 3D reconstructions were provided for review. Automated exposure control was also utilized to decrease patient radiation dose. FINDINGS: ANGIOGRAM: Typical three-vessel branching pattern off the aortic arch. . There is no flow-limiting stenosis at the great vessel origins. Normal course and caliber of the common carotid arteries. The minimum luminal diameter at the left internal carotid origin measures 4.2 mm, in comparison to a more normal downstream caliber of 4.0 mm.  The minimum luminal diameter of the right internal carotid artery origin measures 3.0 mm, in comparison to a more normal downstream caliber of 3.7 mm. Vertebral arteries are patent. The vertebral arteries are codominant. Densely calcified plaque identified as the vertebral arteries fonseca the dura at the level of the skull base. Intracranial vertebral arteries are normal in caliber. The basilar artery is patent. Distal carotid arteries are patent. Short segment moderate atheromatous narrowing along the supraclinoid segment of the right distal ICA. Bilateral anterior and middle cerebral artery branching appear symmetric. Basilar artery is normal in caliber. The posterior cerebral artery branching is symmetric. No large vessel occlusion. OTHER FINDINGS: No intracranial hemorrhage or mass effect. Orbital contents are unremarkable. The paranasal sinuses are clear. No cervical adenopathy. 1. No flow-limiting stenosis or arterial occlusion in the neck. In particular, there is no flow-limiting stenosis at the carotid bifurcations according to the NASCET assessment. 2. Short segment moderate atheromatous narrowing along the supraclinoid segment of the right distal ICA. No intracranial large vessel occlusion. Signed by Dr Veronica Mauro    MRI BRAIN WO CONTRAST    Result Date: 11/20/2021  MRI BRAIN WO CONTRAST 11/20/2021 11:45 AM HISTORY: Right-sided weakness, facial droop Comparison: None. Technique: Multiplanar imaging of the brain was performed in a routine fashion without intravenous contrast. Findings: Posterior limb left internal capsule lacunar infarct. There is no intra-axial or extra-axial hemorrhage. No visualized mass lesion or mass effect. Mild chronic small vessel ischemic change. Normal size and configuration of the ventricular system for patient age. The posterior fossa structures are unremarkable. The pituitary gland and sella are unremarkable. The major intracranial flow voids are preserved. The orbits are unremarkable.  The paranasal sinuses and mastoids are clear. Marrow signal in the calvarium and skull base appears normal.    Impression: Acute posterior limb left internal capsule lacunar infarct. Signed by Dr Vinayak Bo      Pertinent Labs:   CBC:   Recent Labs     11/21/21 0426 11/22/21 0420 11/23/21  0912   WBC 11.1* 12.8* 17.1*   HGB 13.7 14.2 15.4    237 288     BMP:    Recent Labs     11/21/21 0426 11/22/21 0420 11/23/21  0912    137 135*   K 4.2 3.8 4.1    102 102   CO2 21* 20* 16*   BUN 25* 24* 23   CREATININE 0.7 0.7 0.9   GLUCOSE 158* 137* 247*     INR: No results for input(s): INR in the last 72 hours. Physical Exam:  Vital Signs: BP (!) 148/106   Pulse 95   Temp 95.2 °F (35.1 °C) (Temporal)   Resp 16   Ht 5' 6\" (1.676 m)   Wt 162 lb (73.5 kg)   SpO2 96%   BMI 26.15 kg/m²   Physical Exam  Constitutional:       General: She is not in acute distress. Appearance: Normal appearance. She is not toxic-appearing or diaphoretic. HENT:      Head: Normocephalic and atraumatic. Right Ear: External ear normal.      Left Ear: External ear normal.      Nose: Nose normal. No congestion or rhinorrhea. Mouth/Throat:      Mouth: Mucous membranes are moist.      Pharynx: Oropharynx is clear. Eyes:      General: No scleral icterus. Extraocular Movements: Extraocular movements intact. Conjunctiva/sclera: Conjunctivae normal.   Cardiovascular:      Rate and Rhythm: Normal rate and regular rhythm. Pulses: Normal pulses. Heart sounds: Normal heart sounds. No murmur heard. No friction rub. No gallop. Pulmonary:      Effort: Pulmonary effort is normal. No respiratory distress. Breath sounds: Normal breath sounds. No wheezing, rhonchi or rales. Abdominal:      General: Abdomen is flat. Bowel sounds are normal. There is no distension. Palpations: Abdomen is soft. Tenderness: There is no abdominal tenderness. Musculoskeletal:         General: No swelling.  Normal range of motion. Cervical back: Normal range of motion and neck supple. Right lower leg: No edema. Left lower leg: No edema. Skin:     General: Skin is warm and dry. Coloration: Skin is not jaundiced. Findings: No erythema, lesion or rash. Neurological:      General: No focal deficit present. Mental Status: She is alert and oriented to person, place, and time. Mental status is at baseline. Cranial Nerves: No cranial nerve deficit. Sensory: No sensory deficit. Motor: Weakness present. Gait: Gait abnormal.      Comments: Right upper extremity 4/5  Left upper extremity 5/5  Right lower extremity 4/5  Left lower extremity 5/5    Slight right-sided facial droop noted  Slurred speech NOT noted   Psychiatric:         Mood and Affect: Mood normal.         Behavior: Behavior normal.         Thought Content: Thought content normal.         Judgment: Judgment normal.         Discharge Medications:         Medication List      ASK your doctor about these medications    Align 4 MG Caps  Once capsule daily     Anoro Ellipta 62.5-25 MCG/INH Aepb inhaler  Generic drug: umeclidinium-vilanterol     aspirin 81 MG tablet     atorvastatin 80 MG tablet  Commonly known as: LIPITOR  Take 1 tablet by mouth daily     Basaglar KwikPen 100 UNIT/ML injection pen  Generic drug: insulin glargine  Inject 40 Units into the skin nightly     blood glucose monitor kit and supplies  1 kit by Does not apply route daily ICD-10-CM: E11.9     carvedilol 25 MG tablet  Commonly known as: COREG  TAKE 1 TABLET TWICE DAILY  WITH MEALS     cetirizine 10 MG tablet  Commonly known as: ZYRTEC     clopidogrel 75 MG tablet  Commonly known as: PLAVIX  Take 1 tablet by mouth daily     Co Q 10 10 MG Caps     colchicine 0.6 MG tablet  Commonly known as: Colcrys  2 tablets now then 1 tablet two hours later, repeat for 3 days if pain persists.      Diabetic Shoe Misc  by Does not apply route     diclofenac sodium 1 % Gel  Commonly known as: VOLTAREN  Apply 4 g topically 4 times daily     FISH OIL OMEGA-3 PO     fluticasone 50 MCG/ACT nasal spray  Commonly known as: FLONASE     FreeStyle Lancets Misc  USE TO CHECK BLOOD SUGAR 1-2 TIMES DAILY     FREESTYLE LITE strip  Generic drug: blood glucose test strips  USE 4 STRIP TO CHECK GLUCOSE ONCE DAILY AS NEEDED     GLUCOSAMINE-CALCIUM-VIT D PO     Insulin Pen Needle 32G X 6 MM Misc  One needle four times a day     Lancet Device Misc  1 Device by Does not apply route once for 1 dose ICD-10-CM: E11.9     levothyroxine 25 MCG tablet  Commonly known as: Synthroid  TAKE 1 TABLET DAILY     lisinopril 40 MG tablet  Commonly known as: PRINIVIL;ZESTRIL  TAKE 1 TABLET DAILY     * Misc. Devices Misc  Diabetic shoes  DIAGNOSIS: E11.42     * Misc. Devices Misc  Diabetic shoes     E11.9 E11.22     omeprazole 40 MG delayed release capsule  Commonly known as: PRILOSEC  TAKE 1 CAPSULE DAILY     sertraline 25 MG tablet  Commonly known as: ZOLOFT  Take 1 tablet by mouth daily     vitamin B-12 1000 MCG tablet  Commonly known as: CYANOCOBALAMIN  Ask about: Which instructions should I use? * This list has 2 medication(s) that are the same as other medications prescribed for you. Read the directions carefully, and ask your doctor or other care provider to review them with you. Discharge Instructions: Follow up with Kirk Howard MD in 3-5 days after discharge from rehab. Take medications as directed. Resume activity as tolerated. Diet: ADULT DIET; Dysphagia - Soft and Bite Sized     Disposition: Patient is Stableand will be discharged to Acute Rehab. Time spent on discharge 37 minutes spent in assessing patient, reviewing medications, discussion with nursing, confirming safe discharge plan and preparation of discharge summary.     Signed:  SHARI Pereyra CNP, 11/23/2021 12:26 PM

## 2021-11-23 NOTE — PROGRESS NOTES
Denis Lizama arrived to room # 12  Presented with: CVA  Mental Status: Patient is oriented and alert. Vitals:    11/23/21 1450   BP: 91/68   Pulse: 87   Resp: 18   Temp: 95.7 °F (35.4 °C)   SpO2: 99%     Patient safety contract and falls prevention contract reviewed with patient Yes. Oriented Patient to room. Call light within reach. Yes.   Needs, issues or concerns expressed at this time: no.      Electronically signed by Galo Alas LPN on 08/43/7320 at 4:28 PM

## 2021-11-23 NOTE — H&P
[facility]   History and Physical        Patient:   Lev Quijano  MR#:    [id number]  Account Number:                   [id number]      Room:    [id number]   YOB: 1940  Date of Progress Note: 11/23/2021  Time of Note                           2:51 PM  Attending Physician:  Júnior Rsos MD        Admitting diagnosis:Cerebral infarction, unspecified    Secondary diagnoses:Hemiplegia and hemiparesis following cerebral infarction affecting right dominant side,Dysarthria following cerebral infarction,Essential (primary) hypertension,Atherosclerotic heart disease of native coronary artery without angina pectoris,Gastro-esophageal reflux disease without esophagitis,Dysphagia following cerebral infarction,Type 2 diabetes mellitus with diabetic chronic kidney disease,Mixed hyperlipidemia,Other specified anxiety disorders,Chronic obstructive pulmonary disease, unspecified,Disorder of thyroid, unspecified,Heart failure, unspecified    CHIEF COMPLAINT: Acute ischemic stroke      HISTORY OF PRESENT ILLNESS:   This 79 y.o. female  with history of CAD, CHF, COPD, DM, CKD, GERD, mixed hyperlipidemia, thyroid disease and MI x 2. She presented to [facility] ER on 11/20/21 with s/o of slurred speech, right sided numbness and weakness. She initially thought it was her blood sugar which was 25 that morning before she ate but then her family noticed a right facial droop. Hemoglobin A1C on admit was 8.1. CT of head was negative. MRI done revealed an acute posterior limb left internal capsule lacunar infarct. She was admitted to the hospitalist service with consult for neurology. She was evaluated by speech for dysarthria and dysphagia. She was placed on a mechanical soft diet with thin liquids. She had acute kidney injury on CKD but this has now resolved She continues to have right sided weakness and is participating in both PT/OT. She is felt to need a stay on Rehab to work towards her goal of returning home.  She is now disintegrating tablet 4 mg, 4 mg, Oral, Q8H PRN **OR** ondansetron (ZOFRAN) injection 4 mg, 4 mg, IntraVENous, Q6H PRN, SHARI Richardson CNP    dextrose 5 % solution, 100 mL/hr, IntraVENous, PRN, SHARI Richardson CNP    dextrose 50 % IV solution, 12.5 g, IntraVENous, PRN, SHARI Richardson CNP    glucagon (rDNA) injection 1 mg, 1 mg, IntraMUSCular, PRN, SHARI Richardson CNP    glucose (GLUTOSE) 40 % oral gel 15 g, 15 g, Oral, PRN, SHARI Richardson CNP    carvedilol (COREG) tablet 12.5 mg, 12.5 mg, Oral, BID , SHARI Knapp CNP    cetirizine (ZYRTEC) tablet 10 mg, 10 mg, Oral, BID, SHARI Richardson CNP    clopidogrel (PLAVIX) tablet 75 mg, 75 mg, Oral, Daily, SHARI Knapp CNP    coenzyme Q10 capsule 100 mg, 100 mg, Oral, Daily, SHARI Knapp CNP    insulin glargine (LANTUS) injection vial 20 Units, 20 Units, SubCUTAneous, Nightly, SHARI Knapp CNP    insulin lispro (HUMALOG) injection vial 0-6 Units, 0-6 Units, SubCUTAneous, TID Katerina APRN - CNP    insulin lispro (HUMALOG) injection vial 0-3 Units, 0-3 Units, SubCUTAneous, Nightly, SHARI Knapp CNP    ipratropium-albuterol (DUONEB) nebulizer solution 1 ampule, 1 ampule, Inhalation, Q4H PRN, SHARI Richardson CNP    levothyroxine (SYNTHROID) tablet 25 mcg, 25 mcg, Oral, Daily, SHARI Knapp CNP    levothyroxine (SYNTHROID) tablet 25 mcg, 1 tablet, Oral, Daily, SHARI Knapp CNP    [START ON 11/24/2021] pantoprazole (PROTONIX) tablet 40 mg, 40 mg, Oral, QAM AC, SHARI Knapp - CNP    sertraline (ZOLOFT) tablet 25 mg, 25 mg, Oral, Daily, SHARI Knapp - CNP    vitamin B-12 (CYANOCOBALAMIN) tablet 1,000 mcg, 1,000 mcg, Oral, Daily, SHARI Knapp - CNP    acetaminophen (TYLENOL) tablet 650 mg, 650 mg, Oral, Q4H PRN, MD Yudelka Soni tone  trace giveaway weakness in the right arm and right leg    Sensory Exam  Sensation intact to light touch and temperature upper and lower extremities bilaterally    Reflexes   Not tested    Tremors- no tremors in hands or head noted    Gait  Not tested    Coordination  Finger to nose-ataxic on the right        CBC:   Recent Labs     11/21/21 0426 11/22/21 0420 11/23/21 0912   WBC 11.1* 12.8* 17.1*   HGB 13.7 14.2 15.4    237 288       BMP:    Recent Labs     11/21/21 0426 11/22/21 0420 11/23/21 0912    137 135*   K 4.2 3.8 4.1    102 102   CO2 21* 20* 16*   BUN 25* 24* 23   CREATININE 0.7 0.7 0.9   GLUCOSE 158* 137* 247*       Hepatic: No results for input(s): AST, ALT, ALB, BILITOT, ALKPHOS in the last 72 hours. Lipids:   Recent Labs     11/21/21 0426   CHOL 134*   HDL 48*       INR: No results for input(s): INR in the last 72 hours. Assessment and Plan     1. Acute ischemic stroke-aspirin/statin/Plavix  2. Coronary artery disease/history of MI-aspirin/statin/Plavix  3. Hyperlipidemia-on statin  4. CHF-Coreg  5. Allergies-Zyrtec  6. History of COPD-nebulizers as needed  7. DVT prophylaxis-Lovenox  8. Diabetes-on insulin monitor blood sugars  9. Hypothyroidism-Synthroid  10. GI-proton pump inhibitor/bowel regimen  11. Mood disorder-on Zoloft  12. Psoriatic arthritis-Tylenol as needed  13. Chronic kidney disease-monitor  14. PT/OT/speech  15. Obstructive sleep apnea-monitor    Patient's functional assessment  Prior to hospitalization the patient was continent of bowel and incontinent of bladder    Current therapy  Requires PT, OT and/or speech therapy    Anticipated discharge approximately 17 days    Functional assessment  All notes from reehab data were reviewed regarding the patient's functional status.         Anticipated discharge setting  Home with home care    No clear barriers to discharge    The patient appears to be an appropriate candidate for inpatient rehabilitation    Sufficiently stable: Patient's condition is sufficiently stable at the time of admission to allow the patient to actively participate in an intensive rehabilitation program    Close medical supervision: A rehabilitation physician, or other licensed treating physician with specialized training and experience in inpatient rehabilitation, will conduct face-to-face visits with the patient a minimum of at least 3 days per week throughout the patient's stay    This patient requires close medical supervision for the active management of the ongoing conditions and potential complications stated in the admission note    Intensive rehabilitation nursing: The patient demonstrates the need for 24-hour rehabilitation nursing care for active management of the multiple medical issues documented in the admission note    Appropriate therapy needed: The patient requires the active and ongoing therapeutic intervention of at least 2 therapeutic disciplines, one of which must be physical or occupational therapy and/or speech therapy    Intensive therapy: The patient requires and is reasonably expected to actively participate in at least 3 hours of therapy per day at least 5 days per week, and expected to make measurable improvements that will be of practical value to improve the patient's functional capacity or adaptation to impairments.  In addition, therapy treatments will begin within 36 hours from midnight of the day of the patient's admission to the inpatient rehabilitation facility    Expected duration and frequency therapy: 180 minutes per day, 5 days per week    Interdisciplinary team: The patient demonstrates the need for an interdisciplinary team for active management of the following medical issues including ataxia, motor planning, balance, disease management, elimination, endurance, family training, education, independent ADLs, pain management, precautions, range of motion, safety, strength, and transfers    I have reviewed the preadmission screening documents and concur with the findings. I believe the patient meets criteria and is sufficiently stable to allow participation in the program. This requires an intensive level of therapy, close medical supervision, and an interdisciplinary team approach provided through an individualized plan of care. I approve admitting this patient for an intensive inpatient rehabilitation program.    Please feel free to call with any questions   [phone] (cell phone)    Dr Caron Brand certified in Neurology  Board Certified in Clinical Neurophysiology  Fellowship Trained in [place] [address] Neurophysiology      EMR Dragon/transcription disclaimer:Significant part of this  encounter note is electronic transcription/translation of spoken language to printed text. The electronic translation of spoken language may be erroneous, or at times, nonsensical words or phrases may be inadvertently transcribed.  Although I have reviewed the note for such errors, some may still exist.

## 2021-11-23 NOTE — PROGRESS NOTES
Physical Therapy  Name: Diaz Tavarez  MRN:  [id number]  Date of service:  11/23/2021 11/23/21 1254   Restrictions/Precautions   Restrictions/Precautions Fall Risk   Required Braces or Orthoses? No   General   Chart Reviewed Yes   Subjective   Subjective Pt agrees to work with therapy, states that she needs to use the bathroom. Pain Screening   Patient Currently in Pain Denies   Bed Mobility   Supine to Sit Contact guard assistance   Transfers   Sit to Stand Contact guard assistance   Stand to sit Contact guard assistance   Comment stood from bedside, stood from toilet x3, standing balance with CGA to don new brief twice   Ambulation   Ambulation? Yes   Ambulation 1   Surface level tile   Device Rolling Walker   Assistance Contact guard assistance   Quality of Gait slightly unsteady, no LOB, reciprocal gait pattern   Gait Deviations Slow Lisa; Decreased step length; Decreased step height   Distance 10' x2   Short term goals   Time Frame for Short term goals 2 wks   Short term goal 1 supine to sit indep   Short term goal 2 sit to stand indep   Short term goal 3 amb. 200' with RW SBA   Short term goal 4 bed to chair SBA   Conditions Requiring Skilled Therapeutic Intervention   Body structures, Functions, Activity limitations Decreased functional mobility ; Decreased strength; Decreased safe awareness; Decreased cognition; Decreased sensation; Decreased posture; Decreased balance; Decreased coordination   Assessment Pt able to amb into BR and back with rwx, able to stand multiple times from toilet for pericare and to don new brief x2. Pt up to recliner and positioned for comfort with all needs in reach. Activity Tolerance   Activity Tolerance Patient Tolerated treatment well   Safety Devices   Type of devices Call light within reach;  Chair alarm in place; Gait belt; Left in chair         Electronically signed by Carlos Goncalves PTA on 11/23/2021 at 9:57 AM

## 2021-11-23 NOTE — PLAN OF CARE
[address] TREATMENT PLAN      Maira Persaud    : 1940  Acct #: [de-identified]  MRN: [id number]   PHYSICIAN:  Sarah Kaur MD  Primary Problem    Patient Active Problem List   Diagnosis    Other fatigue    CAD in native artery    HTN (hypertension)    Gastroesophageal reflux disease without esophagitis    Depression with anxiety    Type 2 diabetes mellitus with chronic kidney disease, without long-term current use of insulin (HCC)    Pulmonary nodule    S/P left heart catheterization by percutaneous approach    COPD (chronic obstructive pulmonary disease) ([facility])    Mixed hyperlipidemia    MAGALY (obstructive sleep apnea)    Chronic kidney disease, stage III (moderate) ([facility])    Acute CVA (cerebrovascular accident) ([address]    CECILIA (acute kidney injury) ([address]    Hemiplegia affecting dominant side ([address]    Dysarthria    Dysphagia due to recent cerebral infarction    Acquired hypothyroidism    CHF (congestive heart failure) ([facility])    Acute ischemic stroke ([address]    Psoriatic arthritis ([address]       Rehabilitation Diagnosis:     Acute ischemic stroke ([address] [I63.9]       ADMIT DATE:2021   CARE PLAN     NURSING:  Maira Persaud while on this unit will:     [] Be continent of bowel and bladder      [x] Have an adequate number of bowel movements   [x] Urinate with no urinary retention >300ml in bladder   [] Complete bladder protocol with bob removal   [] Maintain O2 SATs at ___%   [x] Have pain managed while on ARU        [] Be pain free by discharge    [] Have no skin breakdown while on ARU   [x] Have improved skin integrity via wound measurements   [] Have no signs/symptoms of infection at the wound site  [x] Be free from injury during hospitalization   [] Complete education with patient/family with understanding demonstrated for:  [] Adjustment   [x] Other:   Nursing interventions may include bowel/bladder training, education for medical assistive devices, medication education, O2 saturation management, energy conservation, stress management techniques, fall prevention, alarms protocol, seating and positioning, skin/wound care, pressure relief instruction,dressing changes,  infection protection, DVT prophylaxis, and/or assistance with in room safety with transfers to bed, toilet, wheelchair, shower as well as bathroom activities and hygiene. Patient/caregiver education for:   [] Disease/sustained injury/management      [] Medication Use   [] Surgical intervention   [] Safety   [] Body mechanics and or joint protection   [] Health maintenance       IRF-GIA  Bladder and Bowel Continence  Bladder Continence: Incontinent less than daily  Bowel Continence: Always continent       PHYSICAL THERAPY:  Goals:                  Short term goals  Time Frame for Short term goals: 1-2 weeks  Short term goal 1: Supervision with ambulation using '  Short term goal 2: Supervision with car transfer  Short term goal 3: Independent with bed mobility  Short term goal 4: Supervision with w/c mobility 250'  Short term goal 5: Supervision with 4 stairs using unilateral rail            Long term goals  Time Frame for Long term goals : 2-3 weeks  Long term goal 1: Independent with ambulation using '  Long term goal 2: Independent with car transfer  Long term goal 3: Independent with transfers  Long term goal 4: Independent with 4 stairs with unilateral rail  These goals were reviewed with this patient at the time of assessment and Cierra Tesfaye is in agreement.      Plan of Care: Frequency:  [] 5 days per week, 90 minutes per day                             [x]  5 days per week, 60 minutes per day               Current Treatment Recommendations: Strengthening, Balance Training, Functional Mobility Training, Transfer Training, Endurance Training, Wheelchair Mobility Training, Gait Training, Stair training, Home Exercise Program, Safety Education & Training, Patient/Caregiver Education & Training, Equipment Evaluation, Education, & procurement    IRF-GIA  Roll Left and Right  Assistance Needed: Supervision or touching assistance  CARE Score: 4  Discharge Goal: Independent  Sit to Lying  Assistance Needed: Supervision or touching assistance  CARE Score: 4  Discharge Goal: Independent  Lying to Sitting on Side of Bed  Assistance Needed: Supervision or touching assistance  CARE Score: 4  Discharge Goal: Independent  Sit to Stand  Assistance Needed: Supervision or touching assistance  CARE Score: 4  Discharge Goal: Independent  Chair/Bed-to-Chair Transfer  Assistance Needed: Supervision or touching assistance  CARE Score: 4  Discharge Goal: Independent  Car Transfer  Reason if not Attempted: Not attempted due to medical condition or safety concerns  CARE Score: 88  Discharge Goal: Independent  Walk 10 Feet  Assistance Needed: Supervision or touching assistance  CARE Score: 4  Discharge Goal: Independent  Walk 50 Feet with Two Turns  Assistance Needed: Supervision or touching assistance  CARE Score: 4  Discharge Goal: Independent  Walk 150 Feet  Assistance Needed: Supervision or touching assistance  CARE Score: 4  Discharge Goal: Independent  Walking 10 Feet on Uneven Surfaces  Reason if not Attempted: Not attempted due to medical condition or safety concerns  CARE Score: 88  Discharge Goal: Not Attempted  1 Step (Curb)  Reason if not Attempted: Not attempted due to medical condition or safety concerns  CARE Score: 88  Discharge Goal: Independent  4 Steps  Reason if not Attempted: Not attempted due to medical condition or safety concerns  CARE Score: 88  Discharge Goal: Independent  12 Steps  Reason if not Attempted: Not attempted due to medical condition or safety concerns  CARE Score: 88  Discharge Goal: Supervision or touching assistance  Wheel 50 Feet with Two Turns  Assistance Needed: Supervision or touching assistance  CARE Score: 4  Discharge Goal: Independent  Wheel 150 Feet  Assistance Needed: Supervision or touching assistance  CARE Score: 4  Discharge Goal: Independent    OCCUPATIONAL THERAPY:  Goals:             Short term goals  Time Frame for Short term goals: 1 week  Short term goal 1: Supervision with bathing hygiene. Short term goal 2: Supervision with clothing management/hygiene for toileting. Short term goal 3: Supervision with toilet transfers. Short term goal 4: Supervision with clothing management/hygiene for toileting. Short term goal 5: Supervision with LB dressing. Short term goal 6: Supervision with one-two handed static standing task for 4 minutes. :  Long term goals  Time Frame for Long term goals : 2 weeks  Long term goal 1: Modified independent with bathing hygiene. Long term goal 2: Modified independent with toileting and toilet transfers. Long term goal 3: Modified independent with overall dressing. Long term goal 4: Modified independent with ambulatory homemaking tasks. Long term goal 5: Patient verbalize DME needs. Long term goals 6: Independent with HEP.  :    These goals were reviewed with this patient at the time of assessment and Saima Godoy is in agreement    Plan of Care: Frequency:   [] 5 days per week, 90 minutes per day     [x] 5 days per week, 60 minutes per day                Plan  Current Treatment Recommendations: Strengthening, Pain Management, Positioning, ROM, Safety Education & Training, Balance Training, Patient/Caregiver Education & Training, Self-Care / ADL, Cognitive/Perceptual Training, Functional Mobility Training, Equipment Evaluation, Education, & procurement, Home Management Training, Endurance Training, Neuromuscular Re-education            IRF-GIA  Eating  Assistance Needed: Setup or clean-up assistance  CARE Score: 5  Discharge Goal: Independent  Oral Hygiene  Assistance Needed: Setup or clean-up assistance  CARE Score: 5  Discharge Goal: Independent  Toileting Hygiene  Assistance Needed: Supervision or touching assistance  Comment: CGA  CARE Score: 4  Discharge Goal: Independent  Shower/Bathe Self  Assistance Needed: Supervision or touching assistance  Comment: CGA with shower  CARE Score: 4  Discharge Goal: Independent  Upper Body Dressing  Assistance Needed: Setup or clean-up assistance  CARE Score: 5  Discharge Goal: Independent  Lower Body Dressing  Assistance Needed: Supervision or touching assistance  Comment: CGA  CARE Score: 4  Discharge Goal: Independent  Putting On/Taking Off Footwear  Assistance Needed: Partial/moderate assistance  Comment: Due to fatigue post shower  CARE Score: 3  Discharge Goal: Independent  Toilet Transfer  Assistance Needed: Supervision or touching assistance  Comment: CGA  CARE Score: 4  Discharge Goal: Independent  Picking Up Object  Assistance Needed: Substantial/maximal assistance  CARE Score: 2  Discharge Goal: Independent  Treatments may include IADL retraining, strengthening, safety education and training, patient/caregiver education and training, equipment evaluation/ training/procurement, neuromuscular reeducation, wheelchair mobility training. SPEECH THERAPY:   Plan of Care and Goals:   LTG Goal 1: The patient will develop functional and intelligible speech and utilize compensatory strategies through the use of adequate labial and lingual function, increased articulatory precision andspeech prosody  FIM Goals: Comprehension: GOAL: Comprehension: 7  Expression: GOAL: Expression: 7  Social: GOAL: Social Interaction: 7  Problem Solving: GOAL: Problem Solvin  Memory: GOAL: Memory: 7                    Short-term Goals  Goal 1: The patient will participate in the Cognitive Linguistic Quick Test or CLQT. Goal 2: The patient will complete tasks for medication management with minimal cues and 100% accuracy. Goal 3: The patient will complete tasks for finance management with minimal cues and 100% accuracy.   Goal 4: The patient will use the (over articulation/slow rate/writing key word/elongation of thevowel/increased loudness/phrasing) strategy to improve speech intelligibility withwords/phrases/sentences/in conversation with 100% accuracy. Goal 5: The patient will complete daily oral-motor exercise to increase lingual and labial range of motion, strength and coordination with (min/mod/max) verbal, tactile and visual cues to improve speech intelligibility. IRF-GIA  Hearing, Speech, and Vision  Expression of Ideas and Wants: Some difficulty  Understanding Verbal and Non-Verbal Content: Usually understands  Cognitive Patterns  Cognitive Pattern Assessment Used: BIMS  Repetition of Three Words (First Attempt): 3  Temporal Orientation: Year: Correct  Temporal Orientation: Month: Accurate within 5 days  Temporal Orientation: Day: Correct  Able to recall \"sock: Yes, no cue required  Able to recall \"blue\": Yes, after cueing (\"a color\")  Able to recall \"bed\": Yes, no cue required  BIMS Summary Score: 14          Plan of Care:  Frequency:   [x] 5 days per week, 60 minutes per day                            [] Not appropriate for Speech Therapy  Treatments may include speech/language/communication therapy, cognitive training, group therapy, education, and/or dysphagia therapy based on the above goals. These goals were reviewed with this patient at the time of assessment and Maira Persaud is in agreement. CASE MANAGEMENT:  Goals:   Assist patient/family with discharge planning, patient/family counseling,  and coordination with insurance during ARU stay. Patient Goals:  Return to prior level of function- improve swallow, speech, strength and stability            Activities Prior to Admit:      Active : No  Mode of Transportation: Linda Gill will be seen a minimum of 3 hours of therapy per day/a minimum of 5 out of 7 days per week.     [] In this rare instance due to the nature of this patient's medical involvement, this patient will be seen 15 hours per week (900 minutes within a 7 day period). Treatments may include therapeutic exercises, gait training, neuromuscular re-ed, transfer training, community reintegration, bed mobility, w/c mobility and training, self care, home mgmt, cognitive training, energy conservation,dysphagia tx, speech/language/communication therapy, group therapy, and patient/family education. In addition, dietician/nutritionist may monitor calorie count as well as intake and collaboratively work with SLP on dietary upgrades. Neuropsychology/Psychology may evaluate and provide necessary support. Medical issues being managed closely and that require 24 hour availability of a physician:   [] Swallowing Precautions  [x] Bowel/Bladder Fx  [] Weight bearing precautions   [] Wound Care    [] Pain Mgmt   [] Infection Protection   [x] DVT Prophylaxis   [x] Fall Precautions  [] Fluid/Electrolyte/Nutrition Balance   [] Voice Protection   [] Respiratory  [] Other:    Medical Prognosis: [x] Good  [] Fair    [] Guarded   Total expected IRF days:  17  Anticipated discharge destination:    [] Home Independently   [x] Home with supervision    []SNF     [] Other                                           Physician anticipated functional outcomes: Independent household ambulation with assistive device  IPOC brief synthesis: Acute inpatient rehabilitation with occupational   physical therapy and SLP, 180 minutes, 5 every 7   days will address basic and advancing mobility with self-care   instruction and adaptive equipment training. Caregiver education will   be offered. Expected length of stay prior to the supervised level of   functional discharge to home with home health services is 17 days. Assessment and Plan:  1. Acute ischemic stroke-aspirin/statin/Plavix  2. Coronary artery disease/history of MI-aspirin/statin/Plavix  3. Hyperlipidemia-on statin  4. CHF-Coreg  5. Allergies-Zyrtec  6. History of COPD-nebulizers as needed  7. DVT prophylaxis-Lovenox  8. Diabetes-on insulin monitor blood sugars  9. Hypothyroidism-Synthroid  10. GI-proton pump inhibitor/bowel regimen  11. Mood disorder-on Zoloft  12. Psoriatic arthritis-Tylenol as needed  13. Chronic kidney disease-monitor  14. PT/OT/speech  15.   Obstructive sleep apnea-monitor             Case Mgmt: Electronically signed by SATYA Giraldo on 11/24/2021 at 12:13 PM    OT: Electronically signed by Mark Scott OT on 11/24/21 at 3:31 PM CST    PT:Electronically signed by Marisel Murphy PT on 11/26/21 at 7:35 AM CST    RN: Electronically signed by Huy Berry, RN on 11/23/21 at 4:44 PM CST    ST:  Electronically Signed By:  Otf Almaguer M.S., CCC-SLP  11/24/2021,10:19 AM.

## 2021-11-23 NOTE — PROGRESS NOTES
Physician Progress Note      Esau Vega  [facility] #:                  [id number]  :                       1940  ADMIT DATE:       2021 9:56 AM  DISCH DATE:        2021 2:25 PM  RESPONDING  PROVIDER #:        Marquis Oliver PINEDA          QUERY TEXT:    Pt admitted with CVA and has a history of CHF documented. If possible, please   document in progress notes and discharge summary further specificity regarding   the type and acuity of CHF:  ]    The medical record reflects the following:  Risk Factors: History of CHF, HTN, DM  Clinical Indicators: ECHO Normal left ventricular size with preserved LV   function and an estimated ejection fraction of approximately 55-60%  Impaired   relaxation compatible with diastolic dysfunction  Treatment: ECHO, Coreg 25 mg BID  Options provided:  -- Chronic Systolic CHF/HFrEF  -- Chronic Diastolic CHF/HFpEF  -- Chronic Systolic and Diastolic CHF  -- Other - I will add my own diagnosis  -- Disagree - Not applicable / Not valid  -- Disagree - Clinically unable to determine / Unknown  -- Refer to Clinical Documentation Reviewer    PROVIDER RESPONSE TEXT:    This patient has chronic diastolic CHF/HFpEF.     Query created by: Shanti Ashton on 2021 4:10 PM      Electronically signed by:  Marquis Oliver PINEDA 2021 6:25 PM

## 2021-11-23 NOTE — PROGRESS NOTES
4 Eyes Skin Assessment    Kerri Manuel is being assessed upon: Admission    I agree that Lamont Phillip, RN, along with Monie Davidson LPN (either 2 RN's or 1 LPN and 1 RN) have performed a thorough Head to Toe Skin Assessment on the patient. ALL assessment sites listed below have been assessed. Areas assessed by both nurses:     [x]   Head, Face, and Ears   [x]   Shoulders, Back, and Chest  [x]   Arms, Elbows, and Hands   [x]   Coccyx, Sacrum, and Ischium  [x]   Legs, Feet, and Heels    Does the Patient have Skin Breakdown? Yes, wound(s) noted upon assessment. It is the responsibility of the Primary Nurse to assure that the following documentation, preventions, orders, and consults are complete on the above noted wound(s): Wound LDA initiated. LDA Flowsheet Documentation includes the Che-wound, Wound Assessment, Measurements, Dressing Treatment, Drainage, and Color. If yes, description of skin breakdown:Non-blancheable redness to coccyx  Does the Patient have Surgical Incision(s)? Yes, incision(s) noted upon assessment.  It is the responsibility of the Primary Nurse to enure that the incision(s) are documented in the [address] Prevention initiated: Yes  Wound Care Orders initiated: NA    WO nurse consulted for Pressure Injury (Stage 3,4, Unstageable, DTI, NWPT, and Complex wounds) and New or Established Ostomies: Yes        Primary Nurse eSignature: Ebony Angeles RN on 11/23/2021 at 4:33 PM      Co-Signer eSignature: Monie Davidson LPN on 66/[id number]/3756 at 4:42 PM

## 2021-11-24 LAB
ALBUMIN SERPL-MCNC: 3.8 G/DL (ref 3.5–5.2)
ALP BLD-CCNC: 100 U/L (ref 35–104)
ALT SERPL-CCNC: 28 U/L (ref 5–33)
ANION GAP SERPL CALCULATED.3IONS-SCNC: 14 MMOL/L (ref 7–19)
AST SERPL-CCNC: 18 U/L (ref 5–32)
BILIRUB SERPL-MCNC: 0.9 MG/DL (ref 0.2–1.2)
BILIRUBIN URINE: NEGATIVE
BLOOD, URINE: NEGATIVE
BUN BLDV-MCNC: 22 MG/DL (ref 8–23)
CALCIUM SERPL-MCNC: 8.9 MG/DL (ref 8.8–10.2)
CHLORIDE BLD-SCNC: 105 MMOL/L (ref 98–111)
CLARITY: CLEAR
CO2: 19 MMOL/L (ref 22–29)
COLOR: YELLOW
CREAT SERPL-MCNC: 0.9 MG/DL (ref 0.5–0.9)
GFR AFRICAN AMERICAN: >59
GFR NON-AFRICAN AMERICAN: >60
GLUCOSE BLD-MCNC: 135 MG/DL (ref 70–99)
GLUCOSE BLD-MCNC: 139 MG/DL (ref 70–99)
GLUCOSE BLD-MCNC: 143 MG/DL (ref 74–109)
GLUCOSE BLD-MCNC: 196 MG/DL (ref 70–99)
GLUCOSE BLD-MCNC: 198 MG/DL (ref 70–99)
GLUCOSE BLD-MCNC: 201 MG/DL (ref 70–99)
GLUCOSE URINE: NEGATIVE MG/DL
HCT VFR BLD CALC: 46.7 % (ref 37–47)
HEMOGLOBIN: 15.2 G/DL (ref 12–16)
KETONES, URINE: NEGATIVE MG/DL
LEUKOCYTE ESTERASE, URINE: NEGATIVE
MCH RBC QN AUTO: 30.9 PG (ref 27–31)
MCHC RBC AUTO-ENTMCNC: 32.5 G/DL (ref 33–37)
MCV RBC AUTO: 94.9 FL (ref 81–99)
NITRITE, URINE: NEGATIVE
PDW BLD-RTO: 13.4 % (ref 11.5–14.5)
PERFORMED ON: ABNORMAL
PH UA: 5 (ref 5–8)
PLATELET # BLD: 231 K/UL (ref 130–400)
PMV BLD AUTO: 9.4 FL (ref 9.4–12.3)
POTASSIUM REFLEX MAGNESIUM: 3.7 MMOL/L (ref 3.5–5)
PREALBUMIN: 22 MG/DL (ref 20–40)
PROTEIN UA: NEGATIVE MG/DL
RBC # BLD: 4.92 M/UL (ref 4.2–5.4)
SODIUM BLD-SCNC: 138 MMOL/L (ref 136–145)
SPECIFIC GRAVITY UA: 1.02 (ref 1–1.03)
TOTAL PROTEIN: 6.3 G/DL (ref 6.6–8.7)
UROBILINOGEN, URINE: 0.2 E.U./DL
WBC # BLD: 12.1 K/UL (ref 4.8–10.8)

## 2021-11-24 PROCEDURE — 97116 GAIT TRAINING THERAPY: CPT

## 2021-11-24 PROCEDURE — 97535 SELF CARE MNGMENT TRAINING: CPT

## 2021-11-24 PROCEDURE — 6360000002 HC RX W HCPCS: Performed by: PSYCHIATRY & NEUROLOGY

## 2021-11-24 PROCEDURE — 36415 COLL VENOUS BLD VENIPUNCTURE: CPT

## 2021-11-24 PROCEDURE — 97161 PT EVAL LOW COMPLEX 20 MIN: CPT

## 2021-11-24 PROCEDURE — 84134 ASSAY OF PREALBUMIN: CPT

## 2021-11-24 PROCEDURE — 92522 EVALUATE SPEECH PRODUCTION: CPT

## 2021-11-24 PROCEDURE — 85027 COMPLETE CBC AUTOMATED: CPT

## 2021-11-24 PROCEDURE — 97110 THERAPEUTIC EXERCISES: CPT

## 2021-11-24 PROCEDURE — 81003 URINALYSIS AUTO W/O SCOPE: CPT

## 2021-11-24 PROCEDURE — 97165 OT EVAL LOW COMPLEX 30 MIN: CPT

## 2021-11-24 PROCEDURE — 82947 ASSAY GLUCOSE BLOOD QUANT: CPT

## 2021-11-24 PROCEDURE — 80053 COMPREHEN METABOLIC PANEL: CPT

## 2021-11-24 PROCEDURE — 6370000000 HC RX 637 (ALT 250 FOR IP): Performed by: NURSE PRACTITIONER

## 2021-11-24 PROCEDURE — 92610 EVALUATE SWALLOWING FUNCTION: CPT

## 2021-11-24 PROCEDURE — 1180000000 HC REHAB R&B

## 2021-11-24 RX ADMIN — ASPIRIN 81 MG: 81 TABLET, COATED ORAL at 08:51

## 2021-11-24 RX ADMIN — INSULIN LISPRO 1 UNITS: 100 INJECTION, SOLUTION INTRAVENOUS; SUBCUTANEOUS at 12:43

## 2021-11-24 RX ADMIN — INSULIN LISPRO 2 UNITS: 100 INJECTION, SOLUTION INTRAVENOUS; SUBCUTANEOUS at 17:04

## 2021-11-24 RX ADMIN — CARVEDILOL 12.5 MG: 12.5 TABLET, FILM COATED ORAL at 17:05

## 2021-11-24 RX ADMIN — CARVEDILOL 12.5 MG: 12.5 TABLET, FILM COATED ORAL at 08:51

## 2021-11-24 RX ADMIN — ATORVASTATIN CALCIUM 80 MG: 80 TABLET, FILM COATED ORAL at 22:02

## 2021-11-24 RX ADMIN — INSULIN GLARGINE 20 UNITS: 100 INJECTION, SOLUTION SUBCUTANEOUS at 22:02

## 2021-11-24 RX ADMIN — Medication 100 MG: at 08:51

## 2021-11-24 RX ADMIN — CLOPIDOGREL BISULFATE 75 MG: 75 TABLET ORAL at 08:51

## 2021-11-24 RX ADMIN — ENOXAPARIN SODIUM 40 MG: 100 INJECTION SUBCUTANEOUS at 17:05

## 2021-11-24 RX ADMIN — CYANOCOBALAMIN TAB 500 MCG 1000 MCG: 500 TAB at 08:51

## 2021-11-24 RX ADMIN — PANTOPRAZOLE SODIUM 40 MG: 40 TABLET, DELAYED RELEASE ORAL at 06:28

## 2021-11-24 RX ADMIN — LEVOTHYROXINE SODIUM 25 MCG: 25 TABLET ORAL at 08:52

## 2021-11-24 RX ADMIN — SERTRALINE HYDROCHLORIDE 25 MG: 25 TABLET ORAL at 08:52

## 2021-11-24 RX ADMIN — CETIRIZINE HYDROCHLORIDE 10 MG: 10 TABLET, FILM COATED ORAL at 08:51

## 2021-11-24 RX ADMIN — INSULIN LISPRO 1 UNITS: 100 INJECTION, SOLUTION INTRAVENOUS; SUBCUTANEOUS at 22:02

## 2021-11-24 ASSESSMENT — PAIN SCALES - GENERAL: PAINLEVEL_OUTOF10: 0

## 2021-11-24 NOTE — PROGRESS NOTES
Speech Language Pathology  Facility/Department: [facility] 8 REHAB UNIT  Initial Speech/Language/Cognitive Assessment and   Clinical Bedside Swallowing Evaluation    NAME: Kylie Richmond  : 1940   MRN: [id number]  ADMISSION DATE: 2021   Date of Eval: 2021   Evaluating Therapist: Bairon Tenorio MS CCC-SLP      ADMITTING DIAGNOSIS:   has Other fatigue; CAD in native artery; HTN (hypertension); Gastroesophageal reflux disease without esophagitis; Depression with anxiety; Type 2 diabetes mellitus with chronic kidney disease, without long-term current use of insulin ([address] Pulmonary nodule; S/P left heart catheterization by percutaneous approach; COPD (chronic obstructive pulmonary disease) ([address] Mixed hyperlipidemia; MAGALY (obstructive sleep apnea); Chronic kidney disease, stage III (moderate) ([address] Acute CVA (cerebrovascular accident) ([address] CECILIA (acute kidney injury) ([address] Hemiplegia affecting dominant side ([address] Dysarthria; Dysphagia due to recent cerebral infarction; Acquired hypothyroidism; CHF (congestive heart failure) ([address] Acute ischemic stroke ([address] and Psoriatic arthritis ([address] on their problem list.      HISTORY OF PRESENT ILLNESS:   Per physician: This 79 y.o. female  with history of CAD, CHF, COPD, DM, CKD, GERD, mixed hyperlipidemia, thyroid disease and MI x 2. She presented to [facility] ER on 21 with s/o of slurred speech, right sided numbness and weakness. She initially thought it was her blood sugar which was 25 that morning before she ate but then her family noticed a right facial droop. Hemoglobin A1C on admit was 8.1. CT of head was negative. MRI done revealed an acute posterior limb left internal capsule lacunar infarct. She was admitted to the hospitalist service with consult for neurology. She was evaluated by speech for dysarthria and dysphagia. She was placed on a mechanical soft diet with thin liquids.  She had acute kidney injury on CKD but this has now resolved She continues to have right sided weakness and is participating in both PT/OT. She is felt to need a stay on Rehab to work towards her goal of returning home. She is now felt ready to start the Rehab program.          RECENT RESULTS  CT OF HEAD/MRI:  Impression   Impression:    Acute posterior limb left internal capsule lacunar infarct. Signed by Dr Mazin Hope           Assessment:  Mild dysarthria was noted this date. She did not exhibit any oral or suspected pharyngeal dysphagia. She did exhibit deficits in simple mental math and telling time. She is recommended to complete a formal cognitive linguistic assessment such as the Cognitive Linguistic Quick Test or CLQT. She is recommended to receive speech language therapy services 1x/day, 5x/week for 2-3 weeks. Recommendations:  Requires SLP Intervention: Yes  Duration/Frequency of Treatment: 1x/day, 5x/week for 2-3 weeks  D/C Recommendations: To be determined       Plan:   Goals:  Short-term Goals  Goal 1: The patient will participate in the Cognitive Linguistic Quick Test or CLQT. Goal 2: The patient will complete tasks for medication management with minimal cues and 100% accuracy. Goal 3: The patient will complete tasks for finance management with minimal cues and 100% accuracy. Goal 4: The patient will use the (over articulation/slow rate/writing key word/elongation of thevowel/increased loudness/phrasing) strategy to improve speech intelligibility withwords/phrases/sentences/in conversation with 100% accuracy. Goal 5: The patient will complete daily oral-motor exercise to increase lingual and labial range of motion, strength and coordination with (min/mod/max) verbal, tactile and visual cues to improve speech intelligibility.   Long-term Goals  Goal 1: The patient will develop functional and intelligible speech and utilize compensatory strategies through the use of adequate labial and lingual function, increased articulatory precision andspeech assessed in therapy  Time:  (WNL)  Safety/Judgement  Unable to Self-monitor and Self-correct Consistently:  (WNL)    Additional Assessments:  Portions of the RIPA and WAB were completed. Will complete the CLQT as well. A clinical bedside swallowing evaluation was completed. Decreased lingual and labial strength and range of motion was noted. She does exhibit mild dysarthria. She reports biting her tongue and right lower lip at times. No oral residue or buccal cavity pocketing was noted following pureed foods or solids. She did tolerate cup and straw sips of thin liquids. Good hyolaryngeal elevation and timely pharyngeal swallow initiation was noted. No overt s/s of aspiration was observed with liquids or foods. Diet Recommendations:  She was upgraded to a regular diet and continue thin liquids  Medications may be whole with water    Swallowing Precautions: Alternate liquids and foods  Small bites and sips  Slow rate  Upright for all oral intake  Remain upright following all oral intake          Prognosis:  Good      Education:  Patient Education: Reviewed assessment results. She does want to complete a standardized cognitive assessment due to patient concerns of delayed processing and decreased memory.   Patient Education Response: Verbalizes understanding                11/24/2021 10:20 AM           Electronically Signed By:  Burton Jiménez M.S., CCC-SLP  11/24/2021,10:25 AM.

## 2021-11-24 NOTE — PROGRESS NOTES
Occupational Therapy   Occupational Therapy Initial Assessment  Date: 2021   Patient Name: Tammi Pretty  MRN: [id number]     : 1940    Date of Service: 2021    Discharge Recommendations:  ECF with OT       Assessment   Performance deficits / Impairments: Decreased functional mobility ; Decreased ADL status; Decreased strength; Decreased balance; Decreased endurance; Decreased high-level IADLs; Decreased fine motor control; Decreased coordination  Assessment: Pt requires increased assistance with ADLs and IADLs due to decreased activity tolerance. She became very fatigued and SOB post shower. O2 sats at 86%, but then increased to 97%. Notified PT to check vital signs right after OT and PT placed pt on 1 L O2 per nc. Pt requires skilled OT to address the above deficits and return the pt back to [address] [place] [address]. Treatment Diagnosis: L CVA  Prognosis: Good  Decision Making: Low Complexity  OT Education: Plan of Care; OT Role; ADL Adaptive Strategies; Transfer Training  Activity Tolerance  Activity Tolerance: Patient Tolerated treatment well  Safety Devices  Safety Devices in place: Yes (Left with PT)  Type of devices: Bed alarm in place; Call light within reach           Patient Diagnosis(es): There were no encounter diagnoses. has a past medical history of Acute CVA (cerebrovascular accident) ([facility] Arthritis, Asthma, CAD (coronary artery disease), CHF (congestive heart failure) ([facility] COPD (chronic obstructive pulmonary disease) ([facility] DM (diabetes mellitus) ([facility] [address] [facility] GERD (gastroesophageal reflux disease), HTN (hypertension), Hyperlipidemia, MI (myocardial infarction) ([facility] [address] [facility] and Thyroid disease. has a past surgical history that includes Hysterectomy; joint replacement (Right); Cholecystectomy; hip surgery; Cataract removal; and Coronary angioplasty (2018).     Treatment Diagnosis: L CVA      Restrictions  Restrictions/Precautions  Restrictions/Precautions: Weight Bearing, Fall Risk  Lower Extremity Weight Bearing Restrictions  Right Lower Extremity Weight Bearing: Weight Bearing As Tolerated  Left Lower Extremity Weight Bearing: Weight Bearing As Tolerated    Subjective   General  Chart Reviewed: Yes  Patient assessed for rehabilitation services?: Yes  Family / Caregiver Present: No  Diagnosis: L CVA    Social/Functional History  Social/Functional History  Lives With: Alone  Type of Home: Assisted living ([facility]  Home Access: Level entry  Bathroom Shower/Tub: Walk-in shower  Bathroom Toilet: Handicap height  Bathroom Equipment: Grab bars in shower, Grab bars around toilet  Bathroom Accessibility: Accessible  Home Equipment: 4 wheeled walker  ADL Assistance: Independent  Ambulation Assistance: Independent  Transfer Assistance: Independent  Active : No  Mode of Transportation: Bus  Additional Comments: goes to appointments and shopping on a bus       Objective   Vision: Impaired  Vision Exceptions: Wears glasses for reading  H[facility]: Exceptions to [facility]  Hearing Exceptions: Bilateral hearing aid    Orientation  Overall Orientation Status: Within Functional Limits     Balance  Sitting Balance: Supervision  Standing Balance: Contact guard assistance  Functional Mobility  Functional - Mobility Device: Rolling Walker  Activity: To/from bathroom  Assist Level: Contact guard assistance     Tone RUE  RUE Tone: Normotonic  Tone LUE  LUE Tone: Normotonic  Coordination  Movements Are Fluid And Coordinated: No  Coordination and Movement description: Fine motor impairments; Right UE  Quality of Movement Other  Comment: Functional with ADLs, but slight impairment with FM tasks     Bed mobility  Supine to Sit: Supervision  Sit to Supine: Supervision  Transfers  Sit to stand: Contact guard assistance  Stand to sit: Contact guard assistance     Cognition  Overall Cognitive Status: Exceptions                 LUE AROM (degrees)  LUE AROM : WFL  Left Hand AROM (degrees)  Left Hand AROM: WFL  RUE AROM (degrees)  RUE AROM : WFL  Right Hand AROM (degrees)  Right Hand AROM: WFL  LUE Strength  Gross LUE Strength: [facility]  RUE Strength  Gross RUE Strength: Exceptions to [facility]  RUE Strength Comment: Proximally/distally 4/5                   Plan   Plan  Current Treatment Recommendations: Strengthening, Pain Management, Positioning, ROM, Safety Education & Training, Balance Training, Patient/Caregiver Education & Training, Self-Care / ADL, Cognitive/Perceptual Training, Functional Mobility Training, Equipment Evaluation, Education, & procurement, Home Management Training, Endurance Training, Neuromuscular Re-education         OutComes Score    Eating  Assistance Needed: Setup or clean-up assistance  CARE Score: 5  Discharge Goal: Independent    Oral Hygiene  Assistance Needed: Setup or clean-up assistance  CARE Score: 5  Discharge Goal: Independent     Toileting Hygiene  Assistance Needed: Supervision or touching assistance  Comment: CGA  CARE Score: 4  Discharge Goal: Independent     Shower/Bathe Self  Assistance Needed: Supervision or touching assistance  Comment: CGA with shower  CARE Score: 4  Discharge Goal: Independent     Upper Body Dressing  Assistance Needed: Setup or clean-up assistance  CARE Score: 5  Discharge Goal: Independent     Lower Body Dressing  Assistance Needed: Supervision or touching assistance  Comment: CGA  CARE Score: 4  Discharge Goal: Independent     Putting On/Taking Off Footwear  Assistance Needed: Partial/moderate assistance  Comment: Due to fatigue post shower  CARE Score: 3  Discharge Goal: Independent     Toilet Transfer  Assistance Needed: Supervision or touching assistance  Comment: CGA  CARE Score: 4  Discharge Goal: Independent     Picking Up Object  Assistance Needed: Substantial/maximal assistance  CARE Score: 2  Discharge Goal: Independent       Goals  Short term goals  Time Frame for Short term goals: 1 week  Short term goal 1: Supervision with bathing hygiene.   Short term goal 2: Supervision with clothing management/hygiene for toileting. Short term goal 3: Supervision with toilet transfers. Short term goal 4: Supervision with clothing management/hygiene for toileting. Short term goal 5: Supervision with LB dressing. Short term goal 6: Supervision with one-two handed static standing task for 4 minutes. Long term goals  Time Frame for Long term goals : 2 weeks  Long term goal 1: Modified independent with bathing hygiene. Long term goal 2: Modified independent with toileting and toilet transfers. Long term goal 3: Modified independent with overall dressing. Long term goal 4: Modified independent with ambulatory homemaking tasks. Long term goal 5: Patient verbalize DME needs. Long term goals 6: Independent with HEP. Patient Goals   Patient goals : Return home independently.        Therapy Time   Individual Concurrent Group Co-treatment   Time In 1000         Time Out 1100         Minutes 60         Timed Code Treatment Minutes: [facility], OT

## 2021-11-24 NOTE — PLAN OF CARE
Problem: Falls - Risk of:  Goal: Will remain free from falls  Description: Will remain free from falls  Outcome: Ongoing  Goal: Absence of physical injury  Description: Absence of physical injury  Outcome: Ongoing     Problem: HEMODYNAMIC STATUS  Goal: Patient has stable vital signs and fluid balance  Outcome: Ongoing     Problem: ACTIVITY INTOLERANCE/IMPAIRED MOBILITY  Goal: Mobility/activity is maintained at optimum level for patient  Outcome: Ongoing     Problem: COMMUNICATION IMPAIRMENT  Goal: Ability to express needs and understand communication  Outcome: Ongoing     Problem: SAFETY  Goal: Free from accidental physical injury  Outcome: Ongoing  Goal: Free from intentional harm  Outcome: Ongoing  Goal: LTG - patient will adhere to hip precautions during ADL's and transfers  Outcome: Ongoing  Goal: LTG - Patient will demonstrate safety requirements appropriate to situation/environment  Outcome: Ongoing  Goal: LTG - patient will utilize safety techniques  Outcome: Ongoing  Goal: STG - patient locks brakes on wheelchair  Outcome: Ongoing  Goal: STG - Patient uses call light consistently to request assistance with transfers  Outcome: Ongoing  Goal: STG - patient uses gait belt during all transfers  Outcome: Ongoing     Problem: DAILY CARE  Goal: Daily care needs are met  Outcome: Ongoing     Problem: PAIN  Goal: Patient's pain/discomfort is manageable  Outcome: Ongoing  Goal: LTG - Patient will manage pain with the appropriate technique/Intervention  Outcome: Ongoing  Goal: LTG - Patient will demonstrate intervention for managing pain  Outcome: Ongoing  Goal: STG - Patient will reduce or eliminate use of analgesics  Outcome: Ongoing  Goal: STG - pain is manageable through therapies  Outcome: Ongoing  Goal: STG - Patient will verbalize an acceptable level of pain  Outcome: Ongoing  Goal: STG - patients pain is managed to allow active participation in daily activities  Outcome: Ongoing  Goal: STG - Patient will increase activity level  Outcome: Ongoing  Goal: STG - patient verbalizes a reduction in pain level  Outcome: Ongoing     Problem: SKIN INTEGRITY  Goal: Skin integrity is maintained or improved  Outcome: Ongoing  Goal: LTG - Patient will be free from infection  Outcome: Ongoing  Goal: LTG - patient will maintain/improve skin integrity through proper skin care techniques  Outcome: Ongoing  Goal: LTG - Patient will demonstrate appropriate pressure relief techniques  Outcome: Ongoing  Goal: LTG - patient will demonstrate appropriate skin care techniques  Outcome: Ongoing  Goal: LTG - Patient will be free from infection  Outcome: Ongoing  Goal: STG - Patient demonstrates skin care/treatment/dressing change  Outcome: Ongoing  Goal: STG - patient will maintain good skin integrity  Outcome: Ongoing  Goal: STG - Patient exhibits signs of wound healing. Outcome: Ongoing  Goal: STG - patient demonstrates pressure reduction techniques  Outcome: Ongoing  Goal: STG - Patient demonstrates preventative skin care measures  Outcome: Ongoing     Problem: KNOWLEDGE DEFICIT  Goal: Patient/S.O. demonstrates understanding of disease process, treatment plan, medications, and discharge instructions.   Outcome: Ongoing     Problem: DISCHARGE BARRIERS  Goal: Patient's continuum of care needs are met  Outcome: Ongoing     Problem: IP BLADDER/VOIDING  Goal: LTG - patient will complete bladder elimination  Outcome: Ongoing  Goal: LTG - Patient will utilize adaptive techniques/equipment to complete bladder elimination  Outcome: Ongoing  Goal: LTG - patient will achieve acceptable level of continence  Outcome: Ongoing  Goal: STG - Patient demonstrates ability to take care of indwelling catheter  Outcome: Ongoing  Goal: STG - patient demonstrates self-cath technique using clean technique and care of the catheter  Outcome: Ongoing  Goal: STG - Patient demonstrates no accidents  Outcome: Ongoing  Goal: STG - Patient will state signs and symptoms of UTI  Outcome: Ongoing  Goal: STG - patient will be able to empty bladder  Outcome: Ongoing  Goal: STG - Patient demonstrates understanding of self catheterization schedule by completing task on time  Outcome: Ongoing  Goal: STG - patient participates in bladder program by expressing need to void  Outcome: Ongoing  Goal: STG - Patient verbalizes understanding of catheter care indwelling/intermittent  Outcome: Ongoing  Goal: STG - patient participates in bladder program by adhering to implemented toileting schedule  Outcome: Ongoing     Problem: IP BOWEL ELIMINATION  Goal: LTG - patient will utilize adaptive techniques/equipment to complete bowel elimination  Outcome: Ongoing  Goal: LTG - patient will have regular and routine bowel evacuation  Outcome: Ongoing  Goal: STG - patient will be accident free  Outcome: Ongoing  Goal: STG - Patient demonstrates care and management of ostomy bag  Outcome: Ongoing  Goal: STG - Patient participates in bowel management program  Outcome: Ongoing  Goal: STG - patient maintains skin integrity  Outcome: Ongoing  Goal: STG - Patient will verbalize signs and symptoms of constipation and how to prevent/alleviate  Outcome: Ongoing  Goal: STG - patient will be continent of stool  Outcome: Ongoing  Goal: STG - Patient completes digital stimulation technique  Outcome: Ongoing  Goal: STG - Patient verbalizes knowledge about relationship between diet, fluid intake, activity and medication on constipation  Outcome: Ongoing     Problem: IP BREATHING  Goal: LTG - patient will mobilize secretions and maintain airway  Outcome: Ongoing  Goal: LTG - Patient/caregiver will demonstrate/perform proper techniques to maintain patent airway  Outcome: Ongoing  Goal: LTG - patient/caregiver will demonstrate/perform improved or stable self care abilities within physical limitations  Outcome: Ongoing  Goal: STG - Patient/caregiver will maintain patent airway  Outcome: Ongoing  Goal: STG - respiratory rate and effort will be within normal limits for the patient  Outcome: Ongoing  Goal: STG - patient/caregiver will be able to verbalize oxygen safety precautions  Outcome: Ongoing  Goal: STG - Patient/caregiver demonstrates correct suctioning technique  Outcome: Ongoing  Goal: STG - patient will utilize incentive spirometer  Outcome: Ongoing  Goal: STG - Patient performs or directs assisted coughing  Outcome: Ongoing  Goal: STG - patient can administer MDI's  Outcome: Ongoing  Goal: STG - Patient can utilize incentive spirometer  Outcome: Ongoing  Goal: STG - family can complete suctioning  Outcome: Ongoing     Problem: NUTRITION  Goal: Patient maintains adequate hydration  Outcome: Ongoing  Goal: Patient maintains weight  Outcome: Ongoing  Goal: Patient/Family demonstrates understanding of diet  Outcome: Ongoing  Goal: Patient/Family independently completes tube feeding  Outcome: Ongoing  Goal: Patient will have no more than 5 lb weight change during LOS  Outcome: Ongoing  Goal: Patient will utilize adaptive techniques to administer nutrition  Outcome: Ongoing  Goal: Patient will verbalize dietary restrictions  Outcome: Ongoing

## 2021-11-24 NOTE — PROGRESS NOTES
Comprehensive Nutrition Assessment    Type and Reason for Visit:  Initial    Nutrition Recommendations/Plan:   Continue current diet, monitor intakes. Nutrition Assessment:  Following for new admission to inpatient rehab. Pt at risk for nutrition compromise AEB c/o diarrhea last 2 days with poor appetite. Pt willing to try toast and a banana for lunch. Will monitor intakes and BMs for further nutrition intervention needs. Malnutrition Assessment:  Malnutrition Status: At risk for malnutrition (Comment)    Context:  Acute Illness     Findings of the 6 clinical characteristics of malnutrition:  Energy Intake:  Mild decrease in energy intake (Comment)  Weight Loss:  No significant weight loss     Body Fat Loss:  No significant body fat loss     Muscle Mass Loss:  No significant muscle mass loss    Fluid Accumulation:  No significant fluid accumulation     Strength:  Not Performed    Estimated Daily Nutrient Needs:  Energy (kcal):  1515-4134 kcals/day; Weight Used for Energy Requirements:  Current (20-25 kcals/kg)     Protein (g):  71-83 g/pro/day; Weight Used for Protein Requirements:  Ideal (1.2-1.4 g/kg)        Fluid (ml/day):  9627-3910 mL/fluid/day; Method Used for Fluid Requirements:  1 ml/kcal       Current Nutrition Therapies:    ADULT DIET; Regular; 5 carb choices (75 gm/meal)    Anthropometric Measures:  · Height: 5' 6\" (167.6 cm)  · Current Body Weight: 160 lb (72.6 kg)   · Admission Body Weight: 160 lb (72.6 kg) (stated)    · Usual Body Weight: 162 lb (73.5 kg) (10/7/2021)     · Ideal Body Weight: 130 lbs  · BMI: 25.8   · BMI Categories: Overweight (BMI 25.0-29. 9)       Nutrition Diagnosis:   · Inadequate oral intake related to altered GI function as evidenced by intake 0-25%, diarrhea    Nutrition Interventions:   Food and/or Nutrient Delivery:  Continue Current Diet  Nutrition Education/Counseling:  Education not indicated   Coordination of Nutrition Care:  Continue to monitor while inpatient    Goals:  Po intake 50% or greater of meals. wt stable. diarrhea improvement. Nutrition Monitoring and Evaluation:   Food/Nutrient Intake Outcomes:  Food and Nutrient Intake  Physical Signs/Symptoms Outcomes:  Biochemical Data, Nutrition Focused Physical Findings, Diarrhea, Weight     Discharge Planning:     Too soon to determine     Electronically signed by Joao Thornton MS, RD, LD on 11/24/21 at 1:55 PM CST    Contact: [phone]

## 2021-11-24 NOTE — PROGRESS NOTES
Physical Therapy EVALUATION Note  DATE:  2021  NAME:  Lauren Garcia  :  1940  (80 y.o.,female)  MRN:  [id number]    HEIGHT:  Height: 5' 6\" (167.6 cm)  WEIGHT:  Weight: 160 lb (72.6 kg)    PATIENT DIAGNOSIS(ES):    Diagnosis: Cerebral infarction, R body involvement, posterior limb left internal capsule lacunar infarct. Referral 21 Dr. Sukhi Jerry    Additional Pertinent Hx: Dysarthria, hypothryoidism, anxiety, CAD, CHF, COPD, T2DM, CKD, MI x 2  RESTRICTIONS/PRECAUTIONS:    Restrictions/Precautions  Restrictions/Precautions: Weight Bearing, Fall Risk        PAIN:  Pain Level: 0        STRENGTH  Strength RLE  Comment: hip 4/5, knee and ankle 4+/5  Strength LLE  Comment: 4+/5 grossly            ACTIVITY TOLERANCE  Activity Tolerance  Activity Tolerance: Patient limited by fatigue      BED MOBILITY  Bed Mobility  Rolling: Supervision (reached accross and used rail bilaterally)  Supine to Sit: Supervision (triplanar off the R side of bed)  Sit to Supine: Supervision (triplanar on R side of bed)  TRANSFERS  Sit to Stand: Supervision      Bed to Chair: Stand by assistance (squat pivot using hands to guide to L side)        WHEELCHAIR PROPULSION 1  Propulsion 1  Propulsion: Manual  Level: Level Tile  Method: RUBY CHAUHAN (Patient noted previously using feet to propell w/c)  Level of Assistance: Supervision  Description/ Details: 2 R, 1 L turn. Patient UEs fatigued ~125'  Distance: 150'     AMBULATION 1  Ambulation 1  Surface: level tile  Device: Rolling Walker  Other Apparatus: O2  Assistance: Stand by assistance  Quality of Gait: good janis, forward posture, decreased leg elevation with fatigue ~100'  Distance: 150'  Comments: 1 L turn, 1 R turn         GOALS:  Short term goals  Time Frame for Short term goals: 1-2 weeks  Short term goal 1: Supervision with ambulation using '  Short term goal 2: Supervision with car transfer  Short term goal 3:  Independent with bed mobility  Short term goal 4: Supervision with w/c mobility 250'  Short term goal 5: Supervision with 4 stairs using unilateral rail    Long term goals  Time Frame for Long term goals : 2-3 weeks  Long term goal 1: Independent with ambulation using '  Long term goal 2: Independent with car transfer  Long term goal 3: Independent with transfers  Long term goal 4: Independent with 4 stairs with unilateral rail  HOME LIVING:     Type of Home: Assisted living [facility]     Home Access: Level entry        ASSESSMENT (IMPAIRMENTS/BARRIERS): Body structures, Functions, Activity limitations: Decreased functional mobility , Decreased strength, Decreased endurance, Decreased balance  Assessment: Patient has good strength for functional mobility bilaterally but is limited by fatigue, patient 02 levels indicated 1L 02 which limited fatigue, patient occassionally had small LOB during functional mobility but demonstrated good safety awareness  Activity Tolerance: Patient limited by fatigue     PLAN:  Plan  Times per week: 5-6  Times per day:  (1-2)  Plan weeks: 1-3  Current Treatment Recommendations: Strengthening, Balance Training, Functional Mobility Training, Transfer Training, Endurance Training, Wheelchair Mobility Training, Gait Training, Stair training, Home Exercise Program, Safety Education & Training, Patient/Caregiver Education & Training, Equipment Evaluation, Education, & procurement  Discharge Recommendations: Home with Home health PT  PATIENT REQUIRES AND IS REASONABLY EXPECTED TO ACTIVELY PARTICIPATE IN AT LEAST 3 HOURS OF INTENSIVE THERAPY PER DAY AT LEAST 5 DAYS PER WEEK, AND BE EXPECTED TO MAKE MEASURABLE IMPROVEMENT THAT WILL BE OF PRACTICAL VALUE TO IMPROVE THE PATIENT'S FUNCTIONAL CAPACITY OR ADAPTATION TO IMPAIRMENTS.    PATIENT GOAL FOR REHAB:  RETURN TO PRIOR LEVEL OF FUNCTION       IRF/GIA  Roll Left and Right  Assistance Needed: Supervision or touching assistance  CARE Score: 4  Discharge Goal: Independent    Sit to Lying  Assistance Needed: Supervision or touching assistance  CARE Score: 4  Discharge Goal: Independent    Lying to Sitting on Side of Bed  Assistance Needed: Supervision or touching assistance  CARE Score: 4  Discharge Goal: Independent    Sit to Stand  Assistance Needed: Supervision or touching assistance  CARE Score: 4  Discharge Goal: Independent    Chair/Bed-to-Chair Transfer  Assistance Needed: Supervision or touching assistance  CARE Score: 4  Discharge Goal: Independent    Car Transfer  Reason if not Attempted: Not attempted due to medical condition or safety concerns  CARE Score: 88  Discharge Goal: Independent    Walk 10 Feet  Assistance Needed: Supervision or touching assistance  CARE Score: 4  Discharge Goal: Independent    Walk 50 Feet with Two Turns  Assistance Needed: Supervision or touching assistance  CARE Score: 4  Discharge Goal: Independent    Walk 150 Feet  Assistance Needed: Supervision or touching assistance  CARE Score: 4  Discharge Goal: Independent    Walking 10 Feet on Uneven Surfaces  Reason if not Attempted: Not attempted due to medical condition or safety concerns  CARE Score: 88  Discharge Goal: Not Attempted    1 Step (Curb)  Reason if not Attempted: Not attempted due to medical condition or safety concerns  CARE Score: 88  Discharge Goal: Independent    4 Steps  Reason if not Attempted: Not attempted due to medical condition or safety concerns  CARE Score: 88  Discharge Goal: Independent    12 Steps  Reason if not Attempted: Not attempted due to medical condition or safety concerns  CARE Score: 88  Discharge Goal: Supervision or touching assistance    Wheel 50 Feet with Two Turns  Assistance Needed: Supervision or touching assistance  CARE Score: 4  Discharge Goal: Independent    Wheel 150 Feet  Assistance Needed: Supervision or touching assistance  CARE Score: 4  Discharge Goal: Independent      LAST TREATMENT TIME  PT Individual Minutes  Time In: 1100  Time Out: 1200  Minutes: 60      Name: Raquel Live  MRN:  [id number]  Date of service:  11/24/2021      Electronically signed by Gael Carlos PT on 11/24/21 at 4:44 PM CST

## 2021-11-24 NOTE — PLAN OF CARE
Problem: Falls - Risk of:  Goal: Will remain free from falls  Description: Will remain free from falls  11/24/2021 1153 by Jose Aguirre LPN  Outcome: Ongoing  11/24/2021 0223 by Alice Lipscomb LPN  Outcome: Ongoing  Goal: Absence of physical injury  Description: Absence of physical injury  11/24/2021 1153 by Jose Aguirre LPN  Outcome: Ongoing  11/24/2021 0223 by Alice Lipscomb LPN  Outcome: Ongoing     Problem: HEMODYNAMIC STATUS  Goal: Patient has stable vital signs and fluid balance  11/24/2021 1153 by Jose Aguirre LPN  Outcome: Ongoing  11/24/2021 0223 by Alice Lipscomb LPN  Outcome: Ongoing     Problem: ACTIVITY INTOLERANCE/IMPAIRED MOBILITY  Goal: Mobility/activity is maintained at optimum level for patient  11/24/2021 1153 by Jose Aguirre LPN  Outcome: Ongoing  11/24/2021 0223 by Alice Lispcomb LPN  Outcome: Ongoing     Problem: COMMUNICATION IMPAIRMENT  Goal: Ability to express needs and understand communication  11/24/2021 1153 by Jose Aguirre LPN  Outcome: Ongoing  11/24/2021 0223 by Alice Lipscomb LPN  Outcome: Ongoing     Problem: SAFETY  Goal: Free from accidental physical injury  11/24/2021 1153 by Jose Aguirre LPN  Outcome: Ongoing  11/24/2021 0223 by Alice Lipscomb LPN  Outcome: Ongoing  Goal: Free from intentional harm  11/24/2021 1153 by Jose Aguirre LPN  Outcome: Ongoing  11/24/2021 0223 by Alice Lipscomb LPN  Outcome: Ongoing  Goal: LTG - patient will adhere to hip precautions during ADL's and transfers  11/24/2021 1153 by Jose Aguirre LPN  Outcome: Ongoing  11/24/2021 0223 by Alice Lipscomb LPN  Outcome: Ongoing  Goal: LTG - Patient will demonstrate safety requirements appropriate to situation/environment  11/24/2021 1153 by Jose Aguirre LPN  Outcome: Ongoing  11/24/2021 0223 by lAice Lipscomb LPN  Outcome: Ongoing  Goal: LTG - patient will utilize safety techniques  11/24/2021 1153 by Jose Aguirre LPN  Outcome: Ongoing  11/24/2021 0223 by Samuel Mulligan LPN  Outcome: Ongoing  Goal: STG - patient locks brakes on wheelchair  11/24/2021 1153 by Rao Astorga LPN  Outcome: Ongoing  11/24/2021 0223 by Samuel Mulligan LPN  Outcome: Ongoing  Goal: STG - Patient uses call light consistently to request assistance with transfers  11/24/2021 1153 by Rao Astorga LPN  Outcome: Ongoing  11/24/2021 0223 by Samuel Mulligan LPN  Outcome: Ongoing  Goal: STG - patient uses gait belt during all transfers  11/24/2021 1153 by Rao Astorga LPN  Outcome: Ongoing  11/24/2021 0223 by Samuel Mulligan LPN  Outcome: Ongoing     Problem: DAILY CARE  Goal: Daily care needs are met  11/24/2021 1153 by Rao Astorga LPN  Outcome: Ongoing  11/24/2021 0223 by Samuel Mulligan LPN  Outcome: Ongoing     Problem: PAIN  Goal: Patient's pain/discomfort is manageable  11/24/2021 1153 by Rao Astorga LPN  Outcome: Ongoing  11/24/2021 0223 by Samuel Mulligan LPN  Outcome: Ongoing  Goal: LTG - Patient will manage pain with the appropriate technique/Intervention  11/24/2021 1153 by Rao Astorga LPN  Outcome: Ongoing  11/24/2021 0223 by Samuel Mulligan LPN  Outcome: Ongoing  Goal: LTG - Patient will demonstrate intervention for managing pain  11/24/2021 1153 by Rao Astorga LPN  Outcome: Ongoing  11/24/2021 0223 by Samuel Mulligan LPN  Outcome: Ongoing  Goal: STG - Patient will reduce or eliminate use of analgesics  11/24/2021 1153 by Rao Astorga LPN  Outcome: Ongoing  11/24/2021 0223 by Samuel Mulligan LPN  Outcome: Ongoing  Goal: STG - pain is manageable through therapies  11/24/2021 1153 by Rao Astorga LPN  Outcome: Ongoing  11/24/2021 0223 by Samuel Mulligan LPN  Outcome: Ongoing  Goal: STG - Patient will verbalize an acceptable level of pain  11/24/2021 1153 by Rao Astorga LPN  Outcome: Ongoing  11/24/2021 0223 by Samuel Mulligan LPN  Outcome: Ongoing  Goal: STG - patients pain is managed to allow active participation in daily activities  11/24/2021 1153 by Hal Bolton LPN  Outcome: Ongoing  11/24/2021 0223 by Lalitha Olivas LPN  Outcome: Ongoing  Goal: STG - Patient will increase activity level  11/24/2021 1153 by Hal Bolton LPN  Outcome: Ongoing  11/24/2021 0223 by Lalitha Olivas LPN  Outcome: Ongoing  Goal: STG - patient verbalizes a reduction in pain level  11/24/2021 1153 by Hal Bolton LPN  Outcome: Ongoing  11/24/2021 0223 by Lalitha Olivas LPN  Outcome: Ongoing     Problem: SKIN INTEGRITY  Goal: Skin integrity is maintained or improved  11/24/2021 1153 by Hal Bolton LPN  Outcome: Ongoing  11/24/2021 0223 by Lalitha Olivas LPN  Outcome: Ongoing  Goal: LTG - Patient will be free from infection  11/24/2021 1153 by Hal Bolton LPN  Outcome: Ongoing  11/24/2021 0223 by Lalitha Olivas LPN  Outcome: Ongoing  Goal: LTG - patient will maintain/improve skin integrity through proper skin care techniques  11/24/2021 1153 by Hal Bolton LPN  Outcome: Ongoing  11/24/2021 0223 by Lalitha Olivas LPN  Outcome: Ongoing  Goal: LTG - Patient will demonstrate appropriate pressure relief techniques  11/24/2021 1153 by Hal Bolton LPN  Outcome: Ongoing  11/24/2021 0223 by Lalitha Olivas LPN  Outcome: Ongoing  Goal: LTG - patient will demonstrate appropriate skin care techniques  11/24/2021 1153 by Hal Bolton LPN  Outcome: Ongoing  11/24/2021 0223 by Lalitha Olivas LPN  Outcome: Ongoing  Goal: LTG - Patient will be free from infection  11/24/2021 1153 by Hal Bolton LPN  Outcome: Ongoing  11/24/2021 0223 by Lalitha Olivas LPN  Outcome: Ongoing  Goal: STG - Patient demonstrates skin care/treatment/dressing change  11/24/2021 1153 by Hal Bolton LPN  Outcome: Ongoing  11/24/2021 0223 by Lalitha Olivas LPN  Outcome: Ongoing  Goal: STG - patient will maintain good skin integrity  11/24/2021 1153 by Hal Bolton LPN  Outcome: Ongoing  11/24/2021 0223 by Lalitha Olivas LPN  Outcome: Ongoing  Goal: STG - Patient exhibits signs of wound healing. 11/24/2021 1153 by Jose Aguirre LPN  Outcome: Ongoing  11/24/2021 0223 by Alice Lipscomb LPN  Outcome: Ongoing  Goal: STG - patient demonstrates pressure reduction techniques  11/24/2021 1153 by Jose Aguirre LPN  Outcome: Ongoing  11/24/2021 0223 by Alice Lipscomb LPN  Outcome: Ongoing  Goal: STG - Patient demonstrates preventative skin care measures  11/24/2021 1153 by Jose Aguirre LPN  Outcome: Ongoing  11/24/2021 0223 by Alice Lipscomb LPN  Outcome: Ongoing     Problem: KNOWLEDGE DEFICIT  Goal: Patient/S.O. demonstrates understanding of disease process, treatment plan, medications, and discharge instructions.   11/24/2021 1153 by Jose Aguirre LPN  Outcome: Ongoing  11/24/2021 0223 by Alice Lipscomb LPN  Outcome: Ongoing     Problem: DISCHARGE BARRIERS  Goal: Patient's continuum of care needs are met  11/24/2021 1153 by Jose Aguirre LPN  Outcome: Ongoing  11/24/2021 0223 by Alice Lipscomb LPN  Outcome: Ongoing     Problem: IP BLADDER/VOIDING  Goal: LTG - patient will complete bladder elimination  11/24/2021 1153 by Jose Aguirre LPN  Outcome: Ongoing  11/24/2021 0223 by Alice Lipscomb LPN  Outcome: Ongoing  Goal: LTG - Patient will utilize adaptive techniques/equipment to complete bladder elimination  11/24/2021 1153 by Jose Aguirre LPN  Outcome: Ongoing  11/24/2021 0223 by Alice Lipscomb LPN  Outcome: Ongoing  Goal: LTG - patient will achieve acceptable level of continence  11/24/2021 1153 by Jose Aguirre LPN  Outcome: Ongoing  11/24/2021 0223 by Alice Lipscomb LPN  Outcome: Ongoing  Goal: STG - Patient demonstrates ability to take care of indwelling catheter  11/24/2021 1153 by Jose Aguirre LPN  Outcome: Ongoing  11/24/2021 0223 by Alice Lipscomb LPN  Outcome: Ongoing  Goal: STG - patient demonstrates self-cath technique using clean technique and care of the catheter  11/24/2021 1153 by Jose Aguirre LPN  Outcome: Ongoing  11/24/2021 0223 by Miri Braden LPN  Outcome: Ongoing  Goal: STG - Patient demonstrates no accidents  11/24/2021 1153 by Donna Gallardo LPN  Outcome: Ongoing  11/24/2021 0223 by Miri Braden LPN  Outcome: Ongoing  Goal: STG - Patient will state signs and symptoms of UTI  11/24/2021 1153 by Donna Gallardo LPN  Outcome: Ongoing  11/24/2021 0223 by Miri Braden LPN  Outcome: Ongoing  Goal: STG - patient will be able to empty bladder  11/24/2021 1153 by Donna Gallardo LPN  Outcome: Ongoing  11/24/2021 0223 by Miri Braden LPN  Outcome: Ongoing  Goal: STG - Patient demonstrates understanding of self catheterization schedule by completing task on time  11/24/2021 1153 by Donna Gallardo LPN  Outcome: Ongoing  11/24/2021 0223 by Miri Braden LPN  Outcome: Ongoing  Goal: STG - patient participates in bladder program by expressing need to void  11/24/2021 1153 by Donna Gallardo LPN  Outcome: Ongoing  11/24/2021 0223 by Miri Braden LPN  Outcome: Ongoing  Goal: STG - Patient verbalizes understanding of catheter care indwelling/intermittent  11/24/2021 1153 by Donna Gallardo LPN  Outcome: Ongoing  11/24/2021 0223 by Miri Braden LPN  Outcome: Ongoing  Goal: STG - patient participates in bladder program by adhering to implemented toileting schedule  11/24/2021 1153 by Donna Gallardo LPN  Outcome: Ongoing  11/24/2021 0223 by Miri Braden LPN  Outcome: Ongoing     Problem: IP BOWEL ELIMINATION  Goal: LTG - patient will utilize adaptive techniques/equipment to complete bowel elimination  11/24/2021 1153 by Donna Gallardo LPN  Outcome: Ongoing  11/24/2021 0223 by Miri Braden LPN  Outcome: Ongoing  Goal: LTG - patient will have regular and routine bowel evacuation  11/24/2021 1153 by Donna Gallardo LPN  Outcome: Ongoing  11/24/2021 0223 by Miri Braden LPN  Outcome: Ongoing  Goal: STG - patient will be accident free  11/24/2021 1153 by Donna Gallardo LPN  Outcome: Ongoing  11/24/2021 0223 by Jhon Dewitt LPN  Outcome: Ongoing  Goal: STG - Patient demonstrates care and management of ostomy bag  11/24/2021 1153 by Andrew Rdz LPN  Outcome: Ongoing  11/24/2021 0223 by Jhon Dewitt LPN  Outcome: Ongoing  Goal: STG - Patient participates in bowel management program  11/24/2021 1153 by Andrew Rdz LPN  Outcome: Ongoing  11/24/2021 0223 by Jhon Dewitt LPN  Outcome: Ongoing  Goal: STG - patient maintains skin integrity  11/24/2021 1153 by Andrew Rdz LPN  Outcome: Ongoing  11/24/2021 0223 by Jhon Dewitt LPN  Outcome: Ongoing  Goal: STG - Patient will verbalize signs and symptoms of constipation and how to prevent/alleviate  11/24/2021 1153 by Andrew Rdz LPN  Outcome: Ongoing  11/24/2021 0223 by Jhon Dewitt LPN  Outcome: Ongoing  Goal: STG - patient will be continent of stool  11/24/2021 1153 by Andrew Rdz LPN  Outcome: Ongoing  11/24/2021 0223 by Jhon Dewitt LPN  Outcome: Ongoing  Goal: STG - Patient completes digital stimulation technique  11/24/2021 1153 by Andrew Rdz LPN  Outcome: Ongoing  11/24/2021 0223 by Jhon Dewitt LPN  Outcome: Ongoing  Goal: STG - Patient verbalizes knowledge about relationship between diet, fluid intake, activity and medication on constipation  11/24/2021 1153 by Andrew Rdz LPN  Outcome: Ongoing  11/24/2021 0223 by Jhon Dewitt LPN  Outcome: Ongoing     Problem: IP BREATHING  Goal: LTG - patient will mobilize secretions and maintain airway  11/24/2021 1153 by Andrew Rdz LPN  Outcome: Ongoing  11/24/2021 0223 by Jhon Dewitt LPN  Outcome: Ongoing  Goal: LTG - Patient/caregiver will demonstrate/perform proper techniques to maintain patent airway  11/24/2021 1153 by Andrew Rdz LPN  Outcome: Ongoing  11/24/2021 0223 by Jhon Dewitt LPN  Outcome: Ongoing  Goal: LTG - patient/caregiver will demonstrate/perform improved or stable self care abilities within physical limitations  11/24/2021 1153 by Kirk Soria LPN  Outcome: Ongoing  11/24/2021 0223 by Duarte Quiroz LPN  Outcome: Ongoing  Goal: STG - Patient/caregiver will maintain patent airway  11/24/2021 1153 by Kirk Soria LPN  Outcome: Ongoing  11/24/2021 0223 by Duarte Quiroz LPN  Outcome: Ongoing  Goal: STG - respiratory rate and effort will be within normal limits for the patient  11/24/2021 1153 by Kirk Soria LPN  Outcome: Ongoing  11/24/2021 0223 by Duarte Quiroz LPN  Outcome: Ongoing  Goal: STG - patient/caregiver will be able to verbalize oxygen safety precautions  11/24/2021 1153 by Kirk Soria LPN  Outcome: Ongoing  11/24/2021 0223 by Duarte Quiroz LPN  Outcome: Ongoing  Goal: STG - Patient/caregiver demonstrates correct suctioning technique  11/24/2021 1153 by Kirk Soria LPN  Outcome: Ongoing  11/24/2021 0223 by Duarte Quiroz LPN  Outcome: Ongoing  Goal: STG - patient will utilize incentive spirometer  11/24/2021 1153 by Kirk Soria LPN  Outcome: Ongoing  11/24/2021 0223 by Duarte Quiroz LPN  Outcome: Ongoing  Goal: STG - Patient performs or directs assisted coughing  11/24/2021 1153 by Kirk Soria LPN  Outcome: Ongoing  11/24/2021 0223 by Duarte Quiroz LPN  Outcome: Ongoing  Goal: STG - patient can administer MDI's  11/24/2021 1153 by Kirk Soria LPN  Outcome: Ongoing  11/24/2021 0223 by Duarte Quiroz LPN  Outcome: Ongoing  Goal: STG - Patient can utilize incentive spirometer  11/24/2021 1153 by Kirk Soria LPN  Outcome: Ongoing  11/24/2021 0223 by Duarte Quiroz LPN  Outcome: Ongoing  Goal: STG - family can complete suctioning  11/24/2021 1153 by Kirk Soria LPN  Outcome: Ongoing  11/24/2021 0223 by Duarte Quiroz LPN  Outcome: Ongoing     Problem: NUTRITION  Goal: Patient maintains adequate hydration  11/24/2021 1153 by Kirk Soria LPN  Outcome: Ongoing  11/24/2021 0223 by Duarte Quiroz LPN  Outcome: Ongoing  Goal: Patient maintains weight  11/24/2021 1153 by Dominga Yan LPN  Outcome: Ongoing  11/24/2021 0223 by Lolis Jorgensen LPN  Outcome: Ongoing  Goal: Patient/Family demonstrates understanding of diet  11/24/2021 1153 by Dominga Yan LPN  Outcome: Ongoing  11/24/2021 0223 by Lolis Jorgensen LPN  Outcome: Ongoing  Goal: Patient/Family independently completes tube feeding  11/24/2021 1153 by Dominga Yan LPN  Outcome: Ongoing  11/24/2021 0223 by Lolis Jorgensen LPN  Outcome: Ongoing  Goal: Patient will have no more than 5 lb weight change during LOS  11/24/2021 1153 by Dominga Yan LPN  Outcome: Ongoing  11/24/2021 0223 by Lolis Jorgensen LPN  Outcome: Ongoing  Goal: Patient will utilize adaptive techniques to administer nutrition  11/24/2021 1153 by Dominga Yan LPN  Outcome: Ongoing  11/24/2021 0223 by Lolis Jorgensen LPN  Outcome: Ongoing  Goal: Patient will verbalize dietary restrictions  11/24/2021 1153 by Dominga Yan LPN  Outcome: Ongoing  11/24/2021 0223 by Lolis Jorgensen LPN  Outcome: Ongoing     Problem: Bleeding:  Goal: Will show no signs and symptoms of excessive bleeding  Description: Will show no signs and symptoms of excessive bleeding  Outcome: Ongoing     Problem: Serum Glucose Level - Abnormal:  Goal: Ability to maintain appropriate glucose levels has stabilized  Description: Ability to maintain appropriate glucose levels has stabilized  Outcome: Ongoing

## 2021-11-25 LAB
ALBUMIN SERPL-MCNC: 3.7 G/DL (ref 3.5–5.2)
ALP BLD-CCNC: 95 U/L (ref 35–104)
ALT SERPL-CCNC: 21 U/L (ref 5–33)
ANION GAP SERPL CALCULATED.3IONS-SCNC: 14 MMOL/L (ref 7–19)
AST SERPL-CCNC: 15 U/L (ref 5–32)
BASOPHILS ABSOLUTE: 0 K/UL (ref 0–0.2)
BASOPHILS RELATIVE PERCENT: 0.3 % (ref 0–1)
BILIRUB SERPL-MCNC: 0.9 MG/DL (ref 0.2–1.2)
BUN BLDV-MCNC: 27 MG/DL (ref 8–23)
CALCIUM SERPL-MCNC: 9 MG/DL (ref 8.8–10.2)
CHLORIDE BLD-SCNC: 104 MMOL/L (ref 98–111)
CO2: 17 MMOL/L (ref 22–29)
CREAT SERPL-MCNC: 0.9 MG/DL (ref 0.5–0.9)
EOSINOPHILS ABSOLUTE: 0.4 K/UL (ref 0–0.6)
EOSINOPHILS RELATIVE PERCENT: 3.2 % (ref 0–5)
GFR AFRICAN AMERICAN: >59
GFR NON-AFRICAN AMERICAN: >60
GLUCOSE BLD-MCNC: 151 MG/DL (ref 70–99)
GLUCOSE BLD-MCNC: 172 MG/DL (ref 70–99)
GLUCOSE BLD-MCNC: 175 MG/DL (ref 70–99)
GLUCOSE BLD-MCNC: 190 MG/DL (ref 74–109)
GLUCOSE BLD-MCNC: 249 MG/DL (ref 70–99)
HCT VFR BLD CALC: 42.2 % (ref 37–47)
HEMOGLOBIN: 14.3 G/DL (ref 12–16)
IMMATURE GRANULOCYTES #: 0.1 K/UL
LYMPHOCYTES ABSOLUTE: 2 K/UL (ref 1.1–4.5)
LYMPHOCYTES RELATIVE PERCENT: 17.9 % (ref 20–40)
MCH RBC QN AUTO: 31.4 PG (ref 27–31)
MCHC RBC AUTO-ENTMCNC: 33.9 G/DL (ref 33–37)
MCV RBC AUTO: 92.5 FL (ref 81–99)
MONOCYTES ABSOLUTE: 1.1 K/UL (ref 0–0.9)
MONOCYTES RELATIVE PERCENT: 10 % (ref 0–10)
NEUTROPHILS ABSOLUTE: 7.4 K/UL (ref 1.5–7.5)
NEUTROPHILS RELATIVE PERCENT: 67.9 % (ref 50–65)
PDW BLD-RTO: 13.4 % (ref 11.5–14.5)
PERFORMED ON: ABNORMAL
PLATELET # BLD: 218 K/UL (ref 130–400)
PMV BLD AUTO: 9.8 FL (ref 9.4–12.3)
POTASSIUM REFLEX MAGNESIUM: 4 MMOL/L (ref 3.5–5)
RBC # BLD: 4.56 M/UL (ref 4.2–5.4)
SODIUM BLD-SCNC: 135 MMOL/L (ref 136–145)
TOTAL PROTEIN: 6 G/DL (ref 6.6–8.7)
WBC # BLD: 10.9 K/UL (ref 4.8–10.8)

## 2021-11-25 PROCEDURE — 36415 COLL VENOUS BLD VENIPUNCTURE: CPT

## 2021-11-25 PROCEDURE — 82947 ASSAY GLUCOSE BLOOD QUANT: CPT

## 2021-11-25 PROCEDURE — 6370000000 HC RX 637 (ALT 250 FOR IP): Performed by: PSYCHIATRY & NEUROLOGY

## 2021-11-25 PROCEDURE — 6370000000 HC RX 637 (ALT 250 FOR IP): Performed by: NURSE PRACTITIONER

## 2021-11-25 PROCEDURE — 2700000000 HC OXYGEN THERAPY PER DAY

## 2021-11-25 PROCEDURE — 80053 COMPREHEN METABOLIC PANEL: CPT

## 2021-11-25 PROCEDURE — 99232 SBSQ HOSP IP/OBS MODERATE 35: CPT | Performed by: PSYCHIATRY & NEUROLOGY

## 2021-11-25 PROCEDURE — 85025 COMPLETE CBC W/AUTO DIFF WBC: CPT

## 2021-11-25 PROCEDURE — 1180000000 HC REHAB R&B

## 2021-11-25 PROCEDURE — 6360000002 HC RX W HCPCS: Performed by: PSYCHIATRY & NEUROLOGY

## 2021-11-25 RX ORDER — LOPERAMIDE HYDROCHLORIDE 2 MG/1
2 CAPSULE ORAL
Status: DISCONTINUED | OUTPATIENT
Start: 2021-11-25 | End: 2021-12-04 | Stop reason: HOSPADM

## 2021-11-25 RX ADMIN — LOPERAMIDE HYDROCHLORIDE 2 MG: 2 CAPSULE ORAL at 12:14

## 2021-11-25 RX ADMIN — ATORVASTATIN CALCIUM 80 MG: 80 TABLET, FILM COATED ORAL at 21:01

## 2021-11-25 RX ADMIN — INSULIN GLARGINE 20 UNITS: 100 INJECTION, SOLUTION SUBCUTANEOUS at 21:02

## 2021-11-25 RX ADMIN — ENOXAPARIN SODIUM 40 MG: 100 INJECTION SUBCUTANEOUS at 16:43

## 2021-11-25 RX ADMIN — INSULIN LISPRO 1 UNITS: 100 INJECTION, SOLUTION INTRAVENOUS; SUBCUTANEOUS at 08:37

## 2021-11-25 RX ADMIN — CARVEDILOL 12.5 MG: 12.5 TABLET, FILM COATED ORAL at 08:33

## 2021-11-25 RX ADMIN — CLOPIDOGREL BISULFATE 75 MG: 75 TABLET ORAL at 08:33

## 2021-11-25 RX ADMIN — LEVOTHYROXINE SODIUM 25 MCG: 25 TABLET ORAL at 08:36

## 2021-11-25 RX ADMIN — PANTOPRAZOLE SODIUM 40 MG: 40 TABLET, DELAYED RELEASE ORAL at 06:13

## 2021-11-25 RX ADMIN — INSULIN LISPRO 2 UNITS: 100 INJECTION, SOLUTION INTRAVENOUS; SUBCUTANEOUS at 12:15

## 2021-11-25 RX ADMIN — ASPIRIN 81 MG: 81 TABLET, COATED ORAL at 08:36

## 2021-11-25 RX ADMIN — INSULIN LISPRO 1 UNITS: 100 INJECTION, SOLUTION INTRAVENOUS; SUBCUTANEOUS at 16:56

## 2021-11-25 RX ADMIN — CYANOCOBALAMIN TAB 500 MCG 1000 MCG: 500 TAB at 08:36

## 2021-11-25 RX ADMIN — SERTRALINE HYDROCHLORIDE 25 MG: 25 TABLET ORAL at 08:36

## 2021-11-25 RX ADMIN — Medication 100 MG: at 08:33

## 2021-11-25 RX ADMIN — CETIRIZINE HYDROCHLORIDE 10 MG: 10 TABLET, FILM COATED ORAL at 08:36

## 2021-11-25 RX ADMIN — CARVEDILOL 12.5 MG: 12.5 TABLET, FILM COATED ORAL at 16:43

## 2021-11-25 ASSESSMENT — PAIN SCALES - GENERAL: PAINLEVEL_OUTOF10: 0

## 2021-11-25 NOTE — PROGRESS NOTES
Patient:   Windy Esquivel  MR#:    [id number]   Room:    [facility]-   YOB: 1940  Date of Progress Note: 11/25/2021  Time of Note                           10:45 AM  Consulting Physician:   Leon Encarnacion M.D. Attending Physician:  Leon Encarnacion MD     Chief complaint Acute ischemic stroke    S:This 79 y.o. female with history of CAD, CHF, COPD, DM, CKD, GERD, mixed hyperlipidemia, thyroid disease and MI x 2. She presented to [facility] ER on 11/20/21 with s/o of slurred speech, right sided numbness and weakness. She initially thought it was her blood sugar which was 25 that morning before she ate but then her family noticed a right facial droop. Hemoglobin A1C on admit was 8.1. CT of head was negative. MRI done revealed an acute posterior limb left internal capsule lacunar infarct. She was admitted to the hospitalist service with consult for neurology. She was evaluated by speech for dysarthria and dysphagia. She was placed on a mechanical soft diet with thin liquids. She had acute kidney injury on CKD but this has now resolved She continues to have right sided weakness and is participating in both PT/OT. She is felt to need a stay on Rehab to work towards her goal of returning home. She is now felt ready to start the Rehab program. Diarrhea this am. Indicates at home she takes an Imodium in am and she doesn't have diarrhea.      REVIEW OF SYSTEMS:  Constitutional: No fevers No chills  Neck:No stiffness  Respiratory: No shortness of breath  Cardiovascular: No chest pain No palpitations  Gastrointestinal: No abdominal pain    Genitourinary: No Dysuria  Neurological: No headache, no confusion    Past Medical History:      Diagnosis Date    Acute CVA (cerebrovascular accident) ([facility] [address] 11/20/2021    Arthritis     Psoriatic    Asthma     CAD (coronary artery disease)     CHF (congestive heart failure) (HCC)     COPD (chronic obstructive pulmonary disease) (HCC)     DM (diabetes mellitus) ([facility] [address]     GERD (gastroesophageal reflux disease)     HTN (hypertension)     Hyperlipidemia     MI (myocardial infarction) ([facility] [address]     x2    Thyroid disease        Past Surgical History:      Procedure Laterality Date    CATARACT REMOVAL      CHOLECYSTECTOMY      CORONARY ANGIOPLASTY  06/2018    Angioplasty to in-stent restenosis to the distal LAD    HIP SURGERY      HYSTERECTOMY      JOINT REPLACEMENT Right     Right knee replacement       Medications in Hospital:      Current Facility-Administered Medications:     loperamide (IMODIUM) capsule 2 mg, 2 mg, Oral, QAM Michael DC MD    aspirin EC tablet 81 mg, 81 mg, Oral, Daily, 81 mg at 11/25/21 0836 **OR** [DISCONTINUED] aspirin suppository 300 mg, 300 mg, Rectal, Daily, SHARI Mclaughlin - CNP    atorvastatin (LIPITOR) tablet 80 mg, 80 mg, Oral, Nightly, SHARI Mclaughlin - CNP, 80 mg at 11/24/21 2202    ondansetron (ZOFRAN-ODT) disintegrating tablet 4 mg, 4 mg, Oral, Q8H PRN **OR** ondansetron (ZOFRAN) injection 4 mg, 4 mg, IntraVENous, Q6H PRN, SHARI Knapp - CNP    dextrose 5 % solution, 100 mL/hr, IntraVENous, PRN, Katerina Holcomb APRN - CNP    dextrose 50 % IV solution, 12.5 g, IntraVENous, PRN, SHARI Mclaughlin - CNP    glucagon (rDNA) injection 1 mg, 1 mg, IntraMUSCular, PRN, SHARI Mclaughlin - CNP    glucose (GLUTOSE) 40 % oral gel 15 g, 15 g, Oral, PRN, SHARI Mclaughlin - CNP    carvedilol (COREG) tablet 12.5 mg, 12.5 mg, Oral, BID WC, Katerina Holcomb APRN - CNP, 12.5 mg at 11/25/21 0833    cetirizine (ZYRTEC) tablet 10 mg, 10 mg, Oral, BID, SHARI Mclaughlin - CNP, 10 mg at 11/25/21 0836    clopidogrel (PLAVIX) tablet 75 mg, 75 mg, Oral, Daily, Katerina Holcomb APRROSSY - CNP, 75 mg at 11/25/21 0833    coenzyme Q10 capsule 100 mg, 100 mg, Oral, Daily, SHARI Mclaughlin - CNP, 100 mg at 11/25/21 0833    insulin glargine (LANTUS) injection vial 20 Units, 20 Units, masses noted, no jugular vein distension  Respiration- chest wall appears symmetric, good expansion,   normal effort without use of accessory muscles  Musculoskeletal - no significant wasting of muscles noted, no bony deformities  Extremities-no clubbing, cyanosis or edema  Skin - warm, dry, and intact. No rash, erythema, or pallor. Psychiatric - mood, affect, and behavior appear normal.      Neurological exam  Awake, alert, fluent oriented appropriate affect  Attention and concentration appear appropriate  Recent and remote memory appears unremarkable  Speech normal without dysarthria  No clear issues with language of fund of knowledge     Cranial Nerve Exam     CN III, IV,VI-EOMI, No nystagmus, conjugate eye movements, no ptosis    CN VII-no facial assymetry       Motor Exam  antigravity throughout upper and lower extremities bilaterally      Tremors- no tremors in hands or head noted     Gait  Not tested     Nursing/pcp notes, imaging,labs and vitals reviewed. PT,OT and/or speech notes reviewed    Lab Results   Component Value Date    WBC 10.9 (H) 11/25/2021    HGB 14.3 11/25/2021    HCT 42.2 11/25/2021    MCV 92.5 11/25/2021     11/25/2021     Lab Results   Component Value Date     (L) 11/25/2021    K 4.0 11/25/2021     11/25/2021    CO2 17 (L) 11/25/2021    BUN 27 (H) 11/25/2021    CREATININE 0.9 11/25/2021    GLUCOSE 190 (H) 11/25/2021    CALCIUM 9.0 11/25/2021    PROT 6.0 (L) 11/25/2021    LABALBU 3.7 11/25/2021    BILITOT 0.9 11/25/2021    ALKPHOS 95 11/25/2021    AST 15 11/25/2021    ALT 21 11/25/2021    LABGLOM >60 11/25/2021    GFRAA >59 11/25/2021     Lab Results   Component Value Date    INR 1.01 11/23/2021    INR 0.99 11/20/2021    INR 1.03 07/05/2021    PROTIME 13.5 11/23/2021    PROTIME 13.3 11/20/2021    PROTIME 13.7 07/05/2021             RECORD REVIEW: Previous medical records, medications were reviewed at today's visit    IMPRESSION:   1.   Acute ischemic stroke-aspirin/statin/Plavix  2. Coronary artery disease/history of MI-aspirin/statin/Plavix  3. Hyperlipidemia-on statin  4. CHF-Coreg  5. Allergies-Zyrtec  6. History of COPD-nebulizers as needed  7. DVT prophylaxis-Lovenox  8. Diabetes-on insulin monitor blood sugars  9. Hypothyroidism-Synthroid  10. GI-proton pump inhibitor/bowel regimen  11. Mood disorder-on Zoloft  12. Psoriatic arthritis-Tylenol as needed  13. Chronic kidney disease-monitor  14. PT/OT/speech  15.   Obstructive sleep apnea-monitor    Continue care       Expected duration and frequency therapy: 180 minutes per day, 5 days per week    [address] [facility]  [phone] CELL  Dr Cuong Looney

## 2021-11-26 LAB
GLUCOSE BLD-MCNC: 142 MG/DL (ref 70–99)
GLUCOSE BLD-MCNC: 158 MG/DL (ref 70–99)
GLUCOSE BLD-MCNC: 186 MG/DL (ref 70–99)
GLUCOSE BLD-MCNC: 192 MG/DL (ref 70–99)
PERFORMED ON: ABNORMAL

## 2021-11-26 PROCEDURE — 97110 THERAPEUTIC EXERCISES: CPT

## 2021-11-26 PROCEDURE — 6360000002 HC RX W HCPCS: Performed by: PSYCHIATRY & NEUROLOGY

## 2021-11-26 PROCEDURE — 97535 SELF CARE MNGMENT TRAINING: CPT

## 2021-11-26 PROCEDURE — 97130 THER IVNTJ EA ADDL 15 MIN: CPT

## 2021-11-26 PROCEDURE — 6370000000 HC RX 637 (ALT 250 FOR IP): Performed by: NURSE PRACTITIONER

## 2021-11-26 PROCEDURE — 82947 ASSAY GLUCOSE BLOOD QUANT: CPT

## 2021-11-26 PROCEDURE — 97116 GAIT TRAINING THERAPY: CPT

## 2021-11-26 PROCEDURE — 1180000000 HC REHAB R&B

## 2021-11-26 PROCEDURE — 97530 THERAPEUTIC ACTIVITIES: CPT

## 2021-11-26 PROCEDURE — 97129 THER IVNTJ 1ST 15 MIN: CPT

## 2021-11-26 PROCEDURE — 6370000000 HC RX 637 (ALT 250 FOR IP): Performed by: PSYCHIATRY & NEUROLOGY

## 2021-11-26 RX ADMIN — LEVOTHYROXINE SODIUM 25 MCG: 25 TABLET ORAL at 09:43

## 2021-11-26 RX ADMIN — ATORVASTATIN CALCIUM 80 MG: 80 TABLET, FILM COATED ORAL at 20:27

## 2021-11-26 RX ADMIN — CARVEDILOL 12.5 MG: 12.5 TABLET, FILM COATED ORAL at 09:44

## 2021-11-26 RX ADMIN — SERTRALINE HYDROCHLORIDE 25 MG: 25 TABLET ORAL at 09:43

## 2021-11-26 RX ADMIN — Medication 100 MG: at 09:44

## 2021-11-26 RX ADMIN — CLOPIDOGREL BISULFATE 75 MG: 75 TABLET ORAL at 09:44

## 2021-11-26 RX ADMIN — CYANOCOBALAMIN TAB 500 MCG 1000 MCG: 500 TAB at 09:44

## 2021-11-26 RX ADMIN — CARVEDILOL 12.5 MG: 12.5 TABLET, FILM COATED ORAL at 17:25

## 2021-11-26 RX ADMIN — INSULIN GLARGINE 20 UNITS: 100 INJECTION, SOLUTION SUBCUTANEOUS at 20:28

## 2021-11-26 RX ADMIN — ENOXAPARIN SODIUM 40 MG: 100 INJECTION SUBCUTANEOUS at 17:25

## 2021-11-26 RX ADMIN — LOPERAMIDE HYDROCHLORIDE 2 MG: 2 CAPSULE ORAL at 05:30

## 2021-11-26 RX ADMIN — PANTOPRAZOLE SODIUM 40 MG: 40 TABLET, DELAYED RELEASE ORAL at 05:30

## 2021-11-26 RX ADMIN — ASPIRIN 81 MG: 81 TABLET, COATED ORAL at 09:43

## 2021-11-26 ASSESSMENT — 9 HOLE PEG TEST
TESTTIME_SECONDS: 48.04
TEST_RESULT: IMPAIRED

## 2021-11-26 ASSESSMENT — PAIN SCALES - GENERAL
PAINLEVEL_OUTOF10: 0
PAINLEVEL_OUTOF10: 0

## 2021-11-26 NOTE — PROGRESS NOTES
[facility]  INPATIENT SPEECH THERAPY  [address]    [x]Daily Note  []Progress Note    Date: 2021  Patient Name: Edd Perez        MRN: [id number]    Account #: [de-identified]  : 1940  (76 y.o.)  Gender: female   Primary Provider: Simon Holguin MD  Swallowing Status/Diet: Regular diet, thin liquids      PATIENT DIAGNOSIS(ES):    Diagnosis: Cerebral infarction, R body involvement, posterior limb left internal capsule lacunar infarct. Referral 21 Dr. Percy Bauer     Additional Pertinent Hx: Dysarthria, hypothryoidism, anxiety, CAD, CHF, COPD, T2DM, CKD, MI x 2    RESTRICTIONS/PRECAUTIONS:    Restrictions/Precautions  Restrictions/Precautions: Weight Bearing, Fall Risk            Subjective: The patient was upright in her bed. She was cooperative with all tasks. She stated she wears bilateral hearing aids. The  for her hearing aids was located. The therapist and patient could not locate the case for the hearing aids. This was reported to her nurse      Objective: Today, the CLQT was completed. The Cognitive Linguistic Quick Test [facility][employer]) was developed for use with adults with acquired neurological dysfunction, ages 18-89. This individually administered test is designed to gain information about cognitive-linguistic domains, a total composite severity rating and a clock drawing severity rating. The CLQT consists of ten tasks: Personal facts, Symbol cancellation, Confrontation naming, Clock drawing, Story retelling, Symbol trails, Generative naming, Design memory, Mazes, and Design generation. The patient scored a 4.0 out of 4.0 on the CLQT which is within normal limits.     CLQT  Cognitive Domain Scoring Range Score Severity   Attention 215-160 171 WNL   Memory 185-141 166 WNL   Executive Function 40-19 19 WNL   Language 37-28 33 WNL   Visuospatial 105-62 71 WNL   Composite 4.0-3.5 4.0 WNL           Diet Recommendations:  Regular diet  Thin liquids  Medications may be whole with water     Swallowing Precautions: Alternate liquids and foods  Small bites and sips  Slow rate  Upright for all oral intake  Remain upright following all oral intake      She was independent with her medications and finances prior to admission. SLP will evaluate these skills during therapy. SHORT TERM GOAL #1:  Goal 1: The patient will participate in the Cognitive Linguistic Quick Test or CLQT. SHORT TERM GOAL #2:  Goal 2: The patient will complete tasks for medication management with minimal cues and 100% accuracy. SHORT TERM GOAL #3:  Goal 3: The patient will complete tasks for finance management with minimal cues and 100% accuracy. SHORT TERM GOAL #4:  Goal 4: The patient will use the (over articulation/slow rate/writing key word/elongation of thevowel/increased loudness/phrasing) strategy to improve speech intelligibility withwords/phrases/sentences/in conversation with 100% accuracy. SHORT TERM GOAL #5:  Goal 5: The patient will complete daily oral-motor exercise to increase lingual and labial range of motion, strength and coordination with (min/mod/max) verbal, tactile and visual cues to improve speech intelligibility.     Comprehension: 5 - Patient understands basic needs (hungry/hot/pain)  Expression: 5 - Expresses basic ideas/needs only (hungry/hot/pain)  Social Interaction: 5 - Patient is appropriate with supervision/cues  Problem Solvin - Patient able to solve simple/routine tasks  Memory: 5 - Patient requires prompting with stress/unfamiliar situations    ASSESSMENT:  Assessment: [x]Progressing towards goals          []Not Progressing towards goals    Patient Tolerance of Treatment:   [x]Tolerated well []Tolerated fair []Required rest breaks []Fatigued    Education:  Learner:  [x]Patient          []Significant Other          []Other  Education provided regarding:  [x]Goals and POC   []Diet and swallowing precautions    []Home Exercise Program  []Progress and/or discharge information  Method of Education:  [x]Discussion          []Demonstration          []Handout          []Other  Evaluation of Education:   [x]Verbalized understanding   []Demonstrates without assistance  []Demonstrates with assistance  []Needs further instruction     []No evidence of learning                  []No family present      Plan: [x]Continue with current plan of care    []Modify current plan of care as follows:    []Discharge patient    Discharge Location:    Services/Supervision Recommended:      [x]Patient continues to require treatment by a licensed therapist to address functional deficits as outlined in the established plan of care.             Electronically Signed By:  Bibi Roman M.S., CCC-SLP  11/26/2021,4:16 PM.

## 2021-11-26 NOTE — PROGRESS NOTES
Occupational Therapy  Facility/Department: [facility] 8 REHAB UNIT  Daily Treatment Note  NAME: Julieth Rice  : 1940  MRN: [id number]    Date of Service: 2021    Discharge Recommendations:  ECF with OT       Assessment        21 1310   Assessment   Performance deficits / Impairments Decreased functional mobility ; Decreased ADL status; Decreased strength; Decreased balance; Decreased endurance; Decreased high-level IADLs; Decreased fine motor control; Decreased coordination   Assessment Checked O2 sats on room air---98% with activity. Notified nursing. Nursing instructed therapist to discontinue O2. Activity Tolerance  Activity Tolerance: Patient Tolerated treatment well  Safety Devices  Safety Devices in place: Yes  Type of devices: Left in bed; Bed alarm in place; Call light within reach         Patient Diagnosis(es): There were no encounter diagnoses. has a past medical history of Acute CVA (cerebrovascular accident) ([facility] [address] Arthritis, Asthma, CAD (coronary artery disease), CHF (congestive heart failure) ([facility] [address] COPD (chronic obstructive pulmonary disease) ([facility] [address] DM (diabetes mellitus) ([facility] [address] GERD (gastroesophageal reflux disease), HTN (hypertension), Hyperlipidemia, MI (myocardial infarction) ([facility] [address] and Thyroid disease. has a past surgical history that includes Hysterectomy; joint replacement (Right); Cholecystectomy; hip surgery; Cataract removal; and Coronary angioplasty (2018).     Restrictions  Restrictions/Precautions  Restrictions/Precautions: Weight Bearing, Fall Risk  Required Braces or Orthoses?: No  Lower Extremity Weight Bearing Restrictions  Right Lower Extremity Weight Bearing: Weight Bearing As Tolerated  Left Lower Extremity Weight Bearing: Weight Bearing As Tolerated  Subjective   General  Chart Reviewed: Yes  Patient assessed for rehabilitation services?: Yes  Family / Caregiver Present: No  Diagnosis: L CVA  Pain Assessment  Pain Assessment: 0-10  Pain Level: 0  Pre toileting. Short term goal 5: Supervision with LB dressing. Short term goal 6: Supervision with one-two handed static standing task for 4 minutes. Long term goals  Time Frame for Long term goals : 2 weeks  Long term goal 1: Modified independent with bathing hygiene. Long term goal 2: Modified independent with toileting and toilet transfers. Long term goal 3: Modified independent with overall dressing. Long term goal 4: Modified independent with ambulatory homemaking tasks. Long term goal 5: Patient verbalize DME needs. Long term goals 6: Independent with HEP. Patient Goals   Patient goals : Return home independently.        Therapy Time   Individual Concurrent Group Co-treatment   Time In 1310         Time Out 1410         Minutes 60         Timed Code Treatment Minutes: 60 Minutes       Electronically signed by Missie Simmonds, OT on 11/26/2021 at 4:24 PM

## 2021-11-26 NOTE — PROGRESS NOTES
Physical Therapy  Name: Lidia Caldera  MRN:  [id number]  Date of service:  11/26/2021 11/26/21 1541   Restrictions/Precautions   Restrictions/Precautions Weight Bearing; Fall Risk   Required Braces or Orthoses? No   Lower Extremity Weight Bearing Restrictions   Right Lower Extremity Weight Bearing Weight Bearing As Tolerated   Left Lower Extremity Weight Bearing Weight Bearing As Tolerated   Subjective   Subjective Patient agrees to treatment stating aurora she needs to use the bathroom and would like to brush her teeth. Pain Screening   Patient Currently in Pain No   Bed Mobility   Rolling Supervision   Supine to Sit Supervision   Sit to Supine Supervision   Scooting Supervision   Transfers   Sit to Stand Supervision   Stand to sit Supervision   Bed to Chair Stand by assistance   Comment Patient used bathroom x 2 due to inability to urinate the first time and stood a sink to brush her teeth. Ambulation   Ambulation? Yes   Ambulation 1   Surface level tile   Device Rolling Walker   Other Apparatus O2   Assistance Stand by assistance   Quality of Gait good janis, forward posture, decreased leg elevation with fatigue ~100'   Gait Deviations Slow Janis; Decreased step length; Decreased step height   Distance 150'   Comments Patient needing cues for safety and awareness with walker.    Patient Goals    Patient goals  go home   Short term goals   Time Frame for Short term goals 1-2 weeks   Short term goal 1 Supervision with ambulation using '   Short term goal 2 Supervision with car transfer   Short term goal 3 Independent with bed mobility   Short term goal 4 Supervision with w/c mobility 250'   Short term goal 5 Supervision with 4 stairs using unilateral rail   Long term goals   Time Frame for Long term goals  2-3 weeks   Long term goal 1 Independent with ambulation using '   Long term goal 2 Independent with car transfer   Long term goal 3 Independent with transfers   Long term goal 4 Independent with 4 stairs with unilateral rail   Conditions Requiring Skilled Therapeutic Intervention   Body structures, Functions, Activity limitations Decreased functional mobility ; Decreased strength; Decreased endurance; Decreased balance   Assessment Patient able to perform transfers well needing min assist from therapist. She shows some decreased strength with ambulation needing to sit due to fatigue in between. She will continue to benefit from skilled therapy services to further address her overall impairments and progress her towards her goals. Activity Tolerance   Activity Tolerance Patient limited by fatigue   Activity Tolerance distracted and needs redirection, impulsive   Safety Devices   Type of devices Bed alarm in place; Call light within reach; Gait belt;  All fall risk precautions in place         Electronically signed by Chris Peraza PTA on 11/26/2021 at 3:57 PM

## 2021-11-27 LAB
GLUCOSE BLD-MCNC: 137 MG/DL (ref 70–99)
GLUCOSE BLD-MCNC: 177 MG/DL (ref 70–99)
GLUCOSE BLD-MCNC: 199 MG/DL (ref 70–99)
GLUCOSE BLD-MCNC: 204 MG/DL (ref 70–99)
PERFORMED ON: ABNORMAL

## 2021-11-27 PROCEDURE — 92507 TX SP LANG VOICE COMM INDIV: CPT

## 2021-11-27 PROCEDURE — 82947 ASSAY GLUCOSE BLOOD QUANT: CPT

## 2021-11-27 PROCEDURE — 97535 SELF CARE MNGMENT TRAINING: CPT

## 2021-11-27 PROCEDURE — 97110 THERAPEUTIC EXERCISES: CPT

## 2021-11-27 PROCEDURE — 6370000000 HC RX 637 (ALT 250 FOR IP): Performed by: PSYCHIATRY & NEUROLOGY

## 2021-11-27 PROCEDURE — 6370000000 HC RX 637 (ALT 250 FOR IP): Performed by: NURSE PRACTITIONER

## 2021-11-27 PROCEDURE — 1180000000 HC REHAB R&B

## 2021-11-27 PROCEDURE — 97116 GAIT TRAINING THERAPY: CPT

## 2021-11-27 PROCEDURE — 97530 THERAPEUTIC ACTIVITIES: CPT

## 2021-11-27 PROCEDURE — 6360000002 HC RX W HCPCS: Performed by: PSYCHIATRY & NEUROLOGY

## 2021-11-27 RX ADMIN — CLOPIDOGREL BISULFATE 75 MG: 75 TABLET ORAL at 08:21

## 2021-11-27 RX ADMIN — CARVEDILOL 12.5 MG: 12.5 TABLET, FILM COATED ORAL at 16:58

## 2021-11-27 RX ADMIN — INSULIN GLARGINE 20 UNITS: 100 INJECTION, SOLUTION SUBCUTANEOUS at 20:27

## 2021-11-27 RX ADMIN — INSULIN LISPRO 1 UNITS: 100 INJECTION, SOLUTION INTRAVENOUS; SUBCUTANEOUS at 12:01

## 2021-11-27 RX ADMIN — CARVEDILOL 12.5 MG: 12.5 TABLET, FILM COATED ORAL at 08:21

## 2021-11-27 RX ADMIN — LOPERAMIDE HYDROCHLORIDE 2 MG: 2 CAPSULE ORAL at 05:43

## 2021-11-27 RX ADMIN — Medication 100 MG: at 08:21

## 2021-11-27 RX ADMIN — ENOXAPARIN SODIUM 40 MG: 100 INJECTION SUBCUTANEOUS at 17:30

## 2021-11-27 RX ADMIN — SERTRALINE HYDROCHLORIDE 25 MG: 25 TABLET ORAL at 08:22

## 2021-11-27 RX ADMIN — ASPIRIN 81 MG: 81 TABLET, COATED ORAL at 08:22

## 2021-11-27 RX ADMIN — LEVOTHYROXINE SODIUM 25 MCG: 25 TABLET ORAL at 08:21

## 2021-11-27 RX ADMIN — PANTOPRAZOLE SODIUM 40 MG: 40 TABLET, DELAYED RELEASE ORAL at 05:43

## 2021-11-27 RX ADMIN — INSULIN LISPRO 1 UNITS: 100 INJECTION, SOLUTION INTRAVENOUS; SUBCUTANEOUS at 20:27

## 2021-11-27 RX ADMIN — CYANOCOBALAMIN TAB 500 MCG 1000 MCG: 500 TAB at 08:22

## 2021-11-27 RX ADMIN — ATORVASTATIN CALCIUM 80 MG: 80 TABLET, FILM COATED ORAL at 20:27

## 2021-11-27 ASSESSMENT — PAIN SCALES - GENERAL: PAINLEVEL_OUTOF10: 0

## 2021-11-27 NOTE — PROGRESS NOTES
Facility/Department: [facility] 8 REHAB UNIT  Daily Treatment Note  NAME: Charity Engle  : 1940  MRN: [id number]    Date of Eval: 2021  Evaluating Therapist: GABRIELLE Schaefer    Patient Diagnosis(es): has Other fatigue; CAD in native artery; HTN (hypertension); Gastroesophageal reflux disease without esophagitis; Depression with anxiety; Type 2 diabetes mellitus with chronic kidney disease, without long-term current use of insulin ([address] Pulmonary nodule; S/P left heart catheterization by percutaneous approach; COPD (chronic obstructive pulmonary disease) ([address] Mixed hyperlipidemia; MAGALY (obstructive sleep apnea); Chronic kidney disease, stage III (moderate) ([address] Acute CVA (cerebrovascular accident) ([address] CECILIA (acute kidney injury) ([address] Hemiplegia affecting dominant side ([address] Dysarthria; Dysphagia due to recent cerebral infarction; Acquired hypothyroidism; CHF (congestive heart failure) ([address] Acute ischemic stroke ([address] and Psoriatic arthritis ([address] on their problem list.     Pt was asleep in her bed when SLP entered the room. The pt was easily awakened. Pt sat up on the side of her bed during the treatment session. Pt was alert and oriented X 3. Pt was able to easily participate in conversational exchanges. Pt's speech was judged to be % intelligible. Pt was able to recall the day's therapy tasks. Pt thoroughly explained her medication management at home, as well, as the daily routines at [facility]. Plan:  Continued daily Speech/Language treatment with goals per patient's plan of care.                               Fly Rivas, SLP

## 2021-11-27 NOTE — PLAN OF CARE
Problem: Falls - Risk of:  Goal: Will remain free from falls  Description: Will remain free from falls  11/26/2021 2323 by Yolette Bautista LPN  Outcome: Ongoing  11/26/2021 1616 by Husam Ayala RN  Outcome: Ongoing  Goal: Absence of physical injury  Description: Absence of physical injury  11/26/2021 2323 by Yolette Bautista LPN  Outcome: Ongoing  11/26/2021 1616 by Husam Ayala RN  Outcome: Ongoing     Problem: HEMODYNAMIC STATUS  Goal: Patient has stable vital signs and fluid balance  11/26/2021 2323 by Yolette Bautista LPN  Outcome: Ongoing  11/26/2021 1616 by Husam Ayala RN  Outcome: Ongoing     Problem: ACTIVITY INTOLERANCE/IMPAIRED MOBILITY  Goal: Mobility/activity is maintained at optimum level for patient  11/26/2021 2323 by Yolette Bautista LPN  Outcome: Ongoing  11/26/2021 1616 by Husam Ayala RN  Outcome: Ongoing     Problem: COMMUNICATION IMPAIRMENT  Goal: Ability to express needs and understand communication  11/26/2021 2323 by Yolette Bautista LPN  Outcome: Ongoing  11/26/2021 1616 by Husam Ayala RN  Outcome: Ongoing     Problem: SAFETY  Goal: Free from accidental physical injury  11/26/2021 2323 by Yolette Bautista LPN  Outcome: Ongoing  11/26/2021 1616 by Husam Ayala RN  Outcome: Ongoing  Goal: Free from intentional harm  11/26/2021 2323 by Yolette Bautista LPN  Outcome: Ongoing  11/26/2021 1616 by Husam Ayala RN  Outcome: Ongoing  Goal: LTG - patient will adhere to hip precautions during ADL's and transfers  11/26/2021 2323 by Yolette Bautista LPN  Outcome: Ongoing  11/26/2021 1616 by Husam Ayala RN  Outcome: Ongoing  Goal: LTG - Patient will demonstrate safety requirements appropriate to situation/environment  11/26/2021 2323 by Yolette Bautista LPN  Outcome: Ongoing  11/26/2021 1616 by Husam Ayala RN  Outcome: Ongoing  Goal: LTG - patient will utilize safety techniques  11/26/2021 2323 by Yolette Primus, LPN  Outcome: Ongoing  11/26/2021 1616 by Alexys aCmilo Francisco Carrillo RN  Outcome: Ongoing  Goal: STG - patient locks brakes on wheelchair  11/26/2021 2323 by Amarilys To, LPN  Outcome: Ongoing  11/26/2021 1616 by Nia Ricci RN  Outcome: Ongoing  Goal: STG - Patient uses call light consistently to request assistance with transfers  11/26/2021 2323 by Amarilys To, LPN  Outcome: Ongoing  11/26/2021 1616 by Nia Ricci RN  Outcome: Ongoing  Goal: STG - patient uses gait belt during all transfers  11/26/2021 2323 by Amarilys To, LPN  Outcome: Ongoing  11/26/2021 1616 by Nia Ricci RN  Outcome: Ongoing     Problem: DAILY CARE  Goal: Daily care needs are met  11/26/2021 2323 by Amarilys To, LPN  Outcome: Ongoing  11/26/2021 1616 by Nia Ricci RN  Outcome: Ongoing     Problem: PAIN  Goal: Patient's pain/discomfort is manageable  11/26/2021 2323 by Amarilys To, LPN  Outcome: Ongoing  11/26/2021 1616 by Nia Ricci RN  Outcome: Ongoing  Goal: LTG - Patient will manage pain with the appropriate technique/Intervention  11/26/2021 2323 by Amarilys To, LPN  Outcome: Ongoing  11/26/2021 1616 by Nia Ricci RN  Outcome: Ongoing  Goal: LTG - Patient will demonstrate intervention for managing pain  11/26/2021 2323 by Amarilys To, LPN  Outcome: Ongoing  11/26/2021 1616 by Nia Ricci RN  Outcome: Ongoing  Goal: STG - Patient will reduce or eliminate use of analgesics  11/26/2021 2323 by Amarilys To, LPN  Outcome: Ongoing  11/26/2021 1616 by Nia Ricci RN  Outcome: Ongoing  Goal: STG - pain is manageable through therapies  11/26/2021 2323 by Amarilys To, LPN  Outcome: Ongoing  11/26/2021 1616 by Nia Ricci RN  Outcome: Ongoing  Goal: STG - Patient will verbalize an acceptable level of pain  11/26/2021 2323 by Amarilys To, LPN  Outcome: Ongoing  11/26/2021 1616 by Nia Ricci RN  Outcome: Ongoing  Goal: STG - patients pain is managed to allow active participation in daily activities  11/26/2021 1872 by Laureen Bowden LPN  Outcome: Ongoing  11/26/2021 1616 by Nikki Araiza RN  Outcome: Ongoing  Goal: STG - Patient will increase activity level  11/26/2021 2323 by Laureen Bowden LPN  Outcome: Ongoing  11/26/2021 1616 by Nikki Araiza RN  Outcome: Ongoing  Goal: STG - patient verbalizes a reduction in pain level  11/26/2021 2323 by Laureen Bowden LPN  Outcome: Ongoing  11/26/2021 1616 by Nikki Araiza RN  Outcome: Ongoing     Problem: SKIN INTEGRITY  Goal: Skin integrity is maintained or improved  11/26/2021 2323 by Laureen Bowden LPN  Outcome: Ongoing  11/26/2021 1616 by Nikki Araiza RN  Outcome: Ongoing  Goal: LTG - Patient will be free from infection  11/26/2021 2323 by Laureen Bowden LPN  Outcome: Ongoing  11/26/2021 1616 by Nikki Araiza RN  Outcome: Ongoing  Goal: LTG - patient will maintain/improve skin integrity through proper skin care techniques  11/26/2021 2323 by Laureen Bowden LPN  Outcome: Ongoing  11/26/2021 1616 by Nikki Araiza RN  Outcome: Ongoing  Goal: LTG - Patient will demonstrate appropriate pressure relief techniques  11/26/2021 2323 by Laureen Bowden LPN  Outcome: Ongoing  11/26/2021 1616 by Nikki Araiza RN  Outcome: Ongoing  Goal: LTG - patient will demonstrate appropriate skin care techniques  11/26/2021 2323 by Laureen Bowden LPN  Outcome: Ongoing  11/26/2021 1616 by Nikki Araiza RN  Outcome: Ongoing  Goal: LTG - Patient will be free from infection  11/26/2021 2323 by Laureen Bowden LPN  Outcome: Ongoing  11/26/2021 1616 by Nikki Araiza RN  Outcome: Ongoing  Goal: STG - Patient demonstrates skin care/treatment/dressing change  11/26/2021 2323 by Laureen Bowden LPN  Outcome: Ongoing  11/26/2021 1616 by Nikki Araiza RN  Outcome: Ongoing  Goal: STG - patient will maintain good skin integrity  11/26/2021 2323 by Laureen Bowden LPN  Outcome: Ongoing  11/26/2021 1616 by Nikki Araiza RN  Outcome: Ongoing  Goal: STG - Patient exhibits signs of wound healing. 11/26/2021 2323 by Jennifer Abdul LPN  Outcome: Ongoing  11/26/2021 1616 by Martine Salazar RN  Outcome: Ongoing  Goal: STG - patient demonstrates pressure reduction techniques  11/26/2021 2323 by Jennifer Abdul LPN  Outcome: Ongoing  11/26/2021 1616 by Martine Salazar RN  Outcome: Ongoing  Goal: STG - Patient demonstrates preventative skin care measures  11/26/2021 2323 by Jennifer Abdul LPN  Outcome: Ongoing  11/26/2021 1616 by Martine Salazar RN  Outcome: Ongoing     Problem: KNOWLEDGE DEFICIT  Goal: Patient/S.O. demonstrates understanding of disease process, treatment plan, medications, and discharge instructions.   11/26/2021 2323 by Jennifer Abdul LPN  Outcome: Ongoing  11/26/2021 1616 by Martine Salazar RN  Outcome: Ongoing     Problem: DISCHARGE BARRIERS  Goal: Patient's continuum of care needs are met  11/26/2021 2323 by Jennifer Abdul LPN  Outcome: Ongoing  11/26/2021 1616 by Martine Salazar RN  Outcome: Ongoing     Problem: IP BLADDER/VOIDING  Goal: LTG - patient will complete bladder elimination  11/26/2021 2323 by Jennifer Abdul LPN  Outcome: Ongoing  11/26/2021 1616 by Martine Salazar RN  Outcome: Ongoing  Goal: LTG - Patient will utilize adaptive techniques/equipment to complete bladder elimination  11/26/2021 2323 by Jennifer Abdul LPN  Outcome: Ongoing  11/26/2021 1616 by Martine Salazar RN  Outcome: Ongoing  Goal: LTG - patient will achieve acceptable level of continence  11/26/2021 2323 by Jennifer Abdul LPN  Outcome: Ongoing  11/26/2021 1616 by Martine Salazar RN  Outcome: Ongoing  Goal: STG - Patient demonstrates ability to take care of indwelling catheter  11/26/2021 2323 by Jennifer Abdul LPN  Outcome: Ongoing  11/26/2021 1616 by Martine Salazar RN  Outcome: Ongoing  Goal: STG - patient demonstrates self-cath technique using clean technique and care of the catheter  11/26/2021 2323 by Tretha Shark, LPN  Outcome: Ongoing  11/26/2021 1616 by Ardyth Scheuermann, RN  Outcome: Ongoing  Goal: STG - Patient demonstrates no accidents  11/26/2021 2323 by Antony Carreon, LPN  Outcome: Ongoing  11/26/2021 1616 by Ardyth Scheuermann, RN  Outcome: Ongoing  Goal: STG - Patient will state signs and symptoms of UTI  11/26/2021 2323 by Antony Carreon, LPN  Outcome: Ongoing  11/26/2021 1616 by Ardyth Scheuermann, RN  Outcome: Ongoing  Goal: STG - patient will be able to empty bladder  11/26/2021 2323 by Antony Carreon, LPN  Outcome: Ongoing  11/26/2021 1616 by Ardyth Scheuermann, RN  Outcome: Ongoing  Goal: STG - Patient demonstrates understanding of self catheterization schedule by completing task on time  11/26/2021 2323 by Antony Carreon, LPN  Outcome: Ongoing  11/26/2021 1616 by Ardyth Scheuermann, RN  Outcome: Ongoing  Goal: STG - patient participates in bladder program by expressing need to void  11/26/2021 2323 by Antony Carreon, LPN  Outcome: Ongoing  11/26/2021 1616 by Ardyth Scheuermann, RN  Outcome: Ongoing  Goal: STG - Patient verbalizes understanding of catheter care indwelling/intermittent  11/26/2021 2323 by Antony Carreon, LPN  Outcome: Ongoing  11/26/2021 1616 by Ardyth Scheuermann, RN  Outcome: Ongoing  Goal: STG - patient participates in bladder program by adhering to implemented toileting schedule  11/26/2021 2323 by Antony Carreon, LPN  Outcome: Ongoing  11/26/2021 1616 by Ardyth Scheuermann, RN  Outcome: Ongoing     Problem: IP BOWEL ELIMINATION  Goal: LTG - patient will utilize adaptive techniques/equipment to complete bowel elimination  11/26/2021 2323 by Antony Carreon, LPN  Outcome: Ongoing  11/26/2021 1616 by Ardyth Scheuermann, RN  Outcome: Ongoing  Goal: LTG - patient will have regular and routine bowel evacuation  11/26/2021 2323 by Antony Carreon, LPN  Outcome: Ongoing  11/26/2021 1616 by Ardyth Scheuermann, RN  Outcome: Ongoing  Goal: STG - patient will be accident free  11/26/2021 2323 by Antony Gamma, LPN  Outcome: Ongoing  11/26/2021 1616 by Javier Lei Lana Holliday RN  Outcome: Ongoing  Goal: STG - Patient demonstrates care and management of ostomy bag  11/26/2021 2323 by Flossie Brittle, LPN  Outcome: Ongoing  11/26/2021 1616 by Randell Moreno RN  Outcome: Ongoing  Goal: STG - Patient participates in bowel management program  11/26/2021 2323 by Flossie Brittle, LPN  Outcome: Ongoing  11/26/2021 1616 by Randell Moreno RN  Outcome: Ongoing  Goal: STG - patient maintains skin integrity  11/26/2021 2323 by Flossie Brittle, LPN  Outcome: Ongoing  11/26/2021 1616 by Randell Moreno RN  Outcome: Ongoing  Goal: STG - Patient will verbalize signs and symptoms of constipation and how to prevent/alleviate  11/26/2021 2323 by Flossie Brittle, LPN  Outcome: Ongoing  11/26/2021 1616 by Randell Moreno RN  Outcome: Ongoing  Goal: STG - patient will be continent of stool  11/26/2021 2323 by Flossie Brittle, LPN  Outcome: Ongoing  11/26/2021 1616 by Randell Moreno RN  Outcome: Ongoing  Goal: STG - Patient completes digital stimulation technique  11/26/2021 2323 by Flossie Brittle, LPN  Outcome: Ongoing  11/26/2021 1616 by Randell Moreno RN  Outcome: Ongoing  Goal: STG - Patient verbalizes knowledge about relationship between diet, fluid intake, activity and medication on constipation  11/26/2021 2323 by Flossie Brittle, LPN  Outcome: Ongoing  11/26/2021 1616 by Randell Moreno RN  Outcome: Ongoing     Problem: IP BREATHING  Goal: LTG - patient will mobilize secretions and maintain airway  11/26/2021 2323 by Flossie Brittle, LPN  Outcome: Ongoing  11/26/2021 1616 by Randell Moreno RN  Outcome: Ongoing  Goal: LTG - Patient/caregiver will demonstrate/perform proper techniques to maintain patent airway  11/26/2021 2323 by Flossie Brittle, LPN  Outcome: Ongoing  11/26/2021 1616 by Randell Moreno RN  Outcome: Ongoing  Goal: LTG - patient/caregiver will demonstrate/perform improved or stable self care abilities within physical limitations  11/26/2021 2323 by Flossie Brittle, LPN  Outcome: Ongoing  11/26/2021 1616 by Radha Nobles RN  Outcome: Ongoing  Goal: STG - Patient/caregiver will maintain patent airway  11/26/2021 2323 by Wilman Truong LPN  Outcome: Ongoing  11/26/2021 1616 by Radha Nobles RN  Outcome: Ongoing  Goal: STG - respiratory rate and effort will be within normal limits for the patient  11/26/2021 2323 by Wilman Truong LPN  Outcome: Ongoing  11/26/2021 1616 by Radha Nobles RN  Outcome: Ongoing  Goal: STG - patient/caregiver will be able to verbalize oxygen safety precautions  11/26/2021 2323 by Wilman Truong LPN  Outcome: Ongoing  11/26/2021 1616 by Radha Nobles RN  Outcome: Ongoing  Goal: STG - Patient/caregiver demonstrates correct suctioning technique  11/26/2021 2323 by Wilman Truong LPN  Outcome: Ongoing  11/26/2021 1616 by Radha Nobles RN  Outcome: Ongoing  Goal: STG - patient will utilize incentive spirometer  11/26/2021 2323 by Wilman Truong LPN  Outcome: Ongoing  11/26/2021 1616 by Radha Nobles RN  Outcome: Ongoing  Goal: STG - Patient performs or directs assisted coughing  11/26/2021 2323 by Wilman Truong LPN  Outcome: Ongoing  11/26/2021 1616 by Radha Nobles RN  Outcome: Ongoing  Goal: STG - patient can administer MDI's  11/26/2021 2323 by Wilman Truong LPN  Outcome: Ongoing  11/26/2021 1616 by Radha Nobles RN  Outcome: Ongoing  Goal: STG - Patient can utilize incentive spirometer  11/26/2021 2323 by Wilman Truong LPN  Outcome: Ongoing  11/26/2021 1616 by Radha Nobles RN  Outcome: Ongoing  Goal: STG - family can complete suctioning  11/26/2021 2323 by Wilman Truong LPN  Outcome: Ongoing  11/26/2021 1616 by Radha Nobles RN  Outcome: Ongoing     Problem: NUTRITION  Goal: Patient maintains adequate hydration  11/26/2021 2323 by Wilman Truong LPN  Outcome: Ongoing  11/26/2021 1616 by Radha Nobles, RN  Outcome: Ongoing  Goal: Patient maintains weight  11/26/2021 2323 by Wilman Truong, LPN  Outcome: Ongoing  11/26/2021 1616 by Radha Nobles RN  Outcome: Ongoing  Goal: Patient/Family demonstrates understanding of diet  11/26/2021 2323 by Wilman Truong LPN  Outcome: Ongoing  11/26/2021 1616 by Radha Nobles RN  Outcome: Ongoing  Goal: Patient/Family independently completes tube feeding  11/26/2021 2323 by Wilman Truong LPN  Outcome: Ongoing  11/26/2021 1616 by Radha Nobles RN  Outcome: Ongoing  Goal: Patient will have no more than 5 lb weight change during LOS  11/26/2021 2323 by Wilman Truong LPN  Outcome: Ongoing  11/26/2021 1616 by Radha Nobles RN  Outcome: Ongoing  Goal: Patient will utilize adaptive techniques to administer nutrition  11/26/2021 2323 by Wilman Truong LPN  Outcome: Ongoing  11/26/2021 1616 by Radha Nobles RN  Outcome: Ongoing  Goal: Patient will verbalize dietary restrictions  11/26/2021 2323 by Wilman Truong LPN  Outcome: Ongoing  11/26/2021 1616 by Radha Nobles RN  Outcome: Ongoing

## 2021-11-27 NOTE — PROGRESS NOTES
Physical Therapy  Name: Gale Lang  MRN:  [id number]  Date of service:  11/27/2021 11/27/21 1100   Subjective   Subjective Pt states she is glad to see a female; wants to get washed up prior to going to therapy   General Comment   Comments pt found lying with head at foot of bed   Ambulation   Ambulation?  Yes   Ambulation 1   Surface level tile   Device Rolling [employer] Stand by assistance   Quality of Gait good pace, seemingly soa towards end, dec step ht with fatigue   Gait Deviations Decreased step length; Decreased step height   Distance 200'   Exercises   Hamstring Sets x 25 with red band   Hip Flexion x 25 with 1 1/2# wt   Hip Abduction x 25 with red band   Knee Long Arc Quad x 25 with 1 1/2# wt   Other Activities   Comment in/out of BR; worked on personal hygiene sitting on toilet (washed entire body), dressed with supervision with exception of shoes (min@), and then stood at sink with supervision assist to comb hair and brush teeth prior to leaving for therapy   Conditions Requiring Skilled Therapeutic Intervention   Assessment pt did well with gt increased distance and good spo2 following despite seeming a little soa; pt did well with resistive ex for LE's in chair with minimal cues required       Electronically signed by Henri Lovelace PTA on 11/27/2021 at 12:09 PM

## 2021-11-27 NOTE — PLAN OF CARE
Problem: Falls - Risk of:  Goal: Will remain free from falls  Description: Will remain free from falls  11/27/2021 0920 by Flavio Key RN  Outcome: Ongoing  11/26/2021 2323 by Denisse Sandoval LPN  Outcome: Ongoing  Goal: Absence of physical injury  Description: Absence of physical injury  11/27/2021 0920 by Flavio Key RN  Outcome: Ongoing  11/26/2021 2323 by Denisse Sandoval LPN  Outcome: Ongoing

## 2021-11-27 NOTE — PROGRESS NOTES
Occupational Therapy     11/27/21 1515   Restrictions/Precautions   Restrictions/Precautions Fall Risk   General   Additional Pertinent Hx Depression and anxiety; CKD; (R) TKA; HTN; arthritis; CAD; CHF; COPD; MIx2; thyroid dx; DM; asthma   Attendance   Activity Discussion/reminisce   Participation Active participation   ADL   Grooming Independent   Toileting Stand by assistance   Balance   Sitting Balance Independent   Standing Balance Supervision   Standing Balance   Activity ADLs   Functional Mobility   Functional - Mobility Device Rolling Walker   Assist Level Supervision   Transfers   Sit to stand Stand by assistance   Stand to sit Stand by assistance   Toilet Transfers   Toilet - Technique Ambulating   Toilet Transfer Stand by assistance   Wheelchair Bed Transfers   Wheelchair/Bed - Technique Ambulating   Equipment Used Other   Level of Asssistance Stand by assistance   Coordination   Fine Motor comprehensive FM ex program - pt issued hadnouts to complete in room   Sensory Integration   Sensory integration Yes   Tactile Stimulation sensory rods   Assessment   Performance deficits / Impairments Decreased functional mobility ; Decreased ADL status;  Decreased strength; Decreased balance; Decreased endurance; Decreased high-level IADLs; Decreased fine motor control; Decreased coordination   Treatment Diagnosis L CVA   Timed Code Treatment Minutes 60 Minutes   Activity Tolerance   Activity Tolerance Patient Tolerated treatment well   Safety Devices   Safety Devices in place Yes   Type of devices Left in bed; Bed alarm in place; Call light within reach

## 2021-11-28 LAB
GLUCOSE BLD-MCNC: 150 MG/DL (ref 70–99)
GLUCOSE BLD-MCNC: 155 MG/DL (ref 70–99)
GLUCOSE BLD-MCNC: 156 MG/DL (ref 70–99)
GLUCOSE BLD-MCNC: 156 MG/DL (ref 70–99)
PERFORMED ON: ABNORMAL

## 2021-11-28 PROCEDURE — 6370000000 HC RX 637 (ALT 250 FOR IP): Performed by: PSYCHIATRY & NEUROLOGY

## 2021-11-28 PROCEDURE — 6360000002 HC RX W HCPCS: Performed by: PSYCHIATRY & NEUROLOGY

## 2021-11-28 PROCEDURE — 1180000000 HC REHAB R&B

## 2021-11-28 PROCEDURE — 6370000000 HC RX 637 (ALT 250 FOR IP): Performed by: NURSE PRACTITIONER

## 2021-11-28 PROCEDURE — 82947 ASSAY GLUCOSE BLOOD QUANT: CPT

## 2021-11-28 RX ADMIN — CARVEDILOL 12.5 MG: 12.5 TABLET, FILM COATED ORAL at 17:24

## 2021-11-28 RX ADMIN — ENOXAPARIN SODIUM 40 MG: 100 INJECTION SUBCUTANEOUS at 17:24

## 2021-11-28 RX ADMIN — PANTOPRAZOLE SODIUM 40 MG: 40 TABLET, DELAYED RELEASE ORAL at 05:54

## 2021-11-28 RX ADMIN — CYANOCOBALAMIN TAB 500 MCG 1000 MCG: 500 TAB at 08:05

## 2021-11-28 RX ADMIN — INSULIN GLARGINE 20 UNITS: 100 INJECTION, SOLUTION SUBCUTANEOUS at 20:14

## 2021-11-28 RX ADMIN — LEVOTHYROXINE SODIUM 25 MCG: 25 TABLET ORAL at 08:05

## 2021-11-28 RX ADMIN — Medication 100 MG: at 08:05

## 2021-11-28 RX ADMIN — SERTRALINE HYDROCHLORIDE 25 MG: 25 TABLET ORAL at 08:05

## 2021-11-28 RX ADMIN — ATORVASTATIN CALCIUM 80 MG: 80 TABLET, FILM COATED ORAL at 20:14

## 2021-11-28 RX ADMIN — ASPIRIN 81 MG: 81 TABLET, COATED ORAL at 08:05

## 2021-11-28 RX ADMIN — CLOPIDOGREL BISULFATE 75 MG: 75 TABLET ORAL at 08:05

## 2021-11-28 RX ADMIN — CARVEDILOL 12.5 MG: 12.5 TABLET, FILM COATED ORAL at 08:05

## 2021-11-28 RX ADMIN — LOPERAMIDE HYDROCHLORIDE 2 MG: 2 CAPSULE ORAL at 05:54

## 2021-11-28 ASSESSMENT — PAIN SCALES - GENERAL: PAINLEVEL_OUTOF10: 0

## 2021-11-29 LAB
ALBUMIN SERPL-MCNC: 3.5 G/DL (ref 3.5–5.2)
ALP BLD-CCNC: 73 U/L (ref 35–104)
ALT SERPL-CCNC: 16 U/L (ref 5–33)
ANION GAP SERPL CALCULATED.3IONS-SCNC: 9 MMOL/L (ref 7–19)
AST SERPL-CCNC: 15 U/L (ref 5–32)
BASOPHILS ABSOLUTE: 0 K/UL (ref 0–0.2)
BASOPHILS RELATIVE PERCENT: 0.4 % (ref 0–1)
BILIRUB SERPL-MCNC: 0.5 MG/DL (ref 0.2–1.2)
BUN BLDV-MCNC: 12 MG/DL (ref 8–23)
CALCIUM SERPL-MCNC: 8.9 MG/DL (ref 8.8–10.2)
CHLORIDE BLD-SCNC: 108 MMOL/L (ref 98–111)
CO2: 23 MMOL/L (ref 22–29)
CREAT SERPL-MCNC: 0.7 MG/DL (ref 0.5–0.9)
EOSINOPHILS ABSOLUTE: 0.3 K/UL (ref 0–0.6)
EOSINOPHILS RELATIVE PERCENT: 3.6 % (ref 0–5)
GFR AFRICAN AMERICAN: >59
GFR NON-AFRICAN AMERICAN: >60
GLUCOSE BLD-MCNC: 106 MG/DL (ref 70–99)
GLUCOSE BLD-MCNC: 139 MG/DL (ref 70–99)
GLUCOSE BLD-MCNC: 172 MG/DL (ref 70–99)
GLUCOSE BLD-MCNC: 213 MG/DL (ref 70–99)
GLUCOSE BLD-MCNC: 98 MG/DL (ref 74–109)
HCT VFR BLD CALC: 35.8 % (ref 37–47)
HEMOGLOBIN: 12.1 G/DL (ref 12–16)
IMMATURE GRANULOCYTES #: 0 K/UL
LYMPHOCYTES ABSOLUTE: 2.1 K/UL (ref 1.1–4.5)
LYMPHOCYTES RELATIVE PERCENT: 25.4 % (ref 20–40)
MCH RBC QN AUTO: 31.3 PG (ref 27–31)
MCHC RBC AUTO-ENTMCNC: 33.8 G/DL (ref 33–37)
MCV RBC AUTO: 92.7 FL (ref 81–99)
MONOCYTES ABSOLUTE: 0.7 K/UL (ref 0–0.9)
MONOCYTES RELATIVE PERCENT: 9 % (ref 0–10)
NEUTROPHILS ABSOLUTE: 5 K/UL (ref 1.5–7.5)
NEUTROPHILS RELATIVE PERCENT: 61.1 % (ref 50–65)
PDW BLD-RTO: 13.1 % (ref 11.5–14.5)
PERFORMED ON: ABNORMAL
PLATELET # BLD: 204 K/UL (ref 130–400)
PMV BLD AUTO: 9.8 FL (ref 9.4–12.3)
POTASSIUM REFLEX MAGNESIUM: 3.6 MMOL/L (ref 3.5–5)
RBC # BLD: 3.86 M/UL (ref 4.2–5.4)
SODIUM BLD-SCNC: 140 MMOL/L (ref 136–145)
TOTAL PROTEIN: 5.5 G/DL (ref 6.6–8.7)
WBC # BLD: 8.2 K/UL (ref 4.8–10.8)

## 2021-11-29 PROCEDURE — 97130 THER IVNTJ EA ADDL 15 MIN: CPT

## 2021-11-29 PROCEDURE — 97530 THERAPEUTIC ACTIVITIES: CPT

## 2021-11-29 PROCEDURE — 6360000002 HC RX W HCPCS: Performed by: PSYCHIATRY & NEUROLOGY

## 2021-11-29 PROCEDURE — 36415 COLL VENOUS BLD VENIPUNCTURE: CPT

## 2021-11-29 PROCEDURE — 97110 THERAPEUTIC EXERCISES: CPT

## 2021-11-29 PROCEDURE — 85025 COMPLETE CBC W/AUTO DIFF WBC: CPT

## 2021-11-29 PROCEDURE — 82947 ASSAY GLUCOSE BLOOD QUANT: CPT

## 2021-11-29 PROCEDURE — 1180000000 HC REHAB R&B

## 2021-11-29 PROCEDURE — 80053 COMPREHEN METABOLIC PANEL: CPT

## 2021-11-29 PROCEDURE — 97129 THER IVNTJ 1ST 15 MIN: CPT

## 2021-11-29 PROCEDURE — 97116 GAIT TRAINING THERAPY: CPT

## 2021-11-29 PROCEDURE — 6370000000 HC RX 637 (ALT 250 FOR IP): Performed by: PSYCHIATRY & NEUROLOGY

## 2021-11-29 PROCEDURE — 6370000000 HC RX 637 (ALT 250 FOR IP): Performed by: NURSE PRACTITIONER

## 2021-11-29 RX ADMIN — ASPIRIN 81 MG: 81 TABLET, COATED ORAL at 08:54

## 2021-11-29 RX ADMIN — PANTOPRAZOLE SODIUM 40 MG: 40 TABLET, DELAYED RELEASE ORAL at 05:37

## 2021-11-29 RX ADMIN — ATORVASTATIN CALCIUM 80 MG: 80 TABLET, FILM COATED ORAL at 21:00

## 2021-11-29 RX ADMIN — LEVOTHYROXINE SODIUM 25 MCG: 25 TABLET ORAL at 08:54

## 2021-11-29 RX ADMIN — INSULIN GLARGINE 20 UNITS: 100 INJECTION, SOLUTION SUBCUTANEOUS at 21:00

## 2021-11-29 RX ADMIN — CARVEDILOL 12.5 MG: 12.5 TABLET, FILM COATED ORAL at 16:44

## 2021-11-29 RX ADMIN — LOPERAMIDE HYDROCHLORIDE 2 MG: 2 CAPSULE ORAL at 05:37

## 2021-11-29 RX ADMIN — CARVEDILOL 12.5 MG: 12.5 TABLET, FILM COATED ORAL at 08:54

## 2021-11-29 RX ADMIN — INSULIN LISPRO 2 UNITS: 100 INJECTION, SOLUTION INTRAVENOUS; SUBCUTANEOUS at 12:08

## 2021-11-29 RX ADMIN — SERTRALINE HYDROCHLORIDE 25 MG: 25 TABLET ORAL at 08:54

## 2021-11-29 RX ADMIN — CLOPIDOGREL BISULFATE 75 MG: 75 TABLET ORAL at 08:54

## 2021-11-29 RX ADMIN — CYANOCOBALAMIN TAB 500 MCG 1000 MCG: 500 TAB at 08:54

## 2021-11-29 RX ADMIN — Medication 100 MG: at 08:54

## 2021-11-29 RX ADMIN — ENOXAPARIN SODIUM 40 MG: 100 INJECTION SUBCUTANEOUS at 16:44

## 2021-11-29 ASSESSMENT — PAIN SCALES - GENERAL
PAINLEVEL_OUTOF10: 0
PAINLEVEL_OUTOF10: 0

## 2021-11-29 NOTE — PROGRESS NOTES
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 Referral 21 Dr. Bisi Taylor     Additional Pertinent Hx: Dysarthria, hypothryoidism, anxiety, CAD, CHF, COPD, T2DM, CKD, MI x 2     RESTRICTIONS/PRECAUTIONS:    Restrictions/Precautions  Restrictions/Precautions: Weight Bearing, Fall Risk     Subjective:   Pt in bed reading a book upon SLP entry. Pt participated with all therapy tasks. Objective:  Tactic game presented. Novel game for patient to participate and learn rules. Pt demonstrated 100% comprehension of rules read aloud. . Pt initiated, planned, and completed task with minimal difficulty requiring verbal prompts occasionally. During task, pt matched colors and shapes, counted manipulative items, took turns, and attempted to keep score independently. Dysarthric strategies targeted within structured conversation. Pt noted with difficulty during word 'community', and she identified use of slow-rate to correct intelligibility. Pt demonstrated use of strategies on 100% of opportunities. SHORT TERM GOAL #1:  Goal 1: The patient will participate in the Cognitive Linguistic Quick Test or CLQT. SHORT TERM GOAL #2:  Goal 2: The patient will complete tasks for medication management with minimal cues and 100% accuracy. SHORT TERM GOAL #3:  Goal 3: The patient will complete tasks for finance management with minimal cues and 100% accuracy.     SHORT TERM GOAL #4:  Goal 4: The patient will use the (over articulation/slow rate/writing key word/elongation of thevowel/increased loudness/phrasing) strategy to improve speech intelligibility withwords/phrases/sentences/in conversation with 100% accuracy. SHORT TERM GOAL #5:  Goal 5: The patient will complete daily oral-motor exercise to increase lingual and labial range of motion, strength and coordination with (min/mod/max) verbal, tactile and visual cues to improve speech intelligibility. Swallowing Short Term Goals       Comprehension: 5 - Patient understands basic needs (hungry/hot/pain)  Expression: 5 - Expresses basic ideas/needs only (hungry/hot/pain)  Social Interaction: 5 - Patient is appropriate with supervision/cues  Problem Solvin - Patient able to solve simple/routine tasks  Memory: 5 - Patient requires prompting with stress/unfamiliar situations    ASSESSMENT:  Assessment: [x]Progressing towards goals          []Not Progressing towards goals    Patient Tolerance of Treatment:   [x]Tolerated well []Tolerated fair []Required rest breaks []Fatigued    Education:  Learner:  [x]Patient          []Significant Other          []Other  Education provided regarding:  [x]Goals and POC   []Diet and swallowing precautions    []Home Exercise Program  []Progress and/or discharge information  Method of Education:  [x]Discussion          []Demonstration          []Handout          []Other  Evaluation of Education:   [x]Verbalized understanding   []Demonstrates without assistance  []Demonstrates with assistance  []Needs further instruction     []No evidence of learning                  []No family present      Plan: [x]Continue with current plan of care    []Modify current plan of care as follows:    []Discharge patient    Discharge Location:    Services/Supervision Recommended:      [x]Patient continues to require treatment by a licensed therapist to address functional deficits as outlined in the established plan of care.     Electronically signed by GABRIELLE Hughes on 21 at 3:33 PM CST

## 2021-11-29 NOTE — PLAN OF CARE
Problem: Falls - Risk of:  Goal: Will remain free from falls  Description: Will remain free from falls  Outcome: Ongoing  Goal: Absence of physical injury  Description: Absence of physical injury  Outcome: Ongoing     Problem: HEMODYNAMIC STATUS  Goal: Patient has stable vital signs and fluid balance  Outcome: Ongoing     Problem: ACTIVITY INTOLERANCE/IMPAIRED MOBILITY  Goal: Mobility/activity is maintained at optimum level for patient  Outcome: Ongoing     Problem: COMMUNICATION IMPAIRMENT  Goal: Ability to express needs and understand communication  Outcome: Ongoing     Problem: SAFETY  Goal: Free from accidental physical injury  Outcome: Ongoing  Goal: Free from intentional harm  Outcome: Ongoing  Goal: LTG - patient will adhere to hip precautions during ADL's and transfers  Outcome: Ongoing  Goal: LTG - Patient will demonstrate safety requirements appropriate to situation/environment  Outcome: Ongoing  Goal: LTG - patient will utilize safety techniques  Outcome: Ongoing  Goal: STG - patient locks brakes on wheelchair  Outcome: Ongoing  Goal: STG - Patient uses call light consistently to request assistance with transfers  Outcome: Ongoing  Goal: STG - patient uses gait belt during all transfers  Outcome: Ongoing     Problem: DAILY CARE  Goal: Daily care needs are met  Outcome: Ongoing     Problem: PAIN  Goal: Patient's pain/discomfort is manageable  Outcome: Ongoing  Goal: LTG - Patient will manage pain with the appropriate technique/Intervention  Outcome: Ongoing  Goal: LTG - Patient will demonstrate intervention for managing pain  Outcome: Ongoing  Goal: STG - Patient will reduce or eliminate use of analgesics  Outcome: Ongoing  Goal: STG - pain is manageable through therapies  Outcome: Ongoing  Goal: STG - Patient will verbalize an acceptable level of pain  Outcome: Ongoing  Goal: STG - patients pain is managed to allow active participation in daily activities  Outcome: Ongoing  Goal: STG - Patient will increase activity level  Outcome: Ongoing  Goal: STG - patient verbalizes a reduction in pain level  Outcome: Ongoing     Problem: SKIN INTEGRITY  Goal: Skin integrity is maintained or improved  Outcome: Ongoing  Goal: LTG - Patient will be free from infection  Outcome: Ongoing  Goal: LTG - patient will maintain/improve skin integrity through proper skin care techniques  Outcome: Ongoing  Goal: LTG - Patient will demonstrate appropriate pressure relief techniques  Outcome: Ongoing  Goal: LTG - patient will demonstrate appropriate skin care techniques  Outcome: Ongoing  Goal: LTG - Patient will be free from infection  Outcome: Ongoing  Goal: STG - Patient demonstrates skin care/treatment/dressing change  Outcome: Ongoing  Goal: STG - patient will maintain good skin integrity  Outcome: Ongoing  Goal: STG - Patient exhibits signs of wound healing. Outcome: Ongoing  Goal: STG - patient demonstrates pressure reduction techniques  Outcome: Ongoing  Goal: STG - Patient demonstrates preventative skin care measures  Outcome: Ongoing     Problem: KNOWLEDGE DEFICIT  Goal: Patient/S.O. demonstrates understanding of disease process, treatment plan, medications, and discharge instructions.   Outcome: Ongoing     Problem: DISCHARGE BARRIERS  Goal: Patient's continuum of care needs are met  Outcome: Ongoing     Problem: IP BLADDER/VOIDING  Goal: LTG - patient will complete bladder elimination  Outcome: Ongoing  Goal: LTG - Patient will utilize adaptive techniques/equipment to complete bladder elimination  Outcome: Ongoing  Goal: LTG - patient will achieve acceptable level of continence  Outcome: Ongoing  Goal: STG - Patient demonstrates ability to take care of indwelling catheter  Outcome: Ongoing  Goal: STG - patient demonstrates self-cath technique using clean technique and care of the catheter  Outcome: Ongoing  Goal: STG - Patient demonstrates no accidents  Outcome: Ongoing  Goal: STG - Patient will state signs and symptoms of UTI  Outcome: Ongoing  Goal: STG - patient will be able to empty bladder  Outcome: Ongoing  Goal: STG - Patient demonstrates understanding of self catheterization schedule by completing task on time  Outcome: Ongoing  Goal: STG - patient participates in bladder program by expressing need to void  Outcome: Ongoing  Goal: STG - Patient verbalizes understanding of catheter care indwelling/intermittent  Outcome: Ongoing  Goal: STG - patient participates in bladder program by adhering to implemented toileting schedule  Outcome: Ongoing     Problem: IP BOWEL ELIMINATION  Goal: LTG - patient will utilize adaptive techniques/equipment to complete bowel elimination  Outcome: Ongoing  Goal: LTG - patient will have regular and routine bowel evacuation  Outcome: Ongoing  Goal: STG - patient will be accident free  Outcome: Ongoing  Goal: STG - Patient demonstrates care and management of ostomy bag  Outcome: Ongoing  Goal: STG - Patient participates in bowel management program  Outcome: Ongoing  Goal: STG - patient maintains skin integrity  Outcome: Ongoing  Goal: STG - Patient will verbalize signs and symptoms of constipation and how to prevent/alleviate  Outcome: Ongoing  Goal: STG - patient will be continent of stool  Outcome: Ongoing  Goal: STG - Patient completes digital stimulation technique  Outcome: Ongoing  Goal: STG - Patient verbalizes knowledge about relationship between diet, fluid intake, activity and medication on constipation  Outcome: Ongoing     Problem: IP BREATHING  Goal: LTG - patient will mobilize secretions and maintain airway  Outcome: Ongoing  Goal: LTG - Patient/caregiver will demonstrate/perform proper techniques to maintain patent airway  Outcome: Ongoing  Goal: LTG - patient/caregiver will demonstrate/perform improved or stable self care abilities within physical limitations  Outcome: Ongoing  Goal: STG - Patient/caregiver will maintain patent airway  Outcome: Ongoing  Goal: STG - respiratory rate and effort will be within normal limits for the patient  Outcome: Ongoing  Goal: STG - patient/caregiver will be able to verbalize oxygen safety precautions  Outcome: Ongoing  Goal: STG - Patient/caregiver demonstrates correct suctioning technique  Outcome: Ongoing  Goal: STG - patient will utilize incentive spirometer  Outcome: Ongoing  Goal: STG - Patient performs or directs assisted coughing  Outcome: Ongoing  Goal: STG - patient can administer MDI's  Outcome: Ongoing  Goal: STG - Patient can utilize incentive spirometer  Outcome: Ongoing  Goal: STG - family can complete suctioning  Outcome: Ongoing     Problem: NUTRITION  Goal: Patient maintains adequate hydration  Outcome: Ongoing  Goal: Patient maintains weight  Outcome: Ongoing  Goal: Patient/Family demonstrates understanding of diet  Outcome: Ongoing  Goal: Patient/Family independently completes tube feeding  Outcome: Ongoing  Goal: Patient will have no more than 5 lb weight change during LOS  Outcome: Ongoing  Goal: Patient will utilize adaptive techniques to administer nutrition  Outcome: Ongoing  Goal: Patient will verbalize dietary restrictions  Outcome: Ongoing     Problem: Bleeding:  Goal: Will show no signs and symptoms of excessive bleeding  Description: Will show no signs and symptoms of excessive bleeding  Outcome: Ongoing     Problem: Serum Glucose Level - Abnormal:  Goal: Ability to maintain appropriate glucose levels has stabilized  Description: Ability to maintain appropriate glucose levels has stabilized  Outcome: Ongoing     Problem: Skin Integrity:  Goal: Will show no infection signs and symptoms  Description: Will show no infection signs and symptoms  Outcome: Ongoing  Goal: Absence of new skin breakdown  Description: Absence of new skin breakdown  Outcome: Ongoing

## 2021-11-29 NOTE — PATIENT CARE CONFERENCE
[facility] ACUTE INPATIENT REHABILITATION  TEAM CONFERENCE NOTE    Date: 2021  Patient Name: Gale Lang        MRN: [id number]    : 1940  (79 y.o.)  Gender: female      Diagnosis: Cerebral infarction, R body involvement, posterior limb left internal capsule lacunar infarct. Referral 21 Dr. Nestor Cummins  Comment: hip 4/5, knee and ankle 4+/5  Strength LLE  Comment: 4+/5 grossly  ROM        BED MOBILITY  Bed Mobility  Rolling: Supervision  Supine to Sit: Supervision  Sit to Supine: Supervision  Scooting: Supervision  TRANSFERS  Transfers  Sit to Stand: Supervision  Stand to sit: Supervision  Bed to Chair: Stand by assistance  Car Transfer: Stand by assistance  Comment: Practiced Car TF  WHEELCHAIR PROPULSION  Propulsion 1  Propulsion: Manual  Level: Level Tile  Method: RUBY CHAUHAN (Patient noted previously using feet to propell w/c)  Level of Assistance: Supervision  Description/ Details: 2 R, 1 L turn. Patient UEs fatigued ~125'  Distance: 150'  AMBULATION  Ambulation 1  Surface: level tile  Device: Rolling Walker  Other Apparatus: O2  Assistance: Stand by assistance  Quality of Gait: Good and steady pace. No SOB noted during amb  Gait Deviations: Decreased step length, Decreased step height  Distance: 250'  Comments: VCs to reach back before sitting after amb  STAIRS     GOALS:  Short term goals  Time Frame for Short term goals: 1-2 weeks  Short term goal 1: Supervision with ambulation using '  Short term goal 2: Supervision with car transfer  Short term goal 3: Independent with bed mobility  Short term goal 4: Supervision with w/c mobility 250'  Short term goal 5: Supervision with 4 stairs using unilateral rail    Long term goals  Time Frame for Long term goals : 2-3 weeks  Long term goal 1: Independent with ambulation using '  Long term goal 2: Independent with car transfer  Long term goal 3:  Independent with transfers  Long term goal 4: patient will complete daily oral-motor exercise to increase lingual and labial range of motion, strength and coordination with (min/mod/max) verbal, tactile and visual cues to improve speech intelligibility. Not Met    Long Term Goals: The patient will develop functional and intelligible speech and utilize compensatory strategies through the use of adequate labial and lingual function, increased articulatory precision and speech prosody. Not Met    STGs Met: 3  LTGs Met: 0    ELOS: 2-3 weeks      Comprehension: 5 - Patient understands basic needs (hungry/hot/pain)  Expression: 5 - Expresses basic ideas/needs only (hungry/hot/pain)  Social Interaction: 5 - Patient is appropriate with supervision/cues  Problem Solvin - Patient able to solve simple/routine tasks  Memory: 5 - Patient requires prompting with stress/unfamiliar situations        OCCUPATIONAL THERAPY    CURRENT IRF-GIA SCORES  Eating: CARE Score: 5       Oral Hygiene: CARE Score: 5        Toileting: CARE Score: 4  Comment: CGA for balance      Shower/Bathe: CARE Score: 4  Comment: CGA with shower        Upper Body Dressing: CARE Score: 5         Lower Body Dressing: CARE Score: 4  Comment: CGA       Footwear: CARE Score: 3  Comment: Due to fatigue post shower       Toilet Transfers: CARE Score: 4  Comment: CGA, RW       Picking Up Object:  CARE Score: 2          UE Functioning:  WFLs    Pain Assessment:  Pain Level: 0       STGs:  Short term goals  Time Frame for Short term goals: 1 week  Short term goal 1: Supervision with bathing hygiene. Short term goal 2: Supervision with clothing management/hygiene for toileting. Short term goal 3: Supervision with toilet transfers. Short term goal 4: Supervision with clothing management/hygiene for toileting. Short term goal 5: Supervision with LB dressing. Short term goal 6: Supervision with one-two handed static standing task for 4 minutes.     LTGs:  Long term goals  Time Frame for Long term goals : 2 Therapy, Speech Therapy per evaluations; will have [facility] home services available    Equipment at Discharge: to be determined    Progress made in the prior week:  1. SBA for toileting  2.  3.  4.  5.      Goals for following week:  1. Mod I for ADLs  2.   3.   4.   5.     Factors facilitating achievement of predicted outcomes: Family support, alert, oriented    Barriers to the achievement of predicted outcomes: sensation loss    Team Members Present at Conference:  : Flo Shin   Occupational Therapist: Maria E Noyola OTR/L  Physical Therapist: Eliz Aguirre PT  Speech Therapist: Antwon Woodward [address], [id number] [facility]  Nurse: Zacarias Canavan, RN,BSN   Nurse Manager:  Zacarias Canavan, RN, BSN  Dietitian:  Lidia Homans, MS, RD, LD  Rehab Director:  Danielle Cabrera approve the established interdisciplinary plan of care as documented within the medical record of [facility].

## 2021-11-29 NOTE — PROGRESS NOTES
Tank Soriano  [id number]     11/29/21 1131 11/29/21 1132 11/29/21 1133   Restrictions/Precautions   Restrictions/Precautions Fall Risk  --   --    Lower Extremity Weight Bearing Restrictions   Right Lower Extremity Weight Bearing Weight Bearing As Tolerated  --   --    Left Lower Extremity Weight Bearing Weight Bearing As Tolerated  --   --    Subjective   Subjective Pt agreed to therapy this morning. --   --    Pain Screening   Patient Currently in Pain No  --   --    Pain Assessment   Pain Assessment 0-10  --   --    Pain Level 0  --   --    Bed Mobility   Rolling  --  Supervision  --    Supine to Sit  --  Supervision  --    Sit to Supine  --  Supervision  --    Transfers   Sit to Stand  --  Supervision  --    Stand to sit  --  Supervision  --    Car Transfer  --  Stand by assistance  --    Comment  --  Practiced Car TF  --    Ambulation   Ambulation?  --   --  Yes   Ambulation 1   Surface  --   --  level tile   Device  --   --  [employer]  --   --  Stand by assistance   Quality of Gait  --   --  Good and steady pace.  No SOB noted during amb   Gait Deviations  --   --  Decreased step length; Decreased step height   Distance  --   --  250'   Comments  --   --  VCs to reach back before sitting after amb   Exercises   Comments  --   --   --    Conditions Requiring Skilled Therapeutic Intervention   Body structures, Functions, Activity limitations  --   --   --    Assessment  --   --   --    Activity Tolerance   Activity Tolerance  --   --   --       11/29/21 1151 11/29/21 1152   Restrictions/Precautions   Restrictions/Precautions  --   --    Lower Extremity Weight Bearing Restrictions   Right Lower Extremity Weight Bearing  --   --    Left Lower Extremity Weight Bearing  --   --    Subjective   Subjective  --   --    Pain Screening   Patient Currently in Pain  --   --    Pain Assessment   Pain Assessment  --   --    Pain Level  --   --    Bed Mobility   Rolling  --   --    Supine to Sit  --   --

## 2021-11-29 NOTE — PROGRESS NOTES
Occupational Therapy     11/29/21 1400   Restrictions/Precautions   Restrictions/Precautions Fall Risk   General   Additional Pertinent Hx Depression and anxiety; CKD; (R) TKA; HTN; arthritis; CAD; CHF; COPD; MIx2; thyroid dx; DM; asthma   Diagnosis L CVA   Coordination   Fine Motor comprehensive FM HEP with Theraputty- min/medium resistanct   Assessment   Performance deficits / Impairments Decreased functional mobility ; Decreased ADL status;  Decreased strength; Decreased balance; Decreased endurance; Decreased high-level IADLs; Decreased fine motor control; Decreased coordination   Assessment Pt states she mostly eats in the dining room at independent living facility but does occasionally complete light food prep (e.g. fried egg, oatmeal, cornbread.)    Treatment Diagnosis L CVA   Timed Code Treatment Minutes 30 Minutes

## 2021-11-29 NOTE — PROGRESS NOTES
Occupational Therapy     11/29/21 1000   Restrictions/Precautions   Restrictions/Precautions Fall Risk   General   Additional Pertinent Hx Depression and anxiety; CKD; (R) TKA; HTN; arthritis; CAD; CHF; COPD; MIx2; thyroid dx; DM; asthma   Diagnosis L CVA   Subjective   Subjective Pt c/o extreme coldness and tingling in B hands   Balance   Standing Balance Supervision   Standing Balance   Time 3 mins   Activity 2 hand task   Functional Mobility   Functional - Mobility Device Rolling Walker   Activity Other   Assist Level Supervision   Type of ROM/Therapeutic Exercise   Type of ROM/Therapeutic Exercise Cane/Wand   Comment 2#    Coordination   Fine Motor R hand FM coordination act   Sensory Integration   Tactile Stimulation sensory rods   Assessment   Performance deficits / Impairments Decreased functional mobility ; Decreased ADL status;  Decreased strength; Decreased balance; Decreased endurance; Decreased high-level IADLs; Decreased fine motor control; Decreased coordination   Treatment Diagnosis L CVA   Timed Code Treatment Minutes 60 Minutes   Activity Tolerance   Activity Tolerance Patient Tolerated treatment well

## 2021-11-29 NOTE — PROGRESS NOTES
Nutrition Assessment     Type and Reason for Visit: Reassess    Nutrition Recommendations/Plan:   Prunes with dinner     Nutrition Assessment:  Pt improving nutritionally AEB improvement in po intakes (more meals 26-75%). Glucose 106-204. Pt states diarrhea has resolved, however now feels constipated. States she takes Imodium and a Probiotic daily at home to stay regular. Will send Prunes with dinner and recommend consider adding a probiotic. Malnutrition Assessment:  Malnutrition Status: At risk for malnutrition (Comment)    Current Nutrition Therapies:    ADULT DIET; Regular; 5 carb choices (75 gm/meal)    Anthropometric Measures:  · Height: 5' 6\" (167.6 cm)  · Current Body Wt: 161 lb (73 kg)   · BMI: 26    Nutrition Interventions:   Food and/or Nutrient Delivery:  Continue Current Diet  Nutrition Education/Counseling:  Education not indicated   Coordination of Nutrition Care:  Continue to monitor while inpatient    Goals:  Po intake 50% or greater of meals. wt stable. diarrhea improvement. Nutrition Monitoring and Evaluation:   Food/Nutrient Intake Outcomes:  Food and Nutrient Intake  Physical Signs/Symptoms Outcomes:  Biochemical Data, Nutrition Focused Physical Findings, Skin, Weight, Constipation     Discharge Planning:     Too soon to determine     Electronically signed by Lidia Homans, MS, RD, LD on 11/29/21 at 2:40 PM CST    Contact: [phone]

## 2021-11-29 NOTE — PROGRESS NOTES
[facility]  INPATIENT SPEECH THERAPY  [facility] REH[facility]        TIME   Time In: 0900  Time Out: 1000  Minutes: 60       [x]Daily Note  []Progress Note    Date: 2021  Patient Name: Cierra Tesfaye        MRN: [id number]    Account #: [de-identified]  : 1940  (79 y.o.)  Gender: female   Primary Provider: Joel Maloney MD  Swallowing Status/Diet: Regular  Diet, thin liquids    PATIENT DIAGNOSIS(ES):    Diagnosis: Cerebral infarction, R body involvement, posterior limb left internal capsule lacunar infarct. Referral 21 Dr. Louisa Cheng     Additional Pertinent Hx: Dysarthria, hypothryoidism, anxiety, CAD, CHF, COPD, T2DM, CKD, MI x 2     RESTRICTIONS/PRECAUTIONS:    Restrictions/Precautions  Restrictions/Precautions: Weight Bearing, Fall Risk            Subjective: The patient was upright in bed. She had taken herself to the bathroom this morning. She complained of stomach pain 2 out of 10. Objective:  She completed a complex task for budgeting and scored at 80% without cueing. She did not utilize a calculator for this task. She completed a task for medication management. She was able to demonstrate her method of preparing her pill organizer. She was able to recall all medications without cueing. She has her prescriptions mailed to her home. She also completed a task for bill payments without cueing. She scored at 100%. SLP will continue to see her for dysarthria. Her cognitive goals will be discontinued as she has met these. Diet Recommendations:  Regular diet  Thin liquids  Medications may be whole with water     Swallowing Precautions: Alternate liquids and foods  Small bites and sips  Slow rate  Upright for all oral intake  Remain upright following all oral intake       Plan:    Continued daily Speech/Language treatment with goals per patient's plan of care.         SHORT TERM GOAL #1:  Goal 1: The patient will participate in the Cognitive Linguistic Quick Test or CLQT.  Met    SHORT TERM GOAL #2:  Goal 2: The patient will complete tasks for medication management with minimal cues and 100% accuracy. Met    SHORT TERM GOAL #3:  Goal 3: The patient will complete tasks for finance management with minimal cues and 100% accuracy. Met    SHORT TERM GOAL #4:  Goal 4: The patient will use the (over articulation/slow rate/writing key word/elongation of thevowel/increased loudness/phrasing) strategy to improve speech intelligibility withwords/phrases/sentences/in conversation with 100% accuracy. Not Met  SHORT TERM GOAL #5:  Goal 5: The patient will complete daily oral-motor exercise to increase lingual and labial range of motion, strength and coordination with (min/mod/max) verbal, tactile and visual cues to improve speech intelligibility. Not Met    Long Term Goals: The patient will develop functional and intelligible speech and utilize compensatory strategies through the use of adequate labial and lingual function, increased articulatory precision and speech prosody.  Not Met    STGs Met: 3  LTGs Met: 0    ELOS: 2-3 weeks      Comprehension: 5 - Patient understands basic needs (hungry/hot/pain)  Expression: 5 - Expresses basic ideas/needs only (hungry/hot/pain)  Social Interaction: 5 - Patient is appropriate with supervision/cues  Problem Solvin - Patient able to solve simple/routine tasks  Memory: 5 - Patient requires prompting with stress/unfamiliar situations    ASSESSMENT:  Assessment: [x]Progressing towards goals          []Not Progressing towards goals    Patient Tolerance of Treatment:   [x]Tolerated well []Tolerated fair []Required rest breaks []Fatigued    Education:  Learner:  [x]Patient          []Significant Other          []Other  Education provided regarding:  [x]Goals and POC   []Diet and swallowing precautions    []Home Exercise Program  []Progress and/or discharge information  Method of Education:  [x]Discussion          []Demonstration          []Handout          []Other  Evaluation

## 2021-11-30 LAB
GLUCOSE BLD-MCNC: 165 MG/DL (ref 70–99)
GLUCOSE BLD-MCNC: 213 MG/DL (ref 70–99)
GLUCOSE BLD-MCNC: 233 MG/DL (ref 70–99)
GLUCOSE BLD-MCNC: 240 MG/DL (ref 70–99)
PERFORMED ON: ABNORMAL

## 2021-11-30 PROCEDURE — 6370000000 HC RX 637 (ALT 250 FOR IP): Performed by: PSYCHIATRY & NEUROLOGY

## 2021-11-30 PROCEDURE — 6360000002 HC RX W HCPCS: Performed by: PSYCHIATRY & NEUROLOGY

## 2021-11-30 PROCEDURE — 97110 THERAPEUTIC EXERCISES: CPT

## 2021-11-30 PROCEDURE — 82947 ASSAY GLUCOSE BLOOD QUANT: CPT

## 2021-11-30 PROCEDURE — 6370000000 HC RX 637 (ALT 250 FOR IP): Performed by: NURSE PRACTITIONER

## 2021-11-30 PROCEDURE — 1180000000 HC REHAB R&B

## 2021-11-30 PROCEDURE — 92507 TX SP LANG VOICE COMM INDIV: CPT

## 2021-11-30 PROCEDURE — 97116 GAIT TRAINING THERAPY: CPT

## 2021-11-30 PROCEDURE — 99233 SBSQ HOSP IP/OBS HIGH 50: CPT | Performed by: PSYCHIATRY & NEUROLOGY

## 2021-11-30 PROCEDURE — 97530 THERAPEUTIC ACTIVITIES: CPT

## 2021-11-30 RX ADMIN — INSULIN LISPRO 1 UNITS: 100 INJECTION, SOLUTION INTRAVENOUS; SUBCUTANEOUS at 21:07

## 2021-11-30 RX ADMIN — LEVOTHYROXINE SODIUM 25 MCG: 25 TABLET ORAL at 08:17

## 2021-11-30 RX ADMIN — INSULIN LISPRO 1 UNITS: 100 INJECTION, SOLUTION INTRAVENOUS; SUBCUTANEOUS at 08:20

## 2021-11-30 RX ADMIN — PANTOPRAZOLE SODIUM 40 MG: 40 TABLET, DELAYED RELEASE ORAL at 05:46

## 2021-11-30 RX ADMIN — SERTRALINE HYDROCHLORIDE 25 MG: 25 TABLET ORAL at 08:17

## 2021-11-30 RX ADMIN — INSULIN LISPRO 2 UNITS: 100 INJECTION, SOLUTION INTRAVENOUS; SUBCUTANEOUS at 17:06

## 2021-11-30 RX ADMIN — Medication 100 MG: at 08:17

## 2021-11-30 RX ADMIN — ACETAMINOPHEN 650 MG: 325 TABLET ORAL at 21:07

## 2021-11-30 RX ADMIN — CYANOCOBALAMIN TAB 500 MCG 1000 MCG: 500 TAB at 08:18

## 2021-11-30 RX ADMIN — CARVEDILOL 12.5 MG: 12.5 TABLET, FILM COATED ORAL at 17:05

## 2021-11-30 RX ADMIN — INSULIN LISPRO 2 UNITS: 100 INJECTION, SOLUTION INTRAVENOUS; SUBCUTANEOUS at 12:20

## 2021-11-30 RX ADMIN — CLOPIDOGREL BISULFATE 75 MG: 75 TABLET ORAL at 08:18

## 2021-11-30 RX ADMIN — ENOXAPARIN SODIUM 40 MG: 100 INJECTION SUBCUTANEOUS at 17:06

## 2021-11-30 RX ADMIN — INSULIN GLARGINE 20 UNITS: 100 INJECTION, SOLUTION SUBCUTANEOUS at 21:07

## 2021-11-30 RX ADMIN — CARVEDILOL 12.5 MG: 12.5 TABLET, FILM COATED ORAL at 08:18

## 2021-11-30 RX ADMIN — ATORVASTATIN CALCIUM 80 MG: 80 TABLET, FILM COATED ORAL at 21:07

## 2021-11-30 RX ADMIN — ASPIRIN 81 MG: 81 TABLET, COATED ORAL at 08:18

## 2021-11-30 ASSESSMENT — PAIN SCALES - GENERAL: PAINLEVEL_OUTOF10: 0

## 2021-11-30 NOTE — PROGRESS NOTES
Name: Jesus Ramirez  MRN: [id number]  Date of Service:  11/30/2021 11/30/21 1411   Restrictions/Precautions   Restrictions/Precautions Fall Risk   Required Braces or Orthoses? No   Lower Extremity Weight Bearing Restrictions   Right Lower Extremity Weight Bearing Weight Bearing As Tolerated   Left Lower Extremity Weight Bearing Weight Bearing As Tolerated   General   Chart Reviewed Yes   Additional Pertinent Hx Dysarthria, hypothryoidism, anxiety, CAD, CHF, COPD, T2DM, CKD, MI x 2   Response To Previous Treatment Not applicable   Family / Caregiver Present No   Referring Practitioner Ike Longo, APRN - CNP, Dr. Pedro Blackwell in bed, agrees to participate in therapy. Pain Screening   Patient Currently in Pain Denies   Bed Mobility   Rolling Modified independent   Supine to Sit Supervision; Modified independent   Sit to Supine Supervision; Modified independent   Scooting Modified independent; Independent  (To EOB )   Transfers   Sit to Stand Supervision; Modified independent   Stand to sit Supervision; Modified independent   Lateral Transfers Supervision   Ambulation   Ambulation? Yes   More Ambulation? Yes   Ambulation 1   Surface level tile   Device Rolling [employer] Stand by assistance; Supervision   Quality of Gait Good and steady pace. No SOB or LOB noted during amb   Gait Deviations Decreased step height   Distance 10'   Comments EOB<w/c   Ambulation 2   Surface - 2 level tile   Device 2 Rolling Walker   Assistance 2 Stand by assistance; Supervision   Quality of Gait 2 same as previous    Gait Deviations Decreased step height   Distance 200'   Comments Trial use of 1# on RLE to increase proprioception, somewhat improved control, continues to present with eversion and some intermittent toe drag.    (Including L and R turns )   Patient Goals    Patient goals  go home   Short term goals   Time Frame for Short term goals 1-2 weeks   Short term goal 1 Supervision with ambulation using '   Short term goal 2 Supervision with car transfer   Short term goal 3 Independent with bed mobility   Short term goal 4 Supervision with w/c mobility 250'   Short term goal 5 Supervision with 4 stairs using unilateral rail   Long term goals   Time Frame for Long term goals  2-3 weeks   Long term goal 1 Independent with ambulation using '   Long term goal 2 Independent with car transfer   Long term goal 3 Independent with transfers   Long term goal 4 Independent with 4 stairs with unilateral rail   Conditions Requiring Skilled Therapeutic Intervention   Body structures, Functions, Activity limitations Decreased functional mobility ; Decreased strength; Decreased endurance; Decreased balance   Assessment Pt presents with minimal improvement of amb. quality with 1# added to RLE. Pt continues to be able to perform most aspects of standing ADLs with SBA/Supervision. History CVA with right body involvement, Dysarthria, hypothryoidism, anxiety, CAD, CHF, COPD, T2DM, CKD, MI x 2   Treatment Initiated  pre-gait, transfers, bed mobility   Discharge Recommendations Home with Home health PT   Activity Tolerance   Activity Tolerance Patient Tolerated treatment well   Activity Tolerance distracted and needs redirection, impulsive   Plan   Times per week 5-6   Plan weeks 1-3   Current Treatment Recommendations Strengthening; Balance Training; Functional Mobility Training; Transfer Training; Endurance Training; Wheelchair Mobility Training; Gait Training; Stair training; Home Exercise Program; Safety Education & Training; Patient/Caregiver Education & Training; Equipment Evaluation, Education, & procurement   Plan Comment cont. PT as pt is able with progressive mobility and safety training.    Safety Devices   Type of devices Patient at risk for falls; Gait belt; Left in bed; Bed alarm in place; Call light within reach   PT Whiteboard Notes   Therapy Whiteboard RE 12/5 CVA, R side weakness         Electronically signed by Carissa Quevedo PTA on 11/30/2021 at 3:44 PM

## 2021-11-30 NOTE — PROGRESS NOTES
[facility]  INPATIENT SPEECH THERAPY  [facility] 8 REHAB UNIT  TIME   Time In: 0900  Time Out: 0930  Minutes: 30       [x]Daily Note  []Progress Note    Date: 2021  Patient Name: Tom Pugh        MRN: [id number]    Account #: [de-identified]  : 1940  (79 y.o.)  Gender: female   Primary Provider: Patricia Bass MD  Swallowing Status/Diet: Regular diet, thin liquids        PATIENT DIAGNOSIS(ES):    Diagnosis: Cerebral infarction, R body involvement, posterior limb left internal capsule lacunar infarct. Referral 21 Dr. Stevie Lim     Additional Pertinent Hx: Dysarthria, hypothryoidism, anxiety, CAD, CHF, COPD, T2DM, CKD, MI x 2     RESTRICTIONS/PRECAUTIONS:    Restrictions/Precautions  Restrictions/Precautions: Weight Bearing, Fall Risk                 Subjective:   She denied pain this date. She states she slept well. She was cooperative with all tasks. Objective:  She was easily distracted this morning. She was attempting to put in her hearing aids and began using her phone. Therapist provided cues to put in her hearing aids. Oral motor exercises were completed to improve lingual and labial strength and range of motion. Diadochokinetic tasks were completed. Decreased accuracy and rate were noted. Phrases and sentences which included fricatives and bilabials were completed while utilizing dysarthria strategies. She also completed five syllable words with fricatives while practicing her dysarthria strategies. Her speech intelligibility was at 100% this date. A handout with all lingual and labial exercises was reviewed and provided to her. She is agreeable to completing her oral motor exercises in her room. SHORT TERM GOAL #1:  Goal 1: The patient will participate in the Cognitive Linguistic Quick Test or CLQT. SHORT TERM GOAL #2:  Goal 2: The patient will complete tasks for medication management with minimal cues and 100% accuracy.     SHORT TERM GOAL #3:  Goal 3: The patient will complete tasks for finance management with minimal cues and 100% accuracy. SHORT TERM GOAL #4:  Goal 4: The patient will use the (over articulation/slow rate/writing key word/elongation of thevowel/increased loudness/phrasing) strategy to improve speech intelligibility withwords/phrases/sentences/in conversation with 100% accuracy. SHORT TERM GOAL #5:  Goal 5: The patient will complete daily oral-motor exercise to increase lingual and labial range of motion, strength and coordination with (min/mod/max) verbal, tactile and visual cues to improve speech intelligibility.       Comprehension: 5 - Patient understands basic needs (hungry/hot/pain)  Expression: 5 - Expresses basic ideas/needs only (hungry/hot/pain)  Social Interaction: 5 - Patient is appropriate with supervision/cues  Problem Solvin - Patient able to solve simple/routine tasks  Memory: 5 - Patient requires prompting with stress/unfamiliar situations    ASSESSMENT:  Assessment: [x]Progressing towards goals          []Not Progressing towards goals    Patient Tolerance of Treatment:   [x]Tolerated well []Tolerated fair []Required rest breaks []Fatigued    Education:  Learner:  [x]Patient          []Significant Other          []Other  Education provided regarding:  [x]Goals and POC   []Diet and swallowing precautions    []Home Exercise Program  []Progress and/or discharge information  Method of Education:  [x]Discussion          []Demonstration          []Handout          []Other  Evaluation of Education:   [x]Verbalized understanding   []Demonstrates without assistance  []Demonstrates with assistance  []Needs further instruction     []No evidence of learning                  []No family present      Plan: [x]Continue with current plan of care    []Modify current plan of care as follows:    []Discharge patient    Discharge Location:    Services/Supervision Recommended:      [x]Patient continues to require treatment by a licensed therapist to address functional deficits as outlined in the established plan of care.                       Electronically Signed By:  Edd Pan M.S., CCC-SLP  11/30/2021,9:04 AM.

## 2021-11-30 NOTE — PROGRESS NOTES
Name: Marge French  MRN: [id number]  Date of Service:  11/30/2021 11/30/21 1034   Restrictions/Precautions   Restrictions/Precautions Fall Risk   Required Braces or Orthoses? No   Lower Extremity Weight Bearing Restrictions   Right Lower Extremity Weight Bearing Weight Bearing As Tolerated   Left Lower Extremity Weight Bearing Weight Bearing As Tolerated   General   Chart Reviewed Yes   Additional Pertinent Hx Dysarthria, hypothryoidism, anxiety, CAD, CHF, COPD, T2DM, CKD, MI x 2   Response To Previous Treatment Not applicable   Family / Caregiver Present No   Referring Practitioner SHARI Cornejo CNP, Dr. Sammie Allen in bed, agrees to participate in therapy. Pain Screening   Patient Currently in Pain Denies   Bed Mobility   Rolling Supervision; Modified independent   Supine to Sit Supervision; Modified independent   Scooting Modified independent  (To EOB )   Transfers   Sit to Stand Supervision; Modified independent   Stand to sit Supervision; Modified independent   Lateral Transfers Supervision   Comment Initial sit<>stand from EOB- v/c's for BUE hand placement    Ambulation   Ambulation? Yes   More Ambulation? Yes   Ambulation 1   Surface level tile   Device Rolling [employer] Stand by assistance   Quality of Gait Good and steady pace.  No SOB noted during amb   Gait Deviations Decreased step length; Decreased step height  (decreased control of RLE, everted at times )   Distance 20'    Comments BR    Ambulation 2   Surface - 2 level tile   Device 2 Rolling Walker   Assistance 2 Stand by assistance   Quality of Gait 2 same as previous    Gait Deviations Decreased step length; Decreased step height  (decreased control of RLE, everted at times )   Distance 20'   Comments mat table<w/c   Exercises   Hamstring Sets X 20 with red tbanc   Hip Flexion Marches X 20 (standing and sitting)   Hip Abduction X 10 (standing) X 20 with red tband (sitting)   Knee Long Arc Quad X 20   Comments Standing and sitting (on mat table with no back support) BLE ther-ex with 1.5# BLE. Other exercises   Other exercises? Yes  (Toe raises X 20 (standing) and hip add with ball X 20 (sit))   Short term goals   Time Frame for Short term goals 1-2 weeks   Short term goal 1 Supervision with ambulation using '   Short term goal 2 Supervision with car transfer   Short term goal 3 Independent with bed mobility   Short term goal 4 Supervision with w/c mobility 250'   Short term goal 5 Supervision with 4 stairs using unilateral rail   Long term goals   Time Frame for Long term goals  2-3 weeks   Long term goal 1 Independent with ambulation using '   Long term goal 2 Independent with car transfer   Long term goal 3 Independent with transfers   Long term goal 4 Independent with 4 stairs with unilateral rail   Conditions Requiring Skilled Therapeutic Intervention   Body structures, Functions, Activity limitations Decreased functional mobility ; Decreased strength; Decreased endurance; Decreased balance   Assessment Pt required distant supervision maintaining dynamic sitting balance on toilet, able to perform personal hygiene independently. Pt has difficulty with SLS of RLE while performing ther-ex and presents with decreased control of RLE with amb. History CVA with right body involvement, Dysarthria, hypothryoidism, anxiety, CAD, CHF, COPD, T2DM, CKD, MI x 2   Treatment Initiated  pre-gait, transfers, bed mobility   Discharge Recommendations Home with Home health PT   Activity Tolerance   Activity Tolerance Patient Tolerated treatment well   Activity Tolerance distracted and needs redirection, impulsive   Plan   Times per week 5-6   Plan weeks 1-3   Current Treatment Recommendations Strengthening; Balance Training; Functional Mobility Training; Transfer Training; Endurance Training;  Wheelchair Mobility Training; Gait Training; Stair training; Home Exercise Program; Safety Education & Training; Patient/Caregiver Education & Training; Equipment Evaluation, Education, & procurement   Plan Comment cont. PT as pt is able with progressive mobility and safety training.    Safety Devices   Type of devices Patient at risk for falls; Gait belt; Left in bed; Bed alarm in place; Call light within reach   PT Whiteboard Notes   Therapy Whiteboard RE 12/5 CVA, R side weakness           Electronically signed by Wilman Berry PTA on 11/30/2021 at 3:39 PM

## 2021-11-30 NOTE — PROGRESS NOTES
Supervision with ambulation using '   Short term goal 2 Supervision with car transfer   Short term goal 3 Independent with bed mobility   Short term goal 4 Supervision with w/c mobility 250'   Short term goal 5 Supervision with 4 stairs using unilateral rail   Long term goals   Time Frame for Long term goals  2-3 weeks   Long term goal 1 Independent with ambulation using '   Long term goal 2 Independent with car transfer   Long term goal 3 Independent with transfers   Long term goal 4 Independent with 4 stairs with unilateral rail   Conditions Requiring Skilled Therapeutic Intervention   Body structures, Functions, Activity limitations Decreased functional mobility ; Decreased strength; Decreased endurance; Decreased balance   Assessment Pt presents with minimal improvement of amb. quality with 1# added to RLE. Pt continues to be able to perform most aspects of standing ADLs with SBA/Supervision. History CVA with right body involvement, Dysarthria, hypothryoidism, anxiety, CAD, CHF, COPD, T2DM, CKD, MI x 2   Treatment Initiated  pre-gait, transfers, bed mobility   Discharge Recommendations Home with Home health PT   Activity Tolerance   Activity Tolerance Patient Tolerated treatment well   Activity Tolerance distracted and needs redirection, impulsive   Plan   Times per week 5-6   Plan weeks 1-3   Current Treatment Recommendations Strengthening; Balance Training; Functional Mobility Training; Transfer Training; Endurance Training; Wheelchair Mobility Training; Gait Training; Stair training; Home Exercise Program; Safety Education & Training; Patient/Caregiver Education & Training; Equipment Evaluation, Education, & procurement   Plan Comment cont. PT as pt is able with progressive mobility and safety training.    Safety Devices   Type of devices Patient at risk for falls; Gait belt; Left in bed; Bed alarm in place; Call light within reach   PT Whiteboard Notes   Therapy Whiteboard RE 12/5 CVA, R side weakness         Electronically signed by Kylah Fletcher PTA on 11/30/2021 at 3:44 PM

## 2021-11-30 NOTE — PROGRESS NOTES
"Hospitalist Discharge Summary  [facility]    Lisa Galloway MRN# [id number]   YOB: 1944 Age: 76 year old     Date of Admission:  1/8/2021  Date of Discharge:  1/12/2021  Admitting Physician:  Lena Lundy MD  Discharge Physician:  Ketan Levi DO  Discharging Service:  Hospitalist     Primary Provider: Nichole Lira          Discharge Diagnosis:   1.  Acute pancreatitis, suspect gallstone etiology.   2.  Acute kidney injury, resolved.   3.  Gastroesophageal reflux disease and peptic ulcer disease.   4.  Asthma.   5.  Anxiety.   6.  Breast cancer, metastatic.   7.  Dilutional anemia.             Discharge Disposition:   Discharged to home           Allergies:   Allergies   Allergen Reactions     Amoxicillin-Pot Clavulanate Itching     Erythromycin Nausea     Latex      PN: Latex Sensitivity Flag     Levofloxacin      PN: joint pain     Sulfa Drugs GI Disturbance     Oxycodone Rash              Discharge Medications:   Current Discharge Medication List      CONTINUE these medications which have NOT CHANGED    Details   acetaminophen (TYLENOL) 325 MG tablet Take 325-650 mg by mouth every 6 hours as needed for mild pain      donepezil (ARICEPT) 10 MG tablet Take 10 mg by mouth At Bedtime      gabapentin (NEURONTIN) 300 MG capsule Take 900 mg by mouth At Bedtime For restless leg syndrome       Multiple Vitamins-Minerals (MULTIVITAMIN ADULT) TABS Take 1 tablet by mouth daily      ribociclib (KISQALI) 200 MG tablet Take 2 tablets (400 mg) by mouth daily for 21 days, followed by 7 days off in a 28 day cycle      ondansetron (ZOFRAN) 4 MG tablet Take 4 mg by mouth every 8 hours as needed for nausea                     Condition on Discharge:   Discharge condition: Stable   Discharge vitals: Blood pressure 137/79, pulse 66, temperature 97.8  F (36.6  C), temperature source Oral, resp. rate 16, height 1.575 m (5' 2\"), weight 64 kg (141 lb), SpO2 92 %, not currently " Occupational Therapy     11/30/21 1100   Restrictions/Precautions   Restrictions/Precautions Fall Risk   General   Additional Pertinent Hx Depression and anxiety; CKD; (R) TKA; HTN; arthritis; CAD; CHF; COPD; MIx2; thyroid dx; DM; asthma   Diagnosis L CVA   ADL   Grooming Independent   Balance   Standing Balance Supervision   Functional Mobility   Functional - Mobility Device Rolling Walker   Assist Level Supervision   Type of ROM/Therapeutic Exercise   Type of ROM/Therapeutic Exercise Resistive Bands   Comment Medium resistance   Exercises   Grasp/Release R hand act 8 to 15#   Coordination   Fine Motor R hand acts with and without resistance    Short term goals   Short term goal 1 MET   Short term goal 2 MET   Short term goal 3 MET   Short term goal 4 MET   Short term goal 5 MET   Short term goal 6 MET   Assessment   Performance deficits / Impairments Decreased functional mobility ; Decreased ADL status;  Decreased strength; Decreased balance; Decreased endurance; Decreased high-level IADLs; Decreased fine motor control; Decreased coordination   Treatment Diagnosis L CVA   Timed Code Treatment Minutes 60 Minutes   Activity Tolerance   Activity Tolerance Patient Tolerated treatment well   Plan   Specific instructions for Next Treatment check up ad vladimir breastfeeding.   Code status on discharge: DNR / DNI      BASIC PHYSICAL EXAMINATION:  GENERAL: No apparent distress.  CARDIOVASCULAR: Regular rate and rhythm without murmurs.  PULMONARY: Clear to auscultation bilaterally.   GASTROINTESTINAL: Abdomen soft, non-tender.  EXTREMITIES: No edema, pulses intact.  NEUROLOGIC: No focal deficits.            History of Illness:   See detailed admission note for full details.               Procedures excluding imaging which is summarized below:   Please see details in the electronic medical record.           Consultations:   GASTROENTEROLOGY IP CONSULT          Significant Results:   No results found for this or any previous visit.    Transthoracic Echocardiogram Results:  No results found for this or any previous visit (from the past 4320 hour(s)).             Pending Results:   Unresulted Labs Ordered in the Past 30 Days of this Admission     No orders found from 12/9/2020 to 1/9/2021.                      Discharge Instructions and Follow-Up:   Discharge instructions and follow-up:   Discharge Procedure Orders   Follow-up and recommended labs and tests    Order Comments: Follow up with primary care provider, Nichole Lira, within 7 days for hospital follow- up.  No follow up labs or test are needed.  Follow-up general surgery for consideration of lap felecia.  Follow-up EUS with GI.     Activity   Order Comments: Your activity upon discharge: activity as tolerated     Order Specific Question Answer Comments   Is discharge order? Yes      No CPR- Do NOT Intubate     Order Specific Question Answer Comments   Code status determined by: Discussion with patient/ legal decision maker      Diet   Order Comments: Follow this diet upon discharge: Orders Placed This Encounter      Low Fat Diet     Order Specific Question Answer Comments   Is discharge order? Yes              Hospital Course:   This is a 76-year-old female with a history of metastatic breast cancer and osseous  metastases noted on recent PET scan, GERD, asthma, and anxiety, presenting to [facility] for evaluation of abdominal pain.  Workup showed a lipase of 1200, as well as creatinine of 1.2.  CT scan showed evidence of acute pancreatitis versus peptic ulcer disease.      She was placed on bowel rest and started on IV fluids, IV protonix, antiemetics and analgesics.  Lipase has eventually improved to near normal levels.  Her condition improved with conservative measures as above.  She denies any notable alcohol use and her triglycerides were normal.  For now, the working diagnosis is gallstone pancreatitis.  CT did show cholelithiasis.  She was seen in consultation by Gastroenterology.  They agreed with a conservative care plan.      As an outpatient, she will undergo EUS to evaluate for any other etiologies that play.  We have also suggested she follow up with a surgeon for consideration of laparoscopic cholecystectomy.  She can consider oral PPI after discharge but I think peptic ulcer disease is less likely and pancreatitis is more likely.  The patient appears to be doing significantly better and is tolerating an oral diet and appears suitable for discharge home with outpatient followup as noted above.     The patient was seen, examined, and counseled on this day. The hospitalization and plan of care was reviewed with the patient and  extensively. All questions were addressed and the patient agreed to follow-up as noted above.      Total time spent in face to face contact with the patient and coordinating discharge was:  35 Minutes    Ketan Levi DO, MPH  [facility] Hospitalist  [address] [place] [address]  Pager: [phone]  2021           D: 2021   T: 2021   MT: WILI      Name:     NATALIE LIRA   MRN:      [id number]        Account:        [id number]   :      1944           Admit Date:     2021                                  Discharge Date:        Document: [id number]       cc: Nichole Lira MD

## 2021-11-30 NOTE — PROGRESS NOTES
Patient:   Lev Quijano  MR#:    [id number]   Room:    [address] [place] [address]   YOB: 1940  Date of Progress Note: 11/30/2021  Time of Note                           8:18 AM  Consulting Physician:   Júnior Ross M.D. Attending Physician:  Júnior Ross MD     Chief complaint Acute ischemic stroke    S:This 79 y.o. female with history of CAD, CHF, COPD, DM, CKD, GERD, mixed hyperlipidemia, thyroid disease and MI x 2. She presented to [facility] ER on 11/20/21 with s/o of slurred speech, right sided numbness and weakness. She initially thought it was her blood sugar which was 25 that morning before she ate but then her family noticed a right facial droop. Hemoglobin A1C on admit was 8.1. CT of head was negative. MRI done revealed an acute posterior limb left internal capsule lacunar infarct. She was admitted to the hospitalist service with consult for neurology. She was evaluated by speech for dysarthria and dysphagia. She was placed on a mechanical soft diet with thin liquids. She had acute kidney injury on CKD but this has now resolved She continues to have right sided weakness and is participating in both PT/OT. She is felt to need a stay on Rehab to work towards her goal of returning home. She is now felt ready to start the Rehab program.No new issues.       REVIEW OF SYSTEMS:  Constitutional: No fevers No chills  Neck:No stiffness  Respiratory: No shortness of breath  Cardiovascular: No chest pain No palpitations  Gastrointestinal: No abdominal pain    Genitourinary: No Dysuria  Neurological: No headache, no confusion    Past Medical History:      Diagnosis Date    [facility]A [facility]cerebrovascular accident) ([facility] [address] 11/20/2021    Arthritis     Psoriatic    Asthma     CAD (coronary artery disease)     CHF (congestive heart failure) (HCC)     [facility]     DM (diabetes mellitus) (HCC)     GERD (gastroesophageal reflux disease)     HTN (hypertension)     Hyperlipidemia  [facility]     x2    Thyroid disease        Past Surgical History:      Procedure Laterality Date    CATARACT REMOVAL      CHOLECYSTECTOMY      CORONARY ANGIOPLASTY  06/2018    Angioplasty to in-stent restenosis to the distal LAD    HIP SURGERY      HYSTERECTOMY      JOINT REPLACEMENT Right     Right knee replacement       Medications in Hospital:      Current Facility-Administered Medications:     loperamide (IMODIUM) capsule 2 mg, 2 mg, Oral, QAM AC, Marco Alberto MD, 2 mg at 11/29/21 0537    aspirin EC tablet 81 mg, 81 mg, Oral, Daily, 81 mg at 11/29/21 0854 **OR** [DISCONTINUED] aspirin suppository 300 mg, 300 mg, Rectal, Daily, Valma Cue, APRN - CNP    atorvastatin (LIPITOR) tablet 80 mg, 80 mg, Oral, Nightly, Valma Cue, APRN - CNP, 80 mg at 11/29/21 2100    ondansetron (ZOFRAN-ODT) disintegrating tablet 4 mg, 4 mg, Oral, Q8H PRN **OR** ondansetron (ZOFRAN) injection 4 mg, 4 mg, IntraVENous, Q6H PRN, Katerina Holcomb, APRN - CNP    dextrose 5 % solution, 100 mL/hr, IntraVENous, PRN, Katerina Holcomb, APRN - CNP    dextrose 50 % IV solution, 12.5 g, IntraVENous, PRN, Valma Cue, APRN - CNP    glucagon (rDNA) injection 1 mg, 1 mg, IntraMUSCular, PRN, Valma Cue, APRN - CNP    glucose (GLUTOSE) 40 % oral gel 15 g, 15 g, Oral, PRN, Valma Cue, APRN - CNP    carvedilol (COREG) tablet 12.5 mg, 12.5 mg, Oral, BID WC, Valma Cue, APRN - CNP, 12.5 mg at 11/29/21 1644    clopidogrel (PLAVIX) tablet 75 mg, 75 mg, Oral, Daily, Katerinaramakrishna Holcomb, APRN - CNP, 75 mg at 11/29/21 0854    coenzyme Q10 capsule 100 mg, 100 mg, Oral, Daily, Katerinaramakrishna Holcomb, APRN - CNP, 100 mg at 11/29/21 0854    insulin glargine (LANTUS) injection vial 20 Units, 20 Units, SubCUTAneous, Nightly, Katerina Holcomb, APRN - CNP, 20 Units at 11/29/21 2100    insulin lispro (HUMALOG) injection vial 0-6 Units, 0-6 Units, SubCUTAneous, TID WC, SHARI Wilkins - CNP, 2 Units at 11/29/21 1208    insulin lispro (HUMALOG) injection vial 0-3 Units, 0-3 Units, SubCUTAneous, Nightly, SHARI Wilkins - CNP, 1 Units at 11/27/21 2027    ipratropium-albuterol (DUONEB) nebulizer solution 1 ampule, 1 ampule, Inhalation, Q4H PRN, SHARI Wilkins - CNP    levothyroxine (SYNTHROID) tablet 25 mcg, 25 mcg, Oral, Daily, Sahil Junior APRN - CNP, 25 mcg at 11/29/21 0854    pantoprazole (PROTONIX) tablet 40 mg, 40 mg, Oral, QAM , Katerina Holcomb APRN - CNP, 40 mg at 11/30/21 0546    sertraline (ZOLOFT) tablet 25 mg, 25 mg, Oral, Daily, Sahil Junior APRN - CNP, 25 mg at 11/29/21 0854    vitamin B-12 (CYANOCOBALAMIN) tablet 1,000 mcg, 1,000 mcg, Oral, Daily, SHARI Wilkins - CNP, 1,000 mcg at 11/29/21 0854    acetaminophen (TYLENOL) tablet 650 mg, 650 mg, Oral, Q4H PRN, Leon Encarnacion MD    enoxaparin (LOVENOX) injection 40 mg, 40 mg, SubCUTAneous, Filipe Pressley MD, 40 mg at 11/29/21 1644    polyethylene glycol (GLYCOLAX) packet 17 g, 17 g, Oral, Daily PRN, Leon Encarnacion MD    Allergies:  Zinc and Tape [adhesive tape]    Social History:   TOBACCO:   reports that she has never smoked. She has never used smokeless tobacco.  ETOH:   reports no history of alcohol use. Family History:       Problem Relation Age of Onset    Heart Disease Mother     Heart Disease Father          PHYSICAL EXAM:  BP (!) 131/54   Pulse 67   Temp 95.5 °F (35.3 °C)   Resp 16   Ht 5' 6\" (1.676 m)   Wt 161 lb 0.2 oz (73 kg)   SpO2 93%   BMI 25.99 kg/m²     Constitutional - well developed, well nourished.    Eyes - conjunctiva normal.   Ear, nose, throat - No scars, masses, or lesions over external nose or ears, no atrophy of tongue  Neck-symmetric, no masses noted, no jugular vein distension  Respiration- chest wall appears symmetric, good expansion,   normal effort without use of accessory muscles  Musculoskeletal - no significant wasting of muscles noted, no bony deformities  Extremities-no clubbing, cyanosis or edema  Skin - warm, dry, and intact. No rash, erythema, or pallor. Psychiatric - mood, affect, and behavior appear normal.      Neurological exam  Awake, alert, fluent oriented appropriate affect  Attention and concentration appear appropriate  Recent and remote memory appears unremarkable  Speech normal without dysarthria  No clear issues with language of fund of knowledge     Cranial Nerve Exam     CN III, IV,VI-EOMI, No nystagmus, conjugate eye movements, no ptosis    CN VII-no facial assymetry       Motor Exam  antigravity throughout upper and lower extremities bilaterally      Tremors- no tremors in hands or head noted     Gait  Not tested     Nursing/pcp notes, imaging,labs and vitals reviewed. PT,OT and/or speech notes reviewed    Lab Results   Component Value Date    WBC 8.2 11/29/2021    HGB 12.1 11/29/2021    HCT 35.8 (L) 11/29/2021    MCV 92.7 11/29/2021     11/29/2021     Lab Results   Component Value Date     11/29/2021    K 3.6 11/29/2021     11/29/2021    CO2 23 11/29/2021    BUN 12 11/29/2021    CREATININE 0.7 11/29/2021    GLUCOSE 98 11/29/2021    CALCIUM 8.9 11/29/2021    PROT 5.5 (L) 11/29/2021    LABALBU 3.5 11/29/2021    BILITOT 0.5 11/29/2021    ALKPHOS 73 11/29/2021    AST 15 11/29/2021    ALT 16 11/29/2021    LABGLOM >60 11/29/2021    GFRAA >59 11/29/2021     Lab Results   Component Value Date    INR 1.01 11/23/2021    INR 0.99 11/20/2021    INR 1.03 07/05/2021    PROTIME 13.5 11/23/2021    PROTIME 13.3 11/20/2021    PROTIME 13.7 07/05/2021             RECORD REVIEW: Previous medical records, medications were reviewed at today's visit    IMPRESSION:   1. Acute ischemic stroke-aspirin/statin/Plavix  2. Coronary artery disease/history of MI-aspirin/statin/Plavix  3. Hyperlipidemia-on statin  4. CHF-Coreg  5. Allergies-Zyrtec  6. History of COPD-nebulizers as needed  7. DVT prophylaxis-Lovenox  8. Diabetes-on insulin monitor blood sugars  9. Hypothyroidism-Synthroid  10. GI-proton pump inhibitor/bowel regimen  11. Mood disorder-on Zoloft  12. Psoriatic arthritis-Tylenol as needed  13. Chronic kidney disease-monitor  14. PT/OT/speech  15.   Obstructive sleep apnea-monitor    Continue care     Team conference with PT/OT/speech/nursing/Care coordinator to review in depth patients care and discharge planning    Ambulation 250 ft     ELOS Saturday       Expected duration and frequency therapy: 180 minutes per day, 5 days per week    Terry Peralta  [phone] CELL  Dr Merlin Stare

## 2021-12-01 ENCOUNTER — TELEPHONE (OUTPATIENT)
Dept: NEUROLOGY | Age: 81
End: 2021-12-01

## 2021-12-01 LAB
GLUCOSE BLD-MCNC: 108 MG/DL (ref 70–99)
GLUCOSE BLD-MCNC: 174 MG/DL (ref 70–99)
GLUCOSE BLD-MCNC: 175 MG/DL (ref 70–99)
GLUCOSE BLD-MCNC: 193 MG/DL (ref 70–99)
PERFORMED ON: ABNORMAL

## 2021-12-01 PROCEDURE — 97116 GAIT TRAINING THERAPY: CPT

## 2021-12-01 PROCEDURE — 97110 THERAPEUTIC EXERCISES: CPT

## 2021-12-01 PROCEDURE — 92507 TX SP LANG VOICE COMM INDIV: CPT

## 2021-12-01 PROCEDURE — 82947 ASSAY GLUCOSE BLOOD QUANT: CPT

## 2021-12-01 PROCEDURE — 6370000000 HC RX 637 (ALT 250 FOR IP): Performed by: NURSE PRACTITIONER

## 2021-12-01 PROCEDURE — 1180000000 HC REHAB R&B

## 2021-12-01 PROCEDURE — 99232 SBSQ HOSP IP/OBS MODERATE 35: CPT | Performed by: PSYCHIATRY & NEUROLOGY

## 2021-12-01 PROCEDURE — 97530 THERAPEUTIC ACTIVITIES: CPT

## 2021-12-01 PROCEDURE — 97535 SELF CARE MNGMENT TRAINING: CPT

## 2021-12-01 PROCEDURE — 6360000002 HC RX W HCPCS: Performed by: PSYCHIATRY & NEUROLOGY

## 2021-12-01 RX ADMIN — Medication 100 MG: at 08:25

## 2021-12-01 RX ADMIN — CARVEDILOL 12.5 MG: 12.5 TABLET, FILM COATED ORAL at 08:25

## 2021-12-01 RX ADMIN — INSULIN LISPRO 1 UNITS: 100 INJECTION, SOLUTION INTRAVENOUS; SUBCUTANEOUS at 17:37

## 2021-12-01 RX ADMIN — INSULIN LISPRO 1 UNITS: 100 INJECTION, SOLUTION INTRAVENOUS; SUBCUTANEOUS at 12:38

## 2021-12-01 RX ADMIN — ATORVASTATIN CALCIUM 80 MG: 80 TABLET, FILM COATED ORAL at 20:54

## 2021-12-01 RX ADMIN — CYANOCOBALAMIN TAB 500 MCG 1000 MCG: 500 TAB at 08:25

## 2021-12-01 RX ADMIN — CARVEDILOL 12.5 MG: 12.5 TABLET, FILM COATED ORAL at 17:23

## 2021-12-01 RX ADMIN — LEVOTHYROXINE SODIUM 25 MCG: 25 TABLET ORAL at 08:25

## 2021-12-01 RX ADMIN — SERTRALINE HYDROCHLORIDE 25 MG: 25 TABLET ORAL at 08:25

## 2021-12-01 RX ADMIN — ASPIRIN 81 MG: 81 TABLET, COATED ORAL at 08:25

## 2021-12-01 RX ADMIN — INSULIN GLARGINE 20 UNITS: 100 INJECTION, SOLUTION SUBCUTANEOUS at 20:55

## 2021-12-01 RX ADMIN — PANTOPRAZOLE SODIUM 40 MG: 40 TABLET, DELAYED RELEASE ORAL at 05:51

## 2021-12-01 RX ADMIN — INSULIN LISPRO 1 UNITS: 100 INJECTION, SOLUTION INTRAVENOUS; SUBCUTANEOUS at 20:55

## 2021-12-01 RX ADMIN — ENOXAPARIN SODIUM 40 MG: 100 INJECTION SUBCUTANEOUS at 17:23

## 2021-12-01 RX ADMIN — CLOPIDOGREL BISULFATE 75 MG: 75 TABLET ORAL at 08:25

## 2021-12-01 NOTE — PROGRESS NOTES
[address]  Test for Patient [place] in the Areas of Transfers/Ambulation    Ambulation/Transfers   · Independent ambulation in room with assistive device:  Yes     -Device Type:  RW  · Independent transfers from wheelchair to surface in room:  Yes     Bathroom Transfers  · Independent transfers in patient bathroom:  Yes         Inpatient Rehabilitation Nursing and Therapies feel as though the patient qualifies for an acute rehabilitation test for independence in the areas of transfers and ambulation prior to discharging from Inpatient Rehabilitation Unit at [facility].  This test for independence in the areas of transfers and ambulation must be agreed upon by the patient's physician, nurse, and therapists.         Nurse Approval:  Electronically signed by Gato Levin RN on 12/1/2021 at 1:49 PM    Physical Therapist Approval:  Electronically signed by Akira Biswas PTA on 12/1/2021 at 10:31 AM    Occupational Therapist Approval: Electronically signed by HUMA Johnson on 12/1/2021 at 1:39 PM

## 2021-12-01 NOTE — PROGRESS NOTES
Name: Denis Lizama  MRN: [id number]  Date of Service:  12/1/2021 12/01/21 1024   Restrictions/Precautions   Restrictions/Precautions Fall Risk   Required Braces or Orthoses? No   Lower Extremity Weight Bearing Restrictions   Right Lower Extremity Weight Bearing Weight Bearing As Tolerated   Left Lower Extremity Weight Bearing Weight Bearing As Tolerated   General   Chart Reviewed Yes   Additional Pertinent Hx Dysarthria, hypothryoidism, anxiety, CAD, CHF, COPD, T2DM, CKD, MI x 2   Response To Previous Treatment Not applicable   Family / Caregiver Present No   Referring Practitioner SHARI Leger CNP, Dr. Glo Jackson in bed, agrees to therapy. Pain Screening   Patient Currently in Pain Denies   Vital Signs   Level of Consciousness Alert (0)   Bed Mobility   Bridging Other(comment); With marching  (BLE X 10 and marching x 10)   Bridging Independent   Rolling Independent   Supine to Sit Independent   Scooting Independent   Transfers   Sit to Stand Independent   Stand to sit Independent   Lateral Transfers Modified independent   Ambulation   Ambulation? Yes   Ambulation 1   Surface level tile   Device Rolling Walker   Assistance Supervision; Modified Independent   Quality of Gait Good and steady pace. No SOB or LOB noted during amb   Gait Deviations Decreased step height   Distance 20'   Comments In room, BR. Including L and R turns. Exercises   Straight Leg Raise X 10 with other knee bent    Comments Supine and sidelying BLE ther-ex with no added wt    Other exercises   Other exercises?  Yes  (TA contraction X 10, clamshells X 10, hip add with ball x 10)   Patient Goals    Patient goals  go home   Short term goals   Time Frame for Short term goals 1-2 weeks   Short term goal 1 Supervision with ambulation using '   Short term goal 2 Supervision with car transfer   Short term goal 3 Independent with bed mobility   Short term goal 4 Supervision with w/c mobility 250'   Short term goal 5 Supervision with 4 stairs using unilateral rail   Long term goals   Time Frame for Long term goals  2-3 weeks   Long term goal 1 Independent with ambulation using '   Long term goal 2 Independent with car transfer   Long term goal 3 Independent with transfers   Long term goal 4 Independent with 4 stairs with unilateral rail   Conditions Requiring Skilled Therapeutic Intervention   Body structures, Functions, Activity limitations Decreased functional mobility ; Decreased strength; Decreased endurance; Decreased balance   Assessment Pt checked to be AD vladimir in room with RW. Pt presents with no difficulty with ther-ex and amb in room. History CVA with right body involvement, Dysarthria, hypothryoidism, anxiety, CAD, CHF, COPD, T2DM, CKD, MI x 2   Barriers to Learning impulsivity, decreased safety awareness   Treatment Initiated  pre-gait, transfers, bed mobility   Discharge Recommendations Home with Home health PT   Activity Tolerance   Activity Tolerance Patient Tolerated treatment well   Activity Tolerance distracted and needs redirection, impulsive   Plan   Times per week 5-6   Times per day Daily   Plan weeks 1-3   Current Treatment Recommendations Strengthening; Balance Training; Functional Mobility Training; Transfer Training; Endurance Training; Wheelchair Mobility Training; Gait Training; Stair training; Home Exercise Program; Safety Education & Training; Patient/Caregiver Education & Training; Equipment Evaluation, Education, & procurement   Plan Comment cont. PT as pt is able with progressive mobility and safety training.    Safety Devices   Type of devices Patient at risk for falls; Gait belt; Left in bed; Bed alarm in place; Call light within reach   PT Whiteboard Notes   Therapy Whiteboard RE 12/5 CVA, R side weakness           Electronically signed by Rajinder Mathew PTA on 12/1/2021 at 4:06 PM

## 2021-12-01 NOTE — PROGRESS NOTES
Physical Therapy  Name: Jesus Ramirez  MRN:  [id number]  Date of service:  12/1/2021 12/01/21 1345   Restrictions/Precautions   Restrictions/Precautions Fall Risk   Required Braces or Orthoses? No   Lower Extremity Weight Bearing Restrictions   Right Lower Extremity Weight Bearing Weight Bearing As Tolerated   Left Lower Extremity Weight Bearing Weight Bearing As Tolerated   Subjective   Subjective Pt without complaint and denies any pain   Pain Screening   Patient Currently in Pain Denies   Transfers   Sit to Stand Independent   Stand to sit Independent   Bed to Chair Stand by assistance  (cues for hand placement and technique)   Comment practiced multiple sit to stands and destination to destination with rollator as she wanted to try for home; needs work on approaching completely then turning so that she doesn't have to back up as far   Ambulation   Ambulation?  Yes   Ambulation 1   Surface level tile   Device Rolling Walker   Assistance Supervision; Modified Independent   Quality of Gait Good and steady pace with inc fwd flex at waist; No SOB or LOB noted during amb   Gait Deviations Decreased step height   Distance 200'   Ambulation 2   Surface - 2 level tile   Device 2 Rollator   Assistance 2 Stand by assistance   Comments remembers to lock brakes prior to standing but doesn't do so before sitting down   Exercises   Hip Flexion x 20 with 1 1/2 # wt   Hip Abduction x 20 with red band; x 20 ADD sets with ball   Knee Long Arc Quad x 20  with 1 1/2# wt   Other exercises   Other exercises 1 x 10 sit to stand    Conditions Requiring Skilled Therapeutic Intervention   Assessment introduced rollator at pt's request; rollator seems appropriate with exception for brakes prior to sitting down as she remembers to reach back for chair but forgets brakes on rollator       Electronically signed by Camryn Key PTA on 12/1/2021 at 3:34 PM

## 2021-12-01 NOTE — PROGRESS NOTES
Patient:   Luis Fernando Jeffers  MR#:    [id number]   Room:    9484/658-72   YOB: 1940  Date of Progress Note: 12/1/2021  Time of Note                           10:36 AM  Consulting Physician:   Merlin Stare, M.D. Attending Physician:  Merlin Stare, MD     Chief complaint Acute ischemic stroke    S:This 79 y.o. female with history of CAD, CHF, COPD, DM, CKD, GERD, mixed hyperlipidemia, thyroid disease and MI x 2. She presented to [facility] ER on 11/20/21 with s/o of slurred speech, right sided numbness and weakness. She initially thought it was her blood sugar which was 25 that morning before she ate but then her family noticed a right facial droop. Hemoglobin A1C on admit was 8.1. CT of head was negative. MRI done revealed an acute posterior limb left internal capsule lacunar infarct. She was admitted to the hospitalist service with consult for neurology. She was evaluated by speech for dysarthria and dysphagia. She was placed on a mechanical soft diet with thin liquids. She had acute kidney injury on CKD but this has now resolved She continues to have right sided weakness and is participating in both PT/OT. She is felt to need a stay on Rehab to work towards her goal of returning home. She is now felt ready to start the Rehab program.No new issues overnight.       REVIEW OF SYSTEMS:  Constitutional: No fevers No chills  Neck:No stiffness  Respiratory: No shortness of breath  Cardiovascular: No chest pain No palpitations  Gastrointestinal: No abdominal pain    Genitourinary: No Dysuria  Neurological: No headache, no confusion    Past Medical History:      Diagnosis Date    Acute CVA (cerebrovascular accident) ([address] 11/20/2021    Arthritis     Psoriatic    Asthma     CAD (coronary artery disease)     CHF (congestive heart failure) (HCC)     COPD (chronic obstructive pulmonary disease) ([address]     DM (diabetes mellitus) (HCC)     GERD (gastroesophageal reflux disease)     HTN (hypertension)     Hyperlipidemia     MI (myocardial infarction) ([facility]     x2    Thyroid disease        Past Surgical History:      Procedure Laterality Date    CATARACT REMOVAL      CHOLECYSTECTOMY      CORONARY ANGIOPLASTY  06/2018    Angioplasty to in-stent restenosis to the distal LAD    HIP SURGERY      HYSTERECTOMY      JOINT REPLACEMENT Right     Right knee replacement       Medications in Hospital:      Current Facility-Administered Medications:     loperamide (IMODIUM) capsule 2 mg, 2 mg, Oral, QAM AC, Eloisa Goncalves MD, 2 mg at 11/29/21 0537    aspirin EC tablet 81 mg, 81 mg, Oral, Daily, 81 mg at 12/01/21 0825 **OR** [DISCONTINUED] aspirin suppository 300 mg, 300 mg, Rectal, Daily, SHARI Simon CNP    atorvastatin (LIPITOR) tablet 80 mg, 80 mg, Oral, Nightly, SHARI Simon - CNP, 80 mg at 11/30/21 2107    ondansetron (ZOFRAN-ODT) disintegrating tablet 4 mg, 4 mg, Oral, Q8H PRN **OR** ondansetron (ZOFRAN) injection 4 mg, 4 mg, IntraVENous, Q6H PRN, SHARI Knapp CNP    dextrose 5 % solution, 100 mL/hr, IntraVENous, PRN, SHARI Knapp CNP    dextrose 50 % IV solution, 12.5 g, IntraVENous, PRN, SHARI Simon CNP    glucagon (rDNA) injection 1 mg, 1 mg, IntraMUSCular, PRN, SHARI Simon CNP    glucose (GLUTOSE) 40 % oral gel 15 g, 15 g, Oral, PRN, SHARI Simon CNP    carvedilol (COREG) tablet 12.5 mg, 12.5 mg, Oral, BID WC, SHARI Simon - CNP, 12.5 mg at 12/01/21 0825    clopidogrel (PLAVIX) tablet 75 mg, 75 mg, Oral, Daily, SHARI Knapp - CNP, 75 mg at 12/01/21 0825    coenzyme Q10 capsule 100 mg, 100 mg, Oral, Daily, SHARI Knapp - CNP, 100 mg at 12/01/21 0825    insulin glargine (LANTUS) injection vial 20 Units, 20 Units, SubCUTAneous, Nightly, Katerina Holcomb, APRN - CNP, 20 Units at 11/30/21 2107    insulin lispro (HUMALOG) injection vial 0-6 Units, 0-6 Units, SubCUTAneous, TID WC, Christina Saenz APRN - CNP, 2 Units at 11/30/21 1706    insulin lispro (HUMALOG) injection vial 0-3 Units, 0-3 Units, SubCUTAneous, Nightly, Christina Saenz APRN - CNP, 1 Units at 11/30/21 2107    ipratropium-albuterol (DUONEB) nebulizer solution 1 ampule, 1 ampule, Inhalation, Q4H PRN, Christina Saenz APRN - CNP    levothyroxine (SYNTHROID) tablet 25 mcg, 25 mcg, Oral, Daily, Leopoldmichael Saenz APRN - CNP, 25 mcg at 12/01/21 0825    pantoprazole (PROTONIX) tablet 40 mg, 40 mg, Oral, QAM AC, Katerina Holcomb, APRN - CNP, 40 mg at 12/01/21 0551    sertraline (ZOLOFT) tablet 25 mg, 25 mg, Oral, Daily, Christina Saenz APRN - CNP, 25 mg at 12/01/21 0825    vitamin B-12 (CYANOCOBALAMIN) tablet 1,000 mcg, 1,000 mcg, Oral, Daily, SHARI Lees - CNP, 1,000 mcg at 12/01/21 0825    acetaminophen (TYLENOL) tablet 650 mg, 650 mg, Oral, Q4H PRN, Korin Hopkins MD, 650 mg at 11/30/21 2107    enoxaparin (LOVENOX) injection 40 mg, 40 mg, SubCUTAneous, Ailyn Lee MD, 40 mg at 11/30/21 1706    polyethylene glycol (GLYCOLAX) packet 17 g, 17 g, Oral, Daily PRN, Korin Hopkins MD    Allergies:  Zinc and Tape [adhesive tape]    Social History:   TOBACCO:   reports that she has never smoked. She has never used smokeless tobacco.  ETOH:   reports no history of alcohol use. Family History:       Problem Relation Age of Onset    Heart Disease Mother     Heart Disease Father          PHYSICAL EXAM:  BP (!) 142/77   Pulse 63   Temp 96.4 °F (35.8 °C)   Resp 19   Ht 5' 6\" (1.676 m)   Wt 161 lb 0.2 oz (73 kg)   SpO2 97%   BMI 25.99 kg/m²     Constitutional - well developed, well nourished.    Eyes - conjunctiva normal.   Ear, nose, throat - No scars, masses, or lesions over external nose or ears, no atrophy of tongue  Neck-symmetric, no masses noted, no jugular vein distension  Respiration- chest wall appears symmetric, good expansion,   normal effort without use of accessory muscles  Musculoskeletal - no significant wasting of muscles noted, no bony deformities  Extremities-no clubbing, cyanosis or edema  Skin - warm, dry, and intact. No rash, erythema, or pallor. Psychiatric - mood, affect, and behavior appear normal.      Neurological exam  Awake, alert, fluent oriented appropriate affect  Attention and concentration appear appropriate  Recent and remote memory appears unremarkable  Speech normal without dysarthria  No clear issues with language of fund of knowledge     Cranial Nerve Exam     CN III, IV,VI-EOMI, No nystagmus, conjugate eye movements, no ptosis    CN VII-no facial assymetry       Motor Exam  antigravity throughout upper and lower extremities bilaterally      Tremors- no tremors in hands or head noted     Gait  Not tested     Nursing/pcp notes, imaging,labs and vitals reviewed.      PT,OT and/or speech notes reviewed    Lab Results   Component Value Date    WBC 8.2 11/29/2021    HGB 12.1 11/29/2021    HCT 35.8 (L) 11/29/2021    MCV 92.7 11/29/2021     11/29/2021     Lab Results   Component Value Date     11/29/2021    K 3.6 11/29/2021     11/29/2021    CO2 23 11/29/2021    BUN 12 11/29/2021    CREATININE 0.7 11/29/2021    GLUCOSE 98 11/29/2021    CALCIUM 8.9 11/29/2021    PROT 5.5 (L) 11/29/2021    LABALBU 3.5 11/29/2021    BILITOT 0.5 11/29/2021    ALKPHOS 73 11/29/2021    AST 15 11/29/2021    ALT 16 11/29/2021    LABGLOM >60 11/29/2021    GFRAA >59 11/29/2021     Lab Results   Component Value Date    INR 1.01 11/23/2021    INR 0.99 11/20/2021    INR 1.03 07/05/2021    PROTIME 13.5 11/23/2021    PROTIME 13.3 11/20/2021    PROTIME 13.7 07/05/2021       Neris Asher PTA   Physical Therapist Assistant   Physical Therapy   Progress Notes      Signed   Date of Service:  11/30/2021  3:44 PM                 Signed                Show:Clear all  []Manual[x]Template[]Copied    Added by:  [x]Brissa Luo PTA      []Latia for details       Name: Tadeo Aguila ambulation using '   Short term goal 2 Supervision with car transfer   Short term goal 3 Independent with bed mobility   Short term goal 4 Supervision with w/c mobility 250'   Short term goal 5 Supervision with 4 stairs using unilateral rail   Long term goals   Time Frame for Long term goals  2-3 weeks   Long term goal 1 Independent with ambulation using '   Long term goal 2 Independent with car transfer   Long term goal 3 Independent with transfers   Long term goal 4 Independent with 4 stairs with unilateral rail   Conditions Requiring Skilled Therapeutic Intervention   Body structures, Functions, Activity limitations Decreased functional mobility ; Decreased strength; Decreased endurance; Decreased balance   Assessment Pt presents with minimal improvement of amb. quality with 1# added to RLE. Pt continues to be able to perform most aspects of standing ADLs with SBA/Supervision. History CVA with right body involvement, Dysarthria, hypothryoidism, anxiety, CAD, CHF, COPD, T2DM, CKD, MI x 2   Treatment Initiated  pre-gait, transfers, bed mobility   Discharge Recommendations Home with Home health PT   Activity Tolerance   Activity Tolerance Patient Tolerated treatment well   Activity Tolerance distracted and needs redirection, impulsive   Plan   Times per week 5-6   Plan weeks 1-3   Current Treatment Recommendations Strengthening; Balance Training; Functional Mobility Training; Transfer Training; Endurance Training; Wheelchair Mobility Training; Gait Training; Stair training; Home Exercise Program; Safety Education & Training; Patient/Caregiver Education & Training; Equipment Evaluation, Education, & procurement   Plan Comment cont. PT as pt is able with progressive mobility and safety training.    Safety Devices   Type of devices Patient at risk for falls; Gait belt; Left in bed; Bed alarm in place; Call light within reach   PT Whiteboard Notes   Therapy Whiteboard RE 12/5 CVA, R side weakness          Electronically signed by Erika Ro PTA on 11/30/2021 at 3:44 PM            Cosigned by: Ashley Bain PT at 11/30/2021  3:46 PM       Revision History                                  RECORD REVIEW: Previous medical records, medications were reviewed at today's visit    IMPRESSION:   1. Acute ischemic stroke-aspirin/statin/Plavix  2. Coronary artery disease/history of MI-aspirin/statin/Plavix  3. Hyperlipidemia-on statin  4. CHF-Coreg  5. Allergies-Zyrtec  6. History of COPD-nebulizers as needed  7. DVT prophylaxis-Lovenox  8. Diabetes-on insulin monitor blood sugars  9. Hypothyroidism-Synthroid  10. GI-proton pump inhibitor/bowel regimen  11. Mood disorder-on Zoloft  12. Psoriatic arthritis-Tylenol as needed  13. Chronic kidney disease-monitor  14. PT/OT/speech  15.   Obstructive sleep apnea-monitor    Continue care as noted    ELOS Saturday       Expected duration and frequency therapy: 180 minutes per day, 5 days per week    [facility]  [phone] CELL  Dr Júnior Ross

## 2021-12-01 NOTE — PROGRESS NOTES
[facility]  [facility] 8 REHAB UNIT  TIME   Time In: 0830  Time Out: 0900  Minutes: 30       [x]Daily Note  []Progress Note    Date: 2021  Patient Name: Johnny Morelos        MRN: [id number]    Account #: [de-identified]  : 1940  (79 y.o.)  Gender: female   Primary Provider: Marco Alberto MD  Swallowing Status/Diet: Regular diet, thin liquids       PATIENT DIAGNOSIS(ES):    Diagnosis: Cerebral infarction, R body involvement, posterior limb left internal capsule lacunar infarct. Referral 21 Dr. Sarabjit Gray     Additional Pertinent Hx: Dysarthria, hypothryoidism, anxiety, CAD, CHF, COPD, T2DM, CKD, MI x 2     RESTRICTIONS/PRECAUTIONS:    Restrictions/Precautions  Restrictions/Precautions: Weight Bearing, Fall Risk          Subjective:   She denied pain this date. She was cooperative with all tasks. No caregivers were present this morning. Objective:  Oral motor exercises were completed to improve lingual and labial strength and range of motion. Diadochokinetic tasks were completed. Decreased accuracy and rate were noted. Phrases and sentences which included fricatives and bilabials were completed while utilizing dysarthria strategies. She also completed five syllable words with fricatives while practicing her dysarthria strategies. Her speech intelligibility was at 100% this date.     She reported that she has been pocketing solid foods. She recalls pocketing pancakes (a large piece) this week. She has been using liquid wash to help clear oral residue. She was also given oral swabs to use after meals. No oral residue was noted this morning after breakfast.       SHORT TERM GOAL #1:  Goal 1: The patient will participate in the Cognitive Linguistic Quick Test or CLQT. SHORT TERM GOAL #2:  Goal 2: The patient will complete tasks for medication management with minimal cues and 100% accuracy.     SHORT TERM GOAL #3:  Goal 3: The patient will complete tasks for finance management with minimal cues and 100% accuracy. SHORT TERM GOAL #4:  Goal 4: The patient will use the (over articulation/slow rate/writing key word/elongation of thevowel/increased loudness/phrasing) strategy to improve speech intelligibility withwords/phrases/sentences/in conversation with 100% accuracy. SHORT TERM GOAL #5:  Goal 5: The patient will complete daily oral-motor exercise to increase lingual and labial range of motion, strength and coordination with (min/mod/max) verbal, tactile and visual cues to improve speech intelligibility.       Comprehension: 5 - Patient understands basic needs (hungry/hot/pain)  Expression: 5 - Expresses basic ideas/needs only (hungry/hot/pain)  Social Interaction: 5 - Patient is appropriate with supervision/cues  Problem Solvin - Patient able to solve simple/routine tasks  Memory: 5 - Patient requires prompting with stress/unfamiliar situations    ASSESSMENT:  Assessment: [x]Progressing towards goals          []Not Progressing towards goals    Patient Tolerance of Treatment:   [x]Tolerated well []Tolerated fair []Required rest breaks []Fatigued    Education:  Learner:  [x]Patient          []Significant Other          []Other  Education provided regarding:  [x]Goals and POC   []Diet and swallowing precautions    []Home Exercise Program  []Progress and/or discharge information  Method of Education:  [x]Discussion          []Demonstration          []Handout          []Other  Evaluation of Education:   [x]Verbalized understanding   []Demonstrates without assistance  []Demonstrates with assistance  []Needs further instruction     []No evidence of learning                  []No family present      Plan: [x]Continue with current plan of care    []Modify current plan of care as follows:    []Discharge patient    Discharge Location:    Services/Supervision Recommended:      [x]Patient continues to require treatment by a licensed therapist to address functional deficits as outlined in the established plan of care.               Electronically Signed By:  Abram Garcia M.S., CCC-SLP  12/1/2021,8:40 AM.

## 2021-12-02 ENCOUNTER — TELEPHONE (OUTPATIENT)
Dept: INTERNAL MEDICINE CLINIC | Age: 81
End: 2021-12-02

## 2021-12-02 LAB
ALBUMIN SERPL-MCNC: 3.4 G/DL (ref 3.5–5.2)
ALP BLD-CCNC: 76 U/L (ref 35–104)
ALT SERPL-CCNC: 25 U/L (ref 5–33)
ANION GAP SERPL CALCULATED.3IONS-SCNC: 11 MMOL/L (ref 7–19)
AST SERPL-CCNC: 18 U/L (ref 5–32)
BASOPHILS ABSOLUTE: 0 K/UL (ref 0–0.2)
BASOPHILS RELATIVE PERCENT: 0.3 % (ref 0–1)
BILIRUB SERPL-MCNC: 0.5 MG/DL (ref 0.2–1.2)
BUN BLDV-MCNC: 16 MG/DL (ref 8–23)
CALCIUM SERPL-MCNC: 8.6 MG/DL (ref 8.8–10.2)
CHLORIDE BLD-SCNC: 107 MMOL/L (ref 98–111)
CO2: 22 MMOL/L (ref 22–29)
CREAT SERPL-MCNC: 0.7 MG/DL (ref 0.5–0.9)
EOSINOPHILS ABSOLUTE: 0.3 K/UL (ref 0–0.6)
EOSINOPHILS RELATIVE PERCENT: 5.1 % (ref 0–5)
GFR AFRICAN AMERICAN: >59
GFR NON-AFRICAN AMERICAN: >60
GLUCOSE BLD-MCNC: 136 MG/DL (ref 70–99)
GLUCOSE BLD-MCNC: 137 MG/DL (ref 74–109)
GLUCOSE BLD-MCNC: 146 MG/DL (ref 70–99)
GLUCOSE BLD-MCNC: 197 MG/DL (ref 70–99)
GLUCOSE BLD-MCNC: 206 MG/DL (ref 70–99)
HCT VFR BLD CALC: 33.5 % (ref 37–47)
HEMOGLOBIN: 11.5 G/DL (ref 12–16)
IMMATURE GRANULOCYTES #: 0 K/UL
LYMPHOCYTES ABSOLUTE: 1.7 K/UL (ref 1.1–4.5)
LYMPHOCYTES RELATIVE PERCENT: 25.6 % (ref 20–40)
MAGNESIUM: 1.5 MG/DL (ref 1.6–2.4)
MCH RBC QN AUTO: 31.8 PG (ref 27–31)
MCHC RBC AUTO-ENTMCNC: 34.3 G/DL (ref 33–37)
MCV RBC AUTO: 92.5 FL (ref 81–99)
MONOCYTES ABSOLUTE: 0.7 K/UL (ref 0–0.9)
MONOCYTES RELATIVE PERCENT: 10.1 % (ref 0–10)
NEUTROPHILS ABSOLUTE: 4 K/UL (ref 1.5–7.5)
NEUTROPHILS RELATIVE PERCENT: 58.8 % (ref 50–65)
PDW BLD-RTO: 13.1 % (ref 11.5–14.5)
PERFORMED ON: ABNORMAL
PLATELET # BLD: 183 K/UL (ref 130–400)
PMV BLD AUTO: 9.6 FL (ref 9.4–12.3)
POTASSIUM REFLEX MAGNESIUM: 3.5 MMOL/L (ref 3.5–5)
RBC # BLD: 3.62 M/UL (ref 4.2–5.4)
SODIUM BLD-SCNC: 140 MMOL/L (ref 136–145)
TOTAL PROTEIN: 5.4 G/DL (ref 6.6–8.7)
WBC # BLD: 6.7 K/UL (ref 4.8–10.8)

## 2021-12-02 PROCEDURE — 6370000000 HC RX 637 (ALT 250 FOR IP): Performed by: NURSE PRACTITIONER

## 2021-12-02 PROCEDURE — 97530 THERAPEUTIC ACTIVITIES: CPT

## 2021-12-02 PROCEDURE — 85025 COMPLETE CBC W/AUTO DIFF WBC: CPT

## 2021-12-02 PROCEDURE — 80053 COMPREHEN METABOLIC PANEL: CPT

## 2021-12-02 PROCEDURE — 97110 THERAPEUTIC EXERCISES: CPT

## 2021-12-02 PROCEDURE — 92507 TX SP LANG VOICE COMM INDIV: CPT

## 2021-12-02 PROCEDURE — 36415 COLL VENOUS BLD VENIPUNCTURE: CPT

## 2021-12-02 PROCEDURE — 83735 ASSAY OF MAGNESIUM: CPT

## 2021-12-02 PROCEDURE — 99232 SBSQ HOSP IP/OBS MODERATE 35: CPT | Performed by: PSYCHIATRY & NEUROLOGY

## 2021-12-02 PROCEDURE — 6360000002 HC RX W HCPCS: Performed by: PSYCHIATRY & NEUROLOGY

## 2021-12-02 PROCEDURE — 6370000000 HC RX 637 (ALT 250 FOR IP): Performed by: PSYCHIATRY & NEUROLOGY

## 2021-12-02 PROCEDURE — 1180000000 HC REHAB R&B

## 2021-12-02 PROCEDURE — 97535 SELF CARE MNGMENT TRAINING: CPT

## 2021-12-02 PROCEDURE — 97116 GAIT TRAINING THERAPY: CPT

## 2021-12-02 PROCEDURE — 82947 ASSAY GLUCOSE BLOOD QUANT: CPT

## 2021-12-02 RX ORDER — OMEPRAZOLE 40 MG/1
CAPSULE, DELAYED RELEASE ORAL
Qty: 30 CAPSULE | Refills: 0 | Status: SHIPPED | OUTPATIENT
Start: 2021-12-02 | End: 2021-12-06 | Stop reason: ALTCHOICE

## 2021-12-02 RX ORDER — ASCORBIC ACID 1000 MG
1 TABLET ORAL DAILY
Qty: 30 CAPSULE | Refills: 0 | Status: SHIPPED | OUTPATIENT
Start: 2021-12-02

## 2021-12-02 RX ORDER — CARVEDILOL 12.5 MG/1
12.5 TABLET ORAL 2 TIMES DAILY WITH MEALS
Qty: 60 TABLET | Refills: 0 | Status: SHIPPED | OUTPATIENT
Start: 2021-12-02 | End: 2022-10-10 | Stop reason: SDUPTHER

## 2021-12-02 RX ORDER — ASPIRIN 81 MG/1
81 TABLET ORAL DAILY
Qty: 30 TABLET | Refills: 0 | Status: SHIPPED | OUTPATIENT
Start: 2021-12-03

## 2021-12-02 RX ORDER — LEVOTHYROXINE SODIUM 0.03 MG/1
25 TABLET ORAL DAILY
Qty: 30 TABLET | Refills: 0 | Status: SHIPPED | OUTPATIENT
Start: 2021-12-03 | End: 2022-04-26 | Stop reason: SDUPTHER

## 2021-12-02 RX ORDER — INSULIN GLARGINE 100 [IU]/ML
20 INJECTION, SOLUTION SUBCUTANEOUS NIGHTLY
Qty: 1 PEN | Refills: 0 | Status: SHIPPED | OUTPATIENT
Start: 2021-12-02 | End: 2022-01-01 | Stop reason: SDUPTHER

## 2021-12-02 RX ORDER — SERTRALINE HYDROCHLORIDE 25 MG/1
25 TABLET, FILM COATED ORAL DAILY
Qty: 30 TABLET | Refills: 0 | Status: SHIPPED | OUTPATIENT
Start: 2021-12-02

## 2021-12-02 RX ORDER — LANOLIN ALCOHOL/MO/W.PET/CERES
1000 CREAM (GRAM) TOPICAL DAILY
Qty: 30 TABLET | Refills: 0 | Status: SHIPPED | OUTPATIENT
Start: 2021-12-02

## 2021-12-02 RX ORDER — LOPERAMIDE HYDROCHLORIDE 2 MG/1
2 CAPSULE ORAL
Qty: 30 CAPSULE | Refills: 0 | Status: SHIPPED | OUTPATIENT
Start: 2021-12-03 | End: 2022-01-02

## 2021-12-02 RX ORDER — CLOPIDOGREL BISULFATE 75 MG/1
75 TABLET ORAL DAILY
Qty: 30 TABLET | Refills: 0 | Status: SHIPPED | OUTPATIENT
Start: 2021-12-02 | End: 2021-12-06 | Stop reason: SDUPTHER

## 2021-12-02 RX ORDER — ATORVASTATIN CALCIUM 80 MG/1
80 TABLET, FILM COATED ORAL NIGHTLY
Qty: 30 TABLET | Refills: 0 | Status: SHIPPED | OUTPATIENT
Start: 2021-12-02 | End: 2022-04-26 | Stop reason: SDUPTHER

## 2021-12-02 RX ADMIN — ACETAMINOPHEN 650 MG: 325 TABLET ORAL at 20:48

## 2021-12-02 RX ADMIN — CYANOCOBALAMIN TAB 500 MCG 1000 MCG: 500 TAB at 08:17

## 2021-12-02 RX ADMIN — ATORVASTATIN CALCIUM 80 MG: 80 TABLET, FILM COATED ORAL at 20:43

## 2021-12-02 RX ADMIN — CARVEDILOL 12.5 MG: 12.5 TABLET, FILM COATED ORAL at 16:44

## 2021-12-02 RX ADMIN — LEVOTHYROXINE SODIUM 25 MCG: 25 TABLET ORAL at 08:17

## 2021-12-02 RX ADMIN — Medication 100 MG: at 08:16

## 2021-12-02 RX ADMIN — PANTOPRAZOLE SODIUM 40 MG: 40 TABLET, DELAYED RELEASE ORAL at 05:38

## 2021-12-02 RX ADMIN — INSULIN GLARGINE 20 UNITS: 100 INJECTION, SOLUTION SUBCUTANEOUS at 20:43

## 2021-12-02 RX ADMIN — ASPIRIN 81 MG: 81 TABLET, COATED ORAL at 08:16

## 2021-12-02 RX ADMIN — SERTRALINE HYDROCHLORIDE 25 MG: 25 TABLET ORAL at 08:17

## 2021-12-02 RX ADMIN — INSULIN LISPRO 1 UNITS: 100 INJECTION, SOLUTION INTRAVENOUS; SUBCUTANEOUS at 20:43

## 2021-12-02 RX ADMIN — CARVEDILOL 12.5 MG: 12.5 TABLET, FILM COATED ORAL at 08:16

## 2021-12-02 RX ADMIN — INSULIN LISPRO 2 UNITS: 100 INJECTION, SOLUTION INTRAVENOUS; SUBCUTANEOUS at 11:58

## 2021-12-02 RX ADMIN — CLOPIDOGREL BISULFATE 75 MG: 75 TABLET ORAL at 08:17

## 2021-12-02 RX ADMIN — ENOXAPARIN SODIUM 40 MG: 100 INJECTION SUBCUTANEOUS at 17:00

## 2021-12-02 ASSESSMENT — PAIN DESCRIPTION - ORIENTATION: ORIENTATION: OTHER (COMMENT)

## 2021-12-02 ASSESSMENT — PAIN DESCRIPTION - PAIN TYPE: TYPE: OTHER (COMMENT)

## 2021-12-02 ASSESSMENT — PAIN SCALES - GENERAL
PAINLEVEL_OUTOF10: 0
PAINLEVEL_OUTOF10: 2
PAINLEVEL_OUTOF10: 0

## 2021-12-02 ASSESSMENT — PAIN DESCRIPTION - PROGRESSION: CLINICAL_PROGRESSION: NOT CHANGED

## 2021-12-02 ASSESSMENT — PAIN DESCRIPTION - DESCRIPTORS: DESCRIPTORS: ACHING

## 2021-12-02 ASSESSMENT — PAIN - FUNCTIONAL ASSESSMENT: PAIN_FUNCTIONAL_ASSESSMENT: ACTIVITIES ARE NOT PREVENTED

## 2021-12-02 ASSESSMENT — PAIN DESCRIPTION - FREQUENCY: FREQUENCY: CONTINUOUS

## 2021-12-02 ASSESSMENT — PAIN DESCRIPTION - ONSET: ONSET: GRADUAL

## 2021-12-02 ASSESSMENT — PAIN DESCRIPTION - LOCATION: LOCATION: GENERALIZED

## 2021-12-02 NOTE — PLAN OF CARE
Problem: Falls - Risk of:  Goal: Will remain free from falls  Description: Will remain free from falls  12/2/2021 1040 by Melani Slater RN  Outcome: Ongoing  12/1/2021 2328 by Lizabeth Moses RN  Outcome: Ongoing  Goal: Absence of physical injury  Description: Absence of physical injury  12/2/2021 1040 by Melani Slater RN  Outcome: Ongoing  12/1/2021 2328 by Lizabeth Moses RN  Outcome: Ongoing

## 2021-12-02 NOTE — PROGRESS NOTES
Durable Medical Equipment   Physician Order     Patient Name Suhail Byers  Patient Phone: [phone] [facility]    Patient Address: [place] [address]    Patient Height Height: 5' 6\" (167.6 cm)  Patient Weight 161 lb 0.2 oz (73 kg)   1940   DME NEEDED:  · ROLLING WALKER WITH SEAT     DIAGNOSIS:  1. [facility] PART A AND B    F/O Payor/Plan Precert #   MEDICARE/MEDICARE PART A AND B    Subscriber Subscriber #   Karlee  [id number]   Address Phone   [address] [facility] [address] [phone]     2.  GEHA/GEHA PPO MEDICARE SUPP    F/O Payor/Plan Precert #   GEHA/GEHA PPO MEDICARE SUPP    Subscriber Subscriber #   Bonilla Connell [id number]   Address Phone   [address] [address]   [address] [phone]     Hospital Problem List  Date Reviewed: 2020     ICD-10-CM Priority Class Noted POA   Acute CVA (cerebrovascular accident) [facility]) I63.9   2021 Yes   CAD in native artery I25.10 High  2017 Yes   Overview Addendum 2017 10:45 AM by Angie Altamirano MD    2017  SE  Positive for EKG and echo myocardial ischemia  2017  PCI to mid and distal LAD         HTN (hypertension) I10 High  Unknown Yes   Other fatigue R53.83   2017 Yes   Gastroesophageal reflux disease without esophagitis K21.9   2017 Yes   Depression with anxiety F41.8   2017 Yes   Type 2 diabetes mellitus with chronic kidney disease, without long-term current use of insulin ([address] E11.22   2018 Yes   COPD (chronic obstructive pulmonary disease) ([address] J44.9   2018 Yes   Mixed hyperlipidemia E78.2   2018 Yes   MAGALY (obstructive sleep apnea) G47.33   2019 Yes   CECILIA (acute kidney injury) ([address] N17.9   2021 Yes   Hemiplegia affecting dominant side ([facility] [address] G81.90   2021 Yes   Dysarthria R47.1   2021 Yes   Dysphagia due to recent cerebral infarction I69.391   2021 Yes   Acquired hypothyroidism E03.9   2021 Yes   CHF (congestive heart failure) ([facility] I50.9   11/23/2021 Yes   Acute ischemic stroke ([facility] I63.9   11/23/2021 Yes   Psoriatic arthritis ([facility] L40.50   11/23/2021 Yes   HISTORY OF PRESENT ILLNESS:   This 79 y.o. female  with history of CAD, CHF, COPD, DM, CKD, GERD, mixed hyperlipidemia, thyroid disease and MI x 2. She presented to [facility] ER on 11/20/21 with s/o of slurred speech, right sided numbness and weakness. She initially thought it was her blood sugar which was 25 that morning before she ate but then her family noticed a right facial droop. Hemoglobin A1C on admit was 8.1. CT of head was negative. MRI done revealed an acute posterior limb left internal capsule lacunar infarct. She was admitted to the hospitalist service with consult for neurology. She was evaluated by speech for dysarthria and dysphagia. She was placed on a mechanical soft diet with thin liquids. She had acute kidney injury on CKD but this has now resolved She continues to have right sided weakness and is participating in both PT/OT. She is felt to need a stay on Rehab to work towards her goal of returning home.  She is now felt ready to start the Rehab program.    ____________________ _____________________ ________________   Physician Signature      Physician Name (print)   Physician NPI          Date

## 2021-12-02 NOTE — PROGRESS NOTES
Name: Shady Barajas  MRN: [id number]  Date of Service:  12/2/2021 12/02/21 5416   Restrictions/Precautions   Restrictions/Precautions Up Ad Kayla; Fall Risk   Required Braces or Orthoses? No   Lower Extremity Weight Bearing Restrictions   Right Lower Extremity Weight Bearing Weight Bearing As Tolerated   Left Lower Extremity Weight Bearing Weight Bearing As Tolerated   General   Chart Reviewed Yes   Additional Pertinent Hx Dysarthria, hypothryoidism, anxiety, CAD, CHF, COPD, T2DM, CKD, MI x 2   Response To Previous Treatment Not applicable   Family / Caregiver Present No   Referring Practitioner SHARI Hooks - ADE, Dr. Jagruti Norris Pt brought into gym by OT, agrees to participate in therapy. Pain Screening   Patient Currently in Pain Denies   Bed Mobility   Bridging Independent   Rolling Independent   Sit to Supine Modified independent; Independent   Scooting Independent   Transfers   Sit to Stand Supervision  (Does not lock w/c brakes prior to standing, requires v/c's )   Stand to sit Independent  (On rollator seat- does not require v/c's to lock brakes )   Lateral Transfers Modified independent; Supervision  (Due to not making sure w/c brakes are locked )   Ambulation   Ambulation? Yes   More Ambulation?  Yes   Ambulation 1   Surface level tile   Device Rollator   Assistance Modified Independent; Supervision   Quality of Gait Good and steady pace with inc fwd flex at waist; No SOB or LOB noted during amb   Gait Deviations Decreased step height   Distance 15'   Comments w/c<>//  (1 L and 1 R turn )   Ambulation 2   Surface - 2 level tile   Device 2 Rollator   Assistance 2 Modified Independent; Supervision   Quality of Gait 2 same as previous    Gait Deviations Decreased step height   Distance 250'   Comments Including L and R turns    Wheelchair Activities   Propulsion Yes   Propulsion 1   Propulsion Manual   Level Level Tile   Method RLE; LLE  (intermittent use of R handrail )   Level of Assistance Supervision   Description/ Details pt tends to veer to R, able to self correct    Distance 100'   Exercises   Hip Flexion Marches X 20    Hip Abduction BLE X 20   Comments Standing BLE ther-ex at //  (3X sitting rest breaks on rollator seat )   Other exercises   Other exercises? Yes   Other exercises 1 Heel raises X 20   Other exercises 2 Toe raises X 20    Other exercises 3 Forward tandem walking 12' X 2    Patient Goals    Patient goals  go home   Short term goals   Time Frame for Short term goals 1-2 weeks   Short term goal 1 Supervision with ambulation using '   Short term goal 2 Supervision with car transfer   Short term goal 3 Independent with bed mobility   Short term goal 4 Supervision with w/c mobility 250'   Short term goal 5 Supervision with 4 stairs using unilateral rail   Long term goals   Time Frame for Long term goals  2-3 weeks   Long term goal 1 Independent with ambulation using '   Long term goal 2 Independent with car transfer   Long term goal 3 Independent with transfers   Long term goal 4 Independent with 4 stairs with unilateral rail   Conditions Requiring Skilled Therapeutic Intervention   Body structures, Functions, Activity limitations Decreased functional mobility ; Decreased strength; Decreased endurance; Decreased balance   Assessment Pt easily distracted and requires v/c's for attention to task and encouragement to participate. Pt reports she will not be using w/c much/or at all in room. Pt also reports she is planning to be d/c on Saturday.     History CVA with right body involvement, Dysarthria, hypothryoidism, anxiety, CAD, CHF, COPD, T2DM, CKD, MI x 2   Barriers to Learning impulsivity, decreased safety awareness   Treatment Initiated  pre-gait, transfers, bed mobility   Discharge Recommendations Home with Home health PT   Activity Tolerance   Activity Tolerance Patient Tolerated treatment well   Activity Tolerance distracted and needs

## 2021-12-02 NOTE — PLAN OF CARE
Problem: Falls - Risk of:  Goal: Will remain free from falls  Description: Will remain free from falls  12/1/2021 2328 by Rossana Gonzalez RN  Outcome: Ongoing  12/1/2021 1036 by Kalpana Kaye RN  Outcome: Ongoing  Goal: Absence of physical injury  Description: Absence of physical injury  12/1/2021 2328 by Rossana Gonzalez RN  Outcome: Ongoing  12/1/2021 1036 by Kalpana Kyae RN  Outcome: Ongoing     Problem: HEMODYNAMIC STATUS  Goal: Patient has stable vital signs and fluid balance  12/1/2021 2328 by Rossana Gonzalez RN  Outcome: Ongoing  12/1/2021 1036 by Kalpana Kaye RN  Outcome: Ongoing     Problem: ACTIVITY INTOLERANCE/IMPAIRED MOBILITY  Goal: Mobility/activity is maintained at optimum level for patient  12/1/2021 2328 by Rossana Gonzalez RN  Outcome: Ongoing  12/1/2021 1036 by Kalpana Kaye RN  Outcome: Ongoing     Problem: COMMUNICATION IMPAIRMENT  Goal: Ability to express needs and understand communication  12/1/2021 2328 by Rossana Gonzalez RN  Outcome: Ongoing  12/1/2021 1036 by Kalpana Kaye RN  Outcome: Ongoing

## 2021-12-02 NOTE — PROGRESS NOTES
Patient:   Kylie Richmond  MR#:    [id number]   Room:    [facility]934-   YOB: 1940  Date of Progress Note: 12/2/2021  Time of Note                           9:10 AM  Consulting Physician:   Mahogany Rodrigues M.D. Attending Physician:  Mahogany Rodrigues MD     Chief complaint Acute ischemic stroke    S:This 79 y.o. female with history of CAD, CHF, COPD, DM, CKD, GERD, mixed hyperlipidemia, thyroid disease and MI x 2. She presented to [facility] ER on 11/20/21 with s/o of slurred speech, right sided numbness and weakness. She initially thought it was her blood sugar which was 25 that morning before she ate but then her family noticed a right facial droop. Hemoglobin A1C on admit was 8.1. CT of head was negative. MRI done revealed an acute posterior limb left internal capsule lacunar infarct. She was admitted to the hospitalist service with consult for neurology. She was evaluated by speech for dysarthria and dysphagia. She was placed on a mechanical soft diet with thin liquids. She had acute kidney injury on CKD but this has now resolved She continues to have right sided weakness and is participating in both PT/OT. She is felt to need a stay on Rehab to work towards her goal of returning home. She is now felt ready to start the Rehab program.No acute issues overnight. Poor sleep.       REVIEW OF SYSTEMS:  Constitutional: No fevers No chills  Neck:No stiffness  Respiratory: No shortness of breath  Cardiovascular: No chest pain No palpitations  Gastrointestinal: No abdominal pain    Genitourinary: No Dysuria  Neurological: No headache, no confusion    Past Medical History:      Diagnosis Date    Acute [facility]A [facility]cerebrovascular accident) ([facility] 11/20/2021    Arthritis     Psoriatic    Asthma     CAD (coronary artery disease)     CHF (congestive heart failure) (HCC)     COPD (chronic obstructive pulmonary disease) ([facility]     DM (diabetes mellitus) (HCC)     GERD (gastroesophageal reflux disease)     HTN (hypertension)     Hyperlipidemia     [facility]my[facility] infarction) [facility]     x2    Thyroid disease        Past Surgical History:      Procedure Laterality Date    CATARACT REMOVAL      CHOLECYSTECTOMY      CORONARY ANGIOPLASTY  06/2018    Angioplasty to in-stent restenosis to the distal LAD    HIP SURGERY      HYSTERECTOMY      JOINT REPLACEMENT Right     Right knee replacement       Medications in Hospital:      Current Facility-Administered Medications:     loperamide (IMODIUM) capsule 2 mg, 2 mg, Oral, QAM AC, Burdine MD Gustavo, 2 mg at 11/29/21 0537    aspirin EC tablet 81 mg, 81 mg, Oral, Daily, 81 mg at 12/02/21 0816 **OR** [DISCONTINUED] aspirin suppository 300 mg, 300 mg, Rectal, Daily, SHARI Ortega - CNP    atorvastatin (LIPITOR) tablet 80 mg, 80 mg, Oral, Nightly, SHARI Ortega - CNP, 80 mg at 12/01/21 2054    ondansetron (ZOFRAN-ODT) disintegrating tablet 4 mg, 4 mg, Oral, Q8H PRN **OR** ondansetron (ZOFRAN) injection 4 mg, 4 mg, IntraVENous, Q6H PRN, Katerina Holcomb APRN - CNP    dextrose 5 % solution, 100 mL/hr, IntraVENous, PRN, Katerina Holcomb APRN - CNP    dextrose 50 % IV solution, 12.5 g, IntraVENous, PRN, SHARI Ortega - CNP    glucagon (rDNA) injection 1 mg, 1 mg, IntraMUSCular, PRN, SHARI Ortega - CNP    glucose (GLUTOSE) 40 % oral gel 15 g, 15 g, Oral, PRN, Vernon Ngo APRN - CNP    carvedilol (COREG) tablet 12.5 mg, 12.5 mg, Oral, BID WC, Katerina Holcomb APRN - CNP, 12.5 mg at 12/02/21 0816    clopidogrel (PLAVIX) tablet 75 mg, 75 mg, Oral, Daily, SHARI Knapp - CNP, 75 mg at 12/02/21 0817    coenzyme Q10 capsule 100 mg, 100 mg, Oral, Daily, SHARI Ortega - CNP, 100 mg at 12/02/21 0816    insulin glargine (LANTUS) injection vial 20 Units, 20 Units, SubCUTAneous, Nightly, Katerina Aicha, APRN - CNP, 20 Units at 12/01/21 2055    insulin lispro (HUMALOG) injection vial 0-6 Units, 0-6 without use of accessory muscles  Musculoskeletal - no significant wasting of muscles noted, no bony deformities  Extremities-no clubbing, cyanosis or edema  Skin - warm, dry, and intact. No rash, erythema, or pallor. Psychiatric - mood, affect, and behavior appear normal.      Neurological exam  Awake, alert, fluent oriented appropriate affect  Attention and concentration appear appropriate  Recent and remote memory appears unremarkable  Speech normal without dysarthria  No clear issues with language of fund of knowledge     Cranial Nerve Exam     CN III, IV,VI-EOMI, No nystagmus, conjugate eye movements, no ptosis    CN VII-no facial assymetry       Motor Exam  antigravity throughout upper and lower extremities bilaterally      Tremors- no tremors in hands or head noted     Gait  Not tested     Nursing/pcp notes, imaging,labs and vitals reviewed.      PT,OT and/or speech notes reviewed    Lab Results   Component Value Date    WBC 6.7 12/02/2021    HGB 11.5 (L) 12/02/2021    HCT 33.5 (L) 12/02/2021    MCV 92.5 12/02/2021     12/02/2021     Lab Results   Component Value Date     12/02/2021    K 3.5 12/02/2021     12/02/2021    CO2 22 12/02/2021    BUN 16 12/02/2021    CREATININE 0.7 12/02/2021    GLUCOSE 137 (H) 12/02/2021    CALCIUM 8.6 (L) 12/02/2021    PROT 5.4 (L) 12/02/2021    LABALBU 3.4 (L) 12/02/2021    BILITOT 0.5 12/02/2021    ALKPHOS 76 12/02/2021    AST 18 12/02/2021    ALT 25 12/02/2021    LABGLOM >60 12/02/2021    GFRAA >59 12/02/2021     Lab Results   Component Value Date    INR 1.01 11/23/2021    INR 0.99 11/20/2021    INR 1.03 07/05/2021    PROTIME 13.5 11/23/2021    PROTIME 13.3 11/20/2021    PROTIME 13.7 07/05/2021       Rebecca Downey PTA   Physical Therapist Assistant   Physical Therapy   Progress Notes      Signed   Date of Service:  11/30/2021  3:44 PM                 Signed                Show:Clear all  []Manual[x]Template[]Copied    Added by:  [x]Brissa Irving, EARLENE      []Latia for details       Name: Shawn Doyle  MRN: [id number]  Date of Service:  11/30/2021 11/30/21 1411   Restrictions/Precautions   Restrictions/Precautions Fall Risk   Required Braces or Orthoses? No   Lower Extremity Weight Bearing Restrictions   Right Lower Extremity Weight Bearing Weight Bearing As Tolerated   Left Lower Extremity Weight Bearing Weight Bearing As Tolerated   General   Chart Reviewed Yes   Additional Pertinent Hx Dysarthria, hypothryoidism, anxiety, CAD, CHF, COPD, T2DM, CKD, MI x 2   Response To Previous Treatment Not applicable   Family / Caregiver Present No   Referring Practitioner SHARI Squires CNP, Dr. Veronica Galeana in bed, agrees to participate in therapy. Pain Screening   Patient Currently in Pain Denies   Bed Mobility   Rolling Modified independent   Supine to Sit Supervision; Modified independent   Sit to Supine Supervision; Modified independent   Scooting Modified independent; Independent  (To EOB )   Transfers   Sit to Stand Supervision; Modified independent   Stand to sit Supervision; Modified independent   Lateral Transfers Supervision   Ambulation   Ambulation? Yes   More Ambulation? Yes   Ambulation 1   Surface level tile   Device Rolling [employer] Stand by assistance; Supervision   Quality of Gait Good and steady pace. No SOB or LOB noted during amb   Gait Deviations Decreased step height   Distance 10'   Comments EOB<w/c   Ambulation 2   Surface - 2 level tile   Device 2 Rolling Walker   Assistance 2 Stand by assistance; Supervision   Quality of Gait 2 same as previous    Gait Deviations Decreased step height   Distance 200'   Comments Trial use of 1# on RLE to increase proprioception, somewhat improved control, continues to present with eversion and some intermittent toe drag.    (Including L and R turns )   Patient Goals    Patient goals  go home   Short term goals   Time Frame for Short term Therapy Whiteboard RE 12/5 CVA, R side weakness            Electronically signed by Kylah Fletcher PTA on 11/30/2021 at 3:44 PM            Cosigned by: Gloria Haskins, PT at 11/30/2021  3:46 PM       Revision History                                  RECORD REVIEW: Previous medical records, medications were reviewed at today's visit    IMPRESSION:   1. Acute ischemic stroke-aspirin/statin/Plavix  2. Coronary artery disease/history of MI-aspirin/statin/Plavix  3. Hyperlipidemia-on statin  4. CHF-Coreg  5. Allergies-Zyrtec  6. History of COPD-nebulizers as needed  7. DVT prophylaxis-Lovenox  8. Diabetes-on insulin monitor blood sugars  9. Hypothyroidism-Synthroid  10. GI-proton pump inhibitor/bowel regimen  11. Mood disorder-on Zoloft  12. Psoriatic arthritis-Tylenol as needed  13. Chronic kidney disease-monitor  14. PT/OT/speech  15.   Obstructive sleep apnea-monitor    Continue present care as noted    ELOS Saturday       Expected duration and frequency therapy: 180 minutes per day, 5 days per week    [address] [place] [address]  [phone] CELL  Dr Loree Murphy

## 2021-12-02 NOTE — DISCHARGE SUMMARY
Neurology Discharge Summary     Patient Identification:  Tom Pugh is a 79 y.o. female. :  1940  Admit Date:  2021  Discharge date : 2021   Attending Provider: Patricia Bass MD     Account Number: [de-identified]                                   Admission Diagnoses:   Acute ischemic stroke [facility]) [I63.9]    Discharge Diagnoses:  Principal Problem:    Acute CVA (cerebrovascular accident) [facility])  Active Problems:    CAD in native artery    HTN (hypertension)    Other fatigue    Gastroesophageal reflux disease without esophagitis    Depression with anxiety    Type 2 diabetes mellitus with chronic kidney disease, without long-term current use of insulin (HCC)    COPD (chronic obstructive pulmonary disease) ([facility]    Mixed hyperlipidemia    MAGALY (obstructive sleep apnea)    CECILIA (acute kidney injury) ([facility] [address]    Hemiplegia affecting dominant side ([facility]    Dysarthria    Dysphagia due to recent cerebral infarction    Acquired hypothyroidism    CHF (congestive heart failure) ([facility] [address]    Acute ischemic stroke ([facility] [address]    [facility]  Resolved Problems:    * No resolved hospital problems.  *      Discharge Medications:    Current Discharge Medication List           Details   aspirin 81 MG EC tablet Take 1 tablet by mouth daily  Qty: 30 tablet, Refills: 0      loperamide (IMODIUM) 2 MG capsule Take 1 capsule by mouth every morning (before breakfast)  Qty: 30 capsule, Refills: 0              Details   sertraline (ZOLOFT) 25 MG tablet Take 1 tablet by mouth daily  Qty: 30 tablet, Refills: 0    Associated Diagnoses: Depression with anxiety      insulin glargine (BASAGLAR KWIKPEN) 100 UNIT/ML injection pen Inject 20 Units into the skin nightly  Qty: 1 pen, Refills: 0      atorvastatin (LIPITOR) 80 MG tablet Take 1 tablet by mouth nightly  Qty: 30 tablet, Refills: 0      carvedilol (COREG) 12.5 MG tablet Take 1 tablet by mouth 2 times daily (with meals)  Qty: 60 tablet, Refills: 0      vitamin B-12 (CYANOCOBALAMIN) 1000 MCG tablet Take 1 tablet by mouth daily  Qty: 30 tablet, Refills: 0      Coenzyme Q10 (CO Q 10) 10 MG CAPS Take 1 capsule by mouth daily  Qty: 30 capsule, Refills: 0      clopidogrel (PLAVIX) 75 MG tablet Take 1 tablet by mouth daily  Qty: 30 tablet, Refills: 0      levothyroxine (SYNTHROID) 25 MCG tablet Take 1 tablet by mouth daily  Qty: 30 tablet, Refills: 0      omeprazole (PRILOSEC) 40 MG delayed release capsule TAKE 1 CAPSULE DAILY  Qty: 30 capsule, Refills: 0              Details   Probiotic Product (ALIGN) 4 MG CAPS Once capsule daily  Qty: 30 capsule, Refills: 5    Comments: Or similar probiotic such as culturelle if you don't have align. Current Discharge Medication List      STOP taking these medications       lisinopril (PRINIVIL;ZESTRIL) 40 MG tablet Comments:   Reason for Stopping:         diclofenac sodium (VOLTAREN) 1 % GEL Comments:   Reason for Stopping:         blood glucose test strips (FREESTYLE LITE) strip Comments:   Reason for Stopping:         Diabetic Shoe MISC Comments:   Reason for Stopping:         Misc. Devices MISC Comments:   Reason for Stopping:         Misc.  Devices MISC Comments:   Reason for Stopping:         colchicine (COLCRYS) 0.6 MG tablet Comments:   Reason for Stopping:         Lancet Device MISC Comments:   Reason for Stopping:         Insulin Pen Needle 32G X 6 MM MISC Comments:   Reason for Stopping:         FREESTYLE LANCETS MISC Comments:   Reason for Stopping:         Omega-3 Fatty Acids (FISH OIL OMEGA-3 PO) Comments:   Reason for Stopping:         umeclidinium-vilanterol (ANORO ELLIPTA) 62.5-25 MCG/INH AEPB inhaler Comments:   Reason for Stopping:         fluticasone (FLONASE) 50 MCG/ACT nasal spray Comments:   Reason for Stopping:         cetirizine (ZYRTEC) 10 MG tablet Comments:   Reason for Stopping:         Blood Glucose Monitoring Suppl ADE Comments:   Reason for Stopping:         aspirin 81 MG tablet Comments:   Reason for Stopping:         GLUCOSAMINE-CALCIUM-VIT D PO Comments:   Reason for Stopping:                 Consults:   none    Hospital Course: This 79 y.o. female with history of CAD, CHF, COPD, DM, CKD, GERD, mixed hyperlipidemia, thyroid disease and MI x 2. She presented to [facility] ER on 11/20/21 with s/o of slurred speech, right sided numbness and weakness. She initially thought it was her blood sugar which was 25 that morning before she ate but then her family noticed a right facial droop. Hemoglobin A1C on admit was 8. 1.  CT of head was negative. MRI done revealed an acute posterior limb left internal capsule lacunar infarct. She was admitted to the hospitalist service with consult for neurology. She was evaluated by speech for dysarthria and dysphagia. She was placed on a mechanical soft diet with thin liquids. She had acute kidney injury on CKD but this has now resolved She continues to have right sided weakness and was participating in both PT/OT. She was felt to need a stay on Rehab to work towards her goal of returning home. The patient was admitted in inpatient rehab with improvement. Plan discharge home with home health. Discharge Instructions     Patient Instructions:   Home  Therapy orders: PT/OT/speech/nursing  Discharge lab work: none  Code status: Full Code   Activity: activity as tolerated  Diet: ADULT DIET;  Regular; 5 carb choices (75 gm/meal)    Wound Care: as directed  Equipment: as per therapy      oJel Maloney MD, MD    At least 35 minutes were spent in discharging the patient

## 2021-12-02 NOTE — PROGRESS NOTES
Occupational Therapy     12/02/21 1300   General   Additional Pertinent Hx Depression and anxiety; CKD; (R) TKA; HTN; arthritis; CAD; CHF; COPD; MIx2; thyroid dx; DM; asthma   Diagnosis L CVA   Pain Assessment   Patient Currently in Pain Denies   Balance   Sitting Balance Independent   Standing Balance Modified independent    Functional Mobility   Functional - Mobility Device 4-Wheeled Walker   Activity To/from bathroom   Assist Level Modified independent    Transfers   Sit to stand Modified independent   Stand to sit Modified independent   Toilet Transfers   Toilet - Technique Ambulating   Equipment Used Raised toilet seat with rails   Toilet Transfer Modified independent   Type of ROM/Therapeutic Exercise   Type of ROM/Therapeutic Exercise Avis   Comment 13# BUE 2 sets x 20 reps. Coordination   Fine Motor R hand FM buttoning/unbuttoning task, and 3# digiflex 2 sets x 10 reps. Assessment   Performance deficits / Impairments Decreased functional mobility ;  Decreased strength; Decreased high-level IADLs; Decreased fine motor control; Decreased coordination   Treatment Diagnosis L CVA   Prognosis Good   Timed Code Treatment Minutes 45 Minutes   Activity Tolerance   Activity Tolerance Patient Tolerated treatment well   Safety Devices   Safety Devices in place Yes   Type of devices Call light within reach   Plan   Current Treatment Recommendations Strengthening; ROM; Patient/Caregiver Education & Training; Functional Mobility Training; Equipment Evaluation, Education, & procurement; Home Management Training; Neuromuscular Re-education      12/02/21 1300   Oral Hygiene   Assistance Needed Independent   CARE Score 6   [id number] [facility]   CARE Score 6      12/02/21 1300   Toilet Transfer   Assistance Needed Independent   CARE Score 6

## 2021-12-02 NOTE — PROGRESS NOTES
[facility]  INPATIENT SPEECH THERAPY  [facility] 8 REHAB UNIT  TIME   Time In: 0800  Time Out: 0900  Minutes: 60       [x]Daily Note  []Progress Note    Date: 2021  Patient Name: Hilda Brown        MRN: [id number]    Account #: [de-identified]  : 1940  (79 y.o.)  Gender: female   Primary Provider: Cuong Looney MD  Swallowing Status/Diet: Regular diet, thin liquids        PATIENT DIAGNOSIS(ES):    Diagnosis: Cerebral infarction, R body involvement, posterior limb left internal capsule lacunar infarct. Referral 21 Dr. Bhupinder Lozoya     Additional Pertinent Hx: Dysarthria, hypothryoidism, anxiety, CAD, CHF, COPD, T2DM, CKD, MI x 2     RESTRICTIONS/PRECAUTIONS:    Restrictions/Precautions  Restrictions/Precautions: Weight Bearing, Fall Risk            Subjective: The patient was upright in bed. She stated she did not sleep last night. She was cooperative with all tasks. Objective: Today she exhibited good lingual and labial strength and range of motion. No oral residue or buccal cavity pocketing was observed with breakfast. She still reports decreased right facial and oral sensation. She stated she is no longer biting her tongue or the inside of her cheek. Reviewed lingual search and sweep and utilizing oral swabs to clear the oral cavity. She was able to recall all general swallowing precautions and dysarthria strategies. Speech intelligibility was at 100% this morning. SHORT TERM GOAL #1:  Goal 1: The patient will participate in the Cognitive Linguistic Quick Test or CLQT. SHORT TERM GOAL #2:  Goal 2: The patient will complete tasks for medication management with minimal cues and 100% accuracy. SHORT TERM GOAL #3:  Goal 3: The patient will complete tasks for finance management with minimal cues and 100% accuracy.     SHORT TERM GOAL #4:  Goal 4: The patient will use the (over articulation/slow rate/writing key word/elongation of thevowel/increased loudness/phrasing) strategy to improve speech intelligibility withwords/phrases/sentences/in conversation with 100% accuracy. SHORT TERM GOAL #5:  Goal 5: The patient will complete daily oral-motor exercise to increase lingual and labial range of motion, strength and coordination with (min/mod/max) verbal, tactile and visual cues to improve speech intelligibility. Comprehension: 5 - Patient understands basic needs (hungry/hot/pain)  Expression: 5 - Expresses basic ideas/needs only (hungry/hot/pain)  Social Interaction: 5 - Patient is appropriate with supervision/cues  Problem Solvin - Patient able to solve simple/routine tasks  Memory: 5 - Patient requires prompting with stress/unfamiliar situations    ASSESSMENT:  Assessment: [x]Progressing towards goals          []Not Progressing towards goals    Patient Tolerance of Treatment:   [x]Tolerated well []Tolerated fair []Required rest breaks []Fatigued    Education:  Learner:  [x]Patient          []Significant Other          []Other  Education provided regarding:  [x]Goals and POC   []Diet and swallowing precautions    []Home Exercise Program  []Progress and/or discharge information  Method of Education:  [x]Discussion          []Demonstration          []Handout          []Other  Evaluation of Education:   [x]Verbalized understanding   []Demonstrates without assistance  []Demonstrates with assistance  []Needs further instruction     []No evidence of learning                  []No family present      Plan: [x]Continue with current plan of care    []Modify current plan of care as follows:    []Discharge patient    Discharge Location:    Services/Supervision Recommended:      [x]Patient continues to require treatment by a licensed therapist to address functional deficits as outlined in the established plan of care.           Electronically Signed By:  Natasha Dial M.S., CCC-SLP  2021,8:37 AM.

## 2021-12-02 NOTE — PROGRESS NOTES
Occupational Therapy  Facility/Department: [facility] 8 REHAB UNIT  Daily Treatment Note  NAME: Eleanor Starr  : 1940  MRN: [id number]    Date of Service: 2021    Discharge Recommendations:  ECF with OT  OT Equipment Recommendations  Other: Sent order for Rollator to [facility]. Assessment   Performance deficits / Impairments: Decreased functional mobility ; Decreased strength; Decreased high-level IADLs; Decreased fine motor control; Decreased coordination  Assessment: Sent order for Rollator to [place] [id number]. Treatment Diagnosis: L CVA  Activity Tolerance  Activity Tolerance: Patient Tolerated treatment well  Safety Devices  Safety Devices in place: Yes (Left with PT)  Type of devices: Left in chair         Patient Diagnosis(es): The primary encounter diagnosis was Acute ischemic stroke ([facility] [address] Diagnoses of Depression with anxiety, Dysphagia due to recent cerebral infarction, Hemiplegia affecting dominant side ([address] Mixed hyperlipidemia, and Dysarthria were also pertinent to this visit. has a past medical history of Acute CVA (cerebrovascular accident) ([address] Arthritis, Asthma, CAD (coronary artery disease), CHF (congestive heart failure) ([address] COPD (chronic obstructive pulmonary disease) ([address] DM (diabetes mellitus) ([address] GERD (gastroesophageal reflux disease), HTN (hypertension), Hyperlipidemia, MI (myocardial infarction) ([address] and Thyroid disease. has a past surgical history that includes Hysterectomy; joint replacement (Right); Cholecystectomy; hip surgery; Cataract removal; and Coronary angioplasty (2018).     Restrictions  Restrictions/Precautions  Restrictions/Precautions: Fall Risk  Required Braces or Orthoses?: No  Lower Extremity Weight Bearing Restrictions  Right Lower Extremity Weight Bearing: Weight Bearing As Tolerated  Left Lower Extremity Weight Bearing: Weight Bearing As Tolerated       Objective    Balance  Sitting Balance: Independent  Standing Balance: Modified independent Functional Mobility  Functional - Mobility Device: 4-Wheeled Walker  Activity: To/from bathroom; Other  Assist Level: Modified independent      Transfers  Sit to stand: Modified independent  Stand to sit: Modified independent        Coordination  Fine Motor: FM task incorporating needle and thread with small objects, Good coordination     Type of ROM/Therapeutic Exercise  Type of ROM/Therapeutic Exercise: Resistive Bands  Comment: 30# Red resistive band: 3 sets of 10 in all planes        Plan   Plan  Times per week: 3-5  Specific instructions for Next Treatment: check up ad vladimir  Current Treatment Recommendations: Strengthening, ROM, Patient/Caregiver Education & Training, Functional Mobility Training, Equipment Evaluation, Education, & procurement, Home Management Training, Neuromuscular Re-education       OutComes Score       12/02/21 0815   [id number] [facility]   CARE Score 6             12/02/21 0815   Toilet Transfer   Assistance Needed Independent   CARE Score 6                  Goals  Short term goals  Time Frame for Short term goals: 1 week  Short term goal 1: MET  Short term goal 2: MET  Short term goal 3: MET  Short term goal 4: MET  Short term goal 5: MET  Short term goal 6: MET  Long term goals  Time Frame for Long term goals : 2 weeks  Long term goal 1: MET  Long term goal 2: MET  Long term goal 3: MET  Long term goal 4: Modified independent with ambulatory homemaking tasks. Long term goal 5: MET  Long term goals 6: Independent with HEP. Patient Goals   Patient goals : Return home independently.        Therapy Time   Individual Concurrent Group Co-treatment   Time In 0815         Time Out 0900         Minutes 45         Timed Code Treatment Minutes: [address] [facility], OT

## 2021-12-03 LAB
GLUCOSE BLD-MCNC: 129 MG/DL (ref 70–99)
GLUCOSE BLD-MCNC: 160 MG/DL (ref 70–99)
GLUCOSE BLD-MCNC: 220 MG/DL (ref 70–99)
GLUCOSE BLD-MCNC: 247 MG/DL (ref 70–99)
PERFORMED ON: ABNORMAL

## 2021-12-03 PROCEDURE — 1180000000 HC REHAB R&B

## 2021-12-03 PROCEDURE — 99232 SBSQ HOSP IP/OBS MODERATE 35: CPT | Performed by: PSYCHIATRY & NEUROLOGY

## 2021-12-03 PROCEDURE — 97110 THERAPEUTIC EXERCISES: CPT

## 2021-12-03 PROCEDURE — 6360000002 HC RX W HCPCS: Performed by: PSYCHIATRY & NEUROLOGY

## 2021-12-03 PROCEDURE — 6370000000 HC RX 637 (ALT 250 FOR IP): Performed by: NURSE PRACTITIONER

## 2021-12-03 PROCEDURE — 97116 GAIT TRAINING THERAPY: CPT

## 2021-12-03 PROCEDURE — 92507 TX SP LANG VOICE COMM INDIV: CPT

## 2021-12-03 PROCEDURE — 82947 ASSAY GLUCOSE BLOOD QUANT: CPT

## 2021-12-03 PROCEDURE — 97530 THERAPEUTIC ACTIVITIES: CPT

## 2021-12-03 RX ADMIN — INSULIN LISPRO 1 UNITS: 100 INJECTION, SOLUTION INTRAVENOUS; SUBCUTANEOUS at 21:18

## 2021-12-03 RX ADMIN — CYANOCOBALAMIN TAB 500 MCG 1000 MCG: 500 TAB at 07:44

## 2021-12-03 RX ADMIN — PANTOPRAZOLE SODIUM 40 MG: 40 TABLET, DELAYED RELEASE ORAL at 05:33

## 2021-12-03 RX ADMIN — Medication 100 MG: at 07:44

## 2021-12-03 RX ADMIN — ENOXAPARIN SODIUM 40 MG: 100 INJECTION SUBCUTANEOUS at 17:30

## 2021-12-03 RX ADMIN — LEVOTHYROXINE SODIUM 25 MCG: 25 TABLET ORAL at 07:45

## 2021-12-03 RX ADMIN — INSULIN LISPRO 2 UNITS: 100 INJECTION, SOLUTION INTRAVENOUS; SUBCUTANEOUS at 11:45

## 2021-12-03 RX ADMIN — CARVEDILOL 12.5 MG: 12.5 TABLET, FILM COATED ORAL at 16:39

## 2021-12-03 RX ADMIN — ASPIRIN 81 MG: 81 TABLET, COATED ORAL at 07:44

## 2021-12-03 RX ADMIN — CLOPIDOGREL BISULFATE 75 MG: 75 TABLET ORAL at 07:45

## 2021-12-03 RX ADMIN — SERTRALINE HYDROCHLORIDE 25 MG: 25 TABLET ORAL at 07:44

## 2021-12-03 RX ADMIN — INSULIN GLARGINE 20 UNITS: 100 INJECTION, SOLUTION SUBCUTANEOUS at 21:17

## 2021-12-03 RX ADMIN — ATORVASTATIN CALCIUM 80 MG: 80 TABLET, FILM COATED ORAL at 21:17

## 2021-12-03 RX ADMIN — CARVEDILOL 12.5 MG: 12.5 TABLET, FILM COATED ORAL at 07:45

## 2021-12-03 ASSESSMENT — PAIN SCALES - GENERAL
PAINLEVEL_OUTOF10: 0
PAINLEVEL_OUTOF10: 0

## 2021-12-03 NOTE — PROGRESS NOTES
for finance management with minimal cues and 100% accuracy. SHORT TERM GOAL #4:  Goal 4: The patient will use the (over articulation/slow rate/writing key word/elongation of thevowel/increased loudness/phrasing) strategy to improve speech intelligibility withwords/phrases/sentences/in conversation with 100% accuracy. SHORT TERM GOAL #5:  Goal 5: The patient will complete daily oral-motor exercise to increase lingual and labial range of motion, strength and coordination with (min/mod/max) verbal, tactile and visual cues to improve speech intelligibility.       Comprehension: 5 - Patient understands basic needs (hungry/hot/pain)  Expression: 5 - Expresses basic ideas/needs only (hungry/hot/pain)  Social Interaction: 5 - Patient is appropriate with supervision/cues  Problem Solvin - Patient able to solve simple/routine tasks  Memory: 5 - Patient requires prompting with stress/unfamiliar situations    ASSESSMENT:  Assessment: [x]Progressing towards goals          []Not Progressing towards goals    Patient Tolerance of Treatment:   [x]Tolerated well []Tolerated fair []Required rest breaks []Fatigued    Education:  Learner:  [x]Patient          []Significant Other          []Other  Education provided regarding:  [x]Goals and POC   []Diet and swallowing precautions    []Home Exercise Program  [x]Progress and/or discharge information  Method of Education:  [x]Discussion          []Demonstration          []Handout          []Other  Evaluation of Education:   [x]Verbalized understanding   []Demonstrates without assistance  []Demonstrates with assistance  []Needs further instruction     []No evidence of learning                  []No family present      Plan: [x]Continue with current plan of care    []Modify current plan of care as follows:    []Discharge patient    Discharge Location:    Services/Supervision Recommended:      [x]Patient continues to require treatment by a licensed therapist to address functional deficits as outlined in the established plan of care.                   Electronically Signed By:  Sherry Caicedo  12/3/2021,9:50 AM.

## 2021-12-03 NOTE — PROGRESS NOTES
12/03/21 0924 12/03/21 0925   Pain Screening   Patient Currently in Pain No  --    Bed Mobility   Rolling Independent  --    Supine to Sit Independent  --    Transfers   Sit to Stand Independent  --    Stand to sit Independent  --    Ambulation   Ambulation?  --  Yes   More Ambulation?  --  Yes   Ambulation 1   Surface  --  level tile   Device  --  Rollator   Assistance  --  Independent; Stand by assistance   Quality of Gait  --  Good and steady pace with inc fwd flex at waist; No SOB or LOB noted during amb   Distance  --  250' x2   Comments  --  Incorporated turns. Ambulation 2   Surface - 2  --  level tile   Device 2  --  Rollator   Assistance 2  --  Independent   Distance  --  22' x2   Comments  --  Amb to/from Bathroom.    Wheelchair Activities   Wheelchair Parts Management  --  Yes   Left Leg Rest Level of Assistance  --  Minimal assistance   Right Leg Rest Level of Assistance  --  Minimal assistance   Left Brakes Level of Assistance  --  Stand by assistance   Right Brakes Level of Assistance  --  Stand by Assist   Electronically signed by Prashant Calero PTA on 12/3/2021 at 10:03 AM

## 2021-12-03 NOTE — PROGRESS NOTES
Patient:   Hilda Brown  MR#:    [id number]   Room:    [facility]   YOB: 1940  Date of Progress Note: 12/3/2021  Time of Note                           7:46 AM  Consulting Physician:   Cuong Looney M.D. Attending Physician:  Cuong Looney MD     Chief complaint Acute ischemic stroke    S:This 79 y.o. female with history of CAD, CHF, COPD, DM, CKD, GERD, mixed hyperlipidemia, thyroid disease and MI x 2. She presented to [facility] ER on 11/20/21 with s/o of slurred speech, right sided numbness and weakness. She initially thought it was her blood sugar which was 25 that morning before she ate but then her family noticed a right facial droop. Hemoglobin A1C on admit was 8.1. CT of head was negative. MRI done revealed an acute posterior limb left internal capsule lacunar infarct. She was admitted to the hospitalist service with consult for neurology. She was evaluated by speech for dysarthria and dysphagia. She was placed on a mechanical soft diet with thin liquids. She had acute kidney injury on CKD but this has now resolved She continues to have right sided weakness and is participating in both PT/OT. She is felt to need a stay on Rehab to work towards her goal of returning home. She is now felt ready to start the Rehab program.No new complaints.      REVIEW OF SYSTEMS:  Constitutional: No fevers No chills  Neck:No stiffness  Respiratory: No shortness of breath  Cardiovascular: No chest pain No palpitations  Gastrointestinal: No abdominal pain    Genitourinary: No Dysuria  Neurological: No headache, no confusion    Past Medical History:      Diagnosis Date    Acute CVA (cerebrovascular accident) ([facility] [address] 11/20/2021    Arthritis     Psoriatic    Asthma     CAD (coronary artery disease)     CHF (congestive heart failure) (HCC)     COPD (chronic obstructive pulmonary disease) ([facility] [address]     DM (diabetes mellitus) (HCC)     GERD (gastroesophageal reflux disease)     HTN (hypertension)     Hyperlipidemia     MI (myocardial infarction) ([facility] [address]     x2    Thyroid disease        Past Surgical History:      Procedure Laterality Date    CATARACT REMOVAL      CHOLECYSTECTOMY      CORONARY ANGIOPLASTY  06/2018    Angioplasty to in-stent restenosis to the distal LAD    HIP SURGERY      HYSTERECTOMY      JOINT REPLACEMENT Right     Right knee replacement       Medications in Hospital:      Current Facility-Administered Medications:     loperamide (IMODIUM) capsule 2 mg, 2 mg, Oral, QAM AC, Junita Nissen, MD, 2 mg at 11/29/21 0537    aspirin EC tablet 81 mg, 81 mg, Oral, Daily, 81 mg at 12/03/21 0744 **OR** [DISCONTINUED] aspirin suppository 300 mg, 300 mg, Rectal, Daily, SHARI Carpenter - CNP    atorvastatin (LIPITOR) tablet 80 mg, 80 mg, Oral, Nightly, SHARI Carpenter - CNP, 80 mg at 12/02/21 2043    ondansetron (ZOFRAN-ODT) disintegrating tablet 4 mg, 4 mg, Oral, Q8H PRN **OR** ondansetron (ZOFRAN) injection 4 mg, 4 mg, IntraVENous, Q6H PRN, Katerina Holcomb APRN - CNP    dextrose 5 % solution, 100 mL/hr, IntraVENous, PRN, Katerina Holcomb APRN - CNP    dextrose 50 % IV solution, 12.5 g, IntraVENous, PRN, SHARI Carpenter - CNP    glucagon (rDNA) injection 1 mg, 1 mg, IntraMUSCular, PRN, Shannan Oh APRN - CNP    glucose (GLUTOSE) 40 % oral gel 15 g, 15 g, Oral, PRN, SHARI Carpenter - CNP    carvedilol (COREG) tablet 12.5 mg, 12.5 mg, Oral, BID WC, Katerina Holcomb, APRN - CNP, 12.5 mg at 12/03/21 0745    clopidogrel (PLAVIX) tablet 75 mg, 75 mg, Oral, Daily, Katerina Holcomb, APRN - CNP, 75 mg at 12/03/21 0745    coenzyme Q10 capsule 100 mg, 100 mg, Oral, Daily, Katerina Holcomb, APRN - CNP, 100 mg at 12/03/21 0744    insulin glargine (LANTUS) injection vial 20 Units, 20 Units, SubCUTAneous, Nightly, Katerina Holcomb, APRN - CNP, 20 Units at 12/02/21 2043    insulin lispro (HUMALOG) injection vial 0-6 Units, 0-6 Units, SubCUTAneous, muscles  Musculoskeletal - no significant wasting of muscles noted, no bony deformities  Extremities-no clubbing, cyanosis or edema  Skin - warm, dry, and intact. No rash, erythema, or pallor. Psychiatric - mood, affect, and behavior appear normal.      Neurological exam  Awake, alert, fluent oriented appropriate affect  Attention and concentration appear appropriate  Recent and remote memory appears unremarkable  Speech normal without dysarthria  No clear issues with language of fund of knowledge     Cranial Nerve Exam     CN III, IV,VI-EOMI, No nystagmus, conjugate eye movements, no ptosis    CN VII-no facial assymetry       Motor Exam  antigravity throughout upper and lower extremities bilaterally      Tremors- no tremors in hands or head noted     Gait  Not tested     Nursing/pcp notes, imaging,labs and vitals reviewed.      PT,OT and/or speech notes reviewed    Lab Results   Component Value Date    WBC 6.7 12/02/2021    HGB 11.5 (L) 12/02/2021    HCT 33.5 (L) 12/02/2021    MCV 92.5 12/02/2021     12/02/2021     Lab Results   Component Value Date     12/02/2021    K 3.5 12/02/2021     12/02/2021    CO2 22 12/02/2021    BUN 16 12/02/2021    CREATININE 0.7 12/02/2021    GLUCOSE 137 (H) 12/02/2021    CALCIUM 8.6 (L) 12/02/2021    PROT 5.4 (L) 12/02/2021    LABALBU 3.4 (L) 12/02/2021    BILITOT 0.5 12/02/2021    ALKPHOS 76 12/02/2021    AST 18 12/02/2021    ALT 25 12/02/2021    LABGLOM >60 12/02/2021    GFRAA >59 12/02/2021     Lab Results   Component Value Date    INR 1.01 11/23/2021    INR 0.99 11/20/2021    INR 1.03 07/05/2021    PROTIME 13.5 11/23/2021    PROTIME 13.3 11/20/2021    PROTIME 13.7 07/05/2021     America Brown PTA   Physical Therapist Assistant   Physical Therapy   Progress Notes       Cosign Needed   Date of Service:  12/2/2021  4:50 PM                 Cosign Needed                Show:Clear all  []Manual[x]Template[]Copied    Added by:  [x]Brissa Guzmán, PTA      []Latia for details       Name: Shawn Doyle  MRN: [id number]  Date of Service:  12/2/2021 12/02/21 0910   Restrictions/Precautions   Restrictions/Precautions Up Ad Kayla; Fall Risk   Required Braces or Orthoses? No   Lower Extremity Weight Bearing Restrictions   Right Lower Extremity Weight Bearing Weight Bearing As Tolerated   Left Lower Extremity Weight Bearing Weight Bearing As Tolerated   General   Chart Reviewed Yes   Additional Pertinent Hx Dysarthria, hypothryoidism, anxiety, CAD, CHF, COPD, T2DM, CKD, MI x 2   Response To Previous Treatment Not applicable   Family / Caregiver Present No   Referring Practitioner SHARI Squires - ADE, Dr. Cyrus Mancia Pt brought into gym by OT, agrees to participate in therapy. Pain Screening   Patient Currently in Pain Denies   Bed Mobility   Bridging Independent   Rolling Independent   Sit to Supine Modified independent; Independent   Scooting Independent   Transfers   Sit to Stand Supervision  (Does not lock w/c brakes prior to standing, requires v/c's )   Stand to sit Independent  (On rollator seat- does not require v/c's to lock brakes )   Lateral Transfers Modified independent; Supervision  (Due to not making sure w/c brakes are locked )   Ambulation   Ambulation? Yes   More Ambulation?  Yes   Ambulation 1   Surface level tile   Device Rollator   Assistance Modified Independent; Supervision   Quality of Gait Good and steady pace with inc fwd flex at waist; No SOB or LOB noted during amb   Gait Deviations Decreased step height   Distance 15'   Comments w/c<>//  (1 L and 1 R turn )   Ambulation 2   Surface - 2 level tile   Device 2 Rollator   Assistance 2 Modified Independent; Supervision   Quality of Gait 2 same as previous    Gait Deviations Decreased step height   Distance 250'   Comments Including L and R turns    Wheelchair Activities   Propulsion Yes   Propulsion 1   Propulsion Manual   Level Level Tile   Method RLE; LLE  (intermittent needs redirection, impulsive   Plan   Times per week 5-6   Times per day Daily   Plan weeks 1-3   Current Treatment Recommendations Strengthening; Balance Training; Functional Mobility Training; Transfer Training; Endurance Training; Wheelchair Mobility Training; Gait Training; Stair training; Home Exercise Program; Safety Education & Training; Patient/Caregiver Education & Training; Equipment Evaluation, Education, & procurement   Plan Comment cont. PT as pt is able with progressive mobility and safety training. Safety Devices   Type of devices Patient at risk for falls; Gait belt; Left in bed  (AD vladimir in room )   PT Whiteboard Notes   Therapy Whiteboard RE 12/5 CVA, R side weakness               Electronically signed by Crystal Neil PTA on 12/2/2021 at 4:50 PM                      RECORD REVIEW: Previous medical records, medications were reviewed at today's visit    IMPRESSION:   1. Acute ischemic stroke-aspirin/statin/Plavix  2. Coronary artery disease/history of MI-aspirin/statin/Plavix  3. Hyperlipidemia-on statin  4. CHF-Coreg  5. Allergies-Zyrtec  6. History of COPD-nebulizers as needed  7. DVT prophylaxis-Lovenox  8. Diabetes-on insulin monitor blood sugars  9. Hypothyroidism-Synthroid  10. GI-proton pump inhibitor/bowel regimen  11. Mood disorder-on Zoloft  12. Psoriatic arthritis-Tylenol as needed  13. Chronic kidney disease-monitor  14. PT/OT/speech  15.   Obstructive sleep apnea-monitor    Continue present care as noted    ELOS tomorrow       Expected duration and frequency therapy: 180 minutes per day, 5 days per week    [address] [place] [address]  [phone] CELL  Dr Brooklyn Castro

## 2021-12-03 NOTE — PLAN OF CARE
Problem: Falls - Risk of:  Goal: Will remain free from falls  Description: Will remain free from falls  12/3/2021 0948 by Kaiden Thompson RN  Outcome: Ongoing  12/2/2021 2214 by Sherin Adorno RN  Outcome: Ongoing  Goal: Absence of physical injury  Description: Absence of physical injury  12/3/2021 0948 by Kaiden Thompson RN  Outcome: Ongoing  12/2/2021 2214 by Sherin Adorno RN  Outcome: Ongoing

## 2021-12-03 NOTE — CARE COORDINATION
Ms. Katy Barreto will return home ([address]), referral made to [facility] for continued rehabilitative care. She prepares her own med-tray. Will have home/attendant assistance as needed from [employer] operating there at [facility].

## 2021-12-03 NOTE — PROGRESS NOTES
12/03/21 1316 12/03/21 1317 12/03/21 1327   Pain Screening   Patient Currently in Pain No  --   --    Bed Mobility   Rolling Independent  --   --    Supine to Sit Independent  --   --    Transfers   Sit to Stand  --  Independent  --    Stand to sit  --  Independent  --    Bed to Chair  --  Independent  --    Car Transfer  --   --   --    Ambulation   Ambulation?  --  Yes  --    Ambulation 1   Surface  --  level tile  --    Device  --  Rollator  --    Assistance  --  Independent  --    Quality of Gait  --  Good and steady pace with inc fwd flex at waist; No SOB or LOB noted during amb  --    Distance  --  200', 250', 200'  --    Comments  --  Incorporated turns. --    Stairs/Curb   Stairs?  --  Yes  --    Stairs   # Steps   --  8  --    Stairs Height  --  4\"  --    Rails  --  Bilateral  --    Assistance  --  Modified independent   --    Comment  --  Reciprocal pattern  --    Wheelchair Activities   Wheelchair Size  --  Not using W/C.  --    Exercises   Comments  --   --  Sitting BLE ex  x20 reps.    Conditions Requiring Skilled Therapeutic Intervention   Assessment  --   --   --    Activity Tolerance   Activity Tolerance  --   --   --       12/03/21 1332 12/03/21 1333 12/03/21 1344   Pain Screening   Patient Currently in Pain  --   --   --    Bed Mobility   Rolling  --   --   --    Supine to Sit  --   --   --    Transfers   Sit to Stand  --   --   --    Stand to sit  --   --   --    Bed to Chair  --   --   --    Car Transfer Modified independent  --   --    Ambulation   Ambulation?  --   --   --    Ambulation 1   Surface  --   --   --    Device  --   --   --    Assistance  --   --   --    Quality of Gait  --   --   --    Distance  --   --   --    Comments  --   --   --    Stairs/Curb   Stairs?  --   --   --    Stairs   # Steps   --   --   --    Stairs Height  --   --   --    Rails  --   --   --    Assistance  --   --   --    Comment  --   --   --    Wheelchair Activities   Wheelchair Size  --   --   -- Exercises   Comments  --   --   --    Conditions Requiring Skilled Therapeutic Intervention   Assessment  --  Pt. ready for discharge tomorrow. Doing fine with up ad vladimir in room using Rollator.   --    Activity Tolerance   Activity Tolerance  --   --  Patient Tolerated treatment well   Electronically signed by Donna Mills PTA on 12/3/2021 at 1:51 PM

## 2021-12-03 NOTE — PROGRESS NOTES
Occupational Therapy     12/03/21 1430   General   Additional Pertinent Hx Depression and anxiety; CKD; (R) TKA; HTN; arthritis; CAD; CHF; COPD; MIx2; thyroid dx; DM; asthma   Diagnosis L CVA   Pain Assessment   Patient Currently in Pain No   Pain Assessment 0-10   Pain Level 0   Balance   Sitting Balance Independent   Standing Balance Modified independent    Functional Mobility   Functional - Mobility Device 4-Wheeled Walker   Activity Retrieve items; Transport items; To/From therapy gym   Assist Level Modified independent    Functional Mobility Comments Light homemaking task in ADL apartment and laundry room. Transfers   Sit to stand Modified independent   Stand to sit Modified independent   Type of ROM/Therapeutic Exercise   Type of ROM/Therapeutic Exercise Free weights   Comment 2# BUE 1 set x 15 reps, all planes. Assessment   Performance deficits / Impairments Decreased functional mobility ;  Decreased strength; Decreased high-level IADLs; Decreased fine motor control; Decreased coordination   Treatment Diagnosis L CVA   Prognosis Good   Timed Code Treatment Minutes 35 Minutes   Activity Tolerance   Activity Tolerance Patient Tolerated treatment well   Safety Devices   Safety Devices in place Yes   Type of devices Call light within reach   Plan   Times per week 3-5   Current Treatment Recommendations Strengthening; ROM; Patient/Caregiver Education & Training; Functional Mobility Training; Equipment Evaluation, Education, & procurement; Home Management Training; Neuromuscular Re-education

## 2021-12-03 NOTE — PROGRESS NOTES
Occupational Therapy  Facility/Department: [facility] 8 REHAB UNIT  Daily Treatment Note  NAME: Kerri Manuel  : 1940  MRN: [id number]    Date of Service: 12/3/2021    Discharge Recommendations:  ECF with OT       Assessment   Performance deficits / Impairments: Decreased functional mobility ; Decreased strength; Decreased high-level IADLs; Decreased fine motor control; Decreased coordination  Activity Tolerance  Activity Tolerance: Patient Tolerated treatment well  Safety Devices  Safety Devices in place: Yes (up ad vladimir)  Type of devices: Call light within reach         Patient Diagnosis(es): The primary encounter diagnosis was Acute ischemic stroke ([facility] [address] Diagnoses of Depression with anxiety, Dysphagia due to recent cerebral infarction, Hemiplegia affecting dominant side ([facility] [address] Mixed hyperlipidemia, Dysarthria, Acute CVA (cerebrovascular accident) ([address] and Primary hypertension were also pertinent to this visit. has a past medical history of Acute CVA (cerebrovascular accident) ([facility] [address] Arthritis, Asthma, CAD (coronary artery disease), CHF (congestive heart failure) ([address] COPD (chronic obstructive pulmonary disease) ([address] DM (diabetes mellitus) ([address] GERD (gastroesophageal reflux disease), HTN (hypertension), Hyperlipidemia, MI (myocardial infarction) ([address] and Thyroid disease. has a past surgical history that includes Hysterectomy; joint replacement (Right); Cholecystectomy; hip surgery; Cataract removal; and Coronary angioplasty (2018).     Restrictions  Restrictions/Precautions  Restrictions/Precautions: Fall Risk  Required Braces or Orthoses?: No  Lower Extremity Weight Bearing Restrictions  Right Lower Extremity Weight Bearing: Weight Bearing As Tolerated  Left Lower Extremity Weight Bearing: Weight Bearing As Tolerated  Subjective   General  Chart Reviewed: Yes  Patient assessed for rehabilitation services?: Yes  Additional Pertinent Hx: Depression and anxiety; CKD; (R) TKA; HTN; arthritis; CAD; CHF; COPD; MIx2; thyroid dx; DM; asthma  Family / Caregiver Present: No  Diagnosis: L CVA  Subjective  Subjective: c/o being cold  General Comment    Pain Assessment  Pain Assessment: 0-10  Pain Level: 0  Pre Treatment Pain Screening  Pain at present: 0  Scale Used: Numeric Score  Vital Signs  Patient Currently in Pain: No   Objective    Balance  Sitting Balance: Independent  Standing Balance: Modified independent   Functional Mobility  Functional - Mobility Device: 4-Wheeled Walker  Activity: To/From therapy gym; Other; Retrieve items; Transport items  Assist Level: Modified independent      Transfers  Stand Step Transfers: Independent  Sit to stand: Modified independent  Stand to sit: Modified independent        Coordination  Fine Motor: R hand FM  digiflex 2 sets x 10 reps all resistances     Type of ROM/Therapeutic Exercise  Type of ROM/Therapeutic Exercise: Cane/Wand (2# all planes, 20reps)  Exercises  Grasp/Release: hand strength task with hand exerciser, 1 [id number]            Plan   Plan  Times per week: 3-5  Specific instructions for Next Treatment: check up ad vladimir  Current Treatment Recommendations: Strengthening, ROM, Patient/Caregiver Education & Training, Functional Mobility Training, Equipment Evaluation, Education, & procurement, Home Management Training, Neuromuscular Re-education    Goals  Short term goals  Time Frame for Short term goals: 1 week  Short term goal 1: MET  Short term goal 2: MET  Short term goal 3: MET  Short term goal 4: MET  Short term goal 5: MET  Short term goal 6: MET  Long term goals  Time Frame for Long term goals : 2 weeks  Long term goal 1: MET  Long term goal 2: MET  Long term goal 3: MET  Long term goal 4: Modified independent with ambulatory homemaking tasks. Long term goal 5: MET  Long term goals 6: Independent with HEP. Patient Goals   Patient goals : Return home independently.        Therapy Time   Individual Concurrent Group Co-treatment   Time In 5444         Time

## 2021-12-03 NOTE — PROGRESS NOTES
Nutrition Assessment     Type and Reason for Visit: Reassess    Nutrition Assessment:  Pt improved nutritionally AEB po intakes now >50% most meals. No c/o constipation. Continue current diet. Malnutrition Assessment:  Malnutrition Status: At risk for malnutrition (Comment)    Current Nutrition Therapies:    ADULT DIET; Regular; 5 carb choices (75 gm/meal)    Anthropometric Measures:  · Height: 5' 6\" (167.6 cm)  · Current Body Wt: 161 lb (73 kg)   · BMI: 26    Nutrition Interventions:   Food and/or Nutrient Delivery:  Continue Current Diet   Coordination of Nutrition Care:  Continue to monitor while inpatient    Goals:  Po intake 50% or greater of meals. wt stable. diarrhea improvement.        Nutrition Monitoring and Evaluation:   Food/Nutrient Intake Outcomes:  Food and Nutrient Intake  Physical Signs/Symptoms Outcomes:  Biochemical Data, Nutrition Focused Physical Findings, Skin, Weight, Constipation     Discharge Planning:    Continue current diet     Electronically signed by Bassam Gallardo MS, RD, LD on 12/3/21 at 9:01 AM CST    Contact: [phone]

## 2021-12-03 NOTE — PLAN OF CARE
Problem: Falls - Risk of:  Goal: Will remain free from falls  Description: Will remain free from falls  12/2/2021 2214 by Cristin Coffman RN  Outcome: Ongoing  12/2/2021 1040 by Jessica Clifford RN  Outcome: Ongoing  Goal: Absence of physical injury  Description: Absence of physical injury  12/2/2021 2214 by Cristin Coffman RN  Outcome: Ongoing  12/2/2021 1040 by Jessica Clifford RN  Outcome: Ongoing     Problem: HEMODYNAMIC STATUS  Goal: Patient has stable vital signs and fluid balance  12/2/2021 2214 by Cristin Coffman RN  Outcome: Ongoing  12/2/2021 1040 by Jessica Clifford RN  Outcome: Ongoing     Problem: ACTIVITY INTOLERANCE/IMPAIRED MOBILITY  Goal: Mobility/activity is maintained at optimum level for patient  12/2/2021 2214 by Cristin Coffman RN  Outcome: Ongoing  12/2/2021 1040 by Jessica Clifford RN  Outcome: Ongoing     Problem: COMMUNICATION IMPAIRMENT  Goal: Ability to express needs and understand communication  12/2/2021 2214 by Cristin Coffman RN  Outcome: Ongoing  12/2/2021 1040 by Jessica Clifford RN  Outcome: Ongoing

## 2021-12-04 VITALS
HEART RATE: 64 BPM | BODY MASS INDEX: 25.88 KG/M2 | RESPIRATION RATE: 18 BRPM | TEMPERATURE: 96.6 F | DIASTOLIC BLOOD PRESSURE: 72 MMHG | SYSTOLIC BLOOD PRESSURE: 142 MMHG | HEIGHT: 66 IN | WEIGHT: 161.01 LBS | OXYGEN SATURATION: 94 %

## 2021-12-04 LAB
GLUCOSE BLD-MCNC: 113 MG/DL (ref 70–99)
PERFORMED ON: ABNORMAL

## 2021-12-04 PROCEDURE — 6370000000 HC RX 637 (ALT 250 FOR IP): Performed by: NURSE PRACTITIONER

## 2021-12-04 PROCEDURE — 99239 HOSP IP/OBS DSCHRG MGMT >30: CPT | Performed by: PSYCHIATRY & NEUROLOGY

## 2021-12-04 PROCEDURE — 82947 ASSAY GLUCOSE BLOOD QUANT: CPT

## 2021-12-04 RX ADMIN — CARVEDILOL 12.5 MG: 12.5 TABLET, FILM COATED ORAL at 08:50

## 2021-12-04 RX ADMIN — SERTRALINE HYDROCHLORIDE 25 MG: 25 TABLET ORAL at 08:50

## 2021-12-04 RX ADMIN — ASPIRIN 81 MG: 81 TABLET, COATED ORAL at 08:50

## 2021-12-04 RX ADMIN — CLOPIDOGREL BISULFATE 75 MG: 75 TABLET ORAL at 08:50

## 2021-12-04 RX ADMIN — LEVOTHYROXINE SODIUM 25 MCG: 25 TABLET ORAL at 08:50

## 2021-12-04 RX ADMIN — PANTOPRAZOLE SODIUM 40 MG: 40 TABLET, DELAYED RELEASE ORAL at 05:58

## 2021-12-04 RX ADMIN — CYANOCOBALAMIN TAB 500 MCG 1000 MCG: 500 TAB at 08:50

## 2021-12-04 RX ADMIN — Medication 100 MG: at 08:51

## 2021-12-04 NOTE — PROGRESS NOTES
Informed patient of discharge orders and to let staff know when her son was coming to get her however went back into room and patient and her belongings were gone.

## 2021-12-04 NOTE — PROGRESS NOTES
Patient:   Denis Lizama  MR#:    [id number]   Room:    [address] [place] [address]   YOB: 1940  Date of Progress Note: 12/4/2021  Time of Note                           10:08 AM  Consulting Physician:   Yessy Pillai M.D. Attending Physician:  Yessy Pillai MD     Chief complaint Acute ischemic stroke    S:This 79 y.o. female with history of CAD, CHF, COPD, DM, CKD, GERD, mixed hyperlipidemia, thyroid disease and MI x 2. She presented to [facility] ER on 11/20/21 with s/o of slurred speech, right sided numbness and weakness. She initially thought it was her blood sugar which was 25 that morning before she ate but then her family noticed a right facial droop. Hemoglobin A1C on admit was 8.1. CT of head was negative. MRI done revealed an acute posterior limb left internal capsule lacunar infarct. She was admitted to the hospitalist service with consult for neurology. She was evaluated by speech for dysarthria and dysphagia. She was placed on a mechanical soft diet with thin liquids. She had acute kidney injury on CKD but this has now resolved She continues to have right sided weakness and is participating in both PT/OT. She is felt to need a stay on Rehab to work towards her goal of returning home. She is now felt ready to start the Rehab program.No new issues.     REVIEW OF SYSTEMS:  Constitutional: No fevers No chills  Neck:No stiffness  Respiratory: No shortness of breath  Cardiovascular: No chest pain No palpitations  Gastrointestinal: No abdominal pain    Genitourinary: No Dysuria  Neurological: No headache, no confusion    Past Medical History:      Diagnosis Date    Acute CVA (cerebrovascular accident) ([facility] [address] 11/20/2021    Arthritis     Psoriatic    Asthma     CAD (coronary artery disease)     CHF (congestive heart failure) (HCC)     COPD (chronic obstructive pulmonary disease) ([facility] [address]     DM (diabetes mellitus) (HCC)     GERD (gastroesophageal reflux disease)     HTN (hypertension)     Hyperlipidemia  MI (myocardial infarction) ([facility] [address]     x2    Thyroid disease        Past Surgical History:      Procedure Laterality Date    CATARACT REMOVAL      CHOLECYSTECTOMY      CORONARY ANGIOPLASTY  06/2018    Angioplasty to in-stent restenosis to the distal LAD    HIP SURGERY      HYSTERECTOMY      JOINT REPLACEMENT Right     Right knee replacement       Medications in Hospital:      Current Facility-Administered Medications:     loperamide (IMODIUM) capsule 2 mg, 2 mg, Oral, QAM AC, Sera Gutierrez MD, 2 mg at 11/29/21 0537    aspirin EC tablet 81 mg, 81 mg, Oral, Daily, 81 mg at 12/04/21 0850 **OR** [DISCONTINUED] aspirin suppository 300 mg, 300 mg, Rectal, Daily, Tisha Hou, APRN - CNP    atorvastatin (LIPITOR) tablet 80 mg, 80 mg, Oral, Nightly, Tisha Savi, APRN - CNP, 80 mg at 12/03/21 2117    ondansetron (ZOFRAN-ODT) disintegrating tablet 4 mg, 4 mg, Oral, Q8H PRN **OR** ondansetron (ZOFRAN) injection 4 mg, 4 mg, IntraVENous, Q6H PRN, Katerina Holcomb, APRN - CNP    dextrose 5 % solution, 100 mL/hr, IntraVENous, PRN, Katerina Holcomb, APRN - CNP    dextrose 50 % IV solution, 12.5 g, IntraVENous, PRN, Tisha Hou, APRN - CNP    glucagon (rDNA) injection 1 mg, 1 mg, IntraMUSCular, PRN, Tisha Hou, APRN - CNP    glucose (GLUTOSE) 40 % oral gel 15 g, 15 g, Oral, PRN, Tisha Hou, APRN - CNP    carvedilol (COREG) tablet 12.5 mg, 12.5 mg, Oral, BID WC, Katerina Holcomb, APRN - CNP, 12.5 mg at 12/04/21 0850    clopidogrel (PLAVIX) tablet 75 mg, 75 mg, Oral, Daily, Katerinaramakrishna Holcomb, APRN - CNP, 75 mg at 12/04/21 0850    coenzyme Q10 capsule 100 mg, 100 mg, Oral, Daily, Katerinaramakrishna Holcomb, APRN - CNP, 100 mg at 12/04/21 0851    insulin glargine (LANTUS) injection vial 20 Units, 20 Units, SubCUTAneous, Nightly, Katerina Holcomb, APRN - CNP, 20 Units at 12/03/21 2117    insulin lispro (HUMALOG) injection vial 0-6 Units, 0-6 Units, SubCUTAneous, TID WC, Ulysses Lush, APRN - CNP, 2 Units at 12/03/21 1145    insulin lispro (HUMALOG) injection vial 0-3 Units, 0-3 Units, SubCUTAneous, Nightly, Ulysses Lush, APRN - CNP, 1 Units at 12/03/21 2118    ipratropium-albuterol (DUONEB) nebulizer solution 1 ampule, 1 ampule, Inhalation, Q4H PRN, Ulysses Lush, APRN - CNP    levothyroxine (SYNTHROID) tablet 25 mcg, 25 mcg, Oral, Daily, Katerina Holcomb, APRN - CNP, 25 mcg at 12/04/21 0850    pantoprazole (PROTONIX) tablet 40 mg, 40 mg, Oral, QAM AC, SHARI Knapp - CNP, 40 mg at 12/04/21 0558    sertraline (ZOLOFT) tablet 25 mg, 25 mg, Oral, Daily, Ulysses Lush, APRN - CNP, 25 mg at 12/04/21 0850    vitamin B-12 (CYANOCOBALAMIN) tablet 1,000 mcg, 1,000 mcg, Oral, Daily, Ulysses Lush, APRN - CNP, 1,000 mcg at 12/04/21 0850    acetaminophen (TYLENOL) tablet 650 mg, 650 mg, Oral, Q4H PRN, Hannah Adrian MD, 650 mg at 12/02/21 2048    enoxaparin (LOVENOX) injection 40 mg, 40 mg, SubCUTAneous, Therese Jenkins MD, 40 mg at 12/03/21 1730    polyethylene glycol (GLYCOLAX) packet 17 g, 17 g, Oral, Daily PRN, Hannah Adrian MD    Allergies:  Zinc and Tape [adhesive tape]    Social History:   TOBACCO:   reports that she has never smoked. She has never used smokeless tobacco.  ETOH:   reports no history of alcohol use. Family History:       Problem Relation Age of Onset    Heart Disease Mother     Heart Disease Father          PHYSICAL EXAM:  BP (!) 142/72   Pulse 64   Temp 96.6 °F (35.9 °C) (Temporal)   Resp 18   Ht 5' 6\" (1.676 m)   Wt 161 lb 0.2 oz (73 kg)   SpO2 94%   BMI 25.99 kg/m²     Constitutional - well developed, well nourished.    Eyes - conjunctiva normal.   Ear, nose, throat - No scars, masses, or lesions over external nose or ears, no atrophy of tongue  Neck-symmetric, no masses noted, no jugular vein distension  Respiration- chest wall appears symmetric, good expansion,   normal effort without use of accessory muscles  Musculoskeletal - no significant wasting of muscles noted, no bony deformities  Extremities-no clubbing, cyanosis or edema  Skin - warm, dry, and intact. No rash, erythema, or pallor. Psychiatric - mood, affect, and behavior appear normal.      Neurological exam  Awake, alert, fluent oriented appropriate affect  Attention and concentration appear appropriate  Recent and remote memory appears unremarkable  Speech normal without dysarthria  No clear issues with language of fund of knowledge     Cranial Nerve Exam     CN III, IV,VI-EOMI, No nystagmus, conjugate eye movements, no ptosis    CN VII-no facial assymetry       Motor Exam  antigravity throughout upper and lower extremities bilaterally      Tremors- no tremors in hands or head noted     Gait  Not tested     Nursing/pcp notes, imaging,labs and vitals reviewed.      PT,OT and/or speech notes reviewed    Lab Results   Component Value Date    WBC 6.7 12/02/2021    HGB 11.5 (L) 12/02/2021    HCT 33.5 (L) 12/02/2021    MCV 92.5 12/02/2021     12/02/2021     Lab Results   Component Value Date     12/02/2021    K 3.5 12/02/2021     12/02/2021    CO2 22 12/02/2021    BUN 16 12/02/2021    CREATININE 0.7 12/02/2021    GLUCOSE 137 (H) 12/02/2021    CALCIUM 8.6 (L) 12/02/2021    PROT 5.4 (L) 12/02/2021    LABALBU 3.4 (L) 12/02/2021    BILITOT 0.5 12/02/2021    ALKPHOS 76 12/02/2021    AST 18 12/02/2021    ALT 25 12/02/2021    LABGLOM >60 12/02/2021    GFRAA >59 12/02/2021     Lab Results   Component Value Date    INR 1.01 11/23/2021    INR 0.99 11/20/2021    INR 1.03 07/05/2021    PROTIME 13.5 11/23/2021    PROTIME 13.3 11/20/2021    PROTIME 13.7 07/05/2021     Yan Villar, PTA   Physical Therapist Assistant   Physical Therapy   Progress Notes       Cosign Needed   Date of Service:  12/2/2021  4:50 PM                 Cosign Needed                Show:Clear all  []Manual[x]Template[]Copied    Added by:  [x]Brissa Jim, EARLENE      []Latia for details       Name: Ochoa Rae  MRN: [id number]  Date of Service:  12/2/2021 12/02/21 0910   Restrictions/Precautions   Restrictions/Precautions Up Ad Kayla; Fall Risk   Required Braces or Orthoses? No   Lower Extremity Weight Bearing Restrictions   Right Lower Extremity Weight Bearing Weight Bearing As Tolerated   Left Lower Extremity Weight Bearing Weight Bearing As Tolerated   General   Chart Reviewed Yes   Additional Pertinent Hx Dysarthria, hypothryoidism, anxiety, CAD, CHF, COPD, T2DM, CKD, MI x 2   Response To Previous Treatment Not applicable   Family / Caregiver Present No   Referring Practitioner Randa Montanez, SHARI - ADE, Dr. Sher Mahmood Pt brought into gym by OT, agrees to participate in therapy. Pain Screening   Patient Currently in Pain Denies   Bed Mobility   Bridging Independent   Rolling Independent   Sit to Supine Modified independent; Independent   Scooting Independent   Transfers   Sit to Stand Supervision  (Does not lock w/c brakes prior to standing, requires v/c's )   Stand to sit Independent  (On rollator seat- does not require v/c's to lock brakes )   Lateral Transfers Modified independent; Supervision  (Due to not making sure w/c brakes are locked )   Ambulation   Ambulation? Yes   More Ambulation?  Yes   Ambulation 1   Surface level tile   Device Rollator   Assistance Modified Independent; Supervision   Quality of Gait Good and steady pace with inc fwd flex at waist; No SOB or LOB noted during amb   Gait Deviations Decreased step height   Distance 15'   Comments w/c<>//  (1 L and 1 R turn )   Ambulation 2   Surface - 2 level tile   Device 2 Rollator   Assistance 2 Modified Independent; Supervision   Quality of Gait 2 same as previous    Gait Deviations Decreased step height   Distance 250'   Comments Including L and R turns    Wheelchair Activities   Propulsion Yes   Propulsion 1   Propulsion Manual   Level Level Tile   Method RLE; LLE  (intermittent use of R handrail )   Level of Assistance Supervision   Description/ Details pt tends to veer to R, able to self correct    Distance 100'   Exercises   Hip Flexion Marches X 20    Hip Abduction BLE X 20   Comments Standing BLE ther-ex at //  (3X sitting rest breaks on rollator seat )   Other exercises   Other exercises? Yes   Other exercises 1 Heel raises X 20   Other exercises 2 Toe raises X 20    Other exercises 3 Forward tandem walking 12' X 2    Patient Goals    Patient goals  go home   Short term goals   Time Frame for Short term goals 1-2 weeks   Short term goal 1 Supervision with ambulation using '   Short term goal 2 Supervision with car transfer   Short term goal 3 Independent with bed mobility   Short term goal 4 Supervision with w/c mobility 250'   Short term goal 5 Supervision with 4 stairs using unilateral rail   Long term goals   Time Frame for Long term goals  2-3 weeks   Long term goal 1 Independent with ambulation using '   Long term goal 2 Independent with car transfer   Long term goal 3 Independent with transfers   Long term goal 4 Independent with 4 stairs with unilateral rail   Conditions Requiring Skilled Therapeutic Intervention   Body structures, Functions, Activity limitations Decreased functional mobility ; Decreased strength; Decreased endurance; Decreased balance   Assessment Pt easily distracted and requires v/c's for attention to task and encouragement to participate. Pt reports she will not be using w/c much/or at all in room. Pt also reports she is planning to be d/c on Saturday.     History CVA with right body involvement, Dysarthria, hypothryoidism, anxiety, CAD, CHF, COPD, T2DM, CKD, MI x 2   Barriers to Learning impulsivity, decreased safety awareness   Treatment Initiated  pre-gait, transfers, bed mobility   Discharge Recommendations Home with Home health PT   Activity Tolerance   Activity Tolerance Patient Tolerated treatment well   Activity Tolerance distracted and needs redirection, impulsive   Plan   Times per week 5-6   Times per day Daily   Plan weeks 1-3   Current Treatment Recommendations Strengthening; Balance Training; Functional Mobility Training; Transfer Training; Endurance Training; Wheelchair Mobility Training; Gait Training; Stair training; Home Exercise Program; Safety Education & Training; Patient/Caregiver Education & Training; Equipment Evaluation, Education, & procurement   Plan Comment cont. PT as pt is able with progressive mobility and safety training. Safety Devices   Type of devices Patient at risk for falls; Gait belt; Left in bed  (AD vladimir in room )   PT Whiteboard Notes   Therapy Whiteboard RE 12/5 CVA, R side weakness               Electronically signed by Wilman Berry PTA on 12/2/2021 at 4:50 PM                      RECORD REVIEW: Previous medical records, medications were reviewed at today's visit    IMPRESSION:   1. Acute ischemic stroke-aspirin/statin/Plavix  2. Coronary artery disease/history of MI-aspirin/statin/Plavix  3. Hyperlipidemia-on statin  4. CHF-Coreg  5. Allergies-Zyrtec  6. History of COPD-nebulizers as needed  7. DVT prophylaxis-Lovenox  8. Diabetes-on insulin monitor blood sugars  9. Hypothyroidism-Synthroid  10. GI-proton pump inhibitor/bowel regimen  11. Mood disorder-on Zoloft  12. Psoriatic arthritis-Tylenol as needed  13. Chronic kidney disease-monitor  14. PT/OT/speech  15.   Obstructive sleep apnea-monitor    Continue present care as noted    DC home       Expected duration and frequency therapy: 180 minutes per day, 5 days per week    [address] [facility]  [phone] CELL  Dr Anna Chavez

## 2021-12-04 NOTE — PLAN OF CARE
Problem: Falls - Risk of:  Goal: Will remain free from falls  Description: Will remain free from falls  12/4/2021 1351 by Dea Calvin RN  Outcome: Completed  12/4/2021 0013 by Colt Jovel RN  Outcome: Ongoing  Goal: Absence of physical injury  Description: Absence of physical injury  12/4/2021 1351 by Dea Calvin RN  Outcome: Completed  12/4/2021 0013 by Colt Jovel RN  Outcome: Ongoing     Problem: HEMODYNAMIC STATUS  Goal: Patient has stable vital signs and fluid balance  12/4/2021 1351 by Dea Calvin RN  Outcome: Completed  12/4/2021 0013 by Colt Jovel RN  Outcome: Ongoing     Problem: ACTIVITY INTOLERANCE/IMPAIRED MOBILITY  Goal: Mobility/activity is maintained at optimum level for patient  12/4/2021 1351 by Dea Calvin RN  Outcome: Completed  12/4/2021 0013 by Colt Jovel RN  Outcome: Ongoing     Problem: COMMUNICATION IMPAIRMENT  Goal: Ability to express needs and understand communication  12/4/2021 1351 by Dea Calvin RN  Outcome: Completed  12/4/2021 0013 by Colt Jovel RN  Outcome: Ongoing     Problem: SAFETY  Goal: Free from accidental physical injury  12/4/2021 1351 by Dea Calvin RN  Outcome: Completed  12/4/2021 0013 by Colt Jovel RN  Outcome: Ongoing  Goal: Free from intentional harm  12/4/2021 1351 by Dea Calvin RN  Outcome: Completed  12/4/2021 0013 by Colt Jovel RN  Outcome: Ongoing  Goal: LTG - patient will adhere to hip precautions during ADL's and transfers  12/4/2021 1351 by Dea Calvin RN  Outcome: Completed  12/4/2021 0013 by Colt Jovel RN  Outcome: Ongoing  Goal: LTG - Patient will demonstrate safety requirements appropriate to situation/environment  12/4/2021 1351 by Dea Calvin RN  Outcome: Completed  12/4/2021 0013 by Colt Jovel RN  Outcome: Ongoing  Goal: LTG - patient will utilize safety techniques  12/4/2021 1351 by Dea Calvin RN  Outcome: Completed  12/4/2021 0013 by Colt Jovel RN  Outcome: Ongoing  Goal: STG - patient locks brakes on wheelchair  12/4/2021 1351 by Bethany Gan RN  Outcome: Completed  12/4/2021 0013 by Jackie Baron RN  Outcome: Ongoing  Goal: STG - Patient uses call light consistently to request assistance with transfers  12/4/2021 1351 by Bethany Gan, RN  Outcome: Completed  12/4/2021 0013 by Jackie Baron RN  Outcome: Ongoing  Goal: STG - patient uses gait belt during all transfers  12/4/2021 1351 by Bethany Gan, RN  Outcome: Completed  12/4/2021 0013 by Jackie Baron RN  Outcome: Ongoing     Problem: DAILY CARE  Goal: Daily care needs are met  12/4/2021 1351 by Bethany Gan RN  Outcome: Completed  12/4/2021 0013 by Jackie Baron RN  Outcome: Ongoing     Problem: PAIN  Goal: Patient's pain/discomfort is manageable  12/4/2021 1351 by Bethany Gan RN  Outcome: Completed  12/4/2021 0013 by Jackie Baron RN  Outcome: Ongoing  Goal: LTG - Patient will manage pain with the appropriate technique/Intervention  12/4/2021 1351 by Bethany Gan RN  Outcome: Completed  12/4/2021 0013 by Jackie Baron RN  Outcome: Ongoing  Goal: LTG - Patient will demonstrate intervention for managing pain  12/4/2021 1351 by Bethany Gan RN  Outcome: Completed  12/4/2021 0013 by Jackie Baron RN  Outcome: Ongoing  Goal: STG - Patient will reduce or eliminate use of analgesics  12/4/2021 1351 by Bethany Gan RN  Outcome: Completed  12/4/2021 0013 by Jackie Baron RN  Outcome: Ongoing  Goal: STG - pain is manageable through therapies  12/4/2021 1351 by Bethany Gan RN  Outcome: Completed  12/4/2021 0013 by Jackie Baron RN  Outcome: Ongoing  Goal: STG - Patient will verbalize an acceptable level of pain  12/4/2021 1351 by Bethany Gan RN  Outcome: Completed  12/4/2021 0013 by Jackie Baron RN  Outcome: Ongoing  Goal: STG - patients pain is managed to allow active participation in daily activities  12/4/2021 1351 by Bethany Gan RN  Outcome: Completed  12/4/2021 0013 by Jackie Baron RN  Outcome: Ongoing  Goal: STG - Patient will increase activity level  12/4/2021 1351 by Naima iMranda RN  Outcome: Completed  12/4/2021 0013 by Seda Diehl RN  Outcome: Ongoing  Goal: STG - patient verbalizes a reduction in pain level  12/4/2021 1351 by Naima Miranda RN  Outcome: Completed  12/4/2021 0013 by Seda Diehl RN  Outcome: Ongoing     Problem: SKIN INTEGRITY  Goal: Skin integrity is maintained or improved  12/4/2021 1351 by Naima Miranda RN  Outcome: Completed  12/4/2021 0013 by eSda Diehl RN  Outcome: Ongoing  Goal: LTG - Patient will be free from infection  12/4/2021 1351 by Naima Miranda RN  Outcome: Completed  12/4/2021 0013 by Seda Diehl RN  Outcome: Ongoing  Goal: LTG - patient will maintain/improve skin integrity through proper skin care techniques  12/4/2021 1351 by Naima Miranda RN  Outcome: Completed  12/4/2021 0013 by Seda Diehl RN  Outcome: Ongoing  Goal: LTG - Patient will demonstrate appropriate pressure relief techniques  12/4/2021 1351 by Naima Miranda RN  Outcome: Completed  12/4/2021 0013 by Seda Diehl RN  Outcome: Ongoing  Goal: LTG - patient will demonstrate appropriate skin care techniques  12/4/2021 1351 by Naima Miranda RN  Outcome: Completed  12/4/2021 0013 by Seda Diehl RN  Outcome: Ongoing  Goal: LTG - Patient will be free from infection  12/4/2021 1351 by Naima Miranda RN  Outcome: Completed  12/4/2021 0013 by Seda Diehl RN  Outcome: Ongoing  Goal: STG - Patient demonstrates skin care/treatment/dressing change  12/4/2021 1351 by Naima Miranda RN  Outcome: Completed  12/4/2021 0013 by Seda Diehl RN  Outcome: Ongoing  Goal: STG - patient will maintain good skin integrity  12/4/2021 1351 by Naima Miranda RN  Outcome: Completed  12/4/2021 0013 by Seda Diehl RN  Outcome: Ongoing  Goal: STG - Patient exhibits signs of wound healing.   12/4/2021 1351 by Naima Miranda RN  Outcome: Completed  12/4/2021 0013 by Seda Diehl RN  Outcome: Ongoing  Goal: STG - patient demonstrates pressure reduction techniques  12/4/2021 1351 by Husam Ayala RN  Outcome: Completed  12/4/2021 0013 by Jenny Swift RN  Outcome: Ongoing  Goal: STG - Patient demonstrates preventative skin care measures  12/4/2021 1351 by Husam Ayala RN  Outcome: Completed  12/4/2021 0013 by Jenny Swift RN  Outcome: Ongoing     Problem: KNOWLEDGE DEFICIT  Goal: Patient/S.O. demonstrates understanding of disease process, treatment plan, medications, and discharge instructions.   12/4/2021 1351 by Husam Ayala RN  Outcome: Completed  12/4/2021 0013 by Jenny Swift RN  Outcome: Ongoing     Problem: DISCHARGE BARRIERS  Goal: Patient's continuum of care needs are met  12/4/2021 1351 by Husam Ayala RN  Outcome: Completed  12/4/2021 0013 by Jenny Swift RN  Outcome: Ongoing     Problem: IP BLADDER/VOIDING  Goal: LTG - patient will complete bladder elimination  12/4/2021 1351 by Husam Ayala RN  Outcome: Completed  12/4/2021 0013 by Jenny Swift RN  Outcome: Ongoing  Goal: LTG - Patient will utilize adaptive techniques/equipment to complete bladder elimination  12/4/2021 1351 by Husam Ayala RN  Outcome: Completed  12/4/2021 0013 by Jenny Swift RN  Outcome: Ongoing  Goal: LTG - patient will achieve acceptable level of continence  12/4/2021 1351 by Husam Ayala RN  Outcome: Completed  12/4/2021 0013 by Jenny Swift RN  Outcome: Ongoing  Goal: STG - Patient demonstrates ability to take care of indwelling catheter  12/4/2021 1351 by Husam Ayala RN  Outcome: Completed  12/4/2021 0013 by Jenny Swift RN  Outcome: Ongoing  Goal: STG - patient demonstrates self-cath technique using clean technique and care of the catheter  12/4/2021 1351 by Husam Ayala RN  Outcome: Completed  12/4/2021 0013 by Jenny Swift RN  Outcome: Ongoing  Goal: STG - Patient demonstrates no accidents  12/4/2021 1351 by Rosezella Lusty, RN  Outcome: Completed  12/4/2021 0013 by Jenny Swift RN  Outcome: Ongoing  Goal: STG - Patient will state signs and symptoms of UTI  12/4/2021 1351 by Hood Soriano RN  Outcome: Completed  12/4/2021 0013 by Rhys Calvillo RN  Outcome: Ongoing  Goal: STG - patient will be able to empty bladder  12/4/2021 1351 by Hood Soriano RN  Outcome: Completed  12/4/2021 0013 by Rhys Calvillo RN  Outcome: Ongoing  Goal: STG - Patient demonstrates understanding of self catheterization schedule by completing task on time  12/4/2021 1351 by Hood Soriano RN  Outcome: Completed  12/4/2021 0013 by Rhys Calvillo RN  Outcome: Ongoing  Goal: STG - patient participates in bladder program by expressing need to void  12/4/2021 1351 by Hood Soriano RN  Outcome: Completed  12/4/2021 0013 by Rhys Calvillo RN  Outcome: Ongoing  Goal: STG - Patient verbalizes understanding of catheter care indwelling/intermittent  12/4/2021 1351 by Hood Soriano RN  Outcome: Completed  12/4/2021 0013 by Rhys Calvillo RN  Outcome: Ongoing  Goal: STG - patient participates in bladder program by adhering to implemented toileting schedule  12/4/2021 1351 by Hood Soriano RN  Outcome: Completed  12/4/2021 0013 by Rhys Calvillo RN  Outcome: Ongoing     Problem: IP BOWEL ELIMINATION  Goal: LTG - patient will utilize adaptive techniques/equipment to complete bowel elimination  12/4/2021 1351 by Hood Soriano RN  Outcome: Completed  12/4/2021 0013 by Rhys Calvillo RN  Outcome: Ongoing  Goal: LTG - patient will have regular and routine bowel evacuation  12/4/2021 1351 by Hood Soriano RN  Outcome: Completed  12/4/2021 0013 by Rhys Calvillo RN  Outcome: Ongoing  Goal: STG - patient will be accident free  12/4/2021 1351 by Hood Soriano RN  Outcome: Completed  12/4/2021 0013 by Rhys Calvillo RN  Outcome: Ongoing  Goal: STG - Patient demonstrates care and management of ostomy bag  12/4/2021 1351 by Hood Soriano RN  Outcome: Completed  12/4/2021 0013 by Rhys Calvillo RN  Outcome: Ongoing  Goal: STG - Patient participates in bowel management program  12/4/2021 1351 by David Bach Felicia Hull RN  Outcome: Completed  12/4/2021 0013 by Gricelda Crow RN  Outcome: Ongoing  Goal: STG - patient maintains skin integrity  12/4/2021 1351 by Larry Bates RN  Outcome: Completed  12/4/2021 0013 by Gricelda Crow RN  Outcome: Ongoing  Goal: STG - Patient will verbalize signs and symptoms of constipation and how to prevent/alleviate  12/4/2021 1351 by Larry Bates RN  Outcome: Completed  12/4/2021 0013 by Gricelda Crow RN  Outcome: Ongoing  Goal: STG - patient will be continent of stool  12/4/2021 1351 by Larry Bates RN  Outcome: Completed  12/4/2021 0013 by Gricelda Crow RN  Outcome: Ongoing  Goal: STG - Patient completes digital stimulation technique  12/4/2021 1351 by Larry Bates RN  Outcome: Completed  12/4/2021 0013 by Gricelda Crow RN  Outcome: Ongoing  Goal: STG - Patient verbalizes knowledge about relationship between diet, fluid intake, activity and medication on constipation  12/4/2021 1351 by Larry Bates RN  Outcome: Completed  12/4/2021 0013 by Gricelda Crow RN  Outcome: Ongoing     Problem: IP BREATHING  Goal: LTG - patient will mobilize secretions and maintain airway  12/4/2021 1351 by Larry Bates RN  Outcome: Completed  12/4/2021 0013 by Gricelda Crow RN  Outcome: Ongoing  Goal: LTG - Patient/caregiver will demonstrate/perform proper techniques to maintain patent airway  12/4/2021 1351 by Larry Bates RN  Outcome: Completed  12/4/2021 0013 by Gricelda Crow RN  Outcome: Ongoing  Goal: LTG - patient/caregiver will demonstrate/perform improved or stable self care abilities within physical limitations  12/4/2021 1351 by Larry Bates RN  Outcome: Completed  12/4/2021 0013 by Gricelda Crow RN  Outcome: Ongoing  Goal: STG - Patient/caregiver will maintain patent airway  12/4/2021 1351 by Larry Bates RN  Outcome: Completed  12/4/2021 0013 by Gricelda Crow RN  Outcome: Ongoing  Goal: STG - respiratory rate and effort will be within normal limits for the patient  12/4/2021 1351 by

## 2021-12-04 NOTE — PLAN OF CARE
Problem: Falls - Risk of:  Goal: Will remain free from falls  Description: Will remain free from falls  Outcome: Ongoing  Goal: Absence of physical injury  Description: Absence of physical injury  Outcome: Ongoing     Problem: HEMODYNAMIC STATUS  Goal: Patient has stable vital signs and fluid balance  Outcome: Ongoing     Problem: SAFETY  Goal: Free from accidental physical injury  Outcome: Ongoing  Goal: Free from intentional harm  Outcome: Ongoing  Goal: LTG - patient will adhere to hip precautions during ADL's and transfers  Outcome: Ongoing  Goal: LTG - Patient will demonstrate safety requirements appropriate to situation/environment  Outcome: Ongoing  Goal: LTG - patient will utilize safety techniques  Outcome: Ongoing  Goal: STG - patient locks brakes on wheelchair  Outcome: Ongoing  Goal: STG - Patient uses call light consistently to request assistance with transfers  Outcome: Ongoing  Goal: STG - patient uses gait belt during all transfers  Outcome: Ongoing     Problem: DAILY CARE  Goal: Daily care needs are met  Outcome: Ongoing     Problem: PAIN  Goal: Patient's pain/discomfort is manageable  Outcome: Ongoing  Goal: LTG - Patient will manage pain with the appropriate technique/Intervention  Outcome: Ongoing  Goal: LTG - Patient will demonstrate intervention for managing pain  Outcome: Ongoing  Goal: STG - Patient will reduce or eliminate use of analgesics  Outcome: Ongoing  Goal: STG - pain is manageable through therapies  Outcome: Ongoing  Goal: STG - Patient will verbalize an acceptable level of pain  Outcome: Ongoing  Goal: STG - patients pain is managed to allow active participation in daily activities  Outcome: Ongoing  Goal: STG - Patient will increase activity level  Outcome: Ongoing  Goal: STG - patient verbalizes a reduction in pain level  Outcome: Ongoing     Problem: SKIN INTEGRITY  Goal: Skin integrity is maintained or improved  Outcome: Ongoing  Goal: LTG - Patient will be free from infection  Outcome: Ongoing  Goal: LTG - patient will maintain/improve skin integrity through proper skin care techniques  Outcome: Ongoing  Goal: LTG - Patient will demonstrate appropriate pressure relief techniques  Outcome: Ongoing  Goal: LTG - patient will demonstrate appropriate skin care techniques  Outcome: Ongoing  Goal: LTG - Patient will be free from infection  Outcome: Ongoing  Goal: STG - Patient demonstrates skin care/treatment/dressing change  Outcome: Ongoing  Goal: STG - patient will maintain good skin integrity  Outcome: Ongoing  Goal: STG - Patient exhibits signs of wound healing.   Outcome: Ongoing  Goal: STG - patient demonstrates pressure reduction techniques  Outcome: Ongoing  Goal: STG - Patient demonstrates preventative skin care measures  Outcome: Ongoing     Problem: IP BLADDER/VOIDING  Goal: LTG - patient will complete bladder elimination  Outcome: Ongoing  Goal: LTG - Patient will utilize adaptive techniques/equipment to complete bladder elimination  Outcome: Ongoing  Goal: LTG - patient will achieve acceptable level of continence  Outcome: Ongoing  Goal: STG - Patient demonstrates ability to take care of indwelling catheter  Outcome: Ongoing  Goal: STG - patient demonstrates self-cath technique using clean technique and care of the catheter  Outcome: Ongoing  Goal: STG - Patient demonstrates no accidents  Outcome: Ongoing  Goal: STG - Patient will state signs and symptoms of UTI  Outcome: Ongoing  Goal: STG - patient will be able to empty bladder  Outcome: Ongoing  Goal: STG - Patient demonstrates understanding of self catheterization schedule by completing task on time  Outcome: Ongoing  Goal: STG - patient participates in bladder program by expressing need to void  Outcome: Ongoing  Goal: STG - Patient verbalizes understanding of catheter care indwelling/intermittent  Outcome: Ongoing  Goal: STG - patient participates in bladder program by adhering to implemented toileting schedule  Outcome: Ongoing     Problem: IP BOWEL ELIMINATION  Goal: LTG - patient will utilize adaptive techniques/equipment to complete bowel elimination  Outcome: Ongoing  Goal: LTG - patient will have regular and routine bowel evacuation  Outcome: Ongoing  Goal: STG - patient will be accident free  Outcome: Ongoing  Goal: STG - Patient demonstrates care and management of ostomy bag  Outcome: Ongoing  Goal: STG - Patient participates in bowel management program  Outcome: Ongoing  Goal: STG - patient maintains skin integrity  Outcome: Ongoing  Goal: STG - Patient will verbalize signs and symptoms of constipation and how to prevent/alleviate  Outcome: Ongoing  Goal: STG - patient will be continent of stool  Outcome: Ongoing  Goal: STG - Patient completes digital stimulation technique  Outcome: Ongoing  Goal: STG - Patient verbalizes knowledge about relationship between diet, fluid intake, activity and medication on constipation  Outcome: Ongoing

## 2021-12-06 ENCOUNTER — OFFICE VISIT (OUTPATIENT)
Dept: FAMILY MEDICINE CLINIC | Age: 81
End: 2021-12-06
Payer: MEDICARE

## 2021-12-06 ENCOUNTER — CARE COORDINATION (OUTPATIENT)
Dept: CASE MANAGEMENT | Age: 81
End: 2021-12-06

## 2021-12-06 VITALS
HEART RATE: 70 BPM | SYSTOLIC BLOOD PRESSURE: 98 MMHG | BODY MASS INDEX: 26.31 KG/M2 | TEMPERATURE: 97 F | OXYGEN SATURATION: 97 % | DIASTOLIC BLOOD PRESSURE: 60 MMHG | WEIGHT: 163 LBS

## 2021-12-06 DIAGNOSIS — I63.9 ACUTE CVA (CEREBROVASCULAR ACCIDENT) (HCC): Primary | ICD-10-CM

## 2021-12-06 DIAGNOSIS — I25.10 CAD IN NATIVE ARTERY: ICD-10-CM

## 2021-12-06 DIAGNOSIS — I69.391 DYSPHAGIA DUE TO RECENT CEREBRAL INFARCTION: ICD-10-CM

## 2021-12-06 DIAGNOSIS — E03.9 ACQUIRED HYPOTHYROIDISM: ICD-10-CM

## 2021-12-06 DIAGNOSIS — N18.31 STAGE 3A CHRONIC KIDNEY DISEASE (HCC): ICD-10-CM

## 2021-12-06 DIAGNOSIS — N18.31 TYPE 2 DIABETES MELLITUS WITH STAGE 3A CHRONIC KIDNEY DISEASE, WITHOUT LONG-TERM CURRENT USE OF INSULIN (HCC): ICD-10-CM

## 2021-12-06 DIAGNOSIS — J44.9 CHRONIC OBSTRUCTIVE PULMONARY DISEASE, UNSPECIFIED COPD TYPE (HCC): ICD-10-CM

## 2021-12-06 DIAGNOSIS — E11.22 TYPE 2 DIABETES MELLITUS WITH STAGE 3A CHRONIC KIDNEY DISEASE, WITHOUT LONG-TERM CURRENT USE OF INSULIN (HCC): ICD-10-CM

## 2021-12-06 DIAGNOSIS — F41.8 DEPRESSION WITH ANXIETY: ICD-10-CM

## 2021-12-06 DIAGNOSIS — K21.9 GASTROESOPHAGEAL REFLUX DISEASE WITHOUT ESOPHAGITIS: ICD-10-CM

## 2021-12-06 DIAGNOSIS — G81.90 HEMIPLEGIA AFFECTING DOMINANT SIDE (HCC): ICD-10-CM

## 2021-12-06 DIAGNOSIS — I10 PRIMARY HYPERTENSION: ICD-10-CM

## 2021-12-06 DIAGNOSIS — R47.1 DYSARTHRIA: ICD-10-CM

## 2021-12-06 PROBLEM — R53.83 OTHER FATIGUE: Status: RESOLVED | Noted: 2017-12-18 | Resolved: 2021-12-06

## 2021-12-06 PROBLEM — G47.33 OSA (OBSTRUCTIVE SLEEP APNEA): Status: RESOLVED | Noted: 2019-01-14 | Resolved: 2021-12-06

## 2021-12-06 PROBLEM — N17.9 AKI (ACUTE KIDNEY INJURY) (HCC): Status: RESOLVED | Noted: 2021-11-20 | Resolved: 2021-12-06

## 2021-12-06 PROCEDURE — 99495 TRANSJ CARE MGMT MOD F2F 14D: CPT | Performed by: FAMILY MEDICINE

## 2021-12-06 RX ORDER — CLOPIDOGREL BISULFATE 75 MG/1
75 TABLET ORAL DAILY
Qty: 90 TABLET | Refills: 3 | Status: SHIPPED | OUTPATIENT
Start: 2021-12-06 | End: 2022-08-01 | Stop reason: ALTCHOICE

## 2021-12-06 NOTE — CARE COORDINATION
[facility] Transitions Initial Follow Up Call    Call within 2 business days of discharge: Yes    Patient: Suhail Byers Patient : 1940   MRN: [id number]  Reason for Admission:   Discharge Date: 21 RARS: Readmission Risk Score: 16.1 ( )      Last Discharge [facility]       Complaint Diagnosis Description Type Department Provider    21  Acute ischemic stroke ([address] . .. Admission (Discharged) [facility] [address], [place]           Spoke with: Maximus Moscoso    Non-face-to-face services provided:  Reviewed encounter information for continuity of care prior to follow up phone call - chart notes, consults, progress notes, test results, med list, appointments, AVS, other information. Care Transitions 24 Hour Call    Do you have any ongoing symptoms?: No  Do you have a copy of your discharge instructions?: Yes  Do you have all of your prescriptions and are they filled?: Yes  Have you been contacted by a [address]?: No  Have you scheduled your follow up appointment?: Yes  How are you going to get to your appointment?: Car - family or friend to transport  Were you discharged with any Home Care or Post Acute Services: Yes  Post Acute Services: [facility] [place] you feel like you have everything you need to keep you well at home?: Yes  Care Transitions Interventions         Follow Up ; Called and spoke with patient today for initial CT call after discharge from [place]. She says she is doing fairly well, but having some issues with feeling tired, sleepy. She has HFU with PCP today. Says she does have [place], but not heard from them. She says she has set up her medications, took out the ones she needed to stop, picking up Plavix today. She declined to review medications or go on with call any further stating she had to go to the bathroom. She thanks CTN for call, and ended call. CTN will follow up at a later time.      CTN placed call to [facility] and spoke with Juan Antonio Hernandez is going out today to admit to their services. CTN asked that she be advise patient has HFU at 230 this afternoon and if she could see her this morning, or tomorrow would possibly work. Will follow up at a later time.    Future Appointments   Date Time Provider Parminder Stover   12/6/2021  2:30 PM [facility]   1/10/2022 11:30 AM MD ROSSY Dave [facility] NEURO [facility]       Tony La RN

## 2021-12-06 NOTE — DISCHARGE SUMMARY
Occupational Therapy Discharge Summary         Date: 2021  Patient Name: Gale Lang        MRN: [id number]    : 1940  (79 y.o.)  Gender: female   Referring Practitioner: SHARI Maxwell CNP, Dr. Piter Hunt  Diagnosis: Cerebral infarction, R body involvement, posterior limb left internal capsule lacunar infarct. Referral 21 Dr. Demetra Romero  Restrictions/Precautions  Restrictions/Precautions: Fall Risk  Required Braces or Orthoses?: No      Discharge Date: 2021    UE Functioning:  BUE AROM WFL     Home Management:  Functional Mobility  Functional - Mobility Device: 4-Wheeled Walker  Activity: [employer], Transport items, To/From therapy gym  Assist Level: Modified independent   Functional Mobility Comments: Light homemaking task in ADL apartment and laundry room. Adaptive Equipment/DME Status:  Bathroom Equipment: Grab bars in shower, Grab bars around toilet  Home Equipment: ordered 4 wheeled walker      Pain Assessment:  Pain Level: 0       Remaining Problems:  Decreased functional mobility ; Decreased strength; Decreased high-level IADLs; Decreased fine motor control; Decreased coordination    STGs:  Short term goals  Time Frame for Short term goals: 1 week  Short term goal 1: Supervision with bathing hygiene. Short term goal 2: Supervision with clothing management/hygiene for toileting. Short term goal 3: Supervision with toilet transfers. Short term goal 4: Supervision with clothing management/hygiene for toileting. Short term goal 5: Supervision with LB dressing. Short term goal 6: Supervision with one-two handed static standing task for 4 minutes. MET 6/6 Short Term Goals    LTGs:  Long term goals  Time Frame for Long term goals : 2 weeks  Long term goal 1: Modified independent with bathing hygiene. Long term goal 2: Modified independent with toileting and toilet transfers. Long term goal 3: Modified independent with overall dressing.   Long term goal 4: Modified independent with ambulatory homemaking tasks. Long term goal 5: Patient verbalize DME needs. Long term goals 6: Independent with HEP.   MET 6/6 Long Term Goals    Discharge Setting and Recommendations:  Home to [address] [place] [address] and with Home Health OT     Discharge Care Scores  Eating: CARE Score: 6  Oral Hygiene: CARE Score: 6  Toileting: CARE Score: 6  Shower/Bathe: CARE Score: 6  Upper Body Dressing: CARE Score: 6  Lower Body Dressing: CARE Score: 6  Footwear: CARE Score: 6  Toilet Transfers: CARE Score: 6  Picking Up Object:  CARE Score: 6    Electronically signed by Matt Andres OT on 12/6/21 at 1:35 PM CST

## 2021-12-06 NOTE — PROGRESS NOTES
[facility] PHYSICIAN SERVICES  [facility] FAMILY MEDICINE  [address] [address] [address]  Dept: [phone]  Dept Fax: [phone]  Loc: [phone]    Shawn Doyle is a 79 y.o. female who presents today for her medical conditions/complaints asnoted below. Shawn Doyle is here for Follow-Up from [facility] date:  11/20/2021                    Discharge date:  11/23/21     Discharging Physician:  Dr. Manjeet Delong     Advance Directive: Full Code     Consults: Lan Moreira TO REHAB/TCU ADMISSION COORDINATOR  IP CONSULT TO CASE MANAGEMENT  IP CONSULT TO PHARMACY  PHARMACY TO CHANGE BASE FLUIDS      Primary Care Physician:  Twin Connelly MD     Discharge Diagnoses:  Principal Problem:    Acute CVA (cerebrovascular accident) [facility])  Active Problems:    CAD in native artery    HTN (hypertension)    Gastroesophageal reflux disease without esophagitis    Type 2 diabetes mellitus with chronic kidney disease, without long-term current use of insulin (HCC)    COPD (chronic obstructive pulmonary disease) ([facility]    Mixed hyperlipidemia    CECILIA (acute kidney injury) ([facility]  Resolved Problems:    * No resolved hospital problems. *        Inpatient course: Discharge summary reviewed- see chart for full details. 26-year-old presented to the emergency department with slurred speech and right-sided weakness with numbness on November 20. She states she checked her blood sugar the morning of the visit her blood sugar was 25. She resides at an assisted living facility at [facility] in [place]. Emergency department evaluation indicated a sodium of 130, CO2 of 16, glucose of 419, white blood cell count of 11.8. CT showed no acute abnormalities however CT angiogram showed short segment moderate atheromatous narrowing along the right distal ICA.   MRI of her brain was ([facility]     DM (diabetes mellitus) ([facility]     GERD (gastroesophageal reflux disease)     HTN (hypertension)     Hyperlipidemia     MI (myocardial infarction) ([facility]     x2    Thyroid disease       Past Surgical History:   Procedure Laterality Date    CATARACT REMOVAL      CHOLECYSTECTOMY      CORONARY ANGIOPLASTY  06/2018    Angioplasty to in-stent restenosis to the distal LAD    HIP SURGERY      HYSTERECTOMY      JOINT REPLACEMENT Right     Right knee replacement       Family History   Problem Relation Age of Onset    Heart Disease Mother     Heart Disease Father        Social History     Tobacco Use    Smoking status: Never Smoker    Smokeless tobacco: Never Used   Substance Use Topics    Alcohol use: No        Medications patient taking as of now reconciled against medications ordered at time of hospital discharge     Current Outpatient Medications   Medication Sig Dispense Refill    clopidogrel (PLAVIX) 75 MG tablet Take 1 tablet by mouth daily 90 tablet 3    aspirin 81 MG EC tablet Take 1 tablet by mouth daily 30 tablet 0    sertraline (ZOLOFT) 25 MG tablet Take 1 tablet by mouth daily 30 tablet 0    insulin glargine (BASAGLAR KWIKPEN) 100 UNIT/ML injection pen Inject 20 Units into the skin nightly 1 pen 0    loperamide (IMODIUM) 2 MG capsule Take 1 capsule by mouth every morning (before breakfast) 30 capsule 0    atorvastatin (LIPITOR) 80 MG tablet Take 1 tablet by mouth nightly 30 tablet 0    carvedilol (COREG) 12.5 MG tablet Take 1 tablet by mouth 2 times daily (with meals) 60 tablet 0    vitamin B-12 (CYANOCOBALAMIN) 1000 MCG tablet Take 1 tablet by mouth daily 30 tablet 0    Coenzyme Q10 (CO Q 10) 10 MG CAPS Take 1 capsule by mouth daily 30 capsule 0    levothyroxine (SYNTHROID) 25 MCG tablet Take 1 tablet by mouth daily 30 tablet 0    Probiotic Product (ALIGN) 4 MG CAPS Once capsule daily 30 capsule 5     No current facility-administered medications for this visit.      Allergies Allergen Reactions    Zinc Other (See Comments)     \"Elastic in bra\"    Tape Claude Erm Tape] Rash       Health Maintenance   Topic Date Due    Hepatitis C screen  Never done    Colon cancer screen colonoscopy  Never done    Breast cancer screen  12/10/2020    COVID-19 Vaccine (3 - Booster for Pfizer series) 08/01/2021    DTaP/Tdap/Td vaccine (1 - Tdap) 11/20/2027 (Originally 11/21/2017)    Annual Wellness Visit (AWV)  06/18/2022    Lipid screen  11/21/2022    TSH testing  11/23/2022    Potassium monitoring  12/02/2022    Creatinine monitoring  12/02/2022    DEXA (modify frequency per FRAX score)  Completed    Flu vaccine  Completed    Shingles Vaccine  Completed    Pneumococcal 65+ years Vaccine  Completed    Hepatitis A vaccine  Aged Out    Hib vaccine  Aged Out    Meningococcal (ACWY) vaccine  Aged Out       Subjective:      Review of Systems  Review of Systems   Constitutional: Negative for chills and fever. HENT: Negative for congestion. Respiratory: Negative for cough, chest tightness and shortness of breath. Cardiovascular: Negative for chest pain, palpitations and leg swelling. Gastrointestinal: Negative for abdominal pain, anal bleeding, constipation, diarrhea and nausea. Genitourinary: Negative for difficulty urinating. Psychiatric/Behavioral: Negative. See HPI for visit specific review of symptoms. All others negative      Objective:   BP 98/60   Pulse 70   Temp 97 °F (36.1 °C)   Wt 163 lb (73.9 kg)   SpO2 97%   BMI 26.31 kg/m²   Physical Exam  Physical Exam   Constitutional: She appears well-developed. Does not appear ill. Neck: Normal range of motion. Neck supple. No masses. Neck Symmetric. Normal tracheal position. No thyroid enlargement  Cardiovascular: Normal rate and regular rhythm. Exam reveals no friction rub. Carotid arteries: no bruit observed. No murmur heard. Respiratory:  Effort normal and breath sounds normal. No respiratory distress.  No wheezes. No rales. No use of accessory muscles or intercostal retractions. Neurological: alert. Psychiatric: normal mood and affect. Her behavior is normal. Normal judgement and insight observed. Lab Results   Component Value Date    LABA1C 8.1 (H) 11/21/2021    LABA1C 8.9 (H) 10/07/2021    LABA1C 8.3 (H) 06/17/2021     Lab Results   Component Value Date    LABMICR <1.20 06/17/2021    LDLCALC 42 11/21/2021    CREATININE 0.7 12/02/2021    Summary   Structurally normal mitral valve. Mild mitral regurgitation is present. Structurally normal aortic valve. Tricuspid valve is structurally normal.   Normal left ventricular size with preserved LV function and an estimated   ejection fraction of approximately 55-60%. Mild concentric left ventricular hypertrophy. No regional wall motion abnormalities. Impaired relaxation compatible with diastolic dysfunction. ( reversed E/A   ratio)      Signature      ----------------------------------------------------------------   Electronically signed by Kimmy Oviedo MD(Interpreting   physician) on 11/21/2021 08:53 AM   ----------------------------------------------------------------           Assessment & Plan: The following diagnoses and conditions are stable with no further orders unless indicated:  1. Acute CVA (cerebrovascular accident) ([facility]  Continue with aspirin 81 mg daily Plavix 75 mg daily  Reminded her of her neurology follow-up January 10      2. CAD in native artery  Continue with aspirin and Plavix  Continue with carvedilol    3. Gastroesophageal reflux disease without esophagitis  Stable she is no longer taking omeprazole    4. Primary hypertension  BP Readings from Last 3 Encounters:   12/06/21 98/60   12/04/21 (!) 142/72   11/23/21 (!) 148/106     Blood pressure is low today. She is asymptomatic  Advised her to reduce her carvedilol from 12.5 mg twice a day to 6.25 mg twice a day.     5. Type 2 diabetes mellitus with stage 3a chronic kidney disease, without long-term current use of insulin ([facility]  Lab Results   Component Value Date    LABA1C 8.1 (H) 11/21/2021    LABA1C 8.9 (H) 10/07/2021    LABA1C 8.3 (H) 06/17/2021     Lab Results   Component Value Date    LABMICR <1.20 06/17/2021    LDLCALC 42 11/21/2021    CREATININE 0.7 12/02/2021   Her A1c had improved  Her glucose readings at home are normalizing as well. We will continue with Basaglar 20 units    Encouraged ADA diet    6. Depression with anxiety  Stable on sertraline    7. Chronic obstructive pulmonary disease, unspecified COPD type ([facility]  She states she has albuterol at home for as needed use    8. Stage 3a chronic kidney disease ([facility]  Lab Results   Component Value Date    CREATININE 0.7 12/02/2021     Lab Results   Component Value Date    BUN 16 12/02/2021         Reinforced goals of adequate hydration. Recommended he avoid NSAIDs such as naproxen and ibuprofen. 9. Hemiplegia affecting dominant side ([facility]  Continue with physical therapy    10. Dysarthria  Physical therapy    11. Dysphagia due to recent cerebral infarction  She states this has resolved. She is having no choking or swallowing issues. 12. Acquired hypothyroidism  Lab Results   Component Value Date    TSH 1.720 10/07/2021    T4FREE 1.14 10/07/2021     Continue with Synthroid      Diagnostic test results reviewed. Medical Decision Making: moderate complexity    Return in about 6 weeks (around 1/17/2022).

## 2021-12-06 NOTE — PROGRESS NOTES
[facility]                      Inpatient Speech Therapy                Discharge Summary      Date: 2021  Patient Name: Tank Soriano        MRN: [id number]    Account #: [de-identified]  : 1940  (79 y.o.)  Gender: female   Swallowing Status/Diet: Regular diet, thin liquids  Admit Date:  2021  Discharge date : 2021      Admission Diagnoses:   Acute ischemic stroke [facility]) [I63.9]     Discharge Diagnoses:  Principal Problem:    Acute CVA (cerebrovascular accident) [facility])  Active Problems:    CAD in native artery    HTN (hypertension)    Other fatigue    Gastroesophageal reflux disease without esophagitis    Depression with anxiety    Type 2 diabetes mellitus with chronic kidney disease, without long-term current use of insulin ([facility])    COPD (chronic obstructive pulmonary disease) ([facility]    Mixed hyperlipidemia    MAGALY (obstructive sleep apnea)    CECILIA (acute kidney injury) ([facility]    Hemiplegia affecting dominant side ([facility]    Dysarthria    Dysphagia due to recent cerebral infarction    Acquired hypothyroidism    CHF (congestive heart failure) ([facility]    Acute ischemic stroke ([facility]    Psoriatic arthritis ([facility]         Reviewed lingual search and sweep and utilizing oral swabs to clear the oral cavity. She was able to recall all general swallowing precautions and dysarthria strategies.     Oral motor exercises were completed to improve lingual and labial strength and range of motion. Diadochokinetic tasks were completed. Improving accuracy and rate were noted. Phrases and sentences which included fricatives and bilabials were completed while utilizing dysarthria strategies. She also completed five syllable words with fricatives while practicing her dysarthria strategies. Her speech intelligibility was at 100% this date.     Her discharge date is set for tomorrow.  She will not require speech therapy services following discharge.             Comprehension: 5 - Patient understands basic needs (hungry/hot/pain)  Expression: 5 - Expresses basic ideas/needs only (hungry/hot/pain)  Social Interaction: 5 - Patient is appropriate with supervision/cues  Problem Solvin - Patient able to solve simple/routine tasks  Memory: 5 - Patient requires prompting with stress/unfamiliar situations            SHORT TERM GOAL #1:  Goal 1: The patient will participate in the Cognitive Linguistic Quick Test or CLQT. Met      SHORT TERM GOAL #2:  Goal 2: The patient will complete tasks for medication management with minimal cues and 100% accuracy. Met      SHORT TERM GOAL #3:  Goal 3: The patient will complete tasks for finance management with minimal cues and 100% accuracy. Met      SHORT TERM GOAL #4:  Goal 4: The patient will use the (over articulation/slow rate/writing key word/elongation of thevowel/increased loudness/phrasing) strategy to improve speech intelligibility withwords/phrases/sentences/in conversation with 100% accuracy. Met      SHORT TERM GOAL #5:  Goal 5: The patient will complete daily oral-motor exercise to increase lingual and labial range of motion, strength and coordination with (min/mod/max) verbal, tactile and visual cues to improve speech intelligibility. Met    Long Term Goals  The patient will develop functional and intelligible speech and utilize compensatory strategies through the use of adequate labial and lingual function, increased articulatory precision and speech prosody.    Met      Goals Met: _____5__ STG's     _____1___  LTG's      Plan:  Discharge patient: yes             Discharge Location: home             Further Speech treatment/recommendations: no further speech therapy services are warranted at this time        Electronically Signed By:  Radha Lo M.S., CCC-SLP  2021,9:44 AM.

## 2021-12-07 ENCOUNTER — CARE COORDINATION (OUTPATIENT)
Dept: CASE MANAGEMENT | Age: 81
End: 2021-12-07

## 2021-12-07 ENCOUNTER — TELEPHONE (OUTPATIENT)
Dept: INTERNAL MEDICINE CLINIC | Age: 81
End: 2021-12-07

## 2021-12-07 NOTE — CARE COORDINATION
[facility] Transitions Follow Up Call    2021    Patient: Marge French  Patient : 1940   MRN: <[id number]>  Reason for Admission: CVA  Discharge Date: 21 RARS: Readmission Risk Score: 16.1 ( )         Spoke with: NA    Attempted to reach patient via phone for transition call. VM left stating purpose of call along with my contact information requesting a return call. Brittany Wolf  [facility]  Care Transitions  [phone]    Care Transitions Subsequent and Final Call    Subsequent and Final Calls  Do you currently have any active services?: Yes  Are you currently active with any services?: Home Health  Care Transitions Interventions  Other Interventions:            Follow Up  Future Appointments   Date Time Provider Parminder Stover   1/10/2022 11:30 AM MD ROSSY Tsai [facility]O [facility]   2022  2:30 PM Eliane Monroe MD [facility]       Brittany Wolf LPN

## 2021-12-07 NOTE — TELEPHONE ENCOUNTER
[address] [address] PT evaluation completed and PT recommending visits 2 X wk X 3 wks  for deep breathing, trunk/core, and B LE strengthening exercises; bed mobility, transfer, and gait training; balance activities; home safety instruction    Please advise if approved

## 2021-12-08 ENCOUNTER — TELEPHONE (OUTPATIENT)
Dept: INTERNAL MEDICINE CLINIC | Age: 81
End: 2021-12-08

## 2021-12-08 NOTE — TELEPHONE ENCOUNTER
[facility] OT alverto completed and pt doing well with self care and simple mobility tasks; she prefers to have paid caregivers assist with bathing when she is ready  No skilled need for [facility] OT at this time     ST alverto also completed and per ST No deficits identified.  No continued need for skilled ST.    hoangi

## 2021-12-13 ENCOUNTER — CARE COORDINATION (OUTPATIENT)
Dept: CASE MANAGEMENT | Age: 81
End: 2021-12-13

## 2021-12-13 NOTE — CARE COORDINATION
[facility] Transitions Follow Up Call    2021    Patient: Saima Godoy  Patient : 1940   MRN: <[id number]>  Reason for Admission: CVA  Discharge Date: 21 RARS: Readmission Risk Score: 16.1 ( )         Attempted to contact patient for follow up transition call. Left voicemail message to return call with an update on condition since discharge. Contact information provided. Will continue to follow up. Care Transitions Subsequent and Final Call    Subsequent and Final Calls  Are you currently active with any services?: Home Health  Care Transitions Interventions  Other Interventions:            Follow Up  Future Appointments   Date Time Provider Parminder Stover   1/10/2022 11:30 AM MD ROSSY Christie [facility]   2022  2:30 PM MD NEEMA Wang [facility] Romero LPN

## 2021-12-21 ENCOUNTER — CARE COORDINATION (OUTPATIENT)
Dept: CASE MANAGEMENT | Age: 81
End: 2021-12-21

## 2021-12-21 NOTE — CARE COORDINATION
[facility] Transitions Follow Up Call    2021    Patient: Tammi Pretty  Patient : 1940   MRN: <[id number]>  Reason for Admission: CVA  Discharge Date: 21 RARS: Readmission Risk Score: 16.1 ( )         Spoke with: EAN    Second and final attempt to reach patient via phone for transition call. VM left stating purpose of call along with my contact information requesting a return call. Episode ended d/t unsuccessful contact. Jason Butterfield LPN [address] [place] [address]  Care Transitions  [phone]      Care Transitions Subsequent and Final Call    Subsequent and Final Calls  Care Transitions Interventions  Other Interventions:            Follow Up  Future Appointments   Date Time Provider Parminder Stover   1/10/2022 11:30 AM MD ROSSY Rutledge NEURO [facility]   2022  2:30 PM Isma Solitario MD [facility] Butterfield LPN

## 2021-12-23 ENCOUNTER — TELEPHONE (OUTPATIENT)
Dept: FAMILY MEDICINE CLINIC | Age: 81
End: 2021-12-23

## 2021-12-30 DIAGNOSIS — E11.22 TYPE 2 DIABETES MELLITUS WITH STAGE 3A CHRONIC KIDNEY DISEASE, WITHOUT LONG-TERM CURRENT USE OF INSULIN (HCC): Primary | ICD-10-CM

## 2021-12-30 DIAGNOSIS — N18.31 TYPE 2 DIABETES MELLITUS WITH STAGE 3A CHRONIC KIDNEY DISEASE, WITHOUT LONG-TERM CURRENT USE OF INSULIN (HCC): Primary | ICD-10-CM

## 2021-12-30 RX ORDER — BLOOD-GLUCOSE METER
1 KIT MISCELLANEOUS DAILY
Qty: 1 KIT | Refills: 0 | Status: SHIPPED | OUTPATIENT
Start: 2021-12-30

## 2021-12-30 RX ORDER — GLUCOSAMINE HCL/CHONDROITIN SU 500-400 MG
CAPSULE ORAL
Qty: 400 STRIP | Refills: 2 | Status: SHIPPED | OUTPATIENT
Start: 2021-12-30

## 2022-01-01 RX ORDER — INSULIN GLARGINE 100 [IU]/ML
20 INJECTION, SOLUTION SUBCUTANEOUS NIGHTLY
Qty: 2 PEN | Refills: 11 | Status: SHIPPED | OUTPATIENT
Start: 2022-01-01

## 2022-01-04 ENCOUNTER — TELEPHONE (OUTPATIENT)
Dept: FAMILY MEDICINE CLINIC | Age: 82
End: 2022-01-04

## 2022-01-04 NOTE — TELEPHONE ENCOUNTER
Patient called and said her refrigerator was messed up and was freezing her insulin. Called pt back and got her voicemail. Advised her to call the pharmacy for advice and to call us in the morning.

## 2022-01-10 ENCOUNTER — OFFICE VISIT (OUTPATIENT)
Dept: NEUROLOGY | Age: 82
End: 2022-01-10
Payer: MEDICARE

## 2022-01-10 VITALS
RESPIRATION RATE: 16 BRPM | HEART RATE: 67 BPM | SYSTOLIC BLOOD PRESSURE: 119 MMHG | HEIGHT: 66 IN | WEIGHT: 160 LBS | BODY MASS INDEX: 25.71 KG/M2 | DIASTOLIC BLOOD PRESSURE: 71 MMHG

## 2022-01-10 DIAGNOSIS — I69.351 HEMIPARESIS AFFECTING RIGHT SIDE AS LATE EFFECT OF STROKE (HCC): ICD-10-CM

## 2022-01-10 DIAGNOSIS — I63.81 LACUNAR STROKE OF LEFT SUBTHALAMIC REGION (HCC): Primary | ICD-10-CM

## 2022-01-10 PROCEDURE — G8427 DOCREV CUR MEDS BY ELIG CLIN: HCPCS | Performed by: PSYCHIATRY & NEUROLOGY

## 2022-01-10 PROCEDURE — G8399 PT W/DXA RESULTS DOCUMENT: HCPCS | Performed by: PSYCHIATRY & NEUROLOGY

## 2022-01-10 PROCEDURE — 4040F PNEUMOC VAC/ADMIN/RCVD: CPT | Performed by: PSYCHIATRY & NEUROLOGY

## 2022-01-10 PROCEDURE — 1123F ACP DISCUSS/DSCN MKR DOCD: CPT | Performed by: PSYCHIATRY & NEUROLOGY

## 2022-01-10 PROCEDURE — G8484 FLU IMMUNIZE NO ADMIN: HCPCS | Performed by: PSYCHIATRY & NEUROLOGY

## 2022-01-10 PROCEDURE — 99214 OFFICE O/P EST MOD 30 MIN: CPT | Performed by: PSYCHIATRY & NEUROLOGY

## 2022-01-10 PROCEDURE — 1036F TOBACCO NON-USER: CPT | Performed by: PSYCHIATRY & NEUROLOGY

## 2022-01-10 PROCEDURE — G8417 CALC BMI ABV UP PARAM F/U: HCPCS | Performed by: PSYCHIATRY & NEUROLOGY

## 2022-01-10 PROCEDURE — 1090F PRES/ABSN URINE INCON ASSESS: CPT | Performed by: PSYCHIATRY & NEUROLOGY

## 2022-01-10 NOTE — PROGRESS NOTES
[address] [facility] Neurology  [address] [place] [address] Drive, [address] [place] [address],[address]  [facility]  Phone [phone]  Fax [phone] [address] [facility] Neurology Follow Up Encounter  1/10/22 11:51 AM CST    Information:   Patient Name: Nolberto Martinez  :   1940  Age:   [de-identified] y.o. MRN:   [id number]  Account #:  [de-identified]  Today:  1/10/22    Provider: Kady Fam M.D. Chief Complaint:   Chief Complaint   Patient presents with    Follow-Up from Hospital       Subjective:   Nolberto Martinez is a [de-identified] y.o. woman with a history of , hyperlipidemia, diabetes, and cardiovascular disease who is following up after having a left thalamocapsular stroke. She spent some time on Rehab due to the right sided weakness and numbness. She was discharged on ASA/Plavix/Lipitor. She was discharged home. She is doing better but continues to have right sided weakness and discoordination. She ambulates a short distance with a quad cane. She can care for herself but it takes her a long time. Objective:     Past Medical History:  Past Medical History:   Diagnosis Date    Acute CVA (cerebrovascular accident) ([facility] 2021    Arthritis     Psoriatic    Asthma     CAD (coronary artery disease)     CHF (congestive heart failure) (HCC)     COPD (chronic obstructive pulmonary disease) (HCC)     DM (diabetes mellitus) (HCC)     GERD (gastroesophageal reflux disease)     HTN (hypertension)     Hyperlipidemia     MI (myocardial infarction) ([address]     x2    Thyroid disease        Past Surgical History:   Procedure Laterality Date    CATARACT REMOVAL      CHOLECYSTECTOMY      CORONARY ANGIOPLASTY  2018    Angioplasty to in-stent restenosis to the distal LAD    HIP SURGERY      HYSTERECTOMY      JOINT REPLACEMENT Right     Right knee replacement       Recent Hospitalizations  · None    Significant Injuries  · None    Habits  Nolberto Martinez reports that she has never smoked.  She has never used smokeless tobacco. She reports that she does not drink alcohol and does not use drugs. Family History   Problem Relation Age of Onset    Heart Disease Mother     Heart Disease Father        Social History  Prateek Chavez is recently , lives in [address], and is retired. Medications:  Current Outpatient Medications   Medication Sig Dispense Refill    insulin glargine (BASAGLAR KWIKPEN) 100 UNIT/ML injection pen Inject 20 Units into the skin nightly 2 pen 11    glucose monitoring (FREESTYLE FREEDOM) kit 1 kit by Does not apply route daily 1 kit 0    blood glucose monitor strips Test 3 times a day & as needed for symptoms of irregular blood glucose. Dispense sufficient amount for indicated testing frequency plus additional to accommodate PRN testing needs. 400 strip 2    clopidogrel (PLAVIX) 75 MG tablet Take 1 tablet by mouth daily 90 tablet 3    aspirin 81 MG EC tablet Take 1 tablet by mouth daily 30 tablet 0    sertraline (ZOLOFT) 25 MG tablet Take 1 tablet by mouth daily 30 tablet 0    atorvastatin (LIPITOR) 80 MG tablet Take 1 tablet by mouth nightly 30 tablet 0    carvedilol (COREG) 12.5 MG tablet Take 1 tablet by mouth 2 times daily (with meals) 60 tablet 0    vitamin B-12 (CYANOCOBALAMIN) 1000 MCG tablet Take 1 tablet by mouth daily 30 tablet 0    Coenzyme Q10 (CO Q 10) 10 MG CAPS Take 1 capsule by mouth daily 30 capsule 0    levothyroxine (SYNTHROID) 25 MCG tablet Take 1 tablet by mouth daily 30 tablet 0    Probiotic Product (ALIGN) 4 MG CAPS Once capsule daily 30 capsule 5     No current facility-administered medications for this visit. Allergies:   Allergies as of 01/10/2022 - Fully Reviewed 01/10/2022   Allergen Reaction Noted    Zinc Other (See Comments) 11/09/2018    Tape Pao Neas tape] Rash 11/20/2017       Review of Systems:  Constitutional: negative for - chills and fever  Eyes:  negative for - visual disturbance and photophobia  HENMT: negative for - headaches and sinus pain  Respiratory: negative for - movements are full   VII Facial movements are symmetrical without weakness   VIII Hearing is intact   IX,X Shoulder shrug and head rotation strength are intact   XII No tongue atrophy or fasciculations. Normal tongue protrusion. No tongue weakness  Motor:  She has a mild to moderate right spastic hemiparesis. Deep tendon reflexes are increased in the right extremities. Chamorro's signs are absent bilaterally. There is no ankle clonus on either side. Sensation:  Sensation is intact to light touch, temperature, and vibration in all extremities. Coordination:  Rapid alternating movements are impaired in the right hand. Finger to nose testing is unimpaired bilaterally. Gait:  Slow and hemiparetic     Pertinent Diagnostic Studies:  Hospital notes    Assessment:       ICD-10-CM    1. Lacunar stroke of left subthalamic region [facility])  I63.81    2. Hemiparesis affecting right side as late effect of stroke ([facility]  I69.351    I discussed the diagnosis, pathophysiology, symptoms, risks, prognosis, and treatment options of stroke with Lenon Canavan. Plan:   1.  Continue ASA/Plavix/Lipitor  2. FU in 6 months    Electronically signed by Francyne Leventhal, MD on 1/10/22

## 2022-01-17 ENCOUNTER — OFFICE VISIT (OUTPATIENT)
Dept: FAMILY MEDICINE CLINIC | Age: 82
End: 2022-01-17
Payer: MEDICARE

## 2022-01-17 VITALS
BODY MASS INDEX: 26.31 KG/M2 | TEMPERATURE: 97 F | OXYGEN SATURATION: 96 % | SYSTOLIC BLOOD PRESSURE: 128 MMHG | DIASTOLIC BLOOD PRESSURE: 72 MMHG | WEIGHT: 163 LBS | HEART RATE: 68 BPM

## 2022-01-17 DIAGNOSIS — E11.22 TYPE 2 DIABETES MELLITUS WITH STAGE 3A CHRONIC KIDNEY DISEASE, WITHOUT LONG-TERM CURRENT USE OF INSULIN (HCC): Primary | ICD-10-CM

## 2022-01-17 DIAGNOSIS — L40.50 PSORIATIC ARTHRITIS (HCC): ICD-10-CM

## 2022-01-17 DIAGNOSIS — J44.9 CHRONIC OBSTRUCTIVE PULMONARY DISEASE, UNSPECIFIED COPD TYPE (HCC): ICD-10-CM

## 2022-01-17 DIAGNOSIS — E78.00 HYPERCHOLESTEROLEMIA: ICD-10-CM

## 2022-01-17 DIAGNOSIS — L98.9 SKIN LESIONS: ICD-10-CM

## 2022-01-17 DIAGNOSIS — N18.31 TYPE 2 DIABETES MELLITUS WITH STAGE 3A CHRONIC KIDNEY DISEASE, WITHOUT LONG-TERM CURRENT USE OF INSULIN (HCC): Primary | ICD-10-CM

## 2022-01-17 DIAGNOSIS — I10 PRIMARY HYPERTENSION: ICD-10-CM

## 2022-01-17 DIAGNOSIS — N18.31 STAGE 3A CHRONIC KIDNEY DISEASE (HCC): ICD-10-CM

## 2022-01-17 PROBLEM — I63.9 ACUTE ISCHEMIC STROKE (HCC): Status: RESOLVED | Noted: 2021-11-23 | Resolved: 2022-01-17

## 2022-01-17 PROBLEM — I50.9 CHF (CONGESTIVE HEART FAILURE) (HCC): Status: RESOLVED | Noted: 2021-11-23 | Resolved: 2022-01-17

## 2022-01-17 PROCEDURE — 99214 OFFICE O/P EST MOD 30 MIN: CPT | Performed by: FAMILY MEDICINE

## 2022-01-17 PROCEDURE — 4040F PNEUMOC VAC/ADMIN/RCVD: CPT | Performed by: FAMILY MEDICINE

## 2022-01-17 PROCEDURE — 1036F TOBACCO NON-USER: CPT | Performed by: FAMILY MEDICINE

## 2022-01-17 PROCEDURE — 3023F SPIROM DOC REV: CPT | Performed by: FAMILY MEDICINE

## 2022-01-17 PROCEDURE — G8427 DOCREV CUR MEDS BY ELIG CLIN: HCPCS | Performed by: FAMILY MEDICINE

## 2022-01-17 PROCEDURE — 1090F PRES/ABSN URINE INCON ASSESS: CPT | Performed by: FAMILY MEDICINE

## 2022-01-17 PROCEDURE — G8417 CALC BMI ABV UP PARAM F/U: HCPCS | Performed by: FAMILY MEDICINE

## 2022-01-17 PROCEDURE — G8484 FLU IMMUNIZE NO ADMIN: HCPCS | Performed by: FAMILY MEDICINE

## 2022-01-17 PROCEDURE — G8399 PT W/DXA RESULTS DOCUMENT: HCPCS | Performed by: FAMILY MEDICINE

## 2022-01-17 PROCEDURE — 1123F ACP DISCUSS/DSCN MKR DOCD: CPT | Performed by: FAMILY MEDICINE

## 2022-01-17 NOTE — PROGRESS NOTES
[facility] PHYSICIAN SERVICES  [facility] FAMILY MEDICINE  [address] [facility] [address] [address]  Dept: [phone]  Dept Fax: [phone]  Loc: [phone]    Angel Luis Butterfield is a [de-identified] y.o. female who presents today for her medical conditions/complaints as noted below. Angel Luis Butterfield is here for Follow-up        HPI:   CC: Here today to discuss the following:    She has a history of CVA. She followed up with neurology on January 10. Diabetes Mellitus  Has been compliant with medications. No side effects of medications since last visit. No polyuria, polydipsia, or vision changes since last visit. No symptomatic episodes of hypoglycemia. Basaglar 20 units nightly    COPD  Compliant with medications. No adverse effects from medication. Symptoms are stable today. Has chronic shortness of breath and cough but at baseline today. No wheezing or chest tightness. Hyperlipidemia  Tolerating current cholesterol medication without side effects. No body aches. Attempting to reduce processed sugar and cholesterol from diet. Hypertension  Compliant with medications. No adverse effects from medication. No lightheadedness, palpitations, or chest pain. She would like to have a referral to dermatology to further evaluate the scaly lesions on her forehead and chin.       HPI    Past Medical History:   Diagnosis Date    Acute CVA (cerebrovascular accident) ([address] 11/20/2021    Arthritis     Psoriatic    Asthma     CAD (coronary artery disease)     CHF (congestive heart failure) (HCC)     COPD (chronic obstructive pulmonary disease) ([address]     DM (diabetes mellitus) ([address]     GERD (gastroesophageal reflux disease)     HTN (hypertension)     Hyperlipidemia     MI (myocardial infarction) ([address]     x2    Thyroid disease       Past Surgical History:   Procedure Laterality Date    CATARACT REMOVAL      CHOLECYSTECTOMY      CORONARY ANGIOPLASTY  06/2018    Angioplasty to in-stent restenosis to the distal LAD    HIP SURGERY      HYSTERECTOMY      JOINT REPLACEMENT Right     Right knee replacement       Family History   Problem Relation Age of Onset    Heart Disease Mother     Heart Disease Father        Social History     Tobacco Use    Smoking status: Never Smoker    Smokeless tobacco: Never Used   Substance Use Topics    Alcohol use: No     Current Outpatient Medications   Medication Sig Dispense Refill    mupirocin (BACTROBAN) 2 % ointment Apply topically 3 times daily. 3 g 5    insulin glargine (BASAGLAR KWIKPEN) 100 UNIT/ML injection pen Inject 20 Units into the skin nightly 2 pen 11    glucose monitoring (FREESTYLE FREEDOM) kit 1 kit by Does not apply route daily 1 kit 0    blood glucose monitor strips Test 3 times a day & as needed for symptoms of irregular blood glucose. Dispense sufficient amount for indicated testing frequency plus additional to accommodate PRN testing needs. 400 strip 2    clopidogrel (PLAVIX) 75 MG tablet Take 1 tablet by mouth daily 90 tablet 3    aspirin 81 MG EC tablet Take 1 tablet by mouth daily 30 tablet 0    sertraline (ZOLOFT) 25 MG tablet Take 1 tablet by mouth daily 30 tablet 0    atorvastatin (LIPITOR) 80 MG tablet Take 1 tablet by mouth nightly 30 tablet 0    carvedilol (COREG) 12.5 MG tablet Take 1 tablet by mouth 2 times daily (with meals) 60 tablet 0    vitamin B-12 (CYANOCOBALAMIN) 1000 MCG tablet Take 1 tablet by mouth daily 30 tablet 0    Coenzyme Q10 (CO Q 10) 10 MG CAPS Take 1 capsule by mouth daily 30 capsule 0    levothyroxine (SYNTHROID) 25 MCG tablet Take 1 tablet by mouth daily 30 tablet 0    Probiotic Product (ALIGN) 4 MG CAPS Once capsule daily 30 capsule 5     No current facility-administered medications for this visit.      Allergies   Allergen Reactions    Zinc Other (See Comments)     \"Elastic in bra\"    Tape Rafaela Fend Tape] Rash       Health Maintenance   Topic Date Due    Colon cancer screen colonoscopy Never done    Breast cancer screen  12/10/2020    COVID-19 Vaccine (3 - Booster for Pfizer series) 08/01/2021    DTaP/Tdap/Td vaccine (1 - Tdap) 11/20/2027 (Originally 11/21/2017)    Depression Monitoring  06/17/2022    Annual Wellness Visit (AWV)  06/18/2022    Lipid screen  11/21/2022    TSH testing  11/23/2022    Potassium monitoring  12/02/2022    Creatinine monitoring  12/02/2022    DEXA (modify frequency per FRAX score)  Completed    Flu vaccine  Completed    Shingles Vaccine  Completed    Pneumococcal 65+ years Vaccine  Completed    Hepatitis A vaccine  Aged Out    Hib vaccine  Aged Out    Meningococcal (ACWY) vaccine  Aged Out       Subjective:      Review of Systems    Review of Systems   Constitutional: Negative for chills and fever. HENT: Negative for congestion. Respiratory: Negative for cough, chest tightness and shortness of breath. Cardiovascular: Negative for chest pain, palpitations and leg swelling. Gastrointestinal: Negative for abdominal pain, anal bleeding, constipation, diarrhea and nausea. Genitourinary: Negative for difficulty urinating. Psychiatric/Behavioral: Negative. SeeHPI for visit specific review of symptoms. All others negative      Objective:   /72   Pulse 68   Temp 97 °F (36.1 °C)   Wt 163 lb (73.9 kg)   SpO2 96%   BMI 26.31 kg/m²    Physical Exam  Physical Exam   Constitutional: She appears well-developed. Does not appear ill. Neck: Normal range of motion. Neck supple. No masses. Neck Symmetric. Normal tracheal position. No thyroid enlargement  Cardiovascular: Normal rate and regular rhythm. Exam reveals no friction rub. Carotid arteries: no bruit observed. No murmur heard. Respiratory:  Effort normal and breath sounds normal. No respiratory distress. No wheezes. No rales. No use of accessory muscles or intercostal retractions. Neurological: alert. Psychiatric: normal mood and affect.  Her behavior is normal. Normal judgement and insight observed. No results found for this or any previous visit (from the past 672 hour(s)). Assessment & Plan: The following diagnoses and conditions are stable with no further orders unless indicated:  1. Type 2 diabetes mellitus with stage 3a chronic kidney disease, without long-term current use of insulin (HCC)  Lab Results   Component Value Date    LABA1C 8.1 (H) 11/21/2021    LABA1C 8.9 (H) 10/07/2021    LABA1C 8.3 (H) 06/17/2021     Lab Results   Component Value Date    LABMICR <1.20 06/17/2021    LDLCALC 42 11/21/2021    CREATININE 0.7 12/02/2021     Continue with Basaglar 20 units at night  Advised to continue checking her blood sugar before meals and provide me with those readings every 2 weeks. 2. Psoriatic arthritis (HCC)  Symptoms are currently stable. She has been offered referral to rheumatology. Overall she feels she is doing well    3. Chronic obstructive pulmonary disease, unspecified COPD type ([facility]  Symptoms are stable  She was formerly seeing Dr. Rosetta Domínguez, pulmonology at [address] [place] [address] for stage I mild COPD  Due to [employer] she has stopped seeing him and feels her symptoms are adequately controlled. Encouraged her to continue follow-up    4. Stage 3a chronic kidney disease ([facility]  Lab Results   Component Value Date    CREATININE 0.7 12/02/2021     Lab Results   Component Value Date    BUN 16 12/02/2021         Reinforced goals of adequate hydration. Recommended he avoid NSAIDs such as naproxen and ibuprofen.       5. Hypercholesterolemia  Lab Results   Component Value Date    CHOL 134 (L) 11/21/2021    CHOL 137 (L) 10/07/2021    CHOL 121 (L) 06/17/2021     Lab Results   Component Value Date    TRIG 222 (H) 11/21/2021    TRIG 243 (H) 10/07/2021    TRIG 224 (H) 06/17/2021     Lab Results   Component Value Date    HDL 48 (L) 11/21/2021    HDL 42 (L) 10/07/2021    HDL 39 (L) 06/17/2021     Lab Results   Component Value Date    LDLCALC 42 11/21/2021    LDLCALC 46 10/07/2021    LDLCALC 37 06/17/2021     No results found for: LABVLDL, VLDL  No results found for: CHOLHDLRATIO  Continue atorvastatin 80 mg daily    6. Primary hypertension  BP Readings from Last 3 Encounters:   01/17/22 128/72   01/10/22 119/71   12/06/21 98/60     Blood pressure stable    7. Skin lesions  Refer her to dermatology at her request  - Amb External Referral To Dermatology    Return in about 3 months (around 4/17/2022) for Routine follow up - 15 minute visit. Discussed use, benefit, and side effects of prescribed medications. All patient questions answered. Pt voiced understanding. Reviewed health maintenance. Instructedto continue current medications, diet and exercise. Patient agreed with treatmentplan. Follow up as directed. Note dictated using Dragon Dictation software  Sometimes this dictation software makes erroneous transcriptions.

## 2022-01-17 NOTE — PATIENT INSTRUCTIONS
We are committed to providing you with the best care possible. In order to help us achieve these goals please remember to bring all medications, herbal products, and over the counter supplements with you to each visit. If your provider has ordered testing for you, please be sure to follow up with our office if you have not received results within 7 days after the testing took place. *If you receive a survey after visiting one of our offices, please take time to share your experience concerning your physician office visit. These surveys are confidential and no health information about you is shared. We are eager to improve for you and we are counting on your feedback to help make that happen. WDL

## 2022-04-12 ENCOUNTER — TELEPHONE (OUTPATIENT)
Dept: NEUROLOGY | Age: 82
End: 2022-04-12

## 2022-04-12 NOTE — TELEPHONE ENCOUNTER
Called and left patient a VM to let her know that her appointment for 07-11-22 with Dr. Preeti Davis had to be rescheduled due to the provider is out of the office. Left on VM of when I have her appointment rescheduled too.

## 2022-04-18 ENCOUNTER — OFFICE VISIT (OUTPATIENT)
Dept: FAMILY MEDICINE CLINIC | Age: 82
End: 2022-04-18
Payer: MEDICARE

## 2022-04-18 VITALS
SYSTOLIC BLOOD PRESSURE: 122 MMHG | HEART RATE: 74 BPM | DIASTOLIC BLOOD PRESSURE: 68 MMHG | WEIGHT: 169 LBS | OXYGEN SATURATION: 97 % | BODY MASS INDEX: 27.28 KG/M2 | TEMPERATURE: 97.4 F

## 2022-04-18 DIAGNOSIS — N18.31 TYPE 2 DIABETES MELLITUS WITH STAGE 3A CHRONIC KIDNEY DISEASE, WITHOUT LONG-TERM CURRENT USE OF INSULIN (HCC): Primary | ICD-10-CM

## 2022-04-18 DIAGNOSIS — E03.9 ACQUIRED HYPOTHYROIDISM: ICD-10-CM

## 2022-04-18 DIAGNOSIS — R53.83 OTHER FATIGUE: ICD-10-CM

## 2022-04-18 DIAGNOSIS — M79.10 MYALGIA: ICD-10-CM

## 2022-04-18 DIAGNOSIS — E78.00 HYPERCHOLESTEROLEMIA: ICD-10-CM

## 2022-04-18 DIAGNOSIS — F41.8 DEPRESSION WITH ANXIETY: ICD-10-CM

## 2022-04-18 DIAGNOSIS — D64.9 ANEMIA, UNSPECIFIED TYPE: ICD-10-CM

## 2022-04-18 DIAGNOSIS — E11.22 TYPE 2 DIABETES MELLITUS WITH STAGE 3A CHRONIC KIDNEY DISEASE, WITHOUT LONG-TERM CURRENT USE OF INSULIN (HCC): Primary | ICD-10-CM

## 2022-04-18 DIAGNOSIS — E11.22 TYPE 2 DIABETES MELLITUS WITH STAGE 3A CHRONIC KIDNEY DISEASE, WITHOUT LONG-TERM CURRENT USE OF INSULIN (HCC): ICD-10-CM

## 2022-04-18 DIAGNOSIS — I10 ESSENTIAL (PRIMARY) HYPERTENSION: ICD-10-CM

## 2022-04-18 DIAGNOSIS — Z12.31 ENCOUNTER FOR SCREENING MAMMOGRAM FOR BREAST CANCER: ICD-10-CM

## 2022-04-18 DIAGNOSIS — N18.31 TYPE 2 DIABETES MELLITUS WITH STAGE 3A CHRONIC KIDNEY DISEASE, WITHOUT LONG-TERM CURRENT USE OF INSULIN (HCC): ICD-10-CM

## 2022-04-18 LAB
ALBUMIN SERPL-MCNC: 3.9 G/DL (ref 3.5–5.2)
ALP BLD-CCNC: 119 U/L (ref 35–104)
ALT SERPL-CCNC: 19 U/L (ref 5–33)
ANION GAP SERPL CALCULATED.3IONS-SCNC: 15 MMOL/L (ref 7–19)
AST SERPL-CCNC: 19 U/L (ref 5–32)
BACTERIA: NEGATIVE /HPF
BASOPHILS ABSOLUTE: 0 K/UL (ref 0–0.2)
BASOPHILS RELATIVE PERCENT: 0.5 % (ref 0–1)
BILIRUB SERPL-MCNC: 0.3 MG/DL (ref 0.2–1.2)
BILIRUBIN URINE: NEGATIVE
BLOOD, URINE: NEGATIVE
BUN BLDV-MCNC: 28 MG/DL (ref 8–23)
CALCIUM SERPL-MCNC: 9.5 MG/DL (ref 8.8–10.2)
CHLORIDE BLD-SCNC: 104 MMOL/L (ref 98–111)
CHOLESTEROL, TOTAL: 168 MG/DL (ref 160–199)
CLARITY: CLEAR
CO2: 23 MMOL/L (ref 22–29)
COLOR: YELLOW
CREAT SERPL-MCNC: 0.8 MG/DL (ref 0.5–0.9)
CREATININE URINE: 80.6 MG/DL (ref 4.2–622)
CRYSTALS, UA: ABNORMAL /HPF
EOSINOPHILS ABSOLUTE: 0.4 K/UL (ref 0–0.6)
EOSINOPHILS RELATIVE PERCENT: 5.1 % (ref 0–5)
EPITHELIAL CELLS, UA: 0 /HPF (ref 0–5)
FERRITIN: 47.2 NG/ML (ref 13–150)
FOLATE: >20 NG/ML (ref 4.8–37.3)
GFR AFRICAN AMERICAN: >59
GFR NON-AFRICAN AMERICAN: >60
GLUCOSE BLD-MCNC: 165 MG/DL (ref 74–109)
GLUCOSE URINE: NEGATIVE MG/DL
HBA1C MFR BLD: 8.6 %
HCT VFR BLD CALC: 39.4 % (ref 37–47)
HDLC SERPL-MCNC: 35 MG/DL (ref 65–121)
HEMOGLOBIN: 13.2 G/DL (ref 12–16)
HYALINE CASTS: 0 /HPF (ref 0–8)
IMMATURE GRANULOCYTES #: 0 K/UL
IRON: 69 UG/DL (ref 37–145)
KETONES, URINE: NEGATIVE MG/DL
LDL CHOLESTEROL CALCULATED: ABNORMAL MG/DL
LDL CHOLESTEROL DIRECT: 79 MG/DL
LEUKOCYTE ESTERASE, URINE: NEGATIVE
LYMPHOCYTES ABSOLUTE: 1.4 K/UL (ref 1.1–4.5)
LYMPHOCYTES RELATIVE PERCENT: 18.2 % (ref 20–40)
MAGNESIUM: 1.8 MG/DL (ref 1.6–2.4)
MCH RBC QN AUTO: 30.2 PG (ref 27–31)
MCHC RBC AUTO-ENTMCNC: 33.5 G/DL (ref 33–37)
MCV RBC AUTO: 90.2 FL (ref 81–99)
MICROALBUMIN UR-MCNC: 1.6 MG/DL (ref 0–19)
MICROALBUMIN/CREAT UR-RTO: 19.9 MG/G
MONOCYTES ABSOLUTE: 0.8 K/UL (ref 0–0.9)
MONOCYTES RELATIVE PERCENT: 9.8 % (ref 0–10)
NEUTROPHILS ABSOLUTE: 5.2 K/UL (ref 1.5–7.5)
NEUTROPHILS RELATIVE PERCENT: 66 % (ref 50–65)
NITRITE, URINE: NEGATIVE
PDW BLD-RTO: 13.7 % (ref 11.5–14.5)
PH UA: 5 (ref 5–8)
PLATELET # BLD: 251 K/UL (ref 130–400)
PMV BLD AUTO: 9.9 FL (ref 9.4–12.3)
POTASSIUM SERPL-SCNC: 4.4 MMOL/L (ref 3.5–5)
PROTEIN UA: NEGATIVE MG/DL
RBC # BLD: 4.37 M/UL (ref 4.2–5.4)
RBC UA: 0 /HPF (ref 0–4)
SODIUM BLD-SCNC: 142 MMOL/L (ref 136–145)
SPECIFIC GRAVITY UA: 1.02 (ref 1–1.03)
T4 FREE: 0.9 NG/DL (ref 0.93–1.7)
TOTAL PROTEIN: 6.3 G/DL (ref 6.6–8.7)
TRIGL SERPL-MCNC: 595 MG/DL (ref 0–149)
TSH SERPL DL<=0.05 MIU/L-ACNC: 2.46 UIU/ML (ref 0.27–4.2)
UROBILINOGEN, URINE: 0.2 E.U./DL
WBC # BLD: 7.9 K/UL (ref 4.8–10.8)
WBC UA: 1 /HPF (ref 0–5)

## 2022-04-18 PROCEDURE — 4040F PNEUMOC VAC/ADMIN/RCVD: CPT | Performed by: FAMILY MEDICINE

## 2022-04-18 PROCEDURE — 1036F TOBACCO NON-USER: CPT | Performed by: FAMILY MEDICINE

## 2022-04-18 PROCEDURE — 3052F HG A1C>EQUAL 8.0%<EQUAL 9.0%: CPT | Performed by: FAMILY MEDICINE

## 2022-04-18 PROCEDURE — 1123F ACP DISCUSS/DSCN MKR DOCD: CPT | Performed by: FAMILY MEDICINE

## 2022-04-18 PROCEDURE — 83036 HEMOGLOBIN GLYCOSYLATED A1C: CPT | Performed by: FAMILY MEDICINE

## 2022-04-18 PROCEDURE — G8427 DOCREV CUR MEDS BY ELIG CLIN: HCPCS | Performed by: FAMILY MEDICINE

## 2022-04-18 PROCEDURE — G8399 PT W/DXA RESULTS DOCUMENT: HCPCS | Performed by: FAMILY MEDICINE

## 2022-04-18 PROCEDURE — 99214 OFFICE O/P EST MOD 30 MIN: CPT | Performed by: FAMILY MEDICINE

## 2022-04-18 PROCEDURE — 1090F PRES/ABSN URINE INCON ASSESS: CPT | Performed by: FAMILY MEDICINE

## 2022-04-18 PROCEDURE — G8417 CALC BMI ABV UP PARAM F/U: HCPCS | Performed by: FAMILY MEDICINE

## 2022-04-18 RX ORDER — NITROGLYCERIN 0.4 MG/1
0.4 TABLET SUBLINGUAL EVERY 5 MIN PRN
Qty: 25 TABLET | Refills: 3 | Status: SHIPPED | OUTPATIENT
Start: 2022-04-18

## 2022-04-18 RX ORDER — INSULIN LISPRO 100 [IU]/ML
INJECTION, SOLUTION INTRAVENOUS; SUBCUTANEOUS
Qty: 3 PEN | Refills: 5 | Status: SHIPPED | OUTPATIENT
Start: 2022-04-18

## 2022-04-18 NOTE — PATIENT INSTRUCTIONS
We are committed to providing you with the best care possible. In order to help us achieve these goals please remember to bring all medications, herbal products, and over the counter supplements with you to each visit. If your provider has ordered testing for you, please be sure to follow up with our office if you have not received results within 7 days after the testing took place. *If you receive a survey after visiting one of our offices, please take time to share your experience concerning your physician office visit. These surveys are confidential and no health information about you is shared.   We are eager to improve for you and we are counting on your feedback to help make that happen.    _______________________________________________________________        _______________________________________________________________    Continue with basaglar 20 units at night  Add Humalog 6 units before you biggest meal (lunch)

## 2022-04-18 NOTE — PROGRESS NOTES
[facility] PHYSICIAN SERVICES  [facility] FAMILY MEDICINE  [address] [address]  Dept: [phone]  Dept Fax: [phone]  Loc: [phone]    Jimi Chance is a [de-identified] y.o. female who presents today for her medical conditions/complaints as noted below. Jimi Chance is here for 3 Month Follow-Up        HPI:   CC: Here today to discuss the following:    Acute CVA on November 20, 2021  Followed up with neurology on January 10  Continue with aspirin Plavix and Lipitor  She received inpatient rehab  Follow-up appointment with neurology in July    Diabetes Mellitus  Has been compliant with medications. No side effects of medications since last visit. No polyuria, polydipsia, or vision changes since last visit. No symptomatic episodes of hypoglycemia. Basaglar: 20 units at night. Hypertension  Compliant with medications. No adverse effects from medication. No lightheadedness, palpitations, or chest pain. Hypothyroidism  Symptoms are stable. No temperature intolerance, fatigue, or mood disturbance from baseline. Complaint with current medication. Depression with Anxiety  Compliant with medications. No adverse effects from medication. Depression and anxiety symptoms are stable today. No suicidal or homicidal ideation. Excessive worry, insomnia, and anxiousness are stable. Energy, concentration, and apathy are stable. HPI    Subjective:      Review of Systems    Review of Systems   Constitutional: Negative for chills and fever. HENT: Negative for congestion. Respiratory: Negative for cough, chest tightness and shortness of breath. Cardiovascular: Negative for chest pain, palpitations and leg swelling. Gastrointestinal: Negative for abdominal pain, anal bleeding, constipation, diarrhea and nausea. Genitourinary: Negative for difficulty urinating. Psychiatric/Behavioral: Negative. SeeHPI for visit specific review of symptoms.   All others negative      Objective:   /68   Pulse 74   Temp 97.4 °F (36.3 °C)   Wt 169 lb (76.7 kg)   SpO2 97%   BMI 27.28 kg/m²   Physical Exam    Physical Exam   Constitutional: She appears well-developed. Does not appear ill. Neck: Normal range of motion. Neck supple. No masses. Neck Symmetric. Normal tracheal position. No thyroid enlargement  Cardiovascular: Normal rate and regular rhythm. Exam reveals no friction rub. Carotid arteries: no bruit observed. No murmur heard. Respiratory:  Effort normal and breath sounds normal. No respiratory distress. No wheezes. No rales. No use of accessory muscles or intercostal retractions. Neurological: alert. Psychiatric: normal mood and affect. Her behavior is normal. Normal judgement and insight observed. Lab Results   Component Value Date    LABA1C 8.6 04/18/2022     No results found for: EAG  She missed her lab appointment. Assessment & Plan: The following diagnoses and conditions are stable with no further orders unless indicated:  1. Type 2 diabetes mellitus with stage 3a chronic kidney disease, without long-term current use of insulin (HCC)  Continue with Basaglar 20 units at night  Humalog 6 units before lunch (her largest meal)  - POCT glycosylated hemoglobin (Hb A1C)  - insulin lispro, 1 Unit Dial, (HUMALOG KWIKPEN) 100 UNIT/ML SOPN; 10 units before largest meal (lunch)  Dispense: 3 pen; Refill: 5  - Microalbumin / Creatinine Urine Ratio; Future  ADA diet reviewed  2. Hypercholesterolemia  Check lipid panel today  - Comprehensive Metabolic Panel; Future  - Lipid Panel; Future    3. Essential (primary) hypertension  BP Readings from Last 3 Encounters:   04/18/22 122/68   01/17/22 128/72   01/10/22 119/71     Chronic issue stable  Continue carvedilol 12.5 mg twice daily      4. Acquired hypothyroidism  Lab Results   Component Value Date    TSH 2.460 04/18/2022    T4FREE 0.90 (L) 04/18/2022     Stable  - TSH;  Future  - T4, Free; Future    5. Depression with anxiety  Continue with sertraline    6. Encounter for screening mammogram for breast cancer    - PASCALE DIGITAL SCREEN W OR WO CAD BILATERAL; Future    7. Anemia, unspecified type    - CBC with Auto Differential; Future  - Ferritin; Future  - Folate; Future  - Iron; Future    8. Myalgia    - Magnesium; Future    9. Other fatigue      - Urinalysis with Microscopic; Future            Return in about 3 months (around 7/18/2022) for AWV - 30 minute visit; poct a1c. Discussed use, benefit, and side effects of prescribed medications. All patient questions answered. Pt voiced understanding. Reviewed health maintenance. Instructedto continue current medications, diet and exercise. Patient agreed with treatmentplan.  Follow up as directed.     _______________________________________________________________      Past Medical History:   Diagnosis Date    Acute [facility] (cerebrovascular [facility]) ([facility] 11/20/2021    Arthritis     Psoriatic    Asthma     CAD (coronary artery disease)     CHF (congestive heart failure) (HCC)     COPD (chronic obstructive pulmonary disease) ([facility]     DM (diabetes mellitus) ([facility]     GERD (gastroesophageal reflux disease)     HTN (hypertension)     Hyperlipidemia     MI (myocardial infarction) ([facility]     x2    Thyroid disease       Past Surgical History:   Procedure Laterality Date    CATARACT REMOVAL      CHOLECYSTECTOMY      CORONARY ANGIOPLASTY  06/2018    Angioplasty to in-stent restenosis to the distal LAD    HIP SURGERY      HYSTERECTOMY      JOINT REPLACEMENT Right     Right knee replacement       Family History   Problem Relation Age of Onset    Heart Disease Mother     Heart Disease Father        Social History     Tobacco Use    Smoking status: Never Smoker    Smokeless tobacco: Never Used   Substance Use Topics    Alcohol use: No     Current Outpatient Medications   Medication Sig Dispense Refill    Multiple Vitamin (MULTIVITAMIN Monitoring  06/17/2022    Annual Wellness Visit (AWV)  06/18/2022    Lipids  04/18/2023    TSH  04/18/2023    Potassium  04/18/2023    Creatinine  04/18/2023    DEXA (modify frequency per FRAX score)  Completed    Flu vaccine  Completed    Shingles Vaccine  Completed    Pneumococcal 65+ years Vaccine  Completed    Hepatitis A vaccine  Aged Out    Hib vaccine  Aged Out    Meningococcal (ACWY) vaccine  Aged Out       _______________________________________________________________    Note dictated using Dragon Dictation software  Sometimes this dictation software makes erroneous transcriptions.

## 2022-04-26 RX ORDER — ATORVASTATIN CALCIUM 80 MG/1
80 TABLET, FILM COATED ORAL NIGHTLY
Qty: 90 TABLET | Refills: 3 | Status: SHIPPED | OUTPATIENT
Start: 2022-04-26

## 2022-04-26 RX ORDER — LEVOTHYROXINE SODIUM 0.03 MG/1
25 TABLET ORAL DAILY
Qty: 90 TABLET | Refills: 1 | Status: SHIPPED | OUTPATIENT
Start: 2022-04-26

## 2022-04-26 NOTE — TELEPHONE ENCOUNTER
Poornima Omar called to request a refill on her medication.       Last office visit : 4/18/2022   Next office visit : 8/1/2022     Requested Prescriptions     Signed Prescriptions Disp Refills    atorvastatin (LIPITOR) 80 MG tablet 90 tablet 3     Sig: Take 1 tablet by mouth nightly     Authorizing Provider: Radha Grant     Ordering User: Jesi Vicente levothyroxine (SYNTHROID) 25 MCG tablet 90 tablet 1     Sig: Take 1 tablet by mouth daily     Authorizing Provider: Radha Grant     Ordering User: Ida Slade

## 2022-05-05 ENCOUNTER — HOSPITAL ENCOUNTER (OUTPATIENT)
Dept: WOMENS IMAGING | Age: 82
Discharge: HOME | End: 2022-05-05
Payer: MEDICARE

## 2022-05-05 DIAGNOSIS — Z12.31 ENCOUNTER FOR SCREENING MAMMOGRAM FOR BREAST CANCER: ICD-10-CM

## 2022-05-05 PROCEDURE — 77063 BREAST TOMOSYNTHESIS BI: CPT

## 2022-07-25 ENCOUNTER — OFFICE VISIT (OUTPATIENT)
Dept: FAMILY MEDICINE CLINIC | Age: 82
End: 2022-07-25
Payer: MEDICARE

## 2022-07-25 ENCOUNTER — HOSPITAL ENCOUNTER (OUTPATIENT)
Dept: GENERAL RADIOLOGY | Age: 82
Discharge: HOME | End: 2022-07-25
Payer: MEDICARE

## 2022-07-25 VITALS
OXYGEN SATURATION: 99 % | HEIGHT: 66 IN | BODY MASS INDEX: 26.52 KG/M2 | WEIGHT: 165 LBS | HEART RATE: 78 BPM | SYSTOLIC BLOOD PRESSURE: 122 MMHG | DIASTOLIC BLOOD PRESSURE: 82 MMHG

## 2022-07-25 DIAGNOSIS — G89.29 CHRONIC LEFT EAR PAIN: ICD-10-CM

## 2022-07-25 DIAGNOSIS — M25.511 ACUTE PAIN OF RIGHT SHOULDER: Primary | ICD-10-CM

## 2022-07-25 DIAGNOSIS — H92.02 CHRONIC LEFT EAR PAIN: ICD-10-CM

## 2022-07-25 DIAGNOSIS — M25.511 ACUTE PAIN OF RIGHT SHOULDER: ICD-10-CM

## 2022-07-25 PROCEDURE — G8427 DOCREV CUR MEDS BY ELIG CLIN: HCPCS | Performed by: NURSE PRACTITIONER

## 2022-07-25 PROCEDURE — 99214 OFFICE O/P EST MOD 30 MIN: CPT | Performed by: NURSE PRACTITIONER

## 2022-07-25 PROCEDURE — 73030 X-RAY EXAM OF SHOULDER: CPT | Performed by: RADIOLOGY

## 2022-07-25 PROCEDURE — 1123F ACP DISCUSS/DSCN MKR DOCD: CPT | Performed by: NURSE PRACTITIONER

## 2022-07-25 PROCEDURE — 1090F PRES/ABSN URINE INCON ASSESS: CPT | Performed by: NURSE PRACTITIONER

## 2022-07-25 PROCEDURE — G8417 CALC BMI ABV UP PARAM F/U: HCPCS | Performed by: NURSE PRACTITIONER

## 2022-07-25 PROCEDURE — 1036F TOBACCO NON-USER: CPT | Performed by: NURSE PRACTITIONER

## 2022-07-25 PROCEDURE — 73030 X-RAY EXAM OF SHOULDER: CPT

## 2022-07-25 PROCEDURE — 96372 THER/PROPH/DIAG INJ SC/IM: CPT | Performed by: NURSE PRACTITIONER

## 2022-07-25 PROCEDURE — G8399 PT W/DXA RESULTS DOCUMENT: HCPCS | Performed by: NURSE PRACTITIONER

## 2022-07-25 RX ORDER — DEXAMETHASONE SODIUM PHOSPHATE 100 MG/10ML
10 INJECTION INTRAMUSCULAR; INTRAVENOUS ONCE
Status: COMPLETED | OUTPATIENT
Start: 2022-07-25 | End: 2022-07-25

## 2022-07-25 RX ORDER — METHYLPREDNISOLONE 4 MG/1
TABLET ORAL
Qty: 1 KIT | Refills: 0 | Status: SHIPPED | OUTPATIENT
Start: 2022-07-25 | End: 2022-07-31

## 2022-07-25 RX ADMIN — DEXAMETHASONE SODIUM PHOSPHATE 10 MG: 100 INJECTION INTRAMUSCULAR; INTRAVENOUS at 12:35

## 2022-07-25 ASSESSMENT — ENCOUNTER SYMPTOMS
NAUSEA: 0
SHORTNESS OF BREATH: 0
CHEST TIGHTNESS: 0
COUGH: 0
DIARRHEA: 0
WHEEZING: 0
ABDOMINAL PAIN: 0
SORE THROAT: 0

## 2022-07-25 NOTE — PROGRESS NOTES
Star Scanlon is a [de-identified] y.o. female who presents today for  Chief Complaint   Patient presents with    Joint Pain     Right Shoulder, holding hip as walking       HPI:  She has had R shoulder pain x 2 days. No injury or fall. Movement of arm exacerbates pain. Pain is radiating down arm intermittently. No increased neck pain. She has had chronic L ear pain x 2 years. She has seen 2 audiologists in the past but no ENT. Had hearing aids but has difficulty wearing it in the L ear. She states swelling \"pushes it out. \"       Review of Systems   Constitutional:  Negative for chills and fever. HENT:  Positive for ear pain (L chronic). Negative for congestion and sore throat. Respiratory:  Negative for cough, chest tightness, shortness of breath and wheezing. Cardiovascular:  Negative for chest pain. Gastrointestinal:  Negative for abdominal pain, diarrhea and nausea. Musculoskeletal:  Positive for arthralgias (R shoulder). Negative for myalgias. Skin:  Negative for rash. Past Medical History:   Diagnosis Date    Ac[facility] 11/20/2021    Arthritis     Psoriatic    Asthma     CAD (coronary artery disease)     CHF (congestive heart failure) (HCC)     COPD (chronic obstructive pulmonary disease) ([facility])     DM (diabetes mellitus) ([facility]     GERD (gastroesophageal reflux disease)     HTN (hypertension)     Hyperlipidemia     MI (myocardial infarction) ([facility]     x2    Thyroid disease        Current Outpatient Medications   Medication Sig Dispense Refill    methylPREDNISolone (MEDROL DOSEPACK) 4 MG tablet Take by mouth.  1 kit 0    atorvastatin (LIPITOR) 80 MG tablet Take 1 tablet by mouth nightly 90 tablet 3    levothyroxine (SYNTHROID) 25 MCG tablet Take 1 tablet by mouth daily 90 tablet 1    Multiple Vitamin (MULTIVITAMIN PO) Take by mouth      insulin lispro, 1 Unit Dial, (HUMALOG KWIKPEN) 100 UNIT/ML SOPN 10 units before largest meal (lunch) 3 pen 5    nitroGLYCERIN Substance Use Topics    Alcohol use: No    Drug use: No       Family History   Problem Relation Age of Onset    Heart Disease Mother     Heart Disease Father        /82 (Site: Left Upper Arm, Position: Sitting, Cuff Size: Medium Adult)   Pulse 78   Ht 5' 6\" (1.676 m)   Wt 165 lb (74.8 kg)   SpO2 99%   BMI 26.63 kg/m²     Physical Exam  Vitals reviewed. Constitutional:       General: She is not in acute distress. Appearance: Normal appearance. She is well-developed. HENT:      Head: Normocephalic. Right Ear: Tympanic membrane and external ear normal.      Left Ear: Tympanic membrane and external ear normal.      Nose: Nose normal.   Eyes:      Conjunctiva/sclera: Conjunctivae normal.      Pupils: Pupils are equal, round, and reactive to light. Neck:      Thyroid: No thyromegaly. Vascular: No carotid bruit or JVD. Trachea: No tracheal deviation. Cardiovascular:      Rate and Rhythm: Normal rate and regular rhythm. Heart sounds: Normal heart sounds. No murmur heard. Pulmonary:      Effort: Pulmonary effort is normal. No respiratory distress. Breath sounds: Normal breath sounds. No wheezing or rhonchi. Musculoskeletal:         General: Tenderness present. Cervical back: Normal range of motion and neck supple. Comments: Diffuse tenderness to palpation, decreased ROM with abduction, reaching behind back. Lymphadenopathy:      Cervical: No cervical adenopathy. Skin:     General: Skin is warm and dry. Findings: No rash. Neurological:      Mental Status: She is alert. Psychiatric:         Mood and Affect: Mood normal.         Behavior: Behavior normal.         Thought Content: Thought content normal.       ASSESSMENT/PLAN:  1. Acute pain of right shoulder  -XR shoulder  -Dexamethasone 10 mg IM  -MDP, start tomorrow. Needs to avoid NSAIDs due to Plavix.  -Discussed that she may need referral to ortho. - XR SHOULDER RIGHT (MIN 2 VIEWS); Future    2. Chronic left ear pain  -No abnormal findings on exam.  Refer to ENT for evaluation  - Garcia Ortiz MD, Otolaryngology, [place]       Return for as scheduled. Zoila Graham was seen today for joint pain. Diagnoses and all orders for this visit:    Acute pain of right shoulder  -     XR SHOULDER RIGHT (MIN 2 VIEWS); Future    Chronic left ear pain  -     Garcia Ortiz MD, Otolaryngology, [place]    Other orders  -     dexamethasone (DECADRON) injection 10 mg  -     methylPREDNISolone (MEDROL DOSEPACK) 4 MG tablet; Take by mouth. There are no discontinued medications. There are no Patient Instructions on file for this visit. Patient voicesunderstanding and agrees to plans along with risks and benefits of plan. Counseling:  Karl Henley's case, medications and options were discussed in detail. Patient was instructed to call the office if she questionsregarding her treatment. Should her conditions worsen, she should return to office to be reassessed by SHARI Reyes. she Should to go the closest Emergency Department for any emergency. They verbalizedunderstanding the above instructions. Return for as scheduled.

## 2022-07-26 DIAGNOSIS — M25.511 ACUTE PAIN OF RIGHT SHOULDER: Primary | ICD-10-CM

## 2022-07-26 DIAGNOSIS — M19.011 PRIMARY OSTEOARTHRITIS OF RIGHT SHOULDER: ICD-10-CM

## 2022-07-28 ENCOUNTER — OFFICE VISIT (OUTPATIENT)
Dept: NEUROLOGY | Age: 82
End: 2022-07-28
Payer: MEDICARE

## 2022-07-28 VITALS
HEIGHT: 66 IN | DIASTOLIC BLOOD PRESSURE: 76 MMHG | BODY MASS INDEX: 26.52 KG/M2 | OXYGEN SATURATION: 97 % | SYSTOLIC BLOOD PRESSURE: 124 MMHG | WEIGHT: 165 LBS | HEART RATE: 72 BPM

## 2022-07-28 DIAGNOSIS — I69.351 HEMIPARESIS AFFECTING RIGHT SIDE AS LATE EFFECT OF STROKE (HCC): Primary | ICD-10-CM

## 2022-07-28 PROCEDURE — G8427 DOCREV CUR MEDS BY ELIG CLIN: HCPCS | Performed by: PSYCHIATRY & NEUROLOGY

## 2022-07-28 PROCEDURE — 1090F PRES/ABSN URINE INCON ASSESS: CPT | Performed by: PSYCHIATRY & NEUROLOGY

## 2022-07-28 PROCEDURE — 99213 OFFICE O/P EST LOW 20 MIN: CPT | Performed by: PSYCHIATRY & NEUROLOGY

## 2022-07-28 PROCEDURE — 1123F ACP DISCUSS/DSCN MKR DOCD: CPT | Performed by: PSYCHIATRY & NEUROLOGY

## 2022-07-28 PROCEDURE — G8417 CALC BMI ABV UP PARAM F/U: HCPCS | Performed by: PSYCHIATRY & NEUROLOGY

## 2022-07-28 PROCEDURE — 1036F TOBACCO NON-USER: CPT | Performed by: PSYCHIATRY & NEUROLOGY

## 2022-07-28 PROCEDURE — G8399 PT W/DXA RESULTS DOCUMENT: HCPCS | Performed by: PSYCHIATRY & NEUROLOGY

## 2022-07-28 NOTE — PROGRESS NOTES
never used smokeless tobacco. She reports that she does not drink alcohol and does not use drugs. Family History   Problem Relation Age of Onset    Heart Disease Mother     Heart Disease Father        Social History  Kerri Manuel is recently , lives in [address], and is retired. Medications:  Current Outpatient Medications   Medication Sig Dispense Refill    methylPREDNISolone (MEDROL DOSEPACK) 4 MG tablet Take by mouth. 1 kit 0    atorvastatin (LIPITOR) 80 MG tablet Take 1 tablet by mouth nightly 90 tablet 3    levothyroxine (SYNTHROID) 25 MCG tablet Take 1 tablet by mouth daily 90 tablet 1    Multiple Vitamin (MULTIVITAMIN PO) Take by mouth      insulin lispro, 1 Unit Dial, (HUMALOG KWIKPEN) 100 UNIT/ML SOPN 10 units before largest meal (lunch) 3 pen 5    nitroGLYCERIN (NITROSTAT) 0.4 MG SL tablet Place 1 tablet under the tongue every 5 minutes as needed for Chest pain up to max of 3 total doses. If no relief after 1 dose, call 911. 25 tablet 3    insulin glargine (BASAGLAR KWIKPEN) 100 UNIT/ML injection pen Inject 20 Units into the skin nightly 2 pen 11    glucose monitoring (FREESTYLE FREEDOM) kit 1 kit by Does not apply route daily 1 kit 0    blood glucose monitor strips Test 3 times a day & as needed for symptoms of irregular blood glucose. Dispense sufficient amount for indicated testing frequency plus additional to accommodate PRN testing needs.  400 strip 2    clopidogrel (PLAVIX) 75 MG tablet Take 1 tablet by mouth daily 90 tablet 3    aspirin 81 MG EC tablet Take 1 tablet by mouth daily 30 tablet 0    sertraline (ZOLOFT) 25 MG tablet Take 1 tablet by mouth daily 30 tablet 0    carvedilol (COREG) 12.5 MG tablet Take 1 tablet by mouth 2 times daily (with meals) 60 tablet 0    vitamin B-12 (CYANOCOBALAMIN) 1000 MCG tablet Take 1 tablet by mouth daily 30 tablet 0    Coenzyme Q10 (CO Q 10) 10 MG CAPS Take 1 capsule by mouth daily 30 capsule 0    Probiotic Product (ALIGN) 4 MG CAPS Once capsule daily 30 capsule 5     No current facility-administered medications for this visit. Allergies: Allergies as of 07/28/2022 - Fully Reviewed 07/28/2022   Allergen Reaction Noted    Zinc Other (See Comments) 11/09/2018    Tape Erlinabhijitrupa [place] tape] Rash 11/20/2017       Review of Systems:  Constitutional: negative for - chills and fever  Eyes:  negative for - visual disturbance and photophobia  HENMT: negative for - headaches and sinus pain  Respiratory: negative for - cough, hemoptysis, and shortness of breath  Cardiovascular: negative for - chest pain and palpitations  Gastrointestinal: negative for - blood in stools, constipation, diarrhea, nausea, and vomiting  Genito-Urinary: negative for - hematuria, urinary frequency, urinary urgency, and urinary retention  Musculoskeletal: negative for - joint pain, joint stiffness, and joint swelling  Hematological and Lymphatic: negative for - bleeding problems, abnormal bruising, and swollen lymph nodes  Endocrine:  negative for - polydipsia and polyphagia  Allergy/Immunology:  negative for - rhinorrhea, sinus congestion, hives  Integument:  negative for - negative for - rash, change in moles, new or changing lesions  Psychological: negative for - anxiety and depression  Neurological: negative for - memory loss  positive for - numbness/tingling, and weakness     PHYSICAL EXAMINATION:  Vitals:  /76   Pulse 72   Ht 5' 6\" (1.676 m)   Wt 165 lb (74.8 kg)   SpO2 97%   BMI 26.63 kg/m²   General appearance:  Alert, well developed, well nourished, in no distress  HEENT:  normocephalic, atraumatic, sclera appear normal, no nasal abnormalities, no rhinorrhea, Ears appear normal, oral mucous membranes are moist without erythema, trachea midline, thyroid is normal, no lymphadenopathy or neck mass. Cardiovascular:  Regular rate and rhythm without murmer. No peripheral edema, No cyanosis or clubbing. No carotid bruits. Pulmonary:  Lungs are clear to auscultation. Breathing appears normal, good expansion, normal effort without use of accessory muscles  Musculoskeletal:  Joints are osteoarthritic  Integument:  No rash, erythema, or pallor  Psychiatric:  Mood, affect, and behavior appear normal      NEUROLOGIC EXAMINATION:  Mental Status:  alert, oriented to person, place, and time. Speech:  Clear without dysarthria or dysphonia  Language:  Fluent without aphasia  Cranial Nerves:   II Visual fields are full to confrontation   III,IV, VI Extraocular movements are full   VII Facial movements are symmetrical without weakness   VIII Hearing is intact   IX,X Shoulder shrug and head rotation strength are intact   XII No tongue atrophy or fasciculations. Normal tongue protrusion. No tongue weakness  Motor:  She has a mild to moderate right spastic hemiparesis. Deep tendon reflexes are increased in the right extremities. Chamorro's signs are absent bilaterally. There is no ankle clonus on either side. Sensation:  Sensation is intact to light touch, temperature, and vibration in all extremities. Coordination:  Rapid alternating movements are impaired in the right hand. Finger to nose testing is unimpaired bilaterally. Gait:  Slow and right hemiparetic       Assessment:       ICD-10-CM    1. Hemiparesis affecting right side as late effect of stroke ([facility]  I69.351       I discussed the diagnosis, pathophysiology, symptoms, risks, prognosis, and treatment options of stroke with Windy Esquivel. Unfortunately, there is nothing that can be done to enhance recovery. Her stroke occurred on 11/20/2021 and recommendations currently are for Plavix for only 3 to 6 months.     Plan:   Stop the Plavix (clopidegrel)  Continue the other medications including 81 mg aspirin and Lipitor daily  FU here PRN    Electronically signed by Loreto Jasmine MD on 7/28/22

## 2022-08-01 ENCOUNTER — OFFICE VISIT (OUTPATIENT)
Dept: FAMILY MEDICINE CLINIC | Age: 82
End: 2022-08-01
Payer: MEDICARE

## 2022-08-01 VITALS
TEMPERATURE: 97.9 F | SYSTOLIC BLOOD PRESSURE: 112 MMHG | WEIGHT: 160 LBS | BODY MASS INDEX: 25.71 KG/M2 | HEIGHT: 66 IN | HEART RATE: 73 BPM | OXYGEN SATURATION: 98 % | DIASTOLIC BLOOD PRESSURE: 62 MMHG

## 2022-08-01 DIAGNOSIS — I10 ESSENTIAL (PRIMARY) HYPERTENSION: ICD-10-CM

## 2022-08-01 DIAGNOSIS — B35.1 ONYCHOMYCOSIS: ICD-10-CM

## 2022-08-01 DIAGNOSIS — F41.8 DEPRESSION WITH ANXIETY: ICD-10-CM

## 2022-08-01 DIAGNOSIS — H92.02 LEFT EAR PAIN: ICD-10-CM

## 2022-08-01 DIAGNOSIS — E11.22 TYPE 2 DIABETES MELLITUS WITH STAGE 3A CHRONIC KIDNEY DISEASE, WITHOUT LONG-TERM CURRENT USE OF INSULIN (HCC): ICD-10-CM

## 2022-08-01 DIAGNOSIS — H91.92 HEARING LOSS OF LEFT EAR, UNSPECIFIED HEARING LOSS TYPE: ICD-10-CM

## 2022-08-01 DIAGNOSIS — N18.31 TYPE 2 DIABETES MELLITUS WITH STAGE 3A CHRONIC KIDNEY DISEASE, WITHOUT LONG-TERM CURRENT USE OF INSULIN (HCC): ICD-10-CM

## 2022-08-01 DIAGNOSIS — E78.00 HYPERCHOLESTEROLEMIA: ICD-10-CM

## 2022-08-01 DIAGNOSIS — E03.9 ACQUIRED HYPOTHYROIDISM: ICD-10-CM

## 2022-08-01 DIAGNOSIS — Z00.00 ANNUAL PHYSICAL EXAM: Primary | ICD-10-CM

## 2022-08-01 LAB — HGB, POC: 8.1

## 2022-08-01 PROCEDURE — 3052F HG A1C>EQUAL 8.0%<EQUAL 9.0%: CPT | Performed by: FAMILY MEDICINE

## 2022-08-01 PROCEDURE — 1123F ACP DISCUSS/DSCN MKR DOCD: CPT | Performed by: FAMILY MEDICINE

## 2022-08-01 PROCEDURE — 99214 OFFICE O/P EST MOD 30 MIN: CPT | Performed by: FAMILY MEDICINE

## 2022-08-01 PROCEDURE — 85018 HEMOGLOBIN: CPT | Performed by: FAMILY MEDICINE

## 2022-08-01 PROCEDURE — G0439 PPPS, SUBSEQ VISIT: HCPCS | Performed by: FAMILY MEDICINE

## 2022-08-01 ASSESSMENT — LIFESTYLE VARIABLES
HOW OFTEN DO YOU HAVE A DRINK CONTAINING ALCOHOL: NEVER
HOW MANY STANDARD DRINKS CONTAINING ALCOHOL DO YOU HAVE ON A TYPICAL DAY: PATIENT DECLINED

## 2022-08-01 ASSESSMENT — PATIENT HEALTH QUESTIONNAIRE - PHQ9
9. THOUGHTS THAT YOU WOULD BE BETTER OFF DEAD, OR OF HURTING YOURSELF: 0
6. FEELING BAD ABOUT YOURSELF - OR THAT YOU ARE A FAILURE OR HAVE LET YOURSELF OR YOUR FAMILY DOWN: 1
4. FEELING TIRED OR HAVING LITTLE ENERGY: 2
SUM OF ALL RESPONSES TO PHQ QUESTIONS 1-9: 8
SUM OF ALL RESPONSES TO PHQ QUESTIONS 1-9: 8
1. LITTLE INTEREST OR PLEASURE IN DOING THINGS: 2
7. TROUBLE CONCENTRATING ON THINGS, SUCH AS READING THE NEWSPAPER OR WATCHING TELEVISION: 1
8. MOVING OR SPEAKING SO SLOWLY THAT OTHER PEOPLE COULD HAVE NOTICED. OR THE OPPOSITE, BEING SO FIGETY OR RESTLESS THAT YOU HAVE BEEN MOVING AROUND A LOT MORE THAN USUAL: 0
SUM OF ALL RESPONSES TO PHQ QUESTIONS 1-9: 8
3. TROUBLE FALLING OR STAYING ASLEEP: 0
SUM OF ALL RESPONSES TO PHQ QUESTIONS 1-9: 8
SUM OF ALL RESPONSES TO PHQ9 QUESTIONS 1 & 2: 4
10. IF YOU CHECKED OFF ANY PROBLEMS, HOW DIFFICULT HAVE THESE PROBLEMS MADE IT FOR YOU TO DO YOUR WORK, TAKE CARE OF THINGS AT HOME, OR GET ALONG WITH OTHER PEOPLE: 0
5. POOR APPETITE OR OVEREATING: 0
2. FEELING DOWN, DEPRESSED OR HOPELESS: 2

## 2022-08-01 NOTE — PATIENT INSTRUCTIONS
Advance Care Planning: Care Instructions  Your Care Instructions    It can be hard to live with an illness that cannot be cured. But if your health is getting worse, you may want to make decisions about end-of-life care. Planning for the end of your life does not mean that you are giving up. It is a way to make sure that your wishes are met. Clearly stating your wishes can make it easier for your loved ones. Making plans while you are still able may also ease your mind and make your final days less stressful and more meaningful. Follow-up care is a key part of your treatment and safety. Be sure to make and go to all appointments, and call your doctor if you are having problems. It's also a good idea to know your test results and keep a list of the medicines you take. What can you do to plan for the end of life? Visit:  https://ag.[place].gov/consumer-protection/livingwills  You can bring these issues up with your doctor. You do not need to wait until your doctor starts the conversation. You might start with \"I would not be willing to live with . Ban Bloodgood Ban Bloodgood Ban Bloodgood \" When you complete this sentence it helps your doctor understand your wishes. Talk openly and honestly with your doctor. This is the best way to understand the decisions you will need to make as your health changes. Know that you can always change your mind. Ask your doctor about commonly used life-support measures. These include tube feedings, breathing machines, and fluids given through a vein (IV). Understanding these treatments will help you decide whether you want them. You may choose to have these life-supporting treatments for a limited time. This allows a trial period to see whether they will help you. You may also decide that you want your doctor to take only certain measures to keep you alive. It is important to spell out these conditions so that your doctor and family understand them. Talk to your doctor about how long you are likely to live.  He or she copies of the papers. Where can you learn more? Go to https://chpepiceweb.Fangdd. org and sign in to your [employer] account. Enter P184 in the mascotsecret box to learn more about \"Advance Care Planning: Care Instructions. \"     If you do not have an account, please click on the \"Sign Up Now\" link. Current as of: September 24, 2016  Content Version: 11.5  © 3896-8076 [employer]. Care instructions adapted under license by [facility]. If you have questions about a medical condition or this instruction, always ask your healthcare professional. [place] [address] any warranty or liability for your use of this information. Learning About Living [place]  What is a living will? A living will is a legal form you use to write down the kind of care you want at the end of your life. It is used by the health professionals who will treat you if you aren't able to decide for yourself. If you put your wishes in writing, your loved ones and others will know what kind of care you want. They won't need to guess. This can ease your mind and be helpful to others. A living will is not the same as an estate or property will. An estate will explains what you want to happen with your money and property after you die. Is a living will a legal document? A living will is a legal document. Each state has its own laws about living nash. If you move to another state, make sure that your living will is legal in the state where you now live. Or you might use a universal form that has been approved by many states. This kind of form can sometimes be completed and stored online. Your electronic copy will then be available wherever you have a connection to the Internet. In most cases, doctors will respect your wishes even if you have a form from a different state. You don't need an  to complete a living will.  But legal advice can be helpful if your state's laws are unclear, prefer to die? At home? In a hospital or nursing home? Somewhere else? Would you prefer to be buried or cremated? Do you want your organs to be donated after you die? What should you do with your living will? Make sure that your family members and your health care agent have copies of your living will. Give your doctor a copy of your living will to keep in your medical record. If you have more than one doctor, make sure that each one has a copy. You may want to put a copy of your living will where it can be easily found. Where can you learn more? Go to https://chpepiceweb.Mitokyne. org and sign in to your [employer] account. Enter H014 in the Tablo box to learn more about \"Learning About Living [place]. \"     If you do not have an account, please click on the \"Sign Up Now\" link. Current as of: September 24, 2016  Content Version: 11.5  © 1963-8490 [employer]. Care instructions adapted under license by [facility] ([facility]). If you have questions about a medical condition or this instruction, always ask your healthcare professional. [place] [address] any warranty or liability for your use of this information. Learning About Durable Power of  for Health Care  What is a durable power of  for health care? A durable power of  for health care is one type of the legal forms called advance directives. It lets you decide who you want to make treatment decisions for you if you cannot speak or decide for yourself. The person you choose is called your health care agent. Another type of advance directive is a living will. It lets you write down what kinds of treatment or life support you want or do not want. What should you think about when choosing a health care agent? Choose your health care agent carefully. This person may or may not be a family member. Talk to the person before you make your final decision.  Make sure he or she is comfortable with this responsibility. It's a good idea to choose someone who:  Is at least 25years old. Knows you well and understands what makes life meaningful for you. Understands your Christianity and moral values. Will do what you want, not what he or she wants. Will be able to make difficult choices at a stressful time. Will be able to refuse or stop treatment, if that is what you would want, even if you could die. Will be firm and confident with health professionals if needed. Will ask questions to get necessary information. Lives near you or agrees to travel to you if needed. Your family may help you make medical decisions while you can still be part of that process. But it is important to choose one person to be your health care agent in case you are not able to make decisions for yourself. If you don't fill out the legal form and name a health care agent, the decisions your family can make may be limited. Who will make decisions for you if you do not have a health care agent? If you don't have a health care agent or a living will, your family members may disagree about your medical care. And then some medical professionals who may not know you as well might have to make decisions for you. In some cases, a  makes the decisions. When you name a health care agent, it is very clear who has the power to make health decisions for you. How do you name a health care agent? You name your health care agent on a legal form. It is usually called a durable power of  for health care. Ask your hospital, state bar association, or office on aging where to find these forms. You must sign the form to make it legal. Some states require you to get the form notarized. This means that a person called a  watches you sign the form and then he or she signs the form. Some states also require that two or more witnesses sign the form.   Be sure to tell your family members and doctors who your health care agent is. Keep your forms in a safe place. But make sure that your loved ones know where the forms are. This could be in your desk where you keep other important papers. Make sure your doctor has a copy of your forms. Where can you learn more? Go to https://chpepiceweb.healthWindspire Energy ([employer]). org and sign in to your PearFundst account. Enter [id number] in the [employer] box to learn more about \"Learning About Durable Power of  for Health Care. \"     If you do not have an account, please click on the \"Sign Up Now\" link. Current as of: 2016  Content Version: 11.5  © 8028-5733 [employer]. Care instructions adapted under license by [facility] ([facility]). If you have questions about a medical condition or this instruction, always ask your healthcare professional. [address] any warranty or liability for your use of this information. _______________________________________________________________    Home Safety Tips    Each year, thousands of older Americans fall at home. Many of them are seriously injured, and some are disabled. In , more than 8,500 people over age 72  because of falls. Falls are often due to hazards that are easy to overlook but easy to fix. This checklist will help you find and fix those hazards in your home. The checklist asks about hazards found in each room of your home. For each hazard, the checklist tells you how to fix the problem. At the end of the checklist, you will find other tips for preventing falls. Look at the floor in each room  When he walks through room do you have to walk around furniture? Ask someone to move the furniture to clear your past  You have throw rugs on the floor? Remove the rugs or use double-sided tape or nonslip backing on the rugs so they dont slip  Are papers, magazines, books, shoes, boxes, blankets, towels, or other objects on the floor?  things that are on the floor.  Always keep objects off the floor  Do you have to walk over or around cords or wires (like cords from lamps, extension cords, or telephone cords)? Coil or tape cords and wires next to the wall so you cant trip over them. Have an  put in another outlet. Are papers, shoes, books, or other objects on the stairs?  things on the stairs. Always keep objects off the stairs. Are some steps broken or uneven? Fix loose or uneven steps. Are you missing a light over the stairway? Have a  or an  put in an overhead light at the top and bottom of the stairs. Has the stairway light bulb burned out? Have a friend or family member change the light bulb. Do you have only one light switch for your stairs (only at the top or at the bottom of the stairs)? Have a  or an  put in a light switch at the top and bottom of the stairs. You can get light switches that glow  Are the handrails loose or broken? Is there a handrail on only one side of the stairs? Fix loose handrails or put in new ones. Make sure handrails are on both sides of the stairs and are as long as the stairs. Is the carpet on the steps loose or torn? Make sure the carpet is firmly attached to every step or remove the carpet and attach non-slip rubber treads on the stairs. Look at your kitchen and eating Look at your kitchen and eating area. Are the things you use often on high shelves? Move items in your cabinets. Keep things you use often on the lower shelves (about waist high). Is your step stool unsteady? Get a new, steady step stool with a bar to hold on to. Never use a chair as a step stool. Is the light near the bed hard to reach? Place a lamp close to the bed where it is easy to reach. Is the path from your bed to the bathroom dark? Put in a night-light so you can see where youre walking. Some nightlights go on by themselves after dark  Is the tub or shower floor slippery?    Put a non-slip rubber mat or selfstick strips on the floor of the tub or shower. Do you have some support when you get in and out of the tub or up from the toilet? Have a  or a schmid put in a grab bar inside the tub and next to the toilet. Exercise regularly. Exercise makes you stronger and improves your balance and coordination. Have your doctor or pharmacist look at all the medicines you take, even over-the-counter medicines. Some medicines can make you sleepy or dizzy. Have your vision checked at least once a year by an eye doctor. Poor vision can increase your risk of falling. Get up slowly after you sit or lie down. Wear sturdy shoes with thin, non-slip soles. Avoid slippers and running shoes with thick soles. Improve the lighting in your home. Use brighter light bulbs (at least 60 sanchez). Use lamp shades or frosted bulbs to reduce glare. Use reflecting tape at the top and bottom of the stairs so you can see them better. Paint doorsills a different color to prevent tripping. Keep emergency numbers in large print near each phone. Put a phone near the floor in case you fall and cant get up. Think about wearing an alarm device that will bring help in case you fall and cant get up.    www.cdc.gov/safeusa     _______________________________________________________________Labs before next appointment:  Go to room [facility] for labs prior to next visit. Be sure to fast for at least 10 hours prior to laboratory appointment. Would recommend getting labs about 1 week prior to the appointment time to ensure they are available at the time of the appointment. No appointment is necessary for laboratory work as the orders have already been placed.     Lab opens at 6:30 am and closes at 4:30 pm.

## 2022-08-01 NOTE — PROGRESS NOTES
[facility] PHYSICIAN SERVICES  [facility] FAMILY MEDICINE  [address] [facility] [address] [address]  Dept: [phone]  Dept Fax: [phone]  Loc: [phone]    Shady Barajas is a [de-identified] y.o. female who presents today for her medical conditions/complaints as noted below. Shady Barajas is here for Medicare AWV        HPI:   CC: Here today to discuss the following:        Diabetes mellitus:  Current medications:  Basaglar 20 units nightly  Humalog 10 units before lunch  She is not following an ADA diet. She is requesting referral to ENT for chronic left ear pain. She states this has been a chronic issue for her over the years. She describes previous history of swelling of the external auditory canal.  She also reports some hearing loss she denies any dizziness or tinnitus. She states there is a pulsating pain in the left ear. No ear drainage. No fever chills. No sinus issues. She is also requesting a referral to podiatry to evaluate her onychomycosis      Hypertension  Compliant with medications. No adverse effects from medication. No lightheadedness, palpitations, or chest pain. Depression with Anxiety  Compliant with medications. No adverse effects from medication. Depression and anxiety symptoms are stable today. No suicidal or homicidal ideation. Excessive worry, insomnia, and anxiousness are stable. Energy, concentration, and apathy are stable. HPI    Subjective:      Review of Systems  Review of Systems   Constitutional: Negative for chills and fever. HENT: Negative for congestion. Respiratory: Negative for cough, chest tightness and shortness of breath. Cardiovascular: Negative for chest pain, palpitations and leg swelling. Gastrointestinal: Negative for abdominal pain, anal bleeding, constipation, diarrhea and nausea. Genitourinary: Negative for difficulty urinating. Psychiatric/Behavioral: Negative.         SeeHPI for visit specific review of meal.  She does admit to eating at least 1 dessert per day. She is had no issues with hypoglycemia. Continue with Basaglar 20 units at night but encouraged to use her Humalog 10 units prior to lunch  Continue checking blood sugars before every meal and nightly and will review readings next visit    - POCT hemoglobin  - Hemoglobin A1C; Future  - Comprehensive Metabolic Panel; Future  - Hemoglobin A1C; Future  - Comprehensive Metabolic Panel; Future  - Hemoglobin A1C; Future  - Comprehensive Metabolic Panel; Future    3. Hypercholesterolemia  Continue with atorvastatin 80 mg once a day  Recheck next visit    4. Essential (primary) hypertension  BP Readings from Last 3 Encounters:   08/01/22 112/62   07/28/22 124/76   07/25/22 122/82     Stable. Continue with:   Coreg 12.5 mg bid      5. Depression with anxiety  Sertraline  Chronic issue stable    6. Hypothyroidism:    Lab Results   Component Value Date    TSH 2.460 04/18/2022    T4FREE 0.90 (L) 04/18/2022     Recheck next visit      7. Left ear pain with hearing loss  External auditory canal and TM are clear  No clear etiology. She tells me she would like to be evaluated by ENT despite no obvious clinical findings. 8.  She is also requesting referral to podiatry for evaluation of her toenails. She is concerned that she has a history of diabetes and has developed onychomycosis. Return in about 3 months (around 11/1/2022) for Routine follow up - 20 minutes. Discussed use, benefit, and side effects of prescribed medications. All patient questions answered. Pt voiced understanding. Reviewed health maintenance. Instructedto continue current medications, diet and exercise. Patient agreed with treatmentplan.  Follow up as directed.     _______________________________________________________________      Past Medical History:   Diagnosis Date    Acute CVA (cerebrovascular accident) ([address] 11/20/2021    Arthritis     Psoriatic    Asthma     CAD (coronary artery disease)     CHF (congestive heart failure) (HCC)     COPD (chronic obstructive pulmonary disease) (HCC)     DM (diabetes mellitus) ([facility])     GERD (gastroesophageal reflux disease)     HTN (hypertension)     Hyperlipidemia     MI (myocardial infarction) ([facility] [address]     x2    Thyroid disease       Past Surgical History:   Procedure Laterality Date    CATARACT REMOVAL      CHOLECYSTECTOMY      CORONARY ANGIOPLASTY  06/2018    Angioplasty to in-stent restenosis to the distal LAD    HIP SURGERY      HYSTERECTOMY (CERVIX STATUS UNKNOWN)      JOINT REPLACEMENT Right     Right knee replacement    OVARY REMOVAL         Family History   Problem Relation Age of Onset    Heart Disease Mother     Heart Disease Father        Social History     Tobacco Use    Smoking status: Never    Smokeless tobacco: Never   Substance Use Topics    Alcohol use: No     Current Outpatient Medications   Medication Sig Dispense Refill    atorvastatin (LIPITOR) 80 MG tablet Take 1 tablet by mouth nightly 90 tablet 3    levothyroxine (SYNTHROID) 25 MCG tablet Take 1 tablet by mouth daily 90 tablet 1    Multiple Vitamin (MULTIVITAMIN PO) Take by mouth      insulin lispro, 1 Unit Dial, (HUMALOG KWIKPEN) 100 UNIT/ML SOPN 10 units before largest meal (lunch) 3 pen 5    nitroGLYCERIN (NITROSTAT) 0.4 MG SL tablet Place 1 tablet under the tongue every 5 minutes as needed for Chest pain up to max of 3 total doses. If no relief after 1 dose, call 911. 25 tablet 3    insulin glargine (BASAGLAR KWIKPEN) 100 UNIT/ML injection pen Inject 20 Units into the skin nightly 2 pen 11    glucose monitoring (FREESTYLE FREEDOM) kit 1 kit by Does not apply route daily 1 kit 0    blood glucose monitor strips Test 3 times a day & as needed for symptoms of irregular blood glucose. Dispense sufficient amount for indicated testing frequency plus additional to accommodate PRN testing needs.  400 strip 2    aspirin 81 MG EC tablet Take 1 tablet by mouth daily 30 tablet 0 sertraline (ZOLOFT) 25 MG tablet Take 1 tablet by mouth daily 30 tablet 0    carvedilol (COREG) 12.5 MG tablet Take 1 tablet by mouth 2 times daily (with meals) 60 tablet 0    vitamin B-12 (CYANOCOBALAMIN) 1000 MCG tablet Take 1 tablet by mouth daily 30 tablet 0    Coenzyme Q10 (CO Q 10) 10 MG CAPS Take 1 capsule by mouth daily 30 capsule 0    Probiotic Product (ALIGN) 4 MG CAPS Once capsule daily 30 capsule 5     No current facility-administered medications for this visit. Allergies   Allergen Reactions    Zinc Other (See Comments)     \"Elastic in bra\"    Tape [Adhesive Tape] Rash       Health Maintenance   Topic Date Due    Colorectal Cancer Screen  Never done    COVID-19 Vaccine (3 - Booster for Pfizer series) 07/01/2021    DTaP/Tdap/Td vaccine (1 - Tdap) 11/20/2027 (Originally 11/21/2017)    Flu vaccine (1) 09/01/2022    Lipids  04/18/2023    Breast cancer screen  05/05/2023    Depression Monitoring  08/01/2023    Annual Wellness Visit (AWV)  08/02/2023    DEXA (modify frequency per FRAX score)  Completed    Shingles vaccine  Completed    Pneumococcal 65+ years Vaccine  Completed    Hepatitis A vaccine  Aged Out    Hib vaccine  Aged Out    Meningococcal (ACWY) vaccine  Aged Out       _______________________________________________________________    Note dictated using Dragon Dictation software  Sometimes this dictation software makes erroneous transcriptions.

## 2022-08-01 NOTE — PROGRESS NOTES
Medicare Annual Wellness Visit - Subsequent    Name: Jase Lainez Date: 2022   MRN: [id number] Sex: Female   Age: [de-identified] y.o. Ethnicity: Non- / Non    : 1940 Race: White (non-)      Denis Lizama is here for   Chief Complaint   Patient presents with    Medicare AWV        Screenings for behavioral, psychosocial and functional/safety risks, and cognitive dysfunction are all negative except as indicated below. These results, as well as other patient data from the [address] form, are documented in Flowsheets linked to this Encounter. Allergies   Allergen Reactions    Zinc Other (See Comments)     \"Elastic in bra\"    Tape [Adhesive Tape] Rash       Prior to Visit Medications    Medication Sig Taking? Authorizing Provider   atorvastatin (LIPITOR) 80 MG tablet Take 1 tablet by mouth nightly Yes Cam Narayanan MD   levothyroxine (SYNTHROID) 25 MCG tablet Take 1 tablet by mouth daily Yes Cam Narayanan MD   Multiple Vitamin (MULTIVITAMIN PO) Take by mouth Yes Historical Provider, MD   insulin lispro, 1 Unit Dial, (HUMALOG KWIKPEN) 100 UNIT/ML SOPN 10 units before largest meal (lunch) Yes Cam Narayanan MD   nitroGLYCERIN (NITROSTAT) 0.4 MG SL tablet Place 1 tablet under the tongue every 5 minutes as needed for Chest pain up to max of 3 total doses. If no relief after 1 dose, call 911. Yes Cam Narayanan MD   insulin glargine (BASAGLAR KWIKPEN) 100 UNIT/ML injection pen Inject 20 Units into the skin nightly Yes Cam Narayanan MD   glucose monitoring (FREESTYLE FREEDOM) kit 1 kit by Does not apply route daily Yes Cam Narayanan MD   blood glucose monitor strips Test 3 times a day & as needed for symptoms of irregular blood glucose. Dispense sufficient amount for indicated testing frequency plus additional to accommodate PRN testing needs.  Yes Cam Narayanan MD   aspirin 81 MG EC tablet Take 1 tablet by mouth daily Yes Yessy Pillai MD   sertraline (ZOLOFT) 25 MG tablet Take 1 tablet by mouth daily Yes Susy Su MD   carvedilol (COREG) 12.5 MG tablet Take 1 tablet by mouth 2 times daily (with meals) Yes Susy Su MD   vitamin B-12 (CYANOCOBALAMIN) 1000 MCG tablet Take 1 tablet by mouth daily Yes Susy Su MD   Coenzyme Q10 (CO Q 10) 10 MG CAPS Take 1 capsule by mouth daily Yes Susy Su MD   Probiotic Product (ALIGN) 4 MG CAPS Once capsule daily Yes Reba Wu MD   clopidogrel (PLAVIX) 75 MG tablet Take 1 tablet by mouth daily  Patient not taking: Reported on 8/1/2022  Reba Wu MD         Past Medical History:   Diagnosis Date    Acute CVA (cerebrovascular accident) ([facility] [address] 11/20/2021    Arthritis     Psoriatic    Asthma     CAD (coronary artery disease)     CHF (congestive heart failure) ([facility])     COPD (chronic obstructive pulmonary disease) ([facility])     DM (diabetes mellitus) ([facility] [address]     GERD (gastroesophageal reflux disease)     HTN (hypertension)     Hyperlipidemia     MI (myocardial infarction) ([facility] [address]     x2    Thyroid disease          Past Surgical History:   Procedure Laterality Date    CATARACT REMOVAL      CHOLECYSTECTOMY      CORONARY ANGIOPLASTY  06/2018    Angioplasty to in-stent restenosis to the distal LAD    HIP SURGERY      HYSTERECTOMY (CERVIX STATUS UNKNOWN)      JOINT REPLACEMENT Right     Right knee replacement    OVARY REMOVAL         Family History   Problem Relation Age of Onset    Heart Disease Mother     Heart Disease Father        CareTeam (Including outside providers/suppliers regularly involved in providing care):   Patient Care Team:  Reba Wu MD as PCP - General (Family Medicine)  Reba Wu MD as PCP - [facility]  Ida Hayward MD as Consulting Physician (Interventional Cardiology)  Eric Pollack MD as Neurologist (Neurology)    Wt Readings from Last 3 Encounters:   08/01/22 160 lb (72.6 kg)   07/28/22 165 lb (74.8 kg)   07/25/22 165 lb (74.8 kg)     Vitals: 08/01/22 1255   BP: 112/62   Pulse: 73   Temp: 97.9 °F (36.6 °C)   SpO2: 98%   Weight: 160 lb (72.6 kg)   Height: 5' 6\" (1.676 m)           The following problems were reviewed today and where indicated follow up appointments were made and/or referrals ordered.     Risk Factor Screenings with Interventions     Fall Risk:  2 or more falls in past year?: no  Fall with injury in past year?: (!) yes  Fall Risk Interventions:    Home safety tips provided    Depression:  PHQ-2 Score: 4  Depression Interventions:  See progress notes    Anxiety:     Anxiety Interventions:  Not indicated    Cognitive:  Clock Drawing Test (CDT): Normal  Cognitive Impairment Interventions:  Not indicated    Substance Abuse:  Social History     Socioeconomic History    Marital status:      Spouse name: Not on file    Number of children: Not on file    Years of education: Not on file    Highest education level: Not on file   Occupational History    Not on file   Tobacco Use    Smoking status: Never    Smokeless tobacco: Never   Vaping Use    Vaping Use: Never used   Substance and Sexual Activity    Alcohol use: No    Drug use: No    Sexual activity: Not Currently   Other Topics Concern    Not on file   Social History Narrative    Not on file     Social Determinants of Health     Financial Resource Strain: Not on file   Food Insecurity: Not on file   Transportation Needs: Not on file   Physical Activity: Insufficiently Active    Days of Exercise per Week: 2 days    Minutes of Exercise per Session: 40 min   Stress: Not on file   Social Connections: Not on file   Intimate Partner Violence: Not on file   Housing Stability: Not on file        Substance Abuse Interventions:  Not indicated    Health Risk Assessment:     General  In general, how would you say your health is?: Very Good  In the past 7 days, have you experienced any of the following: New or Increased Pain, New or Increased Fatigue, Loneliness, Social Isolation, Stress or Anger?: No  Do you get the social and emotional support that you need?: Yes  General Health Risk Interventions:  Not indicated    Health Habits/Nutrition  On average, how many days per week do you engage in moderate to strenuous exercise (like a brisk walk)?: 2 days  On average, how many minutes do you engage in exercise at this level?: 40 min  Have you lost any weight without trying in the past 3 months?: (!) Yes  Have you seen the dentist within the past year?: Yes  Body mass index is 25.82 kg/m².   Health Habits/Nutrition Interventions:  See progress notes    Hearing/Vision  Do you or your family notice any trouble with your hearing that hasn't been managed with hearing aids?: (!) Yes  Do you have difficulty driving, watching TV, or doing any of your daily activities because of your eyesight?: No  Have you had an eye exam within the past year?: (!) No  Hearing/Vision Interventions:  Recommend yearly eye exam.  May benefit from hearing screen    Safety  Do you have working smoke detectors?: Yes  Do you have any tripping hazards - loose or unsecured carpets or rugs?: No  Do you have any tripping hazards - clutter in doorways, halls, or stairs?: No  Do you have either shower bars, grab bars, non-slip mats or non-slip surfaces in your shower or bathtub?: Yes  Do all of your stairways have a railing or banister?: Yes  Do you always fasten your seatbelt when you are in a car?: Yes  Safety Interventions:  Not indicated    ADLs  In the past 7 days, did you need help from others to perform any of the following everyday activities: Eating, dressing, grooming, bathing, toileting, or walking/balance?: No  In the past 7 days, did you need help from others to take care of any of the following: Laundry, housekeeping, banking/finances, shopping, telephone use, food preparation, transportation, or taking medications?: No  ADL Interventions:  Not indicated    Personalized Preventive Plan   Current Health Maintenance Status  Immunization History   Administered Date(s) Administered    COVID-19, PFIZER PURPLE top, DILUTE for use, (age 15 y+), 30mcg/0.3mL 01/11/2021, 02/01/2021    Influenza Virus Vaccine 10/01/2018    Influenza, High Dose (Fluzone 65 yrs and older) 11/01/2017, 09/20/2018    Influenza, Quadv, adjuvanted, 65 yrs +, IM, PF (Fluad) 09/28/2020, 10/07/2021    Influenza, Triv, inactivated, subunit, adjuvanted, IM (Fluad 65 yrs and older) 09/30/2019    Pneumococcal Conjugate 13-valent ([id number]) 10/31/2016    Pneumococcal Polysaccharide ([id number]) 10/31/2017    Td vaccine (adult) 11/20/2017    Td, unspecified formulation 11/20/2017    Zoster Recombinant (Shingrix) 04/23/2018, 06/28/2018        Health Maintenance   Topic Date Due    Colorectal Cancer Screen  Never done    COVID-19 Vaccine (3 - Booster for Santiago Peter series) 07/01/2021    Depression Monitoring  06/17/2022    Annual Wellness Visit (AWV)  06/18/2022    DTaP/Tdap/Td vaccine (1 - Tdap) 11/20/2027 (Originally 11/21/2017)    Flu vaccine (1) 09/01/2022    Lipids  04/18/2023    Breast cancer screen  05/05/2023    DEXA (modify frequency per FRAX score)  Completed    Shingles vaccine  Completed    Pneumococcal 65+ years Vaccine  Completed    Hepatitis A vaccine  Aged Out    Hib vaccine  Aged Out    Meningococcal (ACWY) vaccine  Aged Out       Recommendations for [place] Due: see orders.   Recommended screening schedule for the next 5-10 years is provided to the patient in written form: see Patient Instructions/AVS.

## 2022-09-12 ENCOUNTER — PROCEDURE VISIT (OUTPATIENT)
Dept: ENT CLINIC | Age: 82
End: 2022-09-12

## 2022-09-12 ENCOUNTER — OFFICE VISIT (OUTPATIENT)
Dept: ENT CLINIC | Age: 82
End: 2022-09-12
Payer: MEDICARE

## 2022-09-12 VITALS
WEIGHT: 160 LBS | HEIGHT: 66 IN | BODY MASS INDEX: 25.71 KG/M2 | SYSTOLIC BLOOD PRESSURE: 118 MMHG | DIASTOLIC BLOOD PRESSURE: 68 MMHG

## 2022-09-12 DIAGNOSIS — H92.02 LEFT EAR PAIN: Primary | ICD-10-CM

## 2022-09-12 DIAGNOSIS — J34.89 INTERNAL NASAL LESION: ICD-10-CM

## 2022-09-12 DIAGNOSIS — H93.8X2 SWELLING OF LEFT EAR: Primary | ICD-10-CM

## 2022-09-12 PROCEDURE — 99999 PR OFFICE/OUTPT VISIT,PROCEDURE ONLY: CPT | Performed by: AUDIOLOGIST

## 2022-09-12 PROCEDURE — G8427 DOCREV CUR MEDS BY ELIG CLIN: HCPCS | Performed by: OTOLARYNGOLOGY

## 2022-09-12 PROCEDURE — 1036F TOBACCO NON-USER: CPT | Performed by: OTOLARYNGOLOGY

## 2022-09-12 PROCEDURE — 99204 OFFICE O/P NEW MOD 45 MIN: CPT | Performed by: OTOLARYNGOLOGY

## 2022-09-12 PROCEDURE — 1123F ACP DISCUSS/DSCN MKR DOCD: CPT | Performed by: OTOLARYNGOLOGY

## 2022-09-12 PROCEDURE — 1090F PRES/ABSN URINE INCON ASSESS: CPT | Performed by: OTOLARYNGOLOGY

## 2022-09-12 PROCEDURE — G8399 PT W/DXA RESULTS DOCUMENT: HCPCS | Performed by: OTOLARYNGOLOGY

## 2022-09-12 PROCEDURE — G8417 CALC BMI ABV UP PARAM F/U: HCPCS | Performed by: OTOLARYNGOLOGY

## 2022-09-12 RX ORDER — GABAPENTIN 100 MG/1
CAPSULE ORAL
COMMUNITY
Start: 2022-09-02

## 2022-09-12 ASSESSMENT — ENCOUNTER SYMPTOMS
EYES NEGATIVE: 1
ALLERGIC/IMMUNOLOGIC NEGATIVE: 1
GASTROINTESTINAL NEGATIVE: 1
RESPIRATORY NEGATIVE: 1

## 2022-09-12 NOTE — PROGRESS NOTES
2022    Johnny Morelos (:  1940) is a [de-identified] y.o. female, Established patient, here for evaluation of the following chief complaint(s):  New Patient (Ear pain)      Vitals:    22 1029   BP: 118/68   Weight: 160 lb (72.6 kg)   Height: 5' 6\" (1.676 m)       Wt Readings from Last 3 Encounters:   22 160 lb (72.6 kg)   22 160 lb (72.6 kg)   22 165 lb (74.8 kg)       BP Readings from Last 3 Encounters:   22 118/68   22 112/62   22 124/76         SUBJECTIVE/OBJECTIVE:    New patient seen today for her left ear and her left nostril. She says for quite some time she has had intermittent swelling in her left ear. She says usually happens to put her hearing aid in there which causes swelling and pain. When she takes it out her ear is swollen and the hearing is down even worse. She needs hearing aids bilaterally. She says the same time she is feeling in her ear she has a sore developed in the left nostril. Review of Systems   Constitutional: Negative. HENT:  Positive for hearing loss. Eyes: Negative. Respiratory: Negative. Cardiovascular: Negative. Gastrointestinal: Negative. Endocrine: Negative. Musculoskeletal: Negative. Skin: Negative. Allergic/Immunologic: Negative. Neurological: Negative. Hematological: Negative. Psychiatric/Behavioral: Negative. Physical Exam  Vitals reviewed. Constitutional:       Appearance: Normal appearance. She is normal weight. HENT:      Head: Normocephalic and atraumatic. Right Ear: Tympanic membrane, ear canal and external ear normal.      Left Ear: Tympanic membrane, ear canal and external ear normal.      Nose: Nose normal.      Mouth/Throat:      Mouth: Mucous membranes are moist.      Pharynx: Oropharynx is clear. Eyes:      Extraocular Movements: Extraocular movements intact. Pupils: Pupils are equal, round, and reactive to light.    Cardiovascular:      Rate and Rhythm: Normal rate and regular rhythm. Pulmonary:      Effort: Pulmonary effort is normal.      Breath sounds: Normal breath sounds. Musculoskeletal:      Cervical back: Normal range of motion. Skin:     General: Skin is warm and dry. Neurological:      General: No focal deficit present. Mental Status: She is alert and oriented to person, place, and time. Psychiatric:         Mood and Affect: Mood normal.         Behavior: Behavior normal.            ASSESSMENT/PLAN:    1. Swelling of left ear  2. Internal nasal lesion  I feel like this is inflammation of the left ear canal so I will prescribe steroid cream for that. Mupirocin ointment for her nose to kill any infection is going on. See her back in 3 weeks. Return in about 3 weeks (around 10/3/2022). An electronic signature was used to authenticate this note. Trudy Aguilar MD       Please note that this chart was generated using dragon dictation software. Although every effort was made to ensure the accuracy of this automated transcription, some errors in transcription may have occurred.

## 2022-09-12 NOTE — PROGRESS NOTES
History   King Webb is a [de-identified] y.o. female who presented to the clinic this date with complaints of a \"cyst\" in her left ear canal. She noted when the left ear lesion swells up, she also gets a \"fever blister\" in her left nostril. She has known hearing loss and wears a hearing aid in her right ear. She cannot wear one in the left ear due to the lesion. Summary   Audio deferred due to external ear complaint. Plan   Results of today's testing were discussed with Ms. Andrea Carr and the following recommendations were made: Follow up with ENT as scheduled.

## 2022-10-10 RX ORDER — CARVEDILOL 12.5 MG/1
12.5 TABLET ORAL 2 TIMES DAILY WITH MEALS
Qty: 180 TABLET | Refills: 1 | Status: SHIPPED | OUTPATIENT
Start: 2022-10-10

## 2022-10-28 NOTE — PROGRESS NOTES
"    [facility] - PODIATRY    Today's Date: 10/31/2022     Patient Name: Kary Alcaraz  MRN: [id number]  CSN: [id number]  PCP: Nate Whitley MD  Referring Provider: Nate Whitley, *    SUBJECTIVE     Chief Complaint   Patient presents with   • Establish Care     Nate Whitley MD 08/17/2022 diabetic nail care, Onychomycosis- pt states feet doing ok, stumped 5th toe left foot and nail was bloody but dong better, used to see you at the [facility]- pt denies pain- pt presents with long thick nails, bunion on left foot inside great toe   • Diabetes     190mg/dl BG this am prior to breakfast     HPI: Kary Alcaraz, a 81 y.o.female, comes to clinic as a(n) new patient presenting for diabetic foot exam and complaining of painful toenails. Patient has h/o DM, HTN, GERD. Patient is NIDDM with last stated BG level of 190mg/dl. Patient presents with complaints of thickened, irregular toenail growth of both feet. States she is diabetic with mild numbness and tingling in feet. Recently stubbed her left 5th toe and has discoloration under the nail. Was formerly seeing podiatry provider at the [facility] but it has been around 4 years since last encounter. Denies pain. Relates previous treatment(s) including care by podiatry. Denies any constitutional symptoms. No other pedal complaints at this time.     History reviewed. No pertinent past medical history.  History reviewed. No pertinent surgical history.  History reviewed. No pertinent family history.  Social History     Socioeconomic History   • Marital status:    Tobacco Use   • Smoking status: Never   • Smokeless tobacco: Never   Vaping Use   • Vaping Use: Never used   Substance and Sexual Activity   • Alcohol use: Never   • Drug use: No   • Sexual activity: Defer     Allergies   Allergen Reactions   • Adhesive Tape Other (See Comments)   • Elastic Bandages & [Zinc] Other (See Comments)     \"Elastic in bra\"     Current Outpatient Medications "   Medication Sig Dispense Refill   • aspirin 81 MG chewable tablet Chew 81 mg Daily.     • atorvastatin (LIPITOR) 80 MG tablet Take 80 mg by mouth Daily.     • carvedilol (COREG) 12.5 MG tablet Take 12.5 mg by mouth 2 (Two) Times a Day With Meals.     • coenzyme Q10 100 MG capsule Take 100 mg by mouth Daily.     • gabapentin (NEURONTIN) 100 MG capsule Take 1 capsule by mouth 3 (Three) Times a Day.     • Insulin Glargine (BASAGLAR KWIKPEN) 100 UNIT/ML injection pen Inject 100 Units under the skin into the appropriate area as directed.     • Insulin Lispro (humaLOG) 100 UNIT/ML injection Inject 1 Units under the skin into the appropriate area as directed Daily.     • levothyroxine (SYNTHROID, LEVOTHROID) 25 MCG tablet Take 25 mcg by mouth Daily.     • vitamin B-12 (CYANOCOBALAMIN) 1000 MCG tablet Take 1,000 mcg by mouth Daily.     • albuterol (PROVENTIL HFA;VENTOLIN HFA) 108 (90 Base) MCG/ACT inhaler Inhale.     • montelukast (SINGULAIR) 10 MG tablet Take 10 mg by mouth Every Night.     • omeprazole (priLOSEC) 40 MG capsule Take 40 mg by mouth Daily.     • sertraline (ZOLOFT) 50 MG tablet Take 50 mg by mouth Daily.       No current facility-administered medications for this visit.     Review of Systems   Constitutional: Negative for chills and fever.   HENT: Negative for congestion.    Respiratory: Negative for shortness of breath.    Cardiovascular: Negative for chest pain and leg swelling.   Gastrointestinal: Negative for constipation, diarrhea, nausea and vomiting.   Musculoskeletal: Positive for gait problem. Negative for arthralgias and myalgias.   Skin: Negative for wound.   Neurological: Positive for numbness.       OBJECTIVE     Vitals:    10/31/22 1132   Pulse: 63   SpO2: 95%       PHYSICAL EXAM  GEN:   Accompanied by none.     Foot/Ankle Exam:       General:   Diabetic Foot Exam Performed    Appearance: appears stated age and healthy    Orientation: AAOx3    Affect: appropriate    Gait: unimpaired     Assistance: walker and independent    Shoe Gear:  Casual shoes    VASCULAR      Right Foot Vascularity   Dorsalis pedis:  2+  Posterior tibial:  2+  Skin Temperature: warm    Edema Grading:  None  CFT:  3  Pedal Hair Growth:  Present  Varicosities: spider veins       Left Foot Vascularity   Dorsalis pedis:  2+  Posterior tibial:  2+  Skin Temperature: warm    Edema Grading:  None  CFT:  3  Pedal Hair Growth:  Present  Varicosities: spider veins        NEUROLOGIC     Right Foot Neurologic   Light touch sensation:  Diminished  Vibratory sensation:  Diminished  Hot/Cold sensation: diminished    Protective Sensation using Wilton-Efren Monofilament:  6     Left Foot Neurologic   Light touch sensation:  Diminished  Vibratory sensation:  Diminished  Hot/cold sensation: diminished    Protective Sensation using Wilton-Efren Monofilament:  6     MUSCULOSKELETAL      Right Foot Musculoskeletal   Ecchymosis:  None  Tenderness: toenails    Arch:  Normal  Hallux valgus: Yes    Hallux limitus: No    Tailor's bunion: No       Left Foot Musculoskeletal   Ecchymosis:  None  Tenderness: toenails    Arch:  Normal  Hallux valgus: Yes    Hallux limitus: No    Tailor's bunion: No       MUSCLE STRENGTH     Right Foot Muscle Strength   Foot dorsiflexion:  5  Foot plantar flexion:  5  Foot inversion:  5  Foot eversion:  5     Left Foot Muscle Strength   Foot dorsiflexion:  5  Foot plantar flexion:  5  Foot inversion:  5  Foot eversion:  5     RANGE OF MOTION      Right Foot Range of Motion   Foot and ankle ROM within normal limits       Left Foot Range of Motion   Foot and ankle ROM within normal limits       DERMATOLOGIC     Right Foot Dermatologic   Skin: atrophic    Nails: onychomycosis, abnormally thick, subungual debris and dystrophic nails       Left Foot Dermatologic   Skin: atrophic    Nails: onychomycosis, abnormally thick, subungual debris and dystrophic nails        RADIOLOGY/NUCLEAR:  No results  found.    LABORATORY/CULTURE RESULTS:      PATHOLOGY RESULTS:       ASSESSMENT/PLAN     Diagnoses and all orders for this visit:    1. Onychomycosis (Primary)    2. Thickened nail    3. Type 2 diabetes mellitus with diabetic polyneuropathy, with long-term current use of insulin (HCC)    4. Encounter for diabetic foot exam ([facility])      Comprehensive lower extremity examination and evaluation was performed.  Discussed findings and treatment plan including risks, benefits, and treatment options with patient in detail. Patient agreed with treatment plan.  Diabetic foot exam performed.  After verbal consent obtained, nail(s) x10 debrided of length and thickness with nail nipper without incidence  Patient may maintain nails and calluses at home utilizing emery board or pumice stone between visits as needed  Reviewed at home diabetic foot care including daily foot checks   Continue diabetic monitoring and control under direction of PCP.   An After Visit Summary was printed and given to the patient at discharge, including (if requested) any available informative/educational handouts regarding diagnosis, treatment, or medications. All questions were answered to patient/family satisfaction. Should symptoms fail to improve or worsen they agree to call or return to clinic or to go to the Emergency Department. Discussed the importance of following up with any needed screening tests/labs/specialist appointments and any requested follow-up recommended by me today. Importance of maintaining follow-up discussed and patient accepts that missed appointments can delay diagnosis and potentially lead to worsening of conditions.  Return in about 3 months (around 1/31/2023)., or sooner if acute issues arise.    Lab Frequency Next Occurrence       This document has been electronically signed by MANOJ Hernandez on October 31, 2022 11:50 CDT

## 2022-10-31 ENCOUNTER — OFFICE VISIT (OUTPATIENT)
Dept: ENT CLINIC | Age: 82
End: 2022-10-31
Payer: MEDICARE

## 2022-10-31 ENCOUNTER — OFFICE VISIT (OUTPATIENT)
Dept: PODIATRY | Facility: CLINIC | Age: 82
End: 2022-10-31

## 2022-10-31 VITALS — BODY MASS INDEX: 26.03 KG/M2 | HEIGHT: 66 IN | WEIGHT: 162 LBS | HEART RATE: 63 BPM | OXYGEN SATURATION: 95 %

## 2022-10-31 VITALS
DIASTOLIC BLOOD PRESSURE: 64 MMHG | SYSTOLIC BLOOD PRESSURE: 114 MMHG | HEIGHT: 66 IN | BODY MASS INDEX: 26.2 KG/M2 | WEIGHT: 163 LBS

## 2022-10-31 DIAGNOSIS — E11.42 TYPE 2 DIABETES MELLITUS WITH DIABETIC POLYNEUROPATHY, WITH LONG-TERM CURRENT USE OF INSULIN: ICD-10-CM

## 2022-10-31 DIAGNOSIS — B35.1 ONYCHOMYCOSIS: Primary | ICD-10-CM

## 2022-10-31 DIAGNOSIS — H90.3 SENSORINEURAL HEARING LOSS (SNHL) OF BOTH EARS: ICD-10-CM

## 2022-10-31 DIAGNOSIS — H60.541 DERMATITIS OF RIGHT EAR CANAL: Primary | ICD-10-CM

## 2022-10-31 DIAGNOSIS — L60.2 THICKENED NAIL: ICD-10-CM

## 2022-10-31 DIAGNOSIS — Z79.4 TYPE 2 DIABETES MELLITUS WITH DIABETIC POLYNEUROPATHY, WITH LONG-TERM CURRENT USE OF INSULIN: ICD-10-CM

## 2022-10-31 DIAGNOSIS — E11.9 ENCOUNTER FOR DIABETIC FOOT EXAM: ICD-10-CM

## 2022-10-31 DIAGNOSIS — J34.89 SORE IN NOSE: ICD-10-CM

## 2022-10-31 PROCEDURE — 1123F ACP DISCUSS/DSCN MKR DOCD: CPT | Performed by: OTOLARYNGOLOGY

## 2022-10-31 PROCEDURE — G8484 FLU IMMUNIZE NO ADMIN: HCPCS | Performed by: OTOLARYNGOLOGY

## 2022-10-31 PROCEDURE — 3074F SYST BP LT 130 MM HG: CPT | Performed by: OTOLARYNGOLOGY

## 2022-10-31 PROCEDURE — G8399 PT W/DXA RESULTS DOCUMENT: HCPCS | Performed by: OTOLARYNGOLOGY

## 2022-10-31 PROCEDURE — 1090F PRES/ABSN URINE INCON ASSESS: CPT | Performed by: OTOLARYNGOLOGY

## 2022-10-31 PROCEDURE — 99203 OFFICE O/P NEW LOW 30 MIN: CPT | Performed by: NURSE PRACTITIONER

## 2022-10-31 PROCEDURE — 3078F DIAST BP <80 MM HG: CPT | Performed by: OTOLARYNGOLOGY

## 2022-10-31 PROCEDURE — G8417 CALC BMI ABV UP PARAM F/U: HCPCS | Performed by: OTOLARYNGOLOGY

## 2022-10-31 PROCEDURE — 4130F TOPICAL PREP RX AOE: CPT | Performed by: OTOLARYNGOLOGY

## 2022-10-31 PROCEDURE — 99213 OFFICE O/P EST LOW 20 MIN: CPT | Performed by: OTOLARYNGOLOGY

## 2022-10-31 PROCEDURE — 11721 DEBRIDE NAIL 6 OR MORE: CPT | Performed by: NURSE PRACTITIONER

## 2022-10-31 PROCEDURE — G8427 DOCREV CUR MEDS BY ELIG CLIN: HCPCS | Performed by: OTOLARYNGOLOGY

## 2022-10-31 PROCEDURE — 1036F TOBACCO NON-USER: CPT | Performed by: OTOLARYNGOLOGY

## 2022-10-31 RX ORDER — FLUOCINOLONE ACETONIDE 0.11 MG/ML
4 OIL AURICULAR (OTIC) 2 TIMES DAILY
Qty: 20 ML | Refills: 2 | Status: SHIPPED | OUTPATIENT
Start: 2022-10-31 | End: 2022-11-14

## 2022-10-31 RX ORDER — GABAPENTIN 100 MG/1
100 CAPSULE ORAL 3 TIMES DAILY
COMMUNITY

## 2022-10-31 RX ORDER — INSULIN GLARGINE 100 [IU]/ML
100 INJECTION, SOLUTION SUBCUTANEOUS
COMMUNITY

## 2022-10-31 RX ORDER — INSULIN LISPRO 100 [IU]/ML
1 INJECTION, SOLUTION INTRAVENOUS; SUBCUTANEOUS DAILY
COMMUNITY

## 2022-10-31 ASSESSMENT — ENCOUNTER SYMPTOMS
ALLERGIC/IMMUNOLOGIC NEGATIVE: 1
EYES NEGATIVE: 1
GASTROINTESTINAL NEGATIVE: 1
RESPIRATORY NEGATIVE: 1

## 2022-10-31 NOTE — PROGRESS NOTES
10/31/2022    Patrick Gaspar (:  1940) is a [de-identified] y.o. female, Established patient, here for evaluation of the following chief complaint(s):  Follow-up (Left ear)      Vitals:    10/31/22 1430   BP: 114/64   Weight: 163 lb (73.9 kg)   Height: 5' 6\" (1.676 m)       Wt Readings from Last 3 Encounters:   10/31/22 163 lb (73.9 kg)   22 160 lb (72.6 kg)   22 160 lb (72.6 kg)       BP Readings from Last 3 Encounters:   10/31/22 114/64   22 118/68   22 112/62         SUBJECTIVE/OBJECTIVE:    Patient seen today for her right ear and her nose. She said the steroid cream did help her right ear but she still has soreness in deep in her ear canal.  She also says her nose got worse but then got better now she has no symptoms. She says he can do this for it gets better and then gets worse again. She still using both medications. She says she has to adjust the use of her hearing aids due to the cream.      Review of Systems   Constitutional: Negative. HENT:  Positive for ear pain and hearing loss. Eyes: Negative. Respiratory: Negative. Cardiovascular: Negative. Gastrointestinal: Negative. Endocrine: Negative. Musculoskeletal: Negative. Skin: Negative. Allergic/Immunologic: Negative. Neurological: Negative. Hematological: Negative. Psychiatric/Behavioral: Negative. Physical Exam  Vitals reviewed. Constitutional:       Appearance: Normal appearance. She is normal weight. HENT:      Head: Normocephalic and atraumatic. Right Ear: Tympanic membrane, ear canal and external ear normal.      Left Ear: Tympanic membrane, ear canal and external ear normal.      Nose: Nose normal.      Mouth/Throat:      Mouth: Mucous membranes are moist.      Pharynx: Oropharynx is clear. Eyes:      Extraocular Movements: Extraocular movements intact. Pupils: Pupils are equal, round, and reactive to light.    Cardiovascular:      Rate and Rhythm: Normal rate and regular rhythm. Pulmonary:      Effort: Pulmonary effort is normal.      Breath sounds: Normal breath sounds. Musculoskeletal:      Cervical back: Normal range of motion. Skin:     General: Skin is warm and dry. Neurological:      General: No focal deficit present. Mental Status: She is alert and oriented to person, place, and time. Psychiatric:         Mood and Affect: Mood normal.         Behavior: Behavior normal.            ASSESSMENT/PLAN:    1. Dermatitis of right ear canal  2. Sensorineural hearing loss (SNHL) of both ears  3. Sore in nose  Continue the mupirocin in the nose and I will change her up to Derm otic for the right ear. Return in about 6 weeks (around 12/12/2022). An electronic signature was used to authenticate this note. Steven Hsu MD       Please note that this chart was generated using dragon dictation software. Although every effort was made to ensure the accuracy of this automated transcription, some errors in transcription may have occurred.

## 2022-12-05 DIAGNOSIS — F41.8 DEPRESSION WITH ANXIETY: ICD-10-CM

## 2022-12-05 RX ORDER — SERTRALINE HYDROCHLORIDE 25 MG/1
TABLET, FILM COATED ORAL
Qty: 90 TABLET | Refills: 3 | Status: SHIPPED | OUTPATIENT
Start: 2022-12-05

## 2022-12-20 ENCOUNTER — OFFICE VISIT (OUTPATIENT)
Dept: PRIMARY CARE CLINIC | Age: 82
End: 2022-12-20
Payer: MEDICARE

## 2022-12-20 VITALS
HEIGHT: 66 IN | WEIGHT: 169.8 LBS | TEMPERATURE: 96.5 F | HEART RATE: 73 BPM | SYSTOLIC BLOOD PRESSURE: 124 MMHG | OXYGEN SATURATION: 98 % | BODY MASS INDEX: 27.29 KG/M2 | DIASTOLIC BLOOD PRESSURE: 78 MMHG

## 2022-12-20 DIAGNOSIS — N18.31 TYPE 2 DIABETES MELLITUS WITH STAGE 3A CHRONIC KIDNEY DISEASE, WITHOUT LONG-TERM CURRENT USE OF INSULIN (HCC): ICD-10-CM

## 2022-12-20 DIAGNOSIS — E55.9 AVITAMINOSIS D: ICD-10-CM

## 2022-12-20 DIAGNOSIS — R53.83 OTHER FATIGUE: ICD-10-CM

## 2022-12-20 DIAGNOSIS — Z23 NEED FOR INFLUENZA VACCINATION: ICD-10-CM

## 2022-12-20 DIAGNOSIS — E11.22 TYPE 2 DIABETES MELLITUS WITH CHRONIC KIDNEY DISEASE, WITHOUT LONG-TERM CURRENT USE OF INSULIN, UNSPECIFIED CKD STAGE (HCC): ICD-10-CM

## 2022-12-20 DIAGNOSIS — R06.00 DYSPNEA, UNSPECIFIED TYPE: ICD-10-CM

## 2022-12-20 DIAGNOSIS — E03.9 ACQUIRED HYPOTHYROIDISM: ICD-10-CM

## 2022-12-20 DIAGNOSIS — J44.9 CHRONIC OBSTRUCTIVE PULMONARY DISEASE, UNSPECIFIED COPD TYPE (HCC): ICD-10-CM

## 2022-12-20 DIAGNOSIS — D64.9 ANEMIA, UNSPECIFIED TYPE: ICD-10-CM

## 2022-12-20 DIAGNOSIS — F41.8 DEPRESSION WITH ANXIETY: ICD-10-CM

## 2022-12-20 DIAGNOSIS — E11.22 TYPE 2 DIABETES MELLITUS WITH STAGE 3A CHRONIC KIDNEY DISEASE, WITHOUT LONG-TERM CURRENT USE OF INSULIN (HCC): ICD-10-CM

## 2022-12-20 DIAGNOSIS — E78.00 HYPERCHOLESTEROLEMIA: Primary | ICD-10-CM

## 2022-12-20 LAB
ALBUMIN SERPL-MCNC: 4.4 G/DL (ref 3.5–5.2)
ALP BLD-CCNC: 122 U/L (ref 35–104)
ALT SERPL-CCNC: 25 U/L (ref 5–33)
ANION GAP SERPL CALCULATED.3IONS-SCNC: 17 MMOL/L (ref 7–19)
AST SERPL-CCNC: 20 U/L (ref 5–32)
BACTERIA: NEGATIVE /HPF
BASOPHILS ABSOLUTE: 0.1 K/UL (ref 0–0.2)
BASOPHILS RELATIVE PERCENT: 0.5 % (ref 0–1)
BILIRUB SERPL-MCNC: 0.4 MG/DL (ref 0.2–1.2)
BILIRUBIN URINE: NEGATIVE
BLOOD, URINE: NEGATIVE
BUN BLDV-MCNC: 20 MG/DL (ref 8–23)
CALCIUM SERPL-MCNC: 9.6 MG/DL (ref 8.8–10.2)
CHLORIDE BLD-SCNC: 99 MMOL/L (ref 98–111)
CLARITY: CLEAR
CO2: 23 MMOL/L (ref 22–29)
COLOR: YELLOW
CREAT SERPL-MCNC: 1.1 MG/DL (ref 0.5–0.9)
CRYSTALS, UA: ABNORMAL /HPF
EOSINOPHILS ABSOLUTE: 0.2 K/UL (ref 0–0.6)
EOSINOPHILS RELATIVE PERCENT: 1.7 % (ref 0–5)
EPITHELIAL CELLS, UA: 0 /HPF (ref 0–5)
GFR SERPL CREATININE-BSD FRML MDRD: 50 ML/MIN/{1.73_M2}
GLUCOSE BLD-MCNC: 294 MG/DL (ref 74–109)
GLUCOSE URINE: =>1000 MG/DL
HBA1C MFR BLD: 10 %
HCT VFR BLD CALC: 42.6 % (ref 37–47)
HEMOGLOBIN: 14.5 G/DL (ref 12–16)
HYALINE CASTS: 1 /HPF (ref 0–8)
IMMATURE GRANULOCYTES #: 0 K/UL
KETONES, URINE: ABNORMAL MG/DL
LEUKOCYTE ESTERASE, URINE: NEGATIVE
LYMPHOCYTES ABSOLUTE: 1.4 K/UL (ref 1.1–4.5)
LYMPHOCYTES RELATIVE PERCENT: 15.3 % (ref 20–40)
MCH RBC QN AUTO: 32.2 PG (ref 27–31)
MCHC RBC AUTO-ENTMCNC: 34 G/DL (ref 33–37)
MCV RBC AUTO: 94.5 FL (ref 81–99)
MONOCYTES ABSOLUTE: 0.8 K/UL (ref 0–0.9)
MONOCYTES RELATIVE PERCENT: 8.9 % (ref 0–10)
NEUTROPHILS ABSOLUTE: 6.7 K/UL (ref 1.5–7.5)
NEUTROPHILS RELATIVE PERCENT: 73.4 % (ref 50–65)
NITRITE, URINE: NEGATIVE
PDW BLD-RTO: 13.9 % (ref 11.5–14.5)
PH UA: 5 (ref 5–8)
PLATELET # BLD: 225 K/UL (ref 130–400)
PMV BLD AUTO: 10 FL (ref 9.4–12.3)
POTASSIUM SERPL-SCNC: 3.8 MMOL/L (ref 3.5–5)
PRO-BNP: 568 PG/ML (ref 0–1800)
PROTEIN UA: NEGATIVE MG/DL
RBC # BLD: 4.51 M/UL (ref 4.2–5.4)
RBC UA: 0 /HPF (ref 0–4)
SODIUM BLD-SCNC: 139 MMOL/L (ref 136–145)
SPECIFIC GRAVITY UA: 1.03 (ref 1–1.03)
T4 FREE: 1.13 NG/DL (ref 0.93–1.7)
TOTAL PROTEIN: 6.9 G/DL (ref 6.6–8.7)
TSH SERPL DL<=0.05 MIU/L-ACNC: 3.29 UIU/ML (ref 0.27–4.2)
UROBILINOGEN, URINE: 0.2 E.U./DL
VITAMIN B-12: >2000 PG/ML (ref 211–946)
VITAMIN D 25-HYDROXY: 33 NG/ML
WBC # BLD: 9.2 K/UL (ref 4.8–10.8)
WBC UA: 0 /HPF (ref 0–5)

## 2022-12-20 PROCEDURE — 90694 VACC AIIV4 NO PRSRV 0.5ML IM: CPT | Performed by: FAMILY MEDICINE

## 2022-12-20 PROCEDURE — 99214 OFFICE O/P EST MOD 30 MIN: CPT | Performed by: FAMILY MEDICINE

## 2022-12-20 PROCEDURE — G8417 CALC BMI ABV UP PARAM F/U: HCPCS | Performed by: FAMILY MEDICINE

## 2022-12-20 PROCEDURE — G8427 DOCREV CUR MEDS BY ELIG CLIN: HCPCS | Performed by: FAMILY MEDICINE

## 2022-12-20 PROCEDURE — 83036 HEMOGLOBIN GLYCOSYLATED A1C: CPT | Performed by: FAMILY MEDICINE

## 2022-12-20 PROCEDURE — 3078F DIAST BP <80 MM HG: CPT | Performed by: FAMILY MEDICINE

## 2022-12-20 PROCEDURE — 3046F HEMOGLOBIN A1C LEVEL >9.0%: CPT | Performed by: FAMILY MEDICINE

## 2022-12-20 PROCEDURE — 1123F ACP DISCUSS/DSCN MKR DOCD: CPT | Performed by: FAMILY MEDICINE

## 2022-12-20 PROCEDURE — 3023F SPIROM DOC REV: CPT | Performed by: FAMILY MEDICINE

## 2022-12-20 PROCEDURE — G0008 ADMIN INFLUENZA VIRUS VAC: HCPCS | Performed by: FAMILY MEDICINE

## 2022-12-20 PROCEDURE — 1090F PRES/ABSN URINE INCON ASSESS: CPT | Performed by: FAMILY MEDICINE

## 2022-12-20 PROCEDURE — 1036F TOBACCO NON-USER: CPT | Performed by: FAMILY MEDICINE

## 2022-12-20 PROCEDURE — 3074F SYST BP LT 130 MM HG: CPT | Performed by: FAMILY MEDICINE

## 2022-12-20 PROCEDURE — G8484 FLU IMMUNIZE NO ADMIN: HCPCS | Performed by: FAMILY MEDICINE

## 2022-12-20 PROCEDURE — G8399 PT W/DXA RESULTS DOCUMENT: HCPCS | Performed by: FAMILY MEDICINE

## 2022-12-20 RX ORDER — INSULIN GLARGINE 100 [IU]/ML
26 INJECTION, SOLUTION SUBCUTANEOUS NIGHTLY
Qty: 8 ADJUSTABLE DOSE PRE-FILLED PEN SYRINGE | Refills: 3 | Status: SHIPPED | OUTPATIENT
Start: 2022-12-20

## 2022-12-20 RX ORDER — INSULIN LISPRO 100 [IU]/ML
INJECTION, SOLUTION INTRAVENOUS; SUBCUTANEOUS
Qty: 3 ADJUSTABLE DOSE PRE-FILLED PEN SYRINGE | Refills: 0 | Status: SHIPPED | OUTPATIENT
Start: 2022-12-20 | End: 2022-12-22

## 2022-12-20 NOTE — PROGRESS NOTES
[address] [facility]  [address] [place] [address]  Dept: [phone]  Dept Fax: [phone]  Loc: [phone]    Syeda Yang is a [de-identified] y.o. female who presents today for her medical conditions/complaints as noted below. Syeda Yang is here for Follow-up Chronic Condition (diabetes)        HPI:   CC: Here today to discuss the following:    [de-identified]year-old here for follow-up. She continues to have issues with chronic lower back and hip discomfort. Overall she describes the pain as mild to moderate. Worse with ambulation. She states it is manageable. Does take Tylenol from time to time. She also does not feel her sertraline is managing her depression. She lives in assisted living basically does not leave the residence. She feels this causes some depressed mood, apathy, low energy. 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 She does have some friends there but is requesting an increase in dose of her sertraline. Regarding her diabetes, she has not been using her Humalog with her meals. She admits to not following an ADA diet. She states she eats what food is available to her at her assisted living. She does admit to eating desserts almost daily. She has had 2 episodes of hypoglycemia since her last visit and is unsure why. She does not feel like she skips too many meals. HPI    Subjective:      Review of Systems   Constitutional:  Positive for fatigue. Negative for chills and fever. HENT:  Negative for congestion. Respiratory:  Positive for cough and shortness of breath. Negative for chest tightness. Cardiovascular:  Negative for chest pain, palpitations and leg swelling. Gastrointestinal:  Negative for abdominal pain, anal bleeding, constipation, diarrhea and nausea. Genitourinary:  Negative for difficulty urinating. Psychiatric/Behavioral: Negative. Negative for sleep disturbance.         SeeHPI for visit specific review of symptoms. All others negative      Objective:   /78 (Site: Left Upper Arm, Position: Sitting)   Pulse 73   Temp (!) 96.5 °F (35.8 °C) (Temporal)   Ht 5' 6\" (1.676 m)   Wt 169 lb 12.8 oz (77 kg)   SpO2 98%   BMI 27.41 kg/m²   Physical Exam  Constitutional:       Appearance: She is well-developed. She is not ill-appearing. Cardiovascular:      Rate and Rhythm: Normal rate and regular rhythm. Heart sounds: No murmur heard. No friction rub. Pulmonary:      Effort: Pulmonary effort is normal. No respiratory distress. Breath sounds: Normal breath sounds. No wheezing or rales. Abdominal:      General: There is no distension. Palpations: Abdomen is soft. Tenderness: There is no abdominal tenderness. Musculoskeletal:      Cervical back: Neck supple. No tenderness. Lymphadenopathy:      Cervical: No cervical adenopathy. Neurological:      Mental Status: She is alert.    Psychiatric:         Behavior: Behavior normal.         Recent Results (from the past 672 hour(s))   POCT glycosylated hemoglobin (Hb A1C)    Collection Time: 12/20/22 12:00 AM   Result Value Ref Range    Hemoglobin A1C 10.0 %   Vitamin D 25 Hydroxy    Collection Time: 12/20/22  1:11 PM   Result Value Ref Range    Vit D, 25-Hydroxy 33.0 >=30 ng/mL   Vitamin B12    Collection Time: 12/20/22  1:11 PM   Result Value Ref Range    Vitamin B-12 >2000 (H) 211 - 946 pg/mL   Brain Natriuretic Peptide    Collection Time: 12/20/22  1:11 PM   Result Value Ref Range    Pro- 0 - 1,800 pg/mL   Urinalysis with Microscopic    Collection Time: 12/20/22  1:11 PM   Result Value Ref Range    Color, UA YELLOW Straw/Yellow    Clarity, UA Clear Clear    Glucose, Ur =>1000 Negative mg/dL    Bilirubin Urine Negative Negative    Ketones, Urine TRACE (A) Negative mg/dL    Specific Gravity, UA 1.031 1.005 - 1.030    Blood, Urine Negative Negative    pH, UA 5.0 5.0 - 8.0    Protein, UA Negative Negative mg/dL    Urobilinogen, Urine 0.2 <2.0 E.U./dL    Nitrite, Urine Negative Negative    Leukocyte Esterase, Urine Negative Negative    Bacteria, UA NEGATIVE (A) None Seen /HPF    Crystals, UA NEG (A) None Seen /HPF    Hyaline Casts, UA 1 0 - 8 /HPF    WBC, UA 0 0 - 5 /HPF    RBC, UA 0 0 - 4 /HPF    Epithelial Cells, UA 0 0 - 5 /HPF   Comprehensive Metabolic Panel    Collection Time: 12/20/22  1:11 PM   Result Value Ref Range    Sodium 139 136 - 145 mmol/L    Potassium 3.8 3.5 - 5.0 mmol/L    Chloride 99 98 - 111 mmol/L    CO2 23 22 - 29 mmol/L    Anion Gap 17 7 - 19 mmol/L    Glucose 294 (H) 74 - 109 mg/dL    BUN 20 8 - 23 mg/dL    Creatinine 1.1 (H) 0.5 - 0.9 mg/dL    Est, Glom Filt Rate 50 (A) >60    Calcium 9.6 8.8 - 10.2 mg/dL    Total Protein 6.9 6.6 - 8.7 g/dL    Albumin 4.4 3.5 - 5.2 g/dL    Total Bilirubin 0.4 0.2 - 1.2 mg/dL    Alkaline Phosphatase 122 (H) 35 - 104 U/L    ALT 25 5 - 33 U/L    AST 20 5 - 32 U/L   CBC with Auto Differential    Collection Time: 12/20/22  1:11 PM   Result Value Ref Range    WBC 9.2 4.8 - 10.8 K/uL    RBC 4.51 4.20 - 5.40 M/uL    Hemoglobin 14.5 12.0 - 16.0 g/dL    Hematocrit 42.6 37.0 - 47.0 %    MCV 94.5 81.0 - 99.0 fL    MCH 32.2 (H) 27.0 - 31.0 pg    MCHC 34.0 33.0 - 37.0 g/dL    RDW 13.9 11.5 - 14.5 %    Platelets 501 965 - 668 K/uL    MPV 10.0 9.4 - 12.3 fL    Neutrophils % 73.4 (H) 50.0 - 65.0 %    Lymphocytes % 15.3 (L) 20.0 - 40.0 %    Monocytes % 8.9 0.0 - 10.0 %    Eosinophils % 1.7 0.0 - 5.0 %    Basophils % 0.5 0.0 - 1.0 %    Neutrophils Absolute 6.7 1.5 - 7.5 K/uL    Immature Granulocytes # 0.0 K/uL    Lymphocytes Absolute 1.4 1.1 - 4.5 K/uL    Monocytes Absolute 0.80 0.00 - 0.90 K/uL    Eosinophils Absolute 0.20 0.00 - 0.60 K/uL    Basophils Absolute 0.10 0.00 - 0.20 K/uL   TSH    Collection Time: 12/20/22  1:11 PM   Result Value Ref Range    TSH 3.290 0.270 - 4.200 uIU/mL   T4, Free    Collection Time: 12/20/22  1:11 PM   Result Value Ref Range    T4 Free 1.13 0.93 - 1.70 ng/dL Impression   The previously seen 6 mm solid nodule in the left lower   lobe is not visualized in this study. Another follow-up examination in   6 months is recommended in case this nodule is missed between the CT   cuts. A tiny stable nodule in the right upper lobe. Chronic inflammatory lung changes. Signed by Dr Jose Manuel Diaz on 11/2/2018 8:35 AM       Impression    1. No evidence of pulmonary embolus. 2. There is cardiomegaly with mild pulmonary vascular congestion. Patchy   bibasilar atelectasis is present. There is some bilateral bronchial wall   thickening. No evidence of acute lobar pneumonia or effusion. 3. Extensive coronary calcifications are present. No evidence of   pericardial effusion. .       This report was finalized on 10/10/2020 16:19 by Dr. Alissa Bond MD.  Narrative    CT chest angiogram dated 10/10/2020 3:42 PM CDT     HISTORY: Chest pain     COMPARISON: None. DLP: 258 mGy cm. Automated exposure control was utilized to diminish   patient radiation dose. TECHNIQUE: Helical tomographic images of the chest were obtained after   the administration of intravenous contrast following angiogram protocol. Additionally, 3D MIP reconstructions in the coronal and sagittal planes   were provided. FINDINGS:     The imaged portion of the base of the neck appears unremarkable. There is adequate enhancement of the pulmonary arteries to evaluate for   central and segmental pulmonary emboli. There are no filling defects   within the main, lobar, segmental or visualized subsegmental pulmonary   arteries. The pulmonary vessels are within normal limits. There is scarring within the apices. There is evidence of remote   granulomatous disease. There is dependent atelectasis within the lung   bases. No evidence of consolidative pneumonia. There is mild bronchial   wall thickening. There is cardiomegaly with mild pulmonary vascular   congestion. . The trachea and bronchial tree are patent. There is cardiomegaly. Stents and coronary calcifications are present in   the LAD and circumflex distribution as well as within the right coronary   distribution. . There is no pericardial effusion. No enlarged axillary or mediastinal lymph nodes are present. The osseous structures of the thorax and surrounding soft tissues   demonstrate no acute process. The imaged portion of the upper abdomen appears unremarkable. Procedure Note    Luisa Lux MD - 10/10/2020   Formatting of this note might be different from the original.   CT chest angiogram dated 10/10/2020 3:42 PM CDT     HISTORY: Chest pain     COMPARISON: None. DLP: 258 mGy cm. Automated exposure control was utilized to diminish   patient radiation dose. TECHNIQUE: Helical tomographic images of the chest were obtained after   the administration of intravenous contrast following angiogram protocol. Additionally, 3D MIP reconstructions in the coronal and sagittal planes   were provided. FINDINGS:     The imaged portion of the base of the neck appears unremarkable. There is adequate enhancement of the pulmonary arteries to evaluate for   central and segmental pulmonary emboli. There are no filling defects   within the main, lobar, segmental or visualized subsegmental pulmonary   arteries. The pulmonary vessels are within normal limits. There is scarring within the apices. There is evidence of remote   granulomatous disease. There is dependent atelectasis within the lung   bases. No evidence of consolidative pneumonia. There is mild bronchial   wall thickening. There is cardiomegaly with mild pulmonary vascular   congestion. . The trachea and bronchial tree are patent. There is cardiomegaly. Stents and coronary calcifications are present in   the LAD and circumflex distribution as well as within the right coronary   distribution. . There is no pericardial effusion.      No enlarged axillary or mediastinal lymph nodes are present. The osseous structures of the thorax and surrounding soft tissues   demonstrate no acute process. The imaged portion of the upper abdomen appears unremarkable. IMPRESSION:   1. No evidence of pulmonary embolus. 2. There is cardiomegaly with mild pulmonary vascular congestion. Patchy   bibasilar atelectasis is present. There is some bilateral bronchial wall   thickening. No evidence of acute lobar pneumonia or effusion. 3. Extensive coronary calcifications are present. No evidence of   pericardial effusion. .       This report was finalized on 10/10/2020 16:19 by Dr. Veronica Esteves MD.  Exam End: 10/10/20 15:50    Specimen Collected: 10/10/20 16:11 Last Resulted: 10/10/20 16:19   Received From: [facility]  Result Received: 04/18/22 14:15       Assessment & Plan: The following diagnoses and conditions are stable with no further orders unless indicated:  1. Type 2 diabetes mellitus with chronic kidney disease, without long-term current use of insulin, unspecified CKD stage [facility])  Lab Results   Component Value Date    LABA1C 10.0 12/20/2022    LABA1C 8.6 04/18/2022    LABA1C 8.1 (H) 11/21/2021     Lab Results   Component Value Date    LABMICR 1.60 04/18/2022    LDLCALC see below 04/18/2022    CREATININE 1.1 (H) 12/20/2022   Poor glucose control. Does not follow an ADA diet  Does not wish to stop eating desserts  Does not restrict her sugar intake  She has not been taking her mealtime Humalog  Suggested we increase Basaglar to 26 units at night  Encouraged her to add Humalog to her largest meal per day which apparently is breakfast.  Continue checking blood sugar nightly and before every meal and will review next visit    - POCT glycosylated hemoglobin (Hb A1C)  - insulin lispro, 1 Unit Dial, (HUMALOG KWIKPEN) 100 UNIT/ML SOPN; 10 units before largest meal (lunch)  Dispense: 3 Adjustable Dose Pre-filled Pen Syringe;  Refill: 0  - insulin glargine (BASAGLAR KWIKPEN) 100 UNIT/ML injection pen; Inject 26 Units into the skin nightly  Dispense: 8 Adjustable Dose Pre-filled Pen Syringe; Refill: 3    2. Hypercholesterolemia  Continue with atorvastatin 80 mg daily    3. Acquired hypothyroidism  Lab Results   Component Value Date    TSH 3.290 12/20/2022    T4FREE 1.13 12/20/2022   Stable      4. Dyspnea, unspecified type  No results found for: BNP  BMP within normal limits likely multifactorial recommended she increase her activity  - Brain Natriuretic Peptide; Future    5. Other fatigue    - T4, Free; Future  - TSH; Future  - CBC with Auto Differential; Future  - Comprehensive Metabolic Panel; Future  - Urinalysis with Microscopic; Future    6. Depression with anxiety  Increase sertraline to 50 mg daily  - sertraline (ZOLOFT) 50 MG tablet; Take 1 tablet by mouth daily  Dispense: 90 tablet; Refill: 3    7. Chronic obstructive pulmonary disease, unspecified COPD type ([address]  Stable    8. Anemia, unspecified type  Lab Results   Component Value Date    HGB 14.5 12/20/2022     Lab Results   Component Value Date    SXYGJELP32 >2000 (H) 12/20/2022         - Vitamin B12; Future    9. Avitaminosis D  Lab Results   Component Value Date/Time    VITD25 33.0 12/20/2022 01:11 PM      Stable  - Vitamin D 25 Hydroxy; Future    10. Type 2 diabetes mellitus with stage 3a chronic kidney disease, without long-term current use of insulin (HCC)  As above  - insulin lispro, 1 Unit Dial, (HUMALOG KWIKPEN) 100 UNIT/ML SOPN; 10 units before largest meal (lunch)  Dispense: 3 Adjustable Dose Pre-filled Pen Syringe; Refill: 0    11.  Need for influenza vaccination    - Influenza, FLUAD, (age 72 y+), IM, Preservative Free, 0.5 mL        Orders Placed This Encounter   Procedures    Influenza, FLUAD, (age 72 y+), IM, Preservative Free, 0.5 mL    T4, Free     Standing Status:   Future     Number of Occurrences:   1     Standing Expiration Date:   12/20/2023    TSH     Standing Status: Future     Number of Occurrences:   1     Standing Expiration Date:   12/20/2023    CBC with Auto Differential     Standing Status:   Future     Number of Occurrences:   1     Standing Expiration Date:   12/20/2023    Comprehensive Metabolic Panel     Standing Status:   Future     Number of Occurrences:   1     Standing Expiration Date:   12/20/2023    Urinalysis with Microscopic     Standing Status:   Future     Number of Occurrences:   1     Standing Expiration Date:   12/20/2023     Order Specific Question:   SPECIFY(EX-CATH,MIDSTREAM,CYSTO,ETC)? Answer:   Midstream    Brain Natriuretic Peptide     Standing Status:   Future     Number of Occurrences:   1     Standing Expiration Date:   12/20/2023    Vitamin B12     Standing Status:   Future     Number of Occurrences:   1     Standing Expiration Date:   12/20/2023    Vitamin D 25 Hydroxy     Standing Status:   Future     Number of Occurrences:   1     Standing Expiration Date:   12/20/2023    POCT glycosylated hemoglobin (Hb A1C)         Return in about 3 months (around 3/20/2023) for Routine follow up - 20 minutes. Discussed use, benefit, and side effects of prescribed medications. All patient questions answered. Pt voiced understanding. Reviewed health maintenance. Instructedto continue current medications, diet and exercise. Patient agreed with treatmentplan.  Follow up as directed.     _______________________________________________________________      Past Medical History:   Diagnosis Date    Acute CVA (cerebrovascular accident) ([facility] [address] 11/20/2021    Arthritis     Psoriatic    Asthma     CAD (coronary artery disease)     CHF (congestive heart failure) (HCC)     COPD (chronic obstructive pulmonary disease) (HCC)     DM (diabetes mellitus) ([facility] [address]     GERD (gastroesophageal reflux disease)     HTN (hypertension)     Hyperlipidemia     MI (myocardial infarction) ([facility] [address]     x2    Thyroid disease       Past Surgical History:   Procedure Laterality Date    CATARACT REMOVAL      CHOLECYSTECTOMY      CORONARY ANGIOPLASTY  06/2018    Angioplasty to in-stent restenosis to the distal LAD    HIP SURGERY      HYSTERECTOMY (CERVIX STATUS UNKNOWN)      JOINT REPLACEMENT Right     Right knee replacement    OVARY REMOVAL         Family History   Problem Relation Age of Onset    Heart Disease Mother     Heart Disease Father        Social History     Tobacco Use    Smoking status: Never    Smokeless tobacco: Never   Substance Use Topics    Alcohol use: No     Current Outpatient Medications   Medication Sig Dispense Refill    sertraline (ZOLOFT) 50 MG tablet Take 1 tablet by mouth daily 90 tablet 3    insulin lispro, 1 Unit Dial, (HUMALOG KWIKPEN) 100 UNIT/ML SOPN 10 units before largest meal (lunch) 3 Adjustable Dose Pre-filled Pen Syringe 0    insulin glargine (BASAGLAR KWIKPEN) 100 UNIT/ML injection pen Inject 26 Units into the skin nightly 8 Adjustable Dose Pre-filled Pen Syringe 3    carvedilol (COREG) 12.5 MG tablet Take 1 tablet by mouth 2 times daily (with meals) 180 tablet 1    Insulin Pen Needle 32G X 6 MM MISC 1 each by Does not apply route daily 100 each 1    gabapentin (NEURONTIN) 100 MG capsule       atorvastatin (LIPITOR) 80 MG tablet Take 1 tablet by mouth nightly 90 tablet 3    levothyroxine (SYNTHROID) 25 MCG tablet Take 1 tablet by mouth daily 90 tablet 1    Multiple Vitamin (MULTIVITAMIN PO) Take by mouth      nitroGLYCERIN (NITROSTAT) 0.4 MG SL tablet Place 1 tablet under the tongue every 5 minutes as needed for Chest pain up to max of 3 total doses. If no relief after 1 dose, call 911. 25 tablet 3    glucose monitoring (FREESTYLE FREEDOM) kit 1 kit by Does not apply route daily 1 kit 0    blood glucose monitor strips Test 3 times a day & as needed for symptoms of irregular blood glucose. Dispense sufficient amount for indicated testing frequency plus additional to accommodate PRN testing needs.  400 strip 2    aspirin 81 MG EC tablet Take 1 tablet by mouth daily 30 tablet 0    vitamin B-12 (CYANOCOBALAMIN) 1000 MCG tablet Take 1 tablet by mouth daily 30 tablet 0    Coenzyme Q10 (CO Q 10) 10 MG CAPS Take 1 capsule by mouth daily 30 capsule 0    Probiotic Product (ALIGN) 4 MG CAPS Once capsule daily 30 capsule 5     No current facility-administered medications for this visit. Allergies   Allergen Reactions    Zinc Other (See Comments)     \"Elastic in bra\"    Tape [Adhesive Tape] Rash       Health Maintenance   Topic Date Due    Colorectal Cancer Screen  Never done    COVID-19 Vaccine (3 - Booster for Pfizer series) 03/29/2021    DTaP/Tdap/Td vaccine (1 - Tdap) 11/20/2027 (Originally 11/21/2017)    Lipids  04/18/2023    Breast cancer screen  05/05/2023    Depression Monitoring  08/01/2023    Annual Wellness Visit (AWV)  08/02/2023    DEXA (modify frequency per FRAX score)  Completed    Flu vaccine  Completed    Shingles vaccine  Completed    Pneumococcal 65+ years Vaccine  Completed    Hepatitis A vaccine  Aged Out    Hib vaccine  Aged Out    Meningococcal (ACWY) vaccine  Aged Out       _______________________________________________________________    Note dictated using Dragon Dictation software  Sometimes this dictation software makes erroneous transcriptions.

## 2022-12-21 ASSESSMENT — ENCOUNTER SYMPTOMS
ABDOMINAL PAIN: 0
CHEST TIGHTNESS: 0
SHORTNESS OF BREATH: 1
CONSTIPATION: 0
ANAL BLEEDING: 0
DIARRHEA: 0
COUGH: 1
NAUSEA: 0

## 2022-12-22 RX ORDER — INSULIN LISPRO 100 [IU]/ML
INJECTION, SOLUTION INTRAVENOUS; SUBCUTANEOUS
Qty: 9 ML | Refills: 3 | Status: SHIPPED | OUTPATIENT
Start: 2022-12-22

## 2023-01-12 NOTE — PROGRESS NOTES
"    [facility] - PODIATRY    Today's Date: 01/23/2023     Patient Name: Kary Alcaraz  MRN: [id number]  CSN: [id number]  PCP: Nate Whitley MD  Referring Provider: No ref. provider found    SUBJECTIVE     Chief Complaint   Patient presents with   • Follow-up     Nate Whitley MD-12/20/2022-3 MONTH FU DIABETIC NAIL CARE-pt is here today for diabetic nail care-pt denies pain    • Diabetes     182 Mg/dl BG     HPI: Kary Alcaraz, a 82 y.o.female, comes to clinic as a(n) established patient presenting for diabetic foot exam and complaining of painful toenails. Patient has h/o DM, HTN, GERD. Patient is NIDDM with last stated BG level of 182mg/dl. Patient presents with complaints of thickened, irregular toenail growth of both feet. States she is diabetic with mild numbness and tingling in feet. States she recently purchased compression stockings and has been wearing them at night. Feels like she has been retaining fluid. Denies pain today. Relates previous treatment(s) including care by podiatry. Denies any constitutional symptoms. No other pedal complaints at this time.     History reviewed. No pertinent past medical history.  History reviewed. No pertinent surgical history.  History reviewed. No pertinent family history.  Social History     Socioeconomic History   • Marital status:    Tobacco Use   • Smoking status: Never     Passive exposure: Never   • Smokeless tobacco: Never   Vaping Use   • Vaping Use: Never used   Substance and Sexual Activity   • Alcohol use: Never   • Drug use: No   • Sexual activity: Defer     Allergies   Allergen Reactions   • Adhesive Tape Other (See Comments)   • Elastic Bandages & [Zinc] Other (See Comments)     \"Elastic in bra\"     Current Outpatient Medications   Medication Sig Dispense Refill   • albuterol (PROVENTIL HFA;VENTOLIN HFA) 108 (90 Base) MCG/ACT inhaler Inhale.     • aspirin 81 MG chewable tablet Chew 81 mg Daily.     • atorvastatin (LIPITOR) " 80 MG tablet Take 80 mg by mouth Daily.     • carvedilol (COREG) 12.5 MG tablet Take 12.5 mg by mouth 2 (Two) Times a Day With Meals.     • coenzyme Q10 100 MG capsule Take 100 mg by mouth Daily.     • gabapentin (NEURONTIN) 100 MG capsule Take 1 capsule by mouth 3 (Three) Times a Day.     • Insulin Glargine (BASAGLAR KWIKPEN) 100 UNIT/ML injection pen Inject 100 Units under the skin into the appropriate area as directed.     • Insulin Lispro (humaLOG) 100 UNIT/ML injection Inject 1 Units under the skin into the appropriate area as directed Daily.     • levothyroxine (SYNTHROID, LEVOTHROID) 25 MCG tablet Take 25 mcg by mouth Daily.     • montelukast (SINGULAIR) 10 MG tablet Take 10 mg by mouth Every Night.     • omeprazole (priLOSEC) 40 MG capsule Take 40 mg by mouth Daily.     • sertraline (ZOLOFT) 50 MG tablet Take 50 mg by mouth Daily.     • vitamin B-12 (CYANOCOBALAMIN) 1000 MCG tablet Take 1,000 mcg by mouth Daily.       No current facility-administered medications for this visit.     Review of Systems   Constitutional: Negative for chills and fever.   HENT: Negative for congestion.    Respiratory: Negative for shortness of breath.    Cardiovascular: Positive for leg swelling. Negative for chest pain.   Gastrointestinal: Negative for constipation, diarrhea, nausea and vomiting.   Musculoskeletal: Positive for gait problem. Negative for arthralgias and myalgias.   Skin: Negative for wound.   Neurological: Positive for numbness.       OBJECTIVE     Vitals:    01/23/23 1429   BP: 138/72   Pulse: 76   SpO2: 97%       PHYSICAL EXAM  GEN:   Accompanied by none.     Foot/Ankle Exam:       General:   Diabetic Foot Exam Performed    Appearance: appears stated age and healthy    Orientation: AAOx3    Affect: appropriate    Gait: unimpaired    Assistance: walker and independent    Shoe Gear:  Diabetic shoes    VASCULAR      Right Foot Vascularity   Dorsalis pedis:  2+  Posterior tibial:  2+  Skin Temperature: warm     Edema Grading:  None  CFT:  3  Pedal Hair Growth:  Present  Varicosities: spider veins       Left Foot Vascularity   Dorsalis pedis:  2+  Posterior tibial:  2+  Skin Temperature: warm    Edema Grading:  None  CFT:  3  Pedal Hair Growth:  Present  Varicosities: spider veins        NEUROLOGIC     Right Foot Neurologic   Light touch sensation:  Diminished  Vibratory sensation:  Diminished  Hot/Cold sensation: diminished    Protective Sensation using [place]fren Monofilament:  6     Left Foot Neurologic   Light touch sensation:  Diminished  Vibratory sensation:  Diminished  Hot/cold sensation: diminished    Protective Sensation using [place]fren Monofilament:  6     MUSCULOSKELETAL      Right Foot Musculoskeletal   Ecchymosis:  None  Tenderness: toenails    Arch:  Normal  Hallux valgus: Yes    Hallux limitus: No    Tailor's bunion: No       Left Foot Musculoskeletal   Ecchymosis:  None  Tenderness: toenails    Arch:  Normal  Hallux valgus: Yes    Hallux limitus: No    Tailor's bunion: No       MUSCLE STRENGTH     Right Foot Muscle Strength   Foot dorsiflexion:  5  Foot plantar flexion:  5  Foot inversion:  5  Foot eversion:  5     Left Foot Muscle Strength   Foot dorsiflexion:  5  Foot plantar flexion:  5  Foot inversion:  5  Foot eversion:  5     RANGE OF MOTION      Right Foot Range of Motion   Foot and ankle ROM within normal limits       Left Foot Range of Motion   Foot and ankle ROM within normal limits       DERMATOLOGIC     Right Foot Dermatologic   Skin: atrophic    Nails: onychomycosis, abnormally thick, subungual debris and dystrophic nails       Left Foot Dermatologic   Skin: atrophic    Nails: onychomycosis, abnormally thick, subungual debris and dystrophic nails        RADIOLOGY/NUCLEAR:  No results found.    LABORATORY/CULTURE RESULTS:      PATHOLOGY RESULTS:       ASSESSMENT/PLAN     Diagnoses and all orders for this visit:    1. Onychomycosis (Primary)    2. Thickened nail    3. Type 2  diabetes mellitus with diabetic polyneuropathy, with long-term current use of insulin (HCC)    4. Encounter for diabetic foot exam (HCC)      Comprehensive lower extremity examination and evaluation was performed.  Discussed findings and treatment plan including risks, benefits, and treatment options with patient in detail. Patient agreed with treatment plan.  Diabetic foot exam performed.  After verbal consent obtained, nail(s) x10 debrided of length and thickness with nail nipper without incidence  Patient may maintain nails and calluses at home utilizing emery board or pumice stone between visits as needed  Reviewed at home diabetic foot care including daily foot checks   Continue diabetic monitoring and control under direction of PCP.   May utilize compression stockings as desired. Recommend using throughout the day and not at night.   An After Visit Summary was printed and given to the patient at discharge, including (if requested) any available informative/educational handouts regarding diagnosis, treatment, or medications. All questions were answered to patient/family satisfaction. Should symptoms fail to improve or worsen they agree to call or return to clinic or to go to the Emergency Department. Discussed the importance of following up with any needed screening tests/labs/specialist appointments and any requested follow-up recommended by me today. Importance of maintaining follow-up discussed and patient accepts that missed appointments can delay diagnosis and potentially lead to worsening of conditions.  Return in about 3 months (around 4/23/2023)., or sooner if acute issues arise.    Lab Frequency Next Occurrence       This document has been electronically signed by MANOJ Hernandez on January 23, 2023 14:53 CST

## 2023-01-16 DIAGNOSIS — E11.22 TYPE 2 DIABETES MELLITUS WITH STAGE 3A CHRONIC KIDNEY DISEASE, WITHOUT LONG-TERM CURRENT USE OF INSULIN (HCC): ICD-10-CM

## 2023-01-16 DIAGNOSIS — N18.31 TYPE 2 DIABETES MELLITUS WITH STAGE 3A CHRONIC KIDNEY DISEASE, WITHOUT LONG-TERM CURRENT USE OF INSULIN (HCC): ICD-10-CM

## 2023-01-17 RX ORDER — BLOOD-GLUCOSE METER
KIT MISCELLANEOUS
Qty: 100 EACH | Refills: 5 | Status: SHIPPED | OUTPATIENT
Start: 2023-01-17

## 2023-01-20 ENCOUNTER — TELEPHONE (OUTPATIENT)
Dept: PODIATRY | Facility: CLINIC | Age: 83
End: 2023-01-20
Payer: MEDICARE

## 2023-01-20 NOTE — TELEPHONE ENCOUNTER
Called patient to confirmed appointment for 01/23/2023 @ 0060 with Tim ARANDA. Patient will be here for appointment.

## 2023-01-23 ENCOUNTER — OFFICE VISIT (OUTPATIENT)
Dept: PODIATRY | Facility: CLINIC | Age: 83
End: 2023-01-23
Payer: MEDICARE

## 2023-01-23 VITALS
SYSTOLIC BLOOD PRESSURE: 138 MMHG | OXYGEN SATURATION: 97 % | DIASTOLIC BLOOD PRESSURE: 72 MMHG | WEIGHT: 165 LBS | HEART RATE: 76 BPM | HEIGHT: 66 IN | BODY MASS INDEX: 26.52 KG/M2

## 2023-01-23 DIAGNOSIS — L60.2 THICKENED NAIL: ICD-10-CM

## 2023-01-23 DIAGNOSIS — E11.9 ENCOUNTER FOR DIABETIC FOOT EXAM: ICD-10-CM

## 2023-01-23 DIAGNOSIS — E11.42 TYPE 2 DIABETES MELLITUS WITH DIABETIC POLYNEUROPATHY, WITH LONG-TERM CURRENT USE OF INSULIN: ICD-10-CM

## 2023-01-23 DIAGNOSIS — Z79.4 TYPE 2 DIABETES MELLITUS WITH DIABETIC POLYNEUROPATHY, WITH LONG-TERM CURRENT USE OF INSULIN: ICD-10-CM

## 2023-01-23 DIAGNOSIS — B35.1 ONYCHOMYCOSIS: Primary | ICD-10-CM

## 2023-01-23 PROCEDURE — 11721 DEBRIDE NAIL 6 OR MORE: CPT | Performed by: NURSE PRACTITIONER

## 2023-01-23 PROCEDURE — 99213 OFFICE O/P EST LOW 20 MIN: CPT | Performed by: NURSE PRACTITIONER

## 2023-03-05 ENCOUNTER — APPOINTMENT (OUTPATIENT)
Dept: GENERAL RADIOLOGY | Age: 83
End: 2023-03-05
Payer: MEDICARE

## 2023-03-05 ENCOUNTER — HOSPITAL ENCOUNTER (EMERGENCY)
Age: 83
Discharge: HOME | End: 2023-03-05
Attending: EMERGENCY MEDICINE
Payer: MEDICARE

## 2023-03-05 VITALS
RESPIRATION RATE: 17 BRPM | OXYGEN SATURATION: 95 % | SYSTOLIC BLOOD PRESSURE: 167 MMHG | BODY MASS INDEX: 27.16 KG/M2 | HEART RATE: 87 BPM | DIASTOLIC BLOOD PRESSURE: 87 MMHG | TEMPERATURE: 98.4 F | HEIGHT: 66 IN | WEIGHT: 169 LBS

## 2023-03-05 DIAGNOSIS — M25.561 ACUTE PAIN OF RIGHT KNEE: ICD-10-CM

## 2023-03-05 DIAGNOSIS — J18.9 PNEUMONIA DUE TO INFECTIOUS ORGANISM, UNSPECIFIED LATERALITY, UNSPECIFIED PART OF LUNG: Primary | ICD-10-CM

## 2023-03-05 LAB
ALBUMIN SERPL-MCNC: 4.1 G/DL (ref 3.5–5.2)
ALP BLD-CCNC: 110 U/L (ref 35–104)
ALT SERPL-CCNC: 29 U/L (ref 5–33)
ANION GAP SERPL CALCULATED.3IONS-SCNC: 15 MMOL/L (ref 7–19)
AST SERPL-CCNC: 43 U/L (ref 5–32)
BASOPHILS ABSOLUTE: 0 K/UL (ref 0–0.2)
BASOPHILS RELATIVE PERCENT: 0.2 % (ref 0–1)
BILIRUB SERPL-MCNC: 0.7 MG/DL (ref 0.2–1.2)
BUN BLDV-MCNC: 13 MG/DL (ref 8–23)
CALCIUM SERPL-MCNC: 8.9 MG/DL (ref 8.8–10.2)
CHLORIDE BLD-SCNC: 101 MMOL/L (ref 98–111)
CO2: 19 MMOL/L (ref 22–29)
CREAT SERPL-MCNC: 0.8 MG/DL (ref 0.5–0.9)
EOSINOPHILS ABSOLUTE: 0.3 K/UL (ref 0–0.6)
EOSINOPHILS RELATIVE PERCENT: 3.4 % (ref 0–5)
GFR SERPL CREATININE-BSD FRML MDRD: >60 ML/MIN/{1.73_M2}
GLUCOSE BLD-MCNC: 224 MG/DL (ref 74–109)
HCT VFR BLD CALC: 40.8 % (ref 37–47)
HEMOGLOBIN: 13.8 G/DL (ref 12–16)
IMMATURE GRANULOCYTES #: 0 K/UL
LYMPHOCYTES ABSOLUTE: 1 K/UL (ref 1.1–4.5)
LYMPHOCYTES RELATIVE PERCENT: 10.7 % (ref 20–40)
MCH RBC QN AUTO: 30.9 PG (ref 27–31)
MCHC RBC AUTO-ENTMCNC: 33.8 G/DL (ref 33–37)
MCV RBC AUTO: 91.5 FL (ref 81–99)
MONOCYTES ABSOLUTE: 1 K/UL (ref 0–0.9)
MONOCYTES RELATIVE PERCENT: 10.1 % (ref 0–10)
NEUTROPHILS ABSOLUTE: 7.3 K/UL (ref 1.5–7.5)
NEUTROPHILS RELATIVE PERCENT: 75.2 % (ref 50–65)
PDW BLD-RTO: 13.9 % (ref 11.5–14.5)
PLATELET # BLD: 179 K/UL (ref 130–400)
PMV BLD AUTO: 9.8 FL (ref 9.4–12.3)
POTASSIUM SERPL-SCNC: 4.4 MMOL/L (ref 3.5–5)
PRO-BNP: 418 PG/ML (ref 0–1800)
RBC # BLD: 4.46 M/UL (ref 4.2–5.4)
REASON FOR REJECTION: NORMAL
REJECTED TEST: NORMAL
SARS-COV-2, NAAT: NOT DETECTED
SODIUM BLD-SCNC: 135 MMOL/L (ref 136–145)
TOTAL PROTEIN: 7 G/DL (ref 6.6–8.7)
TROPONIN: <0.01 NG/ML (ref 0–0.03)
WBC # BLD: 9.7 K/UL (ref 4.8–10.8)

## 2023-03-05 PROCEDURE — 73560 X-RAY EXAM OF KNEE 1 OR 2: CPT

## 2023-03-05 PROCEDURE — 87635 SARS-COV-2 COVID-19 AMP PRB: CPT

## 2023-03-05 PROCEDURE — 84484 ASSAY OF TROPONIN QUANT: CPT

## 2023-03-05 PROCEDURE — 94640 AIRWAY INHALATION TREATMENT: CPT

## 2023-03-05 PROCEDURE — 73552 X-RAY EXAM OF FEMUR 2/>: CPT

## 2023-03-05 PROCEDURE — 83880 ASSAY OF NATRIURETIC PEPTIDE: CPT

## 2023-03-05 PROCEDURE — 99285 EMERGENCY DEPT VISIT HI MDM: CPT

## 2023-03-05 PROCEDURE — 93005 ELECTROCARDIOGRAM TRACING: CPT | Performed by: EMERGENCY MEDICINE

## 2023-03-05 PROCEDURE — 85025 COMPLETE CBC W/AUTO DIFF WBC: CPT

## 2023-03-05 PROCEDURE — 36415 COLL VENOUS BLD VENIPUNCTURE: CPT

## 2023-03-05 PROCEDURE — 80053 COMPREHEN METABOLIC PANEL: CPT

## 2023-03-05 PROCEDURE — 72170 X-RAY EXAM OF PELVIS: CPT

## 2023-03-05 PROCEDURE — 6370000000 HC RX 637 (ALT 250 FOR IP): Performed by: EMERGENCY MEDICINE

## 2023-03-05 PROCEDURE — 71045 X-RAY EXAM CHEST 1 VIEW: CPT

## 2023-03-05 RX ORDER — DOXYCYCLINE HYCLATE 100 MG
100 TABLET ORAL 2 TIMES DAILY
Qty: 14 TABLET | Refills: 0 | Status: SHIPPED | OUTPATIENT
Start: 2023-03-05 | End: 2023-03-12

## 2023-03-05 RX ORDER — METHYLPREDNISOLONE 4 MG/1
TABLET ORAL
Qty: 1 KIT | Refills: 0 | Status: SHIPPED | OUTPATIENT
Start: 2023-03-05

## 2023-03-05 RX ORDER — CEFDINIR 300 MG/1
300 CAPSULE ORAL 2 TIMES DAILY
Qty: 14 CAPSULE | Refills: 0 | Status: SHIPPED | OUTPATIENT
Start: 2023-03-05 | End: 2023-03-12

## 2023-03-05 RX ORDER — IPRATROPIUM BROMIDE AND ALBUTEROL SULFATE 2.5; .5 MG/3ML; MG/3ML
1 SOLUTION RESPIRATORY (INHALATION) ONCE
Status: COMPLETED | OUTPATIENT
Start: 2023-03-05 | End: 2023-03-05

## 2023-03-05 RX ORDER — ALBUTEROL SULFATE 90 UG/1
2 AEROSOL, METERED RESPIRATORY (INHALATION) 4 TIMES DAILY PRN
Qty: 18 G | Refills: 0 | Status: SHIPPED | OUTPATIENT
Start: 2023-03-05

## 2023-03-05 RX ADMIN — IPRATROPIUM BROMIDE AND ALBUTEROL SULFATE 1 AMPULE: .5; 2.5 SOLUTION RESPIRATORY (INHALATION) at 11:08

## 2023-03-05 ASSESSMENT — ENCOUNTER SYMPTOMS
COUGH: 1
SORE THROAT: 0
RHINORRHEA: 0
CHEST TIGHTNESS: 1
BACK PAIN: 0
DIARRHEA: 0
ABDOMINAL PAIN: 0
NAUSEA: 0
SHORTNESS OF BREATH: 1
VOMITING: 0

## 2023-03-05 NOTE — ED PROVIDER NOTES
[facility] EMERGENCY DEPT  eMERGENCY dEPARTMENT eNCOUnter      Pt Name: Tank Soriano  MRN: [id number]  [place] 1940  Date of evaluation: 3/5/2023  Provider: Jewell Mitchell MD    [address]       Chief Complaint   Patient presents with    Knee Pain     R knee pain post fall last night, h/o knee replacement    Cough     X3 weeks         HISTORY OF PRESENT ILLNESS   (Location/Symptom, Timing/Onset,Context/Setting, Quality, Duration, Modifying Factors, Severity)  Note limiting factors. Tank Soriano is a 81 y.o. female who presents to the emergency department for right knee pain. Patient states that she was squatted down and somewhat twisted her knee and landed on her knee. History of knee replacement approximately 10 years ago. Ambulates with a rollator walker at baseline but since yesterday evening has been using her wheelchair. She did not fall the way down or sustain any head trauma. Patient also complains of a cough x3 weeks that is productive with green and yellow sputum. Admits to feeling short of breath and occasional chest tightness. She has not seen her doctor or anyone for this because she tells me she felt too bad to do that. No fevers or chills. No leg swelling. Denies any history of COPD or CHF. Has never smoked. HPI    NursingNotes were reviewed. REVIEW OF SYSTEMS    (2-9 systems for level 4, 10 or more for level 5)     Review of Systems   Constitutional:  Negative for chills and fever. HENT:  Negative for rhinorrhea and sore throat. Respiratory:  Positive for cough, chest tightness and shortness of breath. Cardiovascular:  Negative for chest pain and leg swelling. Gastrointestinal:  Negative for abdominal pain, diarrhea, nausea and vomiting. Genitourinary:  Negative for dysuria, frequency and urgency. Musculoskeletal:  Positive for gait problem. Negative for back pain and neck pain. Neurological:  Negative for dizziness and headaches.    All other systems reviewed and are negative.          PAST MEDICALHISTORY     Past Medical History:   Diagnosis Date    Acute CVA (cerebrovascular accident) ([facility] [address] 11/20/2021    Arthritis     Psoriatic    Asthma     CAD (coronary artery disease)     CHF (congestive heart failure) ([facility])     COPD (chronic obstructive pulmonary disease) (HCC)     DM (diabetes mellitus) ([facility])     GERD (gastroesophageal reflux disease)     HTN (hypertension)     Hyperlipidemia     MI (myocardial infarction) ([facility] [address]     x2    Thyroid disease          SURGICAL HISTORY       Past Surgical History:   Procedure Laterality Date    CATARACT REMOVAL      CHOLECYSTECTOMY      CORONARY ANGIOPLASTY  06/2018    Angioplasty to in-stent restenosis to the distal LAD    HIP SURGERY      HYSTERECTOMY (CERVIX STATUS UNKNOWN)      JOINT REPLACEMENT Right     Right knee replacement    OVARY REMOVAL           CURRENT MEDICATIONS     Discharge Medication List as of 3/5/2023 11:50 AM        CONTINUE these medications which have NOT CHANGED    Details   blood glucose test strips (FREESTYLE LITE) strip TEST 3 TIMES A DAY & AS NEEDED FOR SYMPTOMS OF IRREGULAR BLOOD GLUCOSE., Disp-100 each, R-5Normal      insulin lispro, 1 Unit Dial, (HUMALOG KWIKPEN) 100 UNIT/ML SOPN 10 units before largest meal (lunch), Disp-9 mL, R-3Normal      sertraline (ZOLOFT) 50 MG tablet Take 1 tablet by mouth daily, Disp-90 tablet, R-3Normal      insulin glargine (BASAGLAR KWIKPEN) 100 UNIT/ML injection pen Inject 26 Units into the skin nightly, Disp-8 Adjustable Dose Pre-filled Pen Syringe, R-3Normal      carvedilol (COREG) 12.5 MG tablet Take 1 tablet by mouth 2 times daily (with meals), Disp-180 tablet, R-1Normal      Insulin Pen Needle 32G X 6 MM MISC DAILY Starting Mon 10/10/2022, Disp-100 each, R-1, Normal      gabapentin (NEURONTIN) 100 MG capsule Historical Med      atorvastatin (LIPITOR) 80 MG tablet Take 1 tablet by mouth nightly, Disp-90 tablet, R-3Normal      levothyroxine (SYNTHROID) 25 MCG tablet Take 1 tablet by mouth daily, Disp-90 tablet, R-1Normal      Multiple Vitamin (MULTIVITAMIN PO) Take by mouthHistorical Med      nitroGLYCERIN (NITROSTAT) 0.4 MG SL tablet Place 1 tablet under the tongue every 5 minutes as needed for Chest pain up to max of 3 total doses. If no relief after 1 dose, call 911., Disp-25 tablet, R-3Normal      glucose monitoring (FREESTYLE FREEDOM) kit DAILY Starting u 12/30/2021, Disp-1 kit, R-0, Normal      aspirin 81 MG EC tablet Take 1 tablet by mouth daily, Disp-30 tablet, R-0Normal      vitamin B-12 (CYANOCOBALAMIN) 1000 MCG tablet Take 1 tablet by mouth daily, Disp-30 tablet, R-0Normal      Coenzyme Q10 (CO Q 10) 10 MG CAPS Take 1 capsule by mouth daily, Disp-30 capsule, R-0Normal      Probiotic Product (ALIGN) 4 MG CAPS Once capsule daily, Disp-30 capsule,R-5Or similar probiotic such as culturelle if you don't have align. Normal             ALLERGIES     Zinc and Tape [adhesive tape]    FAMILY HISTORY       Family History   Problem Relation Age of Onset    Heart Disease Mother     Heart Disease Father           SOCIAL HISTORY       Social History     Socioeconomic History    Marital status:      Spouse name: None    Number of children: None    Years of education: None    Highest education level: None   Tobacco Use    Smoking status: Never    Smokeless tobacco: Never   Vaping Use    Vaping Use: Never used   Substance and Sexual Activity    Alcohol use: No    Drug use: No    Sexual activity: Not Currently     Social Determinants of Health     Physical Activity: Insufficiently Active    Days of Exercise per Week: 2 days    Minutes of Exercise per Session: 40 min       SCREENINGS             PHYSICAL EXAM    (up to 7 for level 4, 8 or more for level 5)     ED Triage Vitals [03/05/23 0910]   BP Temp Temp src Heart Rate Resp SpO2 Height Weight   (!) 144/91 98.4 °F (36.9 °C) -- 95 18 93 % 5' 6\" (1.676 m) 169 lb (76.7 kg)       Physical Exam  Vitals and nursing note reviewed. Constitutional:       General: She is not in acute distress. Appearance: Normal appearance. She is well-developed. She is not diaphoretic. HENT:      Head: Normocephalic and atraumatic. Right Ear: External ear normal.      Left Ear: External ear normal.      Nose: Nose normal.   Eyes:      Conjunctiva/sclera: Conjunctivae normal.   Neck:      Trachea: No tracheal deviation. Cardiovascular:      Rate and Rhythm: Normal rate and regular rhythm. Pulses: Normal pulses. Heart sounds: Normal heart sounds. No murmur heard. Pulmonary:      Effort: Pulmonary effort is normal. No respiratory distress. Breath sounds: Wheezing present. No rales. Comments: Diffuse wheezing bilaterally  Abdominal:      Palpations: Abdomen is soft. Tenderness: There is no abdominal tenderness. Musculoskeletal:      Cervical back: Normal range of motion. No bony tenderness. Thoracic back: No bony tenderness. Lumbar back: No bony tenderness. Right knee: Swelling and bony tenderness present. No deformity. Decreased range of motion. Tenderness present. Normal alignment. Right lower leg: Normal. No edema. Left lower leg: No edema. Legs:       Comments: No obvious hip tenderness or deformity on exam   Skin:     General: Skin is warm and dry. Neurological:      Mental Status: She is alert and oriented to person, place, and time. GCS: GCS eye subscore is 4. GCS verbal subscore is 5. GCS motor subscore is 6.            DIAGNOSTIC RESULTS     EKG: All EKG's areinterpreted by the Emergency Department Physician who either signs or Co-signs this chart in the absence of a cardiologist.    86 normal sinus rhythm no obvious ST changes QTc 493 nondiagnostic EKG    RADIOLOGY:  Non-plain film images such as CT, Ultrasound and MRI are read by the radiologist. Plain radiographic images are visualized and preliminarily interpreted bythe emergency physician with the below findings:          XR KNEE RIGHT (1-2 VIEWS)   Final Result   Prior right total knee arthroplasty, with expected postoperative alignment; no evidence of acute fracture. Recommendation:   Follow up as clinically indicated. Dictated and Electronically Signed by Carmen Guerrero MD at 05-Mar-2023 12:06:03 PM EST               XR FEMUR RIGHT (MIN 2 VIEWS)   Final Result   No acute process. Postoperative changes. Recommendation:    Follow up as clinically indicated. Dictated and Electronically Signed by Arvind Bueno MD at 05-Mar-2023 12:34:12 PM EST               XR PELVIS (1-2 VIEWS)   Final Result   No evidence of acute fracture in the pelvis. Prior posterior laminectomy and fusion in the lower lumbar spine. Recommendation:    Follow up as clinically indicated. Dictated and Electronically Signed by Carmen Guerrero MD at 05-Mar-2023 12:04:42 PM EST               XR CHEST PORTABLE   Final Result   Findings are supportive of mild pulmonary edema with small pleural fluid; consider atypical pneumonitis. Recommendation: Follow up to clear is recommended; consider PA and lateral views.     Dictated and Electronically Signed by Carmen Guerrero MD at 05-Mar-2023 12:11:29 PM EST                       LABS:  Labs Reviewed   COMPREHENSIVE METABOLIC PANEL - Abnormal; Notable for the following components:       Result Value    Sodium 135 (*)     CO2 19 (*)     Glucose 224 (*)     Alkaline Phosphatase 110 (*)     AST 43 (*)     All other components within normal limits   CBC WITH AUTO DIFFERENTIAL - Abnormal; Notable for the following components:    Neutrophils % 75.2 (*)     Lymphocytes % 10.7 (*)     Monocytes % 10.1 (*)     Lymphocytes Absolute 1.0 (*)     Monocytes Absolute 1.00 (*)     All other components within normal limits    Narrative:     Previous QNS notified Elaine/RN/ER   COVID-19, RAPID   BRAIN NATRIURETIC PEPTIDE   TROPONIN   SPECIMEN REJECTION       All other labs were within normal range or not returned as of this dictation. EMERGENCY DEPARTMENT COURSE and DIFFERENTIAL DIAGNOSIS/MDM:   Vitals:    Vitals:    03/05/23 0910 03/05/23 1133   BP: (!) 144/91 (!) 167/87   Pulse: 95 87   Resp: 18 17   Temp: 98.4 °F (36.9 °C)    SpO2: 93% 95%   Weight: 169 lb (76.7 kg)    Height: 5' 6\" (1.676 m)        MDM     Amount and/or Complexity of Data Reviewed  Clinical lab tests: ordered and reviewed  Tests in the radiology section of CPT®: ordered and reviewed  Independent visualization of images, tracings, or specimens: yes      Patient with history of 3 weeks of cough and fall last night onto her right knee. Patient has mild decreased range of motion of her knee x-rays are grossly negative placed in a Ace wrap. Patient has a rollator walker and wheelchair at [facility] where she resides. She wants to try to go home but I did offer her admission due to having pneumonia on x-ray and her ambulatory issues. X-ray read noted about edema however clinically is not volume overloaded and BNP within normal limits and provides history of productive cough with yellow and green sputum more suggestive of infectious etiology however no leukocytosis possibly is viral however given 3-week duration and her age I am going to cover her with antibiotics. Due to her wheezing also going to give steroids as well as albuterol. Patient understands follow-up and return precautions. CONSULTS:  None    :  Unless otherwise noted below, none     Procedures    FINAL IMPRESSION      1. Pneumonia due to infectious organism, unspecified laterality, unspecified part of lung    2.  Acute pain of right knee          DISPOSITION/PLAN   DISPOSITION Decision To Discharge 03/05/2023 11:48:34 AM      PATIENT REFERRED TO:  Cyndi Dasilva MD  [address]  [phone]    Schedule an appointment as soon as possible for a visit in 1 week      Deshaun Kraus MD  [address] [place] [address] [address]  [phone]    Schedule an appointment as soon as possible for a visit in 1 week      [facility] EMERGENCY DEPT  [facility]  [phone]    As needed, If symptoms worsen    DISCHARGE MEDICATIONS:  Discharge Medication List as of 3/5/2023 11:50 AM        START taking these medications    Details   methylPREDNISolone (MEDROL, JAMAR,) 4 MG tablet Take by mouth., Disp-1 kit, R-0Normal      albuterol sulfate HFA (VENTOLIN HFA) 108 (90 Base) MCG/ACT inhaler Inhale 2 puffs into the lungs 4 times daily as needed for Wheezing, Disp-18 g, R-0Normal      cefdinir (OMNICEF) 300 MG capsule Take 1 capsule by mouth 2 times daily for 7 days, Disp-14 capsule, R-0Normal      doxycycline hyclate (VIBRA-TABS) 100 MG tablet Take 1 tablet by mouth 2 times daily for 7 days, Disp-14 tablet, R-0Normal                (Please note that portions of this note were completed with a voice recognition program.  Efforts were made to edit thedictations but occasionally words are mis-transcribed.)    Asha Pierson MD (electronically signed)Emergency Physician        Charlotte Floyd MD  03/05/23 9191

## 2023-03-06 LAB
EKG P AXIS: -29 DEGREES
EKG P-R INTERVAL: 148 MS
EKG Q-T INTERVAL: 410 MS
EKG QRS DURATION: 114 MS
EKG QTC CALCULATION (BAZETT): 453 MS
EKG T AXIS: 87 DEGREES

## 2023-03-06 PROCEDURE — 93010 ELECTROCARDIOGRAM REPORT: CPT | Performed by: INTERNAL MEDICINE

## 2023-03-20 ENCOUNTER — OFFICE VISIT (OUTPATIENT)
Dept: PRIMARY CARE CLINIC | Age: 83
End: 2023-03-20
Payer: MEDICARE

## 2023-03-20 VITALS
SYSTOLIC BLOOD PRESSURE: 116 MMHG | OXYGEN SATURATION: 100 % | WEIGHT: 163 LBS | HEART RATE: 52 BPM | DIASTOLIC BLOOD PRESSURE: 62 MMHG | BODY MASS INDEX: 26.31 KG/M2 | TEMPERATURE: 97 F

## 2023-03-20 DIAGNOSIS — E03.9 ACQUIRED HYPOTHYROIDISM: ICD-10-CM

## 2023-03-20 DIAGNOSIS — M25.561 CHRONIC PAIN OF RIGHT KNEE: ICD-10-CM

## 2023-03-20 DIAGNOSIS — J44.9 CHRONIC OBSTRUCTIVE PULMONARY DISEASE, UNSPECIFIED COPD TYPE (HCC): ICD-10-CM

## 2023-03-20 DIAGNOSIS — N18.31 TYPE 2 DIABETES MELLITUS WITH STAGE 3A CHRONIC KIDNEY DISEASE, WITHOUT LONG-TERM CURRENT USE OF INSULIN (HCC): Primary | ICD-10-CM

## 2023-03-20 DIAGNOSIS — G89.29 CHRONIC PAIN OF RIGHT KNEE: ICD-10-CM

## 2023-03-20 DIAGNOSIS — E11.22 TYPE 2 DIABETES MELLITUS WITH STAGE 3A CHRONIC KIDNEY DISEASE, WITHOUT LONG-TERM CURRENT USE OF INSULIN (HCC): Primary | ICD-10-CM

## 2023-03-20 DIAGNOSIS — I10 PRIMARY HYPERTENSION: ICD-10-CM

## 2023-03-20 DIAGNOSIS — F41.8 DEPRESSION WITH ANXIETY: ICD-10-CM

## 2023-03-20 DIAGNOSIS — R91.1 PULMONARY NODULE: ICD-10-CM

## 2023-03-20 DIAGNOSIS — I25.10 CAD IN NATIVE ARTERY: ICD-10-CM

## 2023-03-20 DIAGNOSIS — E78.2 MIXED HYPERLIPIDEMIA: ICD-10-CM

## 2023-03-20 DIAGNOSIS — I69.351 HEMIPARESIS AFFECTING RIGHT SIDE AS LATE EFFECT OF STROKE (HCC): ICD-10-CM

## 2023-03-20 DIAGNOSIS — L40.50 PSORIATIC ARTHRITIS (HCC): ICD-10-CM

## 2023-03-20 LAB — HBA1C MFR BLD: 9.5 %

## 2023-03-20 PROCEDURE — G8427 DOCREV CUR MEDS BY ELIG CLIN: HCPCS | Performed by: FAMILY MEDICINE

## 2023-03-20 PROCEDURE — 1090F PRES/ABSN URINE INCON ASSESS: CPT | Performed by: FAMILY MEDICINE

## 2023-03-20 PROCEDURE — 3074F SYST BP LT 130 MM HG: CPT | Performed by: FAMILY MEDICINE

## 2023-03-20 PROCEDURE — 83036 HEMOGLOBIN GLYCOSYLATED A1C: CPT | Performed by: FAMILY MEDICINE

## 2023-03-20 PROCEDURE — G8417 CALC BMI ABV UP PARAM F/U: HCPCS | Performed by: FAMILY MEDICINE

## 2023-03-20 PROCEDURE — 1123F ACP DISCUSS/DSCN MKR DOCD: CPT | Performed by: FAMILY MEDICINE

## 2023-03-20 PROCEDURE — 3046F HEMOGLOBIN A1C LEVEL >9.0%: CPT | Performed by: FAMILY MEDICINE

## 2023-03-20 PROCEDURE — 3078F DIAST BP <80 MM HG: CPT | Performed by: FAMILY MEDICINE

## 2023-03-20 PROCEDURE — G8399 PT W/DXA RESULTS DOCUMENT: HCPCS | Performed by: FAMILY MEDICINE

## 2023-03-20 PROCEDURE — G8484 FLU IMMUNIZE NO ADMIN: HCPCS | Performed by: FAMILY MEDICINE

## 2023-03-20 PROCEDURE — 3023F SPIROM DOC REV: CPT | Performed by: FAMILY MEDICINE

## 2023-03-20 PROCEDURE — 99214 OFFICE O/P EST MOD 30 MIN: CPT | Performed by: FAMILY MEDICINE

## 2023-03-20 PROCEDURE — 1036F TOBACCO NON-USER: CPT | Performed by: FAMILY MEDICINE

## 2023-03-20 RX ORDER — INSULIN LISPRO 100 [IU]/ML
INJECTION, SOLUTION INTRAVENOUS; SUBCUTANEOUS
Qty: 3 ADJUSTABLE DOSE PRE-FILLED PEN SYRINGE | Refills: 5 | Status: SHIPPED | OUTPATIENT
Start: 2023-03-20

## 2023-03-20 RX ORDER — CELECOXIB 200 MG/1
200 CAPSULE ORAL DAILY
Qty: 30 CAPSULE | Refills: 0 | Status: SHIPPED | OUTPATIENT
Start: 2023-03-20

## 2023-03-20 RX ORDER — LOPERAMIDE HYDROCHLORIDE 2 MG/1
2 TABLET ORAL 4 TIMES DAILY PRN
Qty: 30 TABLET | Refills: 0 | Status: SHIPPED | OUTPATIENT
Start: 2023-03-20 | End: 2023-03-30

## 2023-03-20 SDOH — ECONOMIC STABILITY: FOOD INSECURITY: WITHIN THE PAST 12 MONTHS, YOU WORRIED THAT YOUR FOOD WOULD RUN OUT BEFORE YOU GOT MONEY TO BUY MORE.: SOMETIMES TRUE

## 2023-03-20 SDOH — ECONOMIC STABILITY: HOUSING INSECURITY
IN THE LAST 12 MONTHS, WAS THERE A TIME WHEN YOU DID NOT HAVE A STEADY PLACE TO SLEEP OR SLEPT IN A SHELTER (INCLUDING NOW)?: NO

## 2023-03-20 SDOH — ECONOMIC STABILITY: FOOD INSECURITY: WITHIN THE PAST 12 MONTHS, THE FOOD YOU BOUGHT JUST DIDN'T LAST AND YOU DIDN'T HAVE MONEY TO GET MORE.: SOMETIMES TRUE

## 2023-03-20 SDOH — ECONOMIC STABILITY: INCOME INSECURITY: HOW HARD IS IT FOR YOU TO PAY FOR THE VERY BASICS LIKE FOOD, HOUSING, MEDICAL CARE, AND HEATING?: SOMEWHAT HARD

## 2023-03-20 ASSESSMENT — PATIENT HEALTH QUESTIONNAIRE - PHQ9
SUM OF ALL RESPONSES TO PHQ QUESTIONS 1-9: 0
6. FEELING BAD ABOUT YOURSELF - OR THAT YOU ARE A FAILURE OR HAVE LET YOURSELF OR YOUR FAMILY DOWN: 0
SUM OF ALL RESPONSES TO PHQ QUESTIONS 1-9: 0
2. FEELING DOWN, DEPRESSED OR HOPELESS: 0
10. IF YOU CHECKED OFF ANY PROBLEMS, HOW DIFFICULT HAVE THESE PROBLEMS MADE IT FOR YOU TO DO YOUR WORK, TAKE CARE OF THINGS AT HOME, OR GET ALONG WITH OTHER PEOPLE: 0
1. LITTLE INTEREST OR PLEASURE IN DOING THINGS: 0
3. TROUBLE FALLING OR STAYING ASLEEP: 0
8. MOVING OR SPEAKING SO SLOWLY THAT OTHER PEOPLE COULD HAVE NOTICED. OR THE OPPOSITE, BEING SO FIGETY OR RESTLESS THAT YOU HAVE BEEN MOVING AROUND A LOT MORE THAN USUAL: 0
SUM OF ALL RESPONSES TO PHQ9 QUESTIONS 1 & 2: 0
SUM OF ALL RESPONSES TO PHQ QUESTIONS 1-9: 0
SUM OF ALL RESPONSES TO PHQ QUESTIONS 1-9: 0
7. TROUBLE CONCENTRATING ON THINGS, SUCH AS READING THE NEWSPAPER OR WATCHING TELEVISION: 0
4. FEELING TIRED OR HAVING LITTLE ENERGY: 0
5. POOR APPETITE OR OVEREATING: 0
9. THOUGHTS THAT YOU WOULD BE BETTER OFF DEAD, OR OF HURTING YOURSELF: 0

## 2023-03-20 ASSESSMENT — ENCOUNTER SYMPTOMS
NAUSEA: 0
BACK PAIN: 1
ABDOMINAL PAIN: 0
SHORTNESS OF BREATH: 1
DIARRHEA: 0
COUGH: 1
ANAL BLEEDING: 0
CONSTIPATION: 0
CHEST TIGHTNESS: 0

## 2023-03-20 NOTE — PROGRESS NOTES
below 04/18/2022    CREATININE 0.8 03/05/2023   -- likely steroids affected her glucose. Patient Instructions   - Uncontrolled  Patient Instructions    Diabetes:  Basaglar: 30 units at night. Humalog: 10 units with largest meal  - If your mail order doesn't send the medication, contact me. T    2. Mixed hyperlipidemia  Current medications:  Atorvastatin 80 mg daily  Lab Results   Component Value Date    CHOL 168 04/18/2022    CHOL 134 (L) 11/21/2021    CHOL 137 (L) 10/07/2021     Lab Results   Component Value Date    TRIG 595 (H) 04/18/2022    TRIG 222 (H) 11/21/2021    TRIG 243 (H) 10/07/2021     Lab Results   Component Value Date    HDL 35 (L) 04/18/2022    HDL 48 (L) 11/21/2021    HDL 42 (L) 10/07/2021     Lab Results   Component Value Date    LDLCALC see below 04/18/2022    LDLCALC 42 11/21/2021    LDLCALC 46 10/07/2021     No results found for: LABVLDL, VLDL  No results found for: CHOLHDLRATIO  - She missed her laboratory appointment. Will need labs as soon as possible    3. Hemiparesis affecting right side as late effect of stroke ([facility] [address]  Continue with ambulation with walker  -Discussed home safety tips    4. Psoriatic arthritis ([facility] [address]  -Discussed referral to rheumatology  5. Chronic obstructive pulmonary disease, unspecified COPD type ([facility] [address]  Stable    6. Pulmonary nodule  -Repeat CT scan of chest     7. Primary hypertension  - Current medications:  Carvedilol 12.5 mg twice daily  BP Readings from Last 3 Encounters:   03/20/23 116/62   03/05/23 (!) 167/87   12/20/22 124/78     Blood pressure stable  8. CAD in native artery  Aspirin 81 mg daily    As needed nitroglycerin        9. Depression with anxiety  Current medication:  Sertraline 50 mg daily  - Uncontrolled  Depression:  -- Increase sertraline from 50 mg to 75 mg.  -- You have sertraline 50 mg: take 1 and 1/2 tablet once a day. 10.  Hypothyroidism:  Current medication:  Synthroid 25 mcg daily      11.   Right knee pain    Right knee pain:

## 2023-03-20 NOTE — PATIENT INSTRUCTIONS
Patient Instructions    Diabetes:  Basaglar: 30 units at night.   Humalog: 10 units with largest meal  - If your mail order doesn't send the medication, contact me.     Depression:  -- Increase sertraline from 50 mg to 75 mg.  -- You have sertraline 50 mg: take 1 and 1/2 tablet once a day.    Right knee pain:    -- continue to take tylenol 500 m tablets three times a day  -- celebrex 200 mg once a day   -- Referral has been sent to Dr. Mendez at the orthopedic [place].  - -If you have not heard from him in 2 weeks, contact us        TRANSPORTATION RESOURCES    [place] [employer] ([facility]) Medicaid Recipients:  The [place] offers free transportation to its [facility] Medicaid Recipients who are qualified to ride.  Transportation is only for Health Care Appointments, Testing, and/or Procedures.  Service or treatment patient will be receiving has to be billable to [place] Medicaid or transportation is not provided.  PURCHASE AREA -  [facility], and [facility] (aka [facility]) [phone] or [phone]    [facility] -  [facility], and [facility] ([facility] [phone]    Medicare Advantage Beneficiaries:   If you have a Medicare Advantage Health Care plan, you can contact a  for your Medicare Advantage plan by calling the Customer Service number on the back of your insurance card.  Ask the Representative if your health care plan provides transportation assistance to health care appointments.    PUBLIC TRANSPORTATION:  [facility] [employer] ([employer]) - Elise [facility] Hick[facility], [facility], and [facility]  WEBSITE: [email or web address]  Phone [phone]  [facility]

## 2023-03-21 RX ORDER — CARVEDILOL 12.5 MG/1
TABLET ORAL
Qty: 180 TABLET | Refills: 1 | Status: SHIPPED | OUTPATIENT
Start: 2023-03-21

## 2023-03-21 NOTE — TELEPHONE ENCOUNTER
Miri Campbell called to request a refill on her medication.       Last office visit : 3/20/2023   Next office visit : 6/22/2023     Requested Prescriptions     Pending Prescriptions Disp Refills    carvedilol (COREG) 12.5 MG tablet [Pharmacy Med Name: CARVEDILOL TAB 12.5MG] 180 tablet 1     Sig: TAKE 1 TABLET TWICE DAILY  WITH MEALS            Ruby Cook LPN

## 2023-04-21 ENCOUNTER — TELEPHONE (OUTPATIENT)
Dept: PRIMARY CARE CLINIC | Age: 83
End: 2023-04-21

## 2023-04-21 NOTE — TELEPHONE ENCOUNTER
Pt called with c/o vomiting and diarrhea since yesterday morning. She is now concerned about getting dehydrated. I called her back and left a VM that she would need to go to the ER for possible fluids.

## 2023-04-26 NOTE — PROGRESS NOTES
"    [facility] - PODIATRY    Today's Date: 05/04/2023     Patient Name: Kary Alcaraz  MRN: [id number]  CSN: [id number]  PCP: Nate Whitley MD  Referring Provider: No ref. provider found    SUBJECTIVE     Chief Complaint   Patient presents with   • Follow-up     Nate Whitley MD-12/20/2022 3 month diabetic nail care- pt states feet doing pretty good. Left great toe feels like has arthritis, left foot compressions socks hurt when neftaly- pt denies pain at present    • Diabetes     178mg/dl BG this am     HPI: Kary Alcaraz, a 82 y.o.female, comes to clinic as a(n) established patient presenting for diabetic foot exam and complaining of painful toenails. Patient has h/o DM, HTN, GERD. Patient is NIDDM with last stated BG level of 178mg/dl. Patient presents with complaints of thickened, irregular toenail growth of both feet. States she is diabetic with mild numbness and tingling in feet. Has been wearing compression stockings but states that it hurts her right foot with extended use.  Denies pain today. Relates previous treatment(s) including care by podiatry. Denies any constitutional symptoms. No other pedal complaints at this time.     History reviewed. No pertinent past medical history.  History reviewed. No pertinent surgical history.  History reviewed. No pertinent family history.  Social History     Socioeconomic History   • Marital status:    Tobacco Use   • Smoking status: Never     Passive exposure: Never   • Smokeless tobacco: Never   Vaping Use   • Vaping Use: Never used   Substance and Sexual Activity   • Alcohol use: Never   • Drug use: No   • Sexual activity: Defer     Allergies   Allergen Reactions   • Adhesive Tape Other (See Comments)   • Elastic Bandages & [Zinc] Other (See Comments)     \"Elastic in bra\"     Current Outpatient Medications   Medication Sig Dispense Refill   • albuterol (PROVENTIL HFA;VENTOLIN HFA) 108 (90 Base) MCG/ACT inhaler Inhale.     • aspirin " 81 MG chewable tablet Chew 1 tablet Daily.     • atorvastatin (LIPITOR) 80 MG tablet Take 1 tablet by mouth Daily.     • carvedilol (COREG) 12.5 MG tablet Take 1 tablet by mouth 2 (Two) Times a Day With Meals.     • coenzyme Q10 100 MG capsule Take 1 capsule by mouth Daily.     • gabapentin (NEURONTIN) 100 MG capsule Take 1 capsule by mouth 3 (Three) Times a Day.     • Insulin Glargine (BASAGLAR KWIKPEN) 100 UNIT/ML injection pen Inject 100 Units under the skin into the appropriate area as directed.     • Insulin Lispro (humaLOG) 100 UNIT/ML injection Inject 1 Units under the skin into the appropriate area as directed Daily.     • levothyroxine (SYNTHROID, LEVOTHROID) 25 MCG tablet Take 1 tablet by mouth Daily.     • montelukast (SINGULAIR) 10 MG tablet Take 1 tablet by mouth Every Night.     • omeprazole (priLOSEC) 40 MG capsule Take 1 capsule by mouth Daily.     • sertraline (ZOLOFT) 50 MG tablet Take 1 tablet by mouth Daily.     • vitamin B-12 (CYANOCOBALAMIN) 1000 MCG tablet Take 1 tablet by mouth Daily.       No current facility-administered medications for this visit.     Review of Systems   Constitutional: Negative for chills and fever.   HENT: Negative for congestion.    Respiratory: Negative for shortness of breath.    Cardiovascular: Positive for leg swelling. Negative for chest pain.   Gastrointestinal: Negative for constipation, diarrhea, nausea and vomiting.   Musculoskeletal: Positive for gait problem. Negative for arthralgias and myalgias.   Skin: Negative for wound.   Neurological: Positive for numbness.       OBJECTIVE     Vitals:    05/04/23 1254   BP: 124/68   Pulse: 65   SpO2: 98%       PHYSICAL EXAM  GEN:   Accompanied by none.     Foot/Ankle Exam    GENERAL  Diabetic foot exam performed    Orientation:  AAOx3  Affect:  appropriate  Gait:  unimpaired  Assistance:  independent and walker    VASCULAR     Right Foot Vascularity   Dorsalis pedis:  2+  Posterior tibial:  2+  Skin temperature:   warm  Edema grading:  None  CFT:  3  Pedal hair growth:  Present  Varicosities:  spider veins     Left Foot Vascularity   Dorsalis pedis:  2+  Posterior tibial:  2+  Skin temperature:  warm  Edema grading:  None  CFT:  3  Pedal hair growth:  Present  Varicosities:  spider veins     NEUROLOGIC     Right Foot Neurologic   Light touch sensation: diminished  Vibratory sensation: diminished  Hot/Cold sensation: diminished     Left Foot Neurologic   Light touch sensation: diminished  Vibratory sensation: diminished  Hot/Cold sensation:  diminished    MUSCULOSKELETAL     Right Foot Musculoskeletal   Tenderness:  toenail problem    Arch:  Normal  Hallux valgus: Yes    Hallux limitus: No    Tailor's bunion: No       Left Foot Musculoskeletal   Tenderness:  toenail problem  Arch:  Normal  Hallux valgus: Yes    Hallux limitus: No    Tailor's bunion: No      MUSCLE STRENGTH     Right Foot Muscle Strength   Foot dorsiflexion:  5  Foot plantar flexion:  5  Foot inversion:  5  Foot eversion:  5     Left Foot Muscle Strength   Foot dorsiflexion:  5  Foot plantar flexion:  5  Foot inversion:  5  Foot eversion:  5    RANGE OF MOTION     Right Foot Range of Motion   Foot and ankle ROM within normal limits       Left Foot Range of Motion   Foot and ankle ROM within normal limits      DERMATOLOGIC      Right Foot Dermatologic   Skin  Positive for atrophy.      Left Foot Dermatologic   Skin  Positive for atrophy.       RADIOLOGY/NUCLEAR:  No results found.    LABORATORY/CULTURE RESULTS:      PATHOLOGY RESULTS:       ASSESSMENT/PLAN     Diagnoses and all orders for this visit:    1. Thickened nail (Primary)    2. Onychomycosis    3. Type 2 diabetes mellitus with diabetic polyneuropathy, with long-term current use of insulin      Comprehensive lower extremity examination and evaluation was performed.  Discussed findings and treatment plan including risks, benefits, and treatment options with patient in detail. Patient agreed with treatment  plan.  After verbal consent obtained, nail(s) x10 debrided of length and thickness with nail nipper without incidence  Patient may maintain nails and calluses at home utilizing emery board or pumice stone between visits as needed  Reviewed at home diabetic foot care including daily foot checks   Continue diabetic monitoring and control under direction of PCP.   May utilize compression; recommend using throughout the day and not at night.   An After Visit Summary was printed and given to the patient at discharge, including (if requested) any available informative/educational handouts regarding diagnosis, treatment, or medications. All questions were answered to patient/family satisfaction. Should symptoms fail to improve or worsen they agree to call or return to clinic or to go to the Emergency Department. Discussed the importance of following up with any needed screening tests/labs/specialist appointments and any requested follow-up recommended by me today. Importance of maintaining follow-up discussed and patient accepts that missed appointments can delay diagnosis and potentially lead to worsening of conditions.  Return in about 3 months (around 8/4/2023)., or sooner if acute issues arise.    Lab Frequency Next Occurrence       This document has been electronically signed by MANOJ Hernandez on May 4, 2023 13:56 CDT

## 2023-05-03 ENCOUNTER — TELEPHONE (OUTPATIENT)
Dept: PODIATRY | Facility: CLINIC | Age: 83
End: 2023-05-03
Payer: MEDICARE

## 2023-05-03 NOTE — TELEPHONE ENCOUNTER
Called patient regarding appt on 05/04/2023. Left message for patient to return call if any questions or concerns arise.

## 2023-05-04 ENCOUNTER — OFFICE VISIT (OUTPATIENT)
Dept: PODIATRY | Facility: CLINIC | Age: 83
End: 2023-05-04
Payer: MEDICARE

## 2023-05-04 VITALS
SYSTOLIC BLOOD PRESSURE: 124 MMHG | BODY MASS INDEX: 25.88 KG/M2 | OXYGEN SATURATION: 98 % | HEART RATE: 65 BPM | DIASTOLIC BLOOD PRESSURE: 68 MMHG | WEIGHT: 161 LBS | HEIGHT: 66 IN

## 2023-05-04 DIAGNOSIS — Z79.4 TYPE 2 DIABETES MELLITUS WITH DIABETIC POLYNEUROPATHY, WITH LONG-TERM CURRENT USE OF INSULIN: ICD-10-CM

## 2023-05-04 DIAGNOSIS — E11.42 TYPE 2 DIABETES MELLITUS WITH DIABETIC POLYNEUROPATHY, WITH LONG-TERM CURRENT USE OF INSULIN: ICD-10-CM

## 2023-05-04 DIAGNOSIS — L60.2 THICKENED NAIL: Primary | ICD-10-CM

## 2023-05-04 DIAGNOSIS — B35.1 ONYCHOMYCOSIS: ICD-10-CM

## 2023-05-10 NOTE — PROGRESS NOTES
of motion. Neck supple. Cardiovascular: Normal rate and regular rhythm. Exam reveals no friction rub. No murmur heard. Pulmonary/Chest: Effort normal and breath sounds normal. No respiratory distress. She has no wheezes. She has no rales. Abdominal: Soft. Bowel sounds are normal. She exhibits no distension. There is no tenderness. There is no rebound and no guarding. Musculoskeletal: She exhibits no edema. Neurological: She is alert. Psychiatric: She has a normal mood and affect. Her behavior is normal.         No results found for this or any previous visit (from the past 672 hour(s)). Lab Results   Component Value Date    LABA1C 7.5 (H) 09/12/2018     No results found for: EAG        Assessment & Plan: The following diagnoses and conditions are stable with no further orders unlessindicated:  1. Seasonal allergic rhinitis due to pollen  Continue with antihistamine of her choice. Continue with nasal steroids and given options today. We'll start her on Astepro 2 sprays twice a day. Start her on singular 10 mg daily. Refer to allergist  Discussed risks and benefits of this new medication. Discussed potential side effects. She voiced understanding. a1c      - Amb External Referral To Allergy    2. Type 2 diabetes mellitus without complication, without long-term current use of insulin ([facility])  Lab Results   Component Value Date    LABA1C 7.5 (H) 09/12/2018     No results found for: EAG  Blood sugar has improved lately. However, she appears to have diarrhea associated with the metformin. We'll discontinue the metformin and see back in 4 weeks to review glucose log. Advised to call back if blood sugars consistently above 200.     3. Essential hypertension  BP Readings from Last 3 Encounters:   10/30/18 130/72   09/20/18 138/82   07/31/18 (!) 180/92     Stable     4. Depression with anxiety    stable    5.  Diarrhea, unspecified type    Likely related to her metformin        Patient Instructions Skin Substitute Text: The defect edges were debeveled with a #15 scalpel blade.  Given the location of the defect, shape of the defect and the proximity to free margins a skin substitute graft was deemed most appropriate.  The graft material was trimmed to fit the size of the defect. The graft was then placed in the primary defect and oriented appropriately.

## 2023-05-11 RX ORDER — PEN NEEDLE, DIABETIC 32 GX 1/4"
NEEDLE, DISPOSABLE MISCELLANEOUS
Qty: 100 EACH | Refills: 1 | Status: SHIPPED | OUTPATIENT
Start: 2023-05-11

## 2023-05-25 RX ORDER — ATORVASTATIN CALCIUM 80 MG/1
80 TABLET, FILM COATED ORAL NIGHTLY
Qty: 90 TABLET | Refills: 3 | Status: SHIPPED | OUTPATIENT
Start: 2023-05-25

## 2023-06-02 RX ORDER — LEVOTHYROXINE SODIUM 0.03 MG/1
25 TABLET ORAL DAILY
Qty: 90 TABLET | Refills: 1 | Status: SHIPPED | OUTPATIENT
Start: 2023-06-02

## 2023-06-22 ENCOUNTER — OFFICE VISIT (OUTPATIENT)
Dept: PRIMARY CARE CLINIC | Age: 83
End: 2023-06-22
Payer: MEDICARE

## 2023-06-22 VITALS
OXYGEN SATURATION: 98 % | TEMPERATURE: 96.8 F | WEIGHT: 161 LBS | DIASTOLIC BLOOD PRESSURE: 62 MMHG | SYSTOLIC BLOOD PRESSURE: 120 MMHG | BODY MASS INDEX: 25.99 KG/M2 | HEART RATE: 75 BPM

## 2023-06-22 DIAGNOSIS — R91.1 PULMONARY NODULE: ICD-10-CM

## 2023-06-22 DIAGNOSIS — G89.29 CHRONIC PAIN OF RIGHT KNEE: ICD-10-CM

## 2023-06-22 DIAGNOSIS — M25.561 CHRONIC PAIN OF RIGHT KNEE: ICD-10-CM

## 2023-06-22 DIAGNOSIS — I10 ESSENTIAL (PRIMARY) HYPERTENSION: ICD-10-CM

## 2023-06-22 DIAGNOSIS — N18.31 TYPE 2 DIABETES MELLITUS WITH STAGE 3A CHRONIC KIDNEY DISEASE, WITHOUT LONG-TERM CURRENT USE OF INSULIN (HCC): Primary | ICD-10-CM

## 2023-06-22 DIAGNOSIS — F41.8 DEPRESSION WITH ANXIETY: ICD-10-CM

## 2023-06-22 DIAGNOSIS — E03.9 PRIMARY HYPOTHYROIDISM: ICD-10-CM

## 2023-06-22 DIAGNOSIS — E78.2 MIXED HYPERLIPIDEMIA: ICD-10-CM

## 2023-06-22 DIAGNOSIS — E11.22 TYPE 2 DIABETES MELLITUS WITH STAGE 3A CHRONIC KIDNEY DISEASE, WITHOUT LONG-TERM CURRENT USE OF INSULIN (HCC): Primary | ICD-10-CM

## 2023-06-22 DIAGNOSIS — I25.10 CAD IN NATIVE ARTERY: ICD-10-CM

## 2023-06-22 LAB — HBA1C MFR BLD: 8.7 %

## 2023-06-22 PROCEDURE — 99214 OFFICE O/P EST MOD 30 MIN: CPT | Performed by: FAMILY MEDICINE

## 2023-06-22 PROCEDURE — 3052F HG A1C>EQUAL 8.0%<EQUAL 9.0%: CPT | Performed by: FAMILY MEDICINE

## 2023-06-22 PROCEDURE — 3078F DIAST BP <80 MM HG: CPT | Performed by: FAMILY MEDICINE

## 2023-06-22 PROCEDURE — 1036F TOBACCO NON-USER: CPT | Performed by: FAMILY MEDICINE

## 2023-06-22 PROCEDURE — G8399 PT W/DXA RESULTS DOCUMENT: HCPCS | Performed by: FAMILY MEDICINE

## 2023-06-22 PROCEDURE — G8427 DOCREV CUR MEDS BY ELIG CLIN: HCPCS | Performed by: FAMILY MEDICINE

## 2023-06-22 PROCEDURE — 3074F SYST BP LT 130 MM HG: CPT | Performed by: FAMILY MEDICINE

## 2023-06-22 PROCEDURE — 1123F ACP DISCUSS/DSCN MKR DOCD: CPT | Performed by: FAMILY MEDICINE

## 2023-06-22 PROCEDURE — G8417 CALC BMI ABV UP PARAM F/U: HCPCS | Performed by: FAMILY MEDICINE

## 2023-06-22 PROCEDURE — 1090F PRES/ABSN URINE INCON ASSESS: CPT | Performed by: FAMILY MEDICINE

## 2023-06-29 ENCOUNTER — TELEPHONE (OUTPATIENT)
Dept: PRIMARY CARE CLINIC | Age: 83
End: 2023-06-29

## 2023-08-24 ENCOUNTER — TELEPHONE (OUTPATIENT)
Dept: PRIMARY CARE CLINIC | Age: 83
End: 2023-08-24

## 2023-08-24 DIAGNOSIS — R26.89 BALANCE DISORDER: ICD-10-CM

## 2023-08-24 DIAGNOSIS — R26.9 ABNORMAL GAIT: Primary | ICD-10-CM

## 2023-08-24 NOTE — TELEPHONE ENCOUNTER
[facility] has called asking if we can place an order for PT to assist with gait/balance?  If ok, I have pended the order with the information attached

## 2023-09-05 RX ORDER — CARVEDILOL 12.5 MG/1
TABLET ORAL
Qty: 180 TABLET | Refills: 1 | Status: SHIPPED | OUTPATIENT
Start: 2023-09-05

## 2023-09-15 ENCOUNTER — HOSPITAL ENCOUNTER (OUTPATIENT)
Dept: CT IMAGING | Age: 83
Discharge: HOME | End: 2023-09-15
Payer: MEDICARE

## 2023-09-15 DIAGNOSIS — R91.1 PULMONARY NODULE: ICD-10-CM

## 2023-09-15 PROCEDURE — 71250 CT THORAX DX C-: CPT

## 2023-09-22 ENCOUNTER — OFFICE VISIT (OUTPATIENT)
Dept: PRIMARY CARE CLINIC | Age: 83
End: 2023-09-22

## 2023-09-22 VITALS
SYSTOLIC BLOOD PRESSURE: 114 MMHG | BODY MASS INDEX: 26.03 KG/M2 | OXYGEN SATURATION: 96 % | HEART RATE: 72 BPM | TEMPERATURE: 97.1 F | HEIGHT: 66 IN | WEIGHT: 162 LBS | DIASTOLIC BLOOD PRESSURE: 55 MMHG

## 2023-09-22 DIAGNOSIS — E03.9 PRIMARY HYPOTHYROIDISM: ICD-10-CM

## 2023-09-22 DIAGNOSIS — L40.50 PSORIATIC ARTHRITIS (HCC): ICD-10-CM

## 2023-09-22 DIAGNOSIS — F41.8 DEPRESSION WITH ANXIETY: ICD-10-CM

## 2023-09-22 DIAGNOSIS — M25.561 CHRONIC PAIN OF RIGHT KNEE: ICD-10-CM

## 2023-09-22 DIAGNOSIS — N18.31 TYPE 2 DIABETES MELLITUS WITH STAGE 3A CHRONIC KIDNEY DISEASE, WITHOUT LONG-TERM CURRENT USE OF INSULIN (HCC): ICD-10-CM

## 2023-09-22 DIAGNOSIS — G89.29 CHRONIC PAIN OF RIGHT KNEE: ICD-10-CM

## 2023-09-22 DIAGNOSIS — E11.22 TYPE 2 DIABETES MELLITUS WITH STAGE 3A CHRONIC KIDNEY DISEASE, WITHOUT LONG-TERM CURRENT USE OF INSULIN (HCC): ICD-10-CM

## 2023-09-22 DIAGNOSIS — R06.00 DYSPNEA, UNSPECIFIED TYPE: ICD-10-CM

## 2023-09-22 DIAGNOSIS — R32 URINARY INCONTINENCE, UNSPECIFIED TYPE: ICD-10-CM

## 2023-09-22 DIAGNOSIS — E78.00 HYPERCHOLESTEROLEMIA: ICD-10-CM

## 2023-09-22 DIAGNOSIS — Z23 NEED FOR INFLUENZA VACCINATION: ICD-10-CM

## 2023-09-22 DIAGNOSIS — I10 ESSENTIAL (PRIMARY) HYPERTENSION: ICD-10-CM

## 2023-09-22 DIAGNOSIS — Z00.00 ANNUAL PHYSICAL EXAM: Primary | ICD-10-CM

## 2023-09-22 LAB
ALBUMIN SERPL-MCNC: 4.1 G/DL (ref 3.5–5.2)
ALP SERPL-CCNC: 113 U/L (ref 35–104)
ALT SERPL-CCNC: 17 U/L (ref 5–33)
ANION GAP SERPL CALCULATED.3IONS-SCNC: 13 MMOL/L (ref 7–19)
AST SERPL-CCNC: 18 U/L (ref 5–32)
BASOPHILS # BLD: 0 K/UL (ref 0–0.2)
BASOPHILS NFR BLD: 0.1 % (ref 0–1)
BILIRUB SERPL-MCNC: 0.3 MG/DL (ref 0.2–1.2)
BNP BLD-MCNC: 348 PG/ML (ref 0–449)
BUN SERPL-MCNC: 18 MG/DL (ref 8–23)
CALCIUM SERPL-MCNC: 9.3 MG/DL (ref 8.8–10.2)
CHLORIDE SERPL-SCNC: 101 MMOL/L (ref 98–111)
CHOLEST SERPL-MCNC: 104 MG/DL (ref 160–199)
CO2 SERPL-SCNC: 21 MMOL/L (ref 22–29)
CREAT SERPL-MCNC: 0.8 MG/DL (ref 0.5–0.9)
EOSINOPHIL # BLD: 0.6 K/UL (ref 0–0.6)
EOSINOPHIL NFR BLD: 7.1 % (ref 0–5)
ERYTHROCYTE [DISTWIDTH] IN BLOOD BY AUTOMATED COUNT: 14.6 % (ref 11.5–14.5)
GLUCOSE SERPL-MCNC: 208 MG/DL (ref 74–109)
HBA1C MFR BLD: 7.9 %
HCT VFR BLD AUTO: 41.8 % (ref 37–47)
HDLC SERPL-MCNC: 40 MG/DL (ref 65–121)
HGB BLD-MCNC: 13.3 G/DL (ref 12–16)
IMM GRANULOCYTES # BLD: 0 K/UL
LDLC SERPL CALC-MCNC: 19 MG/DL
LYMPHOCYTES # BLD: 1.5 K/UL (ref 1.1–4.5)
LYMPHOCYTES NFR BLD: 19 % (ref 20–40)
MCH RBC QN AUTO: 29.4 PG (ref 27–31)
MCHC RBC AUTO-ENTMCNC: 31.8 G/DL (ref 33–37)
MCV RBC AUTO: 92.5 FL (ref 81–99)
MONOCYTES # BLD: 0.6 K/UL (ref 0–0.9)
MONOCYTES NFR BLD: 7.3 % (ref 0–10)
NEUTROPHILS # BLD: 5.2 K/UL (ref 1.5–7.5)
NEUTS SEG NFR BLD: 66.2 % (ref 50–65)
PLATELET # BLD AUTO: 225 K/UL (ref 130–400)
PMV BLD AUTO: 10.7 FL (ref 9.4–12.3)
POTASSIUM SERPL-SCNC: 4 MMOL/L (ref 3.5–5)
PROT SERPL-MCNC: 7.1 G/DL (ref 6.6–8.7)
RBC # BLD AUTO: 4.52 M/UL (ref 4.2–5.4)
SODIUM SERPL-SCNC: 135 MMOL/L (ref 136–145)
T4 FREE SERPL-MCNC: 1.06 NG/DL (ref 0.93–1.7)
TRIGL SERPL-MCNC: 227 MG/DL (ref 0–149)
TSH SERPL DL<=0.005 MIU/L-ACNC: 1.84 UIU/ML (ref 0.27–4.2)
WBC # BLD AUTO: 7.9 K/UL (ref 4.8–10.8)

## 2023-09-22 RX ORDER — PROCHLORPERAZINE 25 MG/1
SUPPOSITORY RECTAL
Qty: 1 EACH | Refills: 5 | Status: SHIPPED | OUTPATIENT
Start: 2023-09-22

## 2023-09-22 RX ORDER — PROCHLORPERAZINE 25 MG/1
SUPPOSITORY RECTAL
Qty: 1 EACH | Refills: 0 | Status: SHIPPED | OUTPATIENT
Start: 2023-09-22

## 2023-09-22 RX ORDER — INSULIN LISPRO 100 [IU]/ML
INJECTION, SOLUTION INTRAVENOUS; SUBCUTANEOUS
Qty: 6 ADJUSTABLE DOSE PRE-FILLED PEN SYRINGE | Refills: 5 | Status: SHIPPED | OUTPATIENT
Start: 2023-09-22

## 2023-09-22 RX ORDER — PROCHLORPERAZINE 25 MG/1
SUPPOSITORY RECTAL
Qty: 3 EACH | Refills: 3 | Status: SHIPPED | OUTPATIENT
Start: 2023-09-22

## 2023-09-22 RX ORDER — CARVEDILOL 6.25 MG/1
6.25 TABLET ORAL 2 TIMES DAILY WITH MEALS
Qty: 180 TABLET | Refills: 3 | Status: SHIPPED | OUTPATIENT
Start: 2023-09-22

## 2023-09-22 ASSESSMENT — PATIENT HEALTH QUESTIONNAIRE - PHQ9
SUM OF ALL RESPONSES TO PHQ QUESTIONS 1-9: 3
SUM OF ALL RESPONSES TO PHQ QUESTIONS 1-9: 3
4. FEELING TIRED OR HAVING LITTLE ENERGY: 1
3. TROUBLE FALLING OR STAYING ASLEEP: 1
9. THOUGHTS THAT YOU WOULD BE BETTER OFF DEAD, OR OF HURTING YOURSELF: 0
6. FEELING BAD ABOUT YOURSELF - OR THAT YOU ARE A FAILURE OR HAVE LET YOURSELF OR YOUR FAMILY DOWN: 1
7. TROUBLE CONCENTRATING ON THINGS, SUCH AS READING THE NEWSPAPER OR WATCHING TELEVISION: 0
5. POOR APPETITE OR OVEREATING: 0
1. LITTLE INTEREST OR PLEASURE IN DOING THINGS: 0
10. IF YOU CHECKED OFF ANY PROBLEMS, HOW DIFFICULT HAVE THESE PROBLEMS MADE IT FOR YOU TO DO YOUR WORK, TAKE CARE OF THINGS AT HOME, OR GET ALONG WITH OTHER PEOPLE: 1
SUM OF ALL RESPONSES TO PHQ QUESTIONS 1-9: 3
SUM OF ALL RESPONSES TO PHQ QUESTIONS 1-9: 3
2. FEELING DOWN, DEPRESSED OR HOPELESS: 0
SUM OF ALL RESPONSES TO PHQ9 QUESTIONS 1 & 2: 0
8. MOVING OR SPEAKING SO SLOWLY THAT OTHER PEOPLE COULD HAVE NOTICED. OR THE OPPOSITE, BEING SO FIGETY OR RESTLESS THAT YOU HAVE BEEN MOVING AROUND A LOT MORE THAN USUAL: 0

## 2023-09-22 ASSESSMENT — ENCOUNTER SYMPTOMS
COUGH: 0
DIARRHEA: 0
BACK PAIN: 1
CHEST TIGHTNESS: 0
CONSTIPATION: 0
ABDOMINAL PAIN: 0
ANAL BLEEDING: 0
NAUSEA: 0
SHORTNESS OF BREATH: 0

## 2023-09-22 ASSESSMENT — LIFESTYLE VARIABLES: HOW OFTEN DO YOU HAVE A DRINK CONTAINING ALCOHOL: NEVER

## 2023-09-22 NOTE — PATIENT INSTRUCTIONS
Blood pressure low today. Reduce carvedilol from 12.5 mg twice a day to 6.25 mg twice a day. New prescription sent to pharmacy. Diabetes:  Continue with basaglar 30 units at night. Start humalog 6 units before breakfast, lunch, and dinner  I will order dexcom glucose monitoring for you. Consider the following vaccinations:  Influenza vaccine  The following vaccinations can be obtained at your local pharmacy or health department:  New COVID 19 vaccine  Shingles Vaccine  RSV vaccine. RSV vaccine information:    http://Seventymm/  https://www.fda.gov/news-events/press-announcements/fda-approves-first-respiratory-syncytial-virus-rsv-vaccine    Or go to www.cdc.gov and www.fda.gov and search \"RSV vaccine information\"    The vaccine can be obtained at your local pharmacy or health department              Advance Care Planning: Care Instructions  Your Care Instructions    It can be hard to live with an illness that cannot be cured. But if your health is getting worse, you may want to make decisions about end-of-life care. Planning for the end of your life does not mean that you are giving up. It is a way to make sure that your wishes are met. Clearly stating your wishes can make it easier for your loved ones. Making plans while you are still able may also ease your mind and make your final days less stressful and more meaningful. Follow-up care is a key part of your treatment and safety. Be sure to make and go to all appointments, and call your doctor if you are having problems. It's also a good idea to know your test results and keep a list of the medicines you take. What can you do to plan for the end of life? Visit:  https://ag.[place].gov/consumer-protection/livingwills  You can bring these issues up with your doctor. You do not need to wait until your doctor starts the conversation. You might start with \"I would not be willing to live with . Darol Cocker Darol Cocker Darol Cocker \" When you

## 2023-09-25 ENCOUNTER — TELEPHONE (OUTPATIENT)
Dept: PRIMARY CARE CLINIC | Age: 83
End: 2023-09-25

## 2023-09-25 NOTE — TELEPHONE ENCOUNTER
Tried to call patient but no answer. Left VM. She called about a CGM. Dr. Alessia Francis sent that in at her last ov and I told her it will need a PA. I will try to work on that this week.

## 2023-10-12 ENCOUNTER — OFFICE VISIT (OUTPATIENT)
Dept: UROLOGY | Age: 83
End: 2023-10-12

## 2023-10-12 VITALS — TEMPERATURE: 97.3 F | BODY MASS INDEX: 26.52 KG/M2 | WEIGHT: 165 LBS | HEIGHT: 66 IN

## 2023-10-12 DIAGNOSIS — N32.81 OAB (OVERACTIVE BLADDER): ICD-10-CM

## 2023-10-12 DIAGNOSIS — N39.46 MIXED STRESS AND URGE URINARY INCONTINENCE: Primary | ICD-10-CM

## 2023-10-12 LAB
APPEARANCE FLUID: CLEAR
BILIRUBIN, POC: NORMAL
BLOOD URINE, POC: NORMAL
CLARITY, POC: CLEAR
COLOR, POC: YELLOW
GLUCOSE URINE, POC: NORMAL
KETONES, POC: NORMAL
LEUKOCYTE EST, POC: NORMAL
NITRITE, POC: NORMAL
PH, POC: 5.5
POST VOID RESIDUAL (PVR): 19 ML
PROTEIN, POC: 30
SPECIFIC GRAVITY, POC: 1.02
UROBILINOGEN, POC: 0.2

## 2023-10-12 RX ORDER — CARVEDILOL 12.5 MG/1
TABLET ORAL
COMMUNITY
Start: 2023-09-28

## 2023-10-13 ASSESSMENT — ENCOUNTER SYMPTOMS
NAUSEA: 0
VOMITING: 0
ABDOMINAL PAIN: 0
ABDOMINAL DISTENTION: 0

## 2023-10-24 ENCOUNTER — TELEPHONE (OUTPATIENT)
Dept: PODIATRY | Facility: CLINIC | Age: 83
End: 2023-10-24
Payer: MEDICARE

## 2023-10-24 NOTE — TELEPHONE ENCOUNTER
PT IS AWARE OF APPT TIME AND DATE CHANGE  FROM DR RIDDLE TO JAMARI IZAGUIRRE FOR NAIL CARE FOR 11/30/2023

## 2023-10-30 DIAGNOSIS — E11.22 TYPE 2 DIABETES MELLITUS WITH STAGE 3A CHRONIC KIDNEY DISEASE, WITHOUT LONG-TERM CURRENT USE OF INSULIN (HCC): ICD-10-CM

## 2023-10-30 DIAGNOSIS — N18.31 TYPE 2 DIABETES MELLITUS WITH STAGE 3A CHRONIC KIDNEY DISEASE, WITHOUT LONG-TERM CURRENT USE OF INSULIN (HCC): ICD-10-CM

## 2023-10-31 ENCOUNTER — TELEPHONE (OUTPATIENT)
Dept: PRIMARY CARE CLINIC | Age: 83
End: 2023-10-31

## 2023-10-31 RX ORDER — BLOOD-GLUCOSE METER
KIT MISCELLANEOUS
Qty: 100 EACH | Refills: 5 | Status: SHIPPED | OUTPATIENT
Start: 2023-10-31

## 2023-10-31 NOTE — TELEPHONE ENCOUNTER
Pt called and left vm with concerns about her BP being high. I left her a msg asking her to call back and schedule an appointment.

## 2023-11-21 ENCOUNTER — OFFICE VISIT (OUTPATIENT)
Dept: PRIMARY CARE CLINIC | Age: 83
End: 2023-11-21
Payer: MEDICARE

## 2023-11-21 VITALS
SYSTOLIC BLOOD PRESSURE: 131 MMHG | BODY MASS INDEX: 26.47 KG/M2 | DIASTOLIC BLOOD PRESSURE: 73 MMHG | HEART RATE: 83 BPM | WEIGHT: 164 LBS | OXYGEN SATURATION: 96 % | TEMPERATURE: 96.9 F

## 2023-11-21 DIAGNOSIS — I10 ESSENTIAL (PRIMARY) HYPERTENSION: Primary | ICD-10-CM

## 2023-11-21 DIAGNOSIS — E11.22 TYPE 2 DIABETES MELLITUS WITH STAGE 3A CHRONIC KIDNEY DISEASE, WITHOUT LONG-TERM CURRENT USE OF INSULIN (HCC): ICD-10-CM

## 2023-11-21 DIAGNOSIS — N18.31 TYPE 2 DIABETES MELLITUS WITH STAGE 3A CHRONIC KIDNEY DISEASE, WITHOUT LONG-TERM CURRENT USE OF INSULIN (HCC): ICD-10-CM

## 2023-11-21 DIAGNOSIS — E11.42 DIABETIC POLYNEUROPATHY ASSOCIATED WITH TYPE 2 DIABETES MELLITUS (HCC): ICD-10-CM

## 2023-11-21 PROCEDURE — G8417 CALC BMI ABV UP PARAM F/U: HCPCS | Performed by: FAMILY MEDICINE

## 2023-11-21 PROCEDURE — 1090F PRES/ABSN URINE INCON ASSESS: CPT | Performed by: FAMILY MEDICINE

## 2023-11-21 PROCEDURE — G8427 DOCREV CUR MEDS BY ELIG CLIN: HCPCS | Performed by: FAMILY MEDICINE

## 2023-11-21 PROCEDURE — 3075F SYST BP GE 130 - 139MM HG: CPT | Performed by: FAMILY MEDICINE

## 2023-11-21 PROCEDURE — G8484 FLU IMMUNIZE NO ADMIN: HCPCS | Performed by: FAMILY MEDICINE

## 2023-11-21 PROCEDURE — 3051F HG A1C>EQUAL 7.0%<8.0%: CPT | Performed by: FAMILY MEDICINE

## 2023-11-21 PROCEDURE — G8399 PT W/DXA RESULTS DOCUMENT: HCPCS | Performed by: FAMILY MEDICINE

## 2023-11-21 PROCEDURE — 3078F DIAST BP <80 MM HG: CPT | Performed by: FAMILY MEDICINE

## 2023-11-21 PROCEDURE — 99214 OFFICE O/P EST MOD 30 MIN: CPT | Performed by: FAMILY MEDICINE

## 2023-11-21 PROCEDURE — 1036F TOBACCO NON-USER: CPT | Performed by: FAMILY MEDICINE

## 2023-11-21 PROCEDURE — 1123F ACP DISCUSS/DSCN MKR DOCD: CPT | Performed by: FAMILY MEDICINE

## 2023-11-21 RX ORDER — LISINOPRIL 10 MG/1
10 TABLET ORAL DAILY
Qty: 90 TABLET | Refills: 3 | Status: SHIPPED | OUTPATIENT
Start: 2023-11-21

## 2023-11-21 RX ORDER — PREGABALIN 50 MG/1
50 CAPSULE ORAL 2 TIMES DAILY
Qty: 180 CAPSULE | Refills: 5 | Status: SHIPPED | OUTPATIENT
Start: 2023-11-21 | End: 2023-12-21

## 2023-11-22 NOTE — PROGRESS NOTES
"    [facility] - PODIATRY    Today's Date: 11/30/2023     Patient Name: Kary Alcaraz  MRN: [id number]  CSN: [id number]  PCP: Nate Whitley MD  Referring Provider: No ref. provider found    SUBJECTIVE     Chief Complaint   Patient presents with    Follow-up     Nate Whitley MD 11/21/2023 3 month follow up - diabetic- pt states feet doing good- pt denies pain     Diabetes     125mg/dl BG this am     HPI: Kary Alcaraz, a 82 y.o.female, comes to clinic as a(n) established patient complaining of toenail/callus issues. Patient has h/o DM, HTN, GERD, CVA, and CKD . Patient is NIDDM with last stated BG level of 125mg/dl. Patient presents with complaints of thickened, irregular toenail growth of both feet. States she is diabetic with mild numbness and tingling in feet.   Patient uses a walker for ambulation and is currently undergoing physical therapy for strength training.  Patient reports she recently moved into [facility].  Denies pain today. Relates previous treatment(s) including care by podiatry . Denies any constitutional symptoms. No other pedal complaints at this time.     Past Medical History:   Diagnosis Date    CKD (chronic kidney disease)     CVA (cerebral vascular accident)     Diabetes mellitus     GERD (gastroesophageal reflux disease)     Hypertension     Neuropathy in diabetes      History reviewed. No pertinent surgical history.  History reviewed. No pertinent family history.  Social History     Socioeconomic History    Marital status:    Tobacco Use    Smoking status: Never     Passive exposure: Never    Smokeless tobacco: Never   Vaping Use    Vaping Use: Never used   Substance and Sexual Activity    Alcohol use: Never    Drug use: No    Sexual activity: Defer     Allergies   Allergen Reactions    Adhesive Tape Other (See Comments)    Elastic Bandages & [Zinc] Other (See Comments)     \"Elastic in bra\"     Current Outpatient Medications   Medication Sig " Dispense Refill    aspirin 81 MG chewable tablet Chew 1 tablet Daily.      atorvastatin (LIPITOR) 80 MG tablet Take 1 tablet by mouth Daily.      carvedilol (COREG) 12.5 MG tablet Take 1 tablet by mouth 2 (Two) Times a Day With Meals.      coenzyme Q10 100 MG capsule Take 1 capsule by mouth Daily.      Insulin Glargine (BASAGLAR KWIKPEN) 100 UNIT/ML injection pen Inject 100 Units under the skin into the appropriate area as directed.      Insulin Lispro (humaLOG) 100 UNIT/ML injection Inject 1 Units under the skin into the appropriate area as directed Daily.      levothyroxine (SYNTHROID, LEVOTHROID) 25 MCG tablet Take 1 tablet by mouth Daily.      lisinopril (PRINIVIL,ZESTRIL) 10 MG tablet Take 1 tablet by mouth Daily.      omeprazole (priLOSEC) 40 MG capsule Take 1 capsule by mouth Daily.      pregabalin (LYRICA) 50 MG capsule Take 1 capsule by mouth 3 (Three) Times a Day.      sertraline (ZOLOFT) 50 MG tablet Take 1 tablet by mouth Daily.      vitamin B-12 (CYANOCOBALAMIN) 1000 MCG tablet Take 1 tablet by mouth Daily.      albuterol (PROVENTIL HFA;VENTOLIN HFA) 108 (90 Base) MCG/ACT inhaler Inhale. (Patient not taking: Reported on 11/30/2023)      gabapentin (NEURONTIN) 100 MG capsule Take 1 capsule by mouth 3 (Three) Times a Day. (Patient not taking: Reported on 11/30/2023)      montelukast (SINGULAIR) 10 MG tablet Take 1 tablet by mouth Every Night. (Patient not taking: Reported on 11/30/2023)       No current facility-administered medications for this visit.     Review of Systems   Constitutional:  Negative for chills and fever.   HENT:  Negative for congestion.    Respiratory:  Negative for shortness of breath.    Cardiovascular:  Positive for leg swelling. Negative for chest pain.   Gastrointestinal:  Negative for constipation, diarrhea, nausea and vomiting.   Musculoskeletal:  Positive for gait problem. Negative for arthralgias and myalgias.   Skin:  Negative for wound.   Neurological:  Positive for weakness  and numbness.       OBJECTIVE     Vitals:    11/30/23 1311   BP: 118/56   Pulse: 72   SpO2: 96%         PHYSICAL EXAM  GEN:   Accompanied by none.     Foot/Ankle Exam    GENERAL  Diabetic foot exam performed    Appearance:  elderly  Orientation:  AAOx3  Affect:  appropriate  Gait:  (Unsteady)  Assistance:  walker  Right shoe gear: casual shoe  Left shoe gear: casual shoe    VASCULAR     Right Foot Vascularity   Dorsalis pedis:  2+  Posterior tibial:  2+  Skin temperature:  warm  Edema grading:  None  CFT:  3  Pedal hair growth:  Present  Varicosities:  spider veins     Left Foot Vascularity   Dorsalis pedis:  2+  Posterior tibial:  2+  Skin temperature:  warm  Edema grading:  None  CFT:  3  Pedal hair growth:  Present  Varicosities:  spider veins     NEUROLOGIC     Right Foot Neurologic   Light touch sensation: diminished  Vibratory sensation: diminished  Hot/Cold sensation: diminished     Left Foot Neurologic   Light touch sensation: diminished  Vibratory sensation: diminished  Hot/Cold sensation:  diminished    MUSCULOSKELETAL     Right Foot Musculoskeletal   Ecchymosis:  none  Tenderness:  toenail problem    Arch:  Normal  Hallux valgus: Yes    Hallux limitus: No    Tailor's bunion: No       Left Foot Musculoskeletal   Ecchymosis:  none  Tenderness:  toenail problem  Arch:  Normal  Hallux valgus: Yes    Hallux limitus: No    Tailor's bunion: No      MUSCLE STRENGTH     Right Foot Muscle Strength   Foot dorsiflexion:  4+  Foot plantar flexion:  4+  Foot inversion:  4+  Foot eversion:  4+     Left Foot Muscle Strength   Foot dorsiflexion:  4+  Foot plantar flexion:  4+  Foot inversion:  4+  Foot eversion:  4+    RANGE OF MOTION     Right Foot Range of Motion   Foot and ankle ROM within normal limits       Left Foot Range of Motion   Foot and ankle ROM within normal limits      DERMATOLOGIC      Right Foot Dermatologic   Skin  Positive for atrophy.   Nails  1.  Positive for elongated, onychomycosis, abnormal  thickness and subungual debris.  2.  Positive for elongated, onychomycosis and abnormal thickness.  3.  Positive for elongated, onychomycosis and abnormal thickness.  4.  Positive for elongated, onychomycosis and abnormal thickness.  5.  Positive for elongated, onychomycosis and abnormal thickness.     Left Foot Dermatologic   Skin  Positive for atrophy.   Nails  1.  Positive for elongated, onychomycosis, abnormal thickness and subungual debris.  2.  Positive for elongated, onychomycosis and abnormal thickness.  3.  Positive for elongated, onychomycosis and abnormal thickness.  4.  Positive for elongated, onychomycosis and abnormally thick.  5.  Positive for elongated, onychomycosis and abnormally thick.      RADIOLOGY/NUCLEAR:  No results found.    LABORATORY/CULTURE RESULTS:      PATHOLOGY RESULTS:       ASSESSMENT/PLAN     Diagnoses and all orders for this visit:    1. Onychomycosis (Primary)    2. Type 2 diabetes mellitus with diabetic polyneuropathy, with long-term current use of insulin    3. Stage 3 chronic kidney disease, unspecified whether stage 3a or 3b CKD    4. Gait abnormality    5. Hemiplegia affecting dominant side        Comprehensive lower extremity examination and evaluation was performed.  Discussed findings and treatment plan including risks, benefits, and treatment options with patient in detail. Patient agreed with treatment plan.  After verbal consent obtained, nail(s) x10 debrided of length and thickness with nail nipper without incidence  Patient may maintain nails and calluses at home utilizing emery board or pumice stone between visits as needed  Reviewed at home diabetic foot care including daily foot checks   Continue diabetic monitoring and control under direction of PCP.  Continue use of walker for ambulation to prevent falls.  Continue physical therapy as previously directed by PCP.  May utilize compression; recommend using throughout the day and not at night.   An After Visit  Summary was printed and given to the patient at discharge, including (if requested) any available informative/educational handouts regarding diagnosis, treatment, or medications. All questions were answered to patient/family satisfaction. Should symptoms fail to improve or worsen they agree to call or return to clinic or to go to the Emergency Department. Discussed the importance of following up with any needed screening tests/labs/specialist appointments and any requested follow-up recommended by me today. Importance of maintaining follow-up discussed and patient accepts that missed appointments can delay diagnosis and potentially lead to worsening of conditions.  Return in about 3 months (around 2/29/2024) for Follow-up with Podiatry APRADRIA, Schedule Foot Care Clinic., or sooner if acute issues arise.    Lab Frequency Next Occurrence       This document has been electronically signed by MANOJ Monroe on November 30, 2023 14:07 CST

## 2023-11-29 ENCOUNTER — TELEPHONE (OUTPATIENT)
Dept: PODIATRY | Facility: CLINIC | Age: 83
End: 2023-11-29
Payer: MEDICARE

## 2023-11-29 NOTE — TELEPHONE ENCOUNTER
Called patient regarding appt on 11/30/2023. Left message for patient to return call if any questions or concerns arise.

## 2023-11-30 ENCOUNTER — OFFICE VISIT (OUTPATIENT)
Dept: PODIATRY | Facility: CLINIC | Age: 83
End: 2023-11-30
Payer: MEDICARE

## 2023-11-30 VITALS
HEART RATE: 72 BPM | WEIGHT: 165 LBS | OXYGEN SATURATION: 96 % | BODY MASS INDEX: 26.52 KG/M2 | HEIGHT: 66 IN | DIASTOLIC BLOOD PRESSURE: 56 MMHG | SYSTOLIC BLOOD PRESSURE: 118 MMHG

## 2023-11-30 DIAGNOSIS — Z79.4 TYPE 2 DIABETES MELLITUS WITH DIABETIC POLYNEUROPATHY, WITH LONG-TERM CURRENT USE OF INSULIN: ICD-10-CM

## 2023-11-30 DIAGNOSIS — N18.30 STAGE 3 CHRONIC KIDNEY DISEASE, UNSPECIFIED WHETHER STAGE 3A OR 3B CKD: ICD-10-CM

## 2023-11-30 DIAGNOSIS — R26.9 GAIT ABNORMALITY: ICD-10-CM

## 2023-11-30 DIAGNOSIS — E11.42 TYPE 2 DIABETES MELLITUS WITH DIABETIC POLYNEUROPATHY, WITH LONG-TERM CURRENT USE OF INSULIN: ICD-10-CM

## 2023-11-30 DIAGNOSIS — B35.1 ONYCHOMYCOSIS: Primary | ICD-10-CM

## 2023-11-30 DIAGNOSIS — G81.90 HEMIPLEGIA AFFECTING DOMINANT SIDE: ICD-10-CM

## 2023-11-30 PROBLEM — E03.9 ACQUIRED HYPOTHYROIDISM: Status: ACTIVE | Noted: 2021-11-23

## 2023-11-30 PROBLEM — I10 HTN (HYPERTENSION): Status: ACTIVE | Noted: 2021-11-23

## 2023-11-30 PROBLEM — N39.46 MIXED STRESS AND URGE URINARY INCONTINENCE: Status: ACTIVE | Noted: 2023-10-12

## 2023-11-30 PROBLEM — L40.50 PSORIATIC ARTHRITIS: Status: ACTIVE | Noted: 2021-11-23

## 2023-11-30 PROBLEM — I63.9 ACUTE CVA (CEREBROVASCULAR ACCIDENT): Status: ACTIVE | Noted: 2021-11-20

## 2023-11-30 PROBLEM — E78.2 MIXED HYPERLIPIDEMIA: Status: ACTIVE | Noted: 2018-11-07

## 2023-11-30 PROBLEM — Z98.890 S/P LEFT HEART CATHETERIZATION BY PERCUTANEOUS APPROACH: Status: ACTIVE | Noted: 2018-07-28

## 2023-11-30 PROBLEM — M25.569 KNEE PAIN: Status: ACTIVE | Noted: 2023-11-30

## 2023-11-30 PROBLEM — E11.22 TYPE 2 DIABETES MELLITUS WITH CHRONIC KIDNEY DISEASE, WITHOUT LONG-TERM CURRENT USE OF INSULIN: Status: ACTIVE | Noted: 2018-01-08

## 2023-11-30 PROBLEM — F41.8 DEPRESSION WITH ANXIETY: Status: ACTIVE | Noted: 2017-12-28

## 2023-11-30 RX ORDER — LISINOPRIL 10 MG/1
10 TABLET ORAL DAILY
COMMUNITY

## 2023-11-30 RX ORDER — PREGABALIN 50 MG/1
50 CAPSULE ORAL 3 TIMES DAILY
COMMUNITY

## 2023-12-12 ENCOUNTER — TELEPHONE (OUTPATIENT)
Dept: PRIMARY CARE CLINIC | Age: 83
End: 2023-12-12

## 2023-12-12 NOTE — TELEPHONE ENCOUNTER
----- Message from Dee Morgan sent at 12/7/2023 12:32 PM CST -----  Subject: Referral Request    Reason for referral request? pt would like a referrel for a wheelchair. She stated she broke her right foot. Provider patient wants to be referred to(if known):     Provider Phone Number(if known):     Additional Information for Provider?   ---------------------------------------------------------------------------  --------------  [address]    [phone]; OK to leave message on voicemail  ---------------------------------------------------------------------------  --------------

## 2023-12-12 NOTE — TELEPHONE ENCOUNTER
I tried to call the patient twice and she is unable to hear me.  Unsure if it is a phone problem or her hearing in general. If she calls back, we need to schedule her an appt so ins will pay

## 2023-12-15 NOTE — TELEPHONE ENCOUNTER
PHYSICAL THERAPY TREATMENT NOTE - INPATIENT    Room Number: 431/431-A     Session: 1     Number of Visits to Meet Established Goals: 5    Presenting Problem: PNA  Co-Morbidities : breast cancer who is undergoing chemotherapy  ASSESSMENT     Pt able to demonstrate improved tolerance to gait training this session 3ft, 25ft, 25ft with RW on 2L. Continues to heavily rely on UE for leverage on RW. HR improved compared to previous session less than 120 for gait training however did elevate after return to supine with desat in to mid -low 80s, resolved with inc to 3L    The patient is below baseline and would benefit from skilled inpatient PT to address the above deficits to assist patient in returning to prior to level of function.        DISCHARGE RECOMMENDATIONS  PT Discharge Recommendations: Home with home health PT/OT;Intermittent Supervision (RW and w/c)     PLAN  PT Treatment Plan: Bed mobility;Body mechanics;Coordination;Endurance;Energy conservation;Patient education;Family education;Gait training;Neuromuscular re-educate;Range of motion;Strengthening;Stoop training;Stair training;Transfer training;Balance training  Rehab Potential : Good  Frequency (Obs): 5x/week    CURRENT GOALS     Goal #1 Patient is able to demonstrate supine - sit EOB @ level: supervision      Goal #2 Patient is able to demonstrate transfers EOB to/from Chair/Wheelchair at assistance level: supervision      Goal #3 Patient is able to ambulate 150 feet with assist device: walker - rolling at assistance level: supervision      Goal #4     Goal #5     Goal #6     Goal Comments: Goals established on 2023       12/15/2023 all goals ongoing      SUBJECTIVE  \"I didn't get any sleep last night\"     OBJECTIVE  Precautions: Bed/chair alarm (Chronic R foot drop per pt)    WEIGHT BEARING RESTRICTION  Weight Bearing Restriction: None                PAIN ASSESSMENT   Ratin          BALANCE                                                         Susy Kc MD                                                                Static Sitting: Good  Dynamic Sitting: Good           Static Standing: Fair -  Dynamic Standing: Fair -    ACTIVITY TOLERANCE                         O2 WALK  Oxygen Therapy  SPO2% Ambulation on Oxygen: 89 (delayed desat to 84 after returning to supine, O2 inc to 3L to maintain >90%)  Ambulation oxygen flow (liters per minute): 2      AM-PAC '6-Clicks' INPATIENT SHORT FORM - BASIC MOBILITY  How much difficulty does the patient currently have...  Patient Difficulty: Turning over in bed (including adjusting bedclothes, sheets and blankets)?: None   Patient Difficulty: Sitting down on and standing up from a chair with arms (e.g., wheelchair, bedside commode, etc.): A Little   Patient Difficulty: Moving from lying on back to sitting on the side of the bed?: A Little   How much help from another person does the patient currently need...   Help from Another: Moving to and from a bed to a chair (including a wheelchair)?: A Little   Help from Another: Need to walk in hospital room?: A Little   Help from Another: Climbing 3-5 steps with a railing?: A Lot       AM-PAC Score:  Raw Score: 18   Approx Degree of Impairment: 46.58%   Standardized Score (AM-PAC Scale): 43.63   CMS Modifier (G-Code): CK    FUNCTIONAL ABILITY STATUS  Gait Assessment   Functional Mobility/Gait Assessment  Gait Assistance: Minimum assistance  Distance (ft): 3,25,25  Assistive Device: Rolling walker  Pattern: Shuffle    Skilled Therapy Provided        THERAPEUTIC EXERCISES  Lower Extremity Alternating marching  Ankle pumps  LAQ     Upper Extremity      Position Sitting     Repetitions   10   Sets   1        Skilled Therapy Provided      Transfer Mobility:  Sit to stand: min A progressing to CGA on 3rd attempt after education on UE placement, inc knee flexion on L  Stand to sit: CGA   Gait = min A with RW, required therapeutic seated rest breaks between gait trials, cont to heavily rely in UE  for navigation, discussed EC strategies including pursed lip breathing     Therapist's Comments: remained on 2L for majority of session with sats >90%, after returning to supine at end of session sats dropped to low-mid 80s and pt unable to recover on 2L, increased to 3L and pt able to recover within a few minutes. Additionally HR elevated to 120s with desat.     Exercise/Education Provided:  Body mechanics  Energy conservation  Functional activity tolerated  Gait training  Transfer training    Patient End of Session: Needs met;Call light within reach;RN aware of session/findings;All patient questions and concerns addressed;In bed        PT Session Time: 25 minutes  Gait Training: 15 minutes  Therapeutic Activity: 10 minutes  Neuromuscular Re-education:  minutes  Therapeutic Exercise:  minutes

## 2023-12-28 ENCOUNTER — OFFICE VISIT (OUTPATIENT)
Dept: PRIMARY CARE CLINIC | Age: 83
End: 2023-12-28

## 2023-12-28 VITALS
TEMPERATURE: 96.8 F | WEIGHT: 158 LBS | OXYGEN SATURATION: 93 % | BODY MASS INDEX: 25.5 KG/M2 | DIASTOLIC BLOOD PRESSURE: 60 MMHG | HEART RATE: 67 BPM | SYSTOLIC BLOOD PRESSURE: 101 MMHG

## 2023-12-28 DIAGNOSIS — I10 ESSENTIAL (PRIMARY) HYPERTENSION: ICD-10-CM

## 2023-12-28 DIAGNOSIS — E03.9 ACQUIRED HYPOTHYROIDISM: ICD-10-CM

## 2023-12-28 DIAGNOSIS — F41.8 DEPRESSION WITH ANXIETY: ICD-10-CM

## 2023-12-28 DIAGNOSIS — E78.00 HYPERCHOLESTEROLEMIA: ICD-10-CM

## 2023-12-28 DIAGNOSIS — M25.50 ARTHRALGIA OF MULTIPLE JOINTS: ICD-10-CM

## 2023-12-28 DIAGNOSIS — E11.22 TYPE 2 DIABETES MELLITUS WITH STAGE 3A CHRONIC KIDNEY DISEASE, WITHOUT LONG-TERM CURRENT USE OF INSULIN (HCC): Primary | ICD-10-CM

## 2023-12-28 DIAGNOSIS — N18.31 TYPE 2 DIABETES MELLITUS WITH STAGE 3A CHRONIC KIDNEY DISEASE, WITHOUT LONG-TERM CURRENT USE OF INSULIN (HCC): Primary | ICD-10-CM

## 2023-12-28 PROBLEM — I20.9 ANGINA PECTORIS, UNSPECIFIED (HCC): Status: ACTIVE | Noted: 2023-12-28

## 2023-12-28 PROBLEM — I25.119 ATHEROSCLEROTIC HEART DISEASE OF NATIVE CORONARY ARTERY WITH UNSPECIFIED ANGINA PECTORIS (HCC): Status: ACTIVE | Noted: 2023-12-28

## 2023-12-28 LAB — HBA1C MFR BLD: 8.4 %

## 2024-01-08 RX ORDER — PEN NEEDLE, DIABETIC 32 GX 1/4"
NEEDLE, DISPOSABLE MISCELLANEOUS
Qty: 100 EACH | Refills: 1 | Status: SHIPPED | OUTPATIENT
Start: 2024-01-08

## 2024-01-10 NOTE — PROGRESS NOTES
Sherice Henley is a 83 y.o., female, Established patient who presents today   Chief Complaint   Patient presents with    Follow-up     I am here today for a follow up. Gemtesa works well.        Patient presents for follow-up of lower urinary tract symptoms after the initiation of Gemtesa.  Original complaints were mostly of mixed stress and urge incontinence.  She reports she is doing much better with the Gemtesa, but could still use some improvement in regards to incontinence.  Her frequency and urgency has improved.  She had previously been maintained on Myrbetriq, but is not a good candidate for this medication secondary to her use of a beta-blocker. Patient also reports previous history of CVA.     REVIEW OF SYSTEMS:  Review of Systems   Constitutional:  Negative for chills and fever.   Gastrointestinal:  Negative for abdominal distention, abdominal pain, nausea and vomiting.   Genitourinary:  Positive for frequency. Negative for difficulty urinating, dysuria, flank pain, hematuria and urgency.   Musculoskeletal:  Positive for gait problem.   Psychiatric/Behavioral:  Negative for agitation and confusion.        PHYSICAL EXAM:  Temp 98.6 °F (37 °C) (Temporal)   Ht 1.676 m (5' 6\")   Wt 76.2 kg (168 lb)   BMI 27.12 kg/m²   Physical Exam  Vitals and nursing note reviewed.   Constitutional:       General: She is not in acute distress.     Appearance: Normal appearance. She is not ill-appearing.   Pulmonary:      Effort: Pulmonary effort is normal. No respiratory distress.   Abdominal:      General: There is no distension.      Tenderness: There is no abdominal tenderness. There is no right CVA tenderness or left CVA tenderness.   Musculoskeletal:      Comments: Boot on right leg   Neurological:      Mental Status: She is alert and oriented to person, place, and time.      Gait: Gait abnormal.   Psychiatric:         Mood and Affect: Mood normal.         Behavior: Behavior normal.         DATA:  Results for orders

## 2024-01-11 ENCOUNTER — OFFICE VISIT (OUTPATIENT)
Dept: UROLOGY | Age: 84
End: 2024-01-11
Payer: MEDICARE

## 2024-01-11 VITALS — TEMPERATURE: 98.6 F | BODY MASS INDEX: 27 KG/M2 | WEIGHT: 168 LBS | HEIGHT: 66 IN

## 2024-01-11 DIAGNOSIS — N39.46 MIXED STRESS AND URGE URINARY INCONTINENCE: ICD-10-CM

## 2024-01-11 DIAGNOSIS — N32.81 OAB (OVERACTIVE BLADDER): Primary | ICD-10-CM

## 2024-01-11 LAB — POST VOID RESIDUAL (PVR): 20 ML

## 2024-01-11 PROCEDURE — G8484 FLU IMMUNIZE NO ADMIN: HCPCS | Performed by: NURSE PRACTITIONER

## 2024-01-11 PROCEDURE — G8417 CALC BMI ABV UP PARAM F/U: HCPCS | Performed by: NURSE PRACTITIONER

## 2024-01-11 PROCEDURE — 1123F ACP DISCUSS/DSCN MKR DOCD: CPT | Performed by: NURSE PRACTITIONER

## 2024-01-11 PROCEDURE — G8399 PT W/DXA RESULTS DOCUMENT: HCPCS | Performed by: NURSE PRACTITIONER

## 2024-01-11 PROCEDURE — 1090F PRES/ABSN URINE INCON ASSESS: CPT | Performed by: NURSE PRACTITIONER

## 2024-01-11 PROCEDURE — 1036F TOBACCO NON-USER: CPT | Performed by: NURSE PRACTITIONER

## 2024-01-11 PROCEDURE — G8427 DOCREV CUR MEDS BY ELIG CLIN: HCPCS | Performed by: NURSE PRACTITIONER

## 2024-01-11 PROCEDURE — 51798 US URINE CAPACITY MEASURE: CPT | Performed by: NURSE PRACTITIONER

## 2024-01-11 PROCEDURE — 99214 OFFICE O/P EST MOD 30 MIN: CPT | Performed by: NURSE PRACTITIONER

## 2024-01-11 PROCEDURE — 0509F URINE INCON PLAN DOCD: CPT | Performed by: NURSE PRACTITIONER

## 2024-01-11 RX ORDER — TROSPIUM CHLORIDE 20 MG/1
20 TABLET, FILM COATED ORAL 2 TIMES DAILY
Qty: 180 TABLET | Refills: 3 | Status: SHIPPED | OUTPATIENT
Start: 2024-01-11

## 2024-01-11 ASSESSMENT — ENCOUNTER SYMPTOMS
VOMITING: 0
ABDOMINAL PAIN: 0
NAUSEA: 0
ABDOMINAL DISTENTION: 0

## 2024-01-12 ENCOUNTER — TELEPHONE (OUTPATIENT)
Dept: UROLOGY | Age: 84
End: 2024-01-12

## 2024-01-12 NOTE — TELEPHONE ENCOUNTER
----- Message from SHARI Fine - CNP sent at 1/11/2024  4:46 PM CST -----  Can we see if we can do a PA or something for her on Gemtesa because it so expensive.  Her insurance prefers Myrbetriq, but this is contraindicated as she is on a beta-blocker.

## 2024-01-12 NOTE — TELEPHONE ENCOUNTER
I have initiated a Prior Auth for the patient Gemtesa in hopes of getting it covered, or at least lowering the price.

## 2024-02-08 DIAGNOSIS — E03.9 ACQUIRED HYPOTHYROIDISM: Primary | ICD-10-CM

## 2024-02-08 RX ORDER — LEVOTHYROXINE SODIUM 25 MCG
25 TABLET ORAL DAILY
Qty: 90 TABLET | Refills: 1 | Status: SHIPPED | OUTPATIENT
Start: 2024-02-08

## 2024-02-19 NOTE — PROGRESS NOTES
"    [facility] - PODIATRY    Today's Date: 02/29/2024     Patient Name: Kary Alcaraz  MRN: [id number]  CSN: [id number]  PCP: Nate Whitley MD  Referring Provider: No ref. provider found    SUBJECTIVE     Chief Complaint   Patient presents with    Follow-up     Nate Whitley MD 12/28/2023 3 MTH FU DIABETIC- pt states feet doing good- pt denies pain     Diabetes     125mg/dl BG this am      HPI: Kary Alcaraz, a 83 y.o.female, comes to clinic as a(n) established patient complaining of toenail/callus issues. Patient has h/o DM, HTN, GERD, CVA, and CKD . Patient is NIDDM with last stated BG level of 125mg/dl. Patient presents with complaints of thickened, irregular toenail growth of both feet. States she is diabetic with mild numbness and tingling in feet.   Patient uses a walker for ambulation.  Patient reports she recently moved into [facility] assisted [facility].  Denies pain today. Relates previous treatment(s) including care by podiatry . Denies any constitutional symptoms. No other pedal complaints at this time.     Past Medical History:   Diagnosis Date    CKD (chronic kidney disease)     CVA (cerebral vascular accident)     Diabetes mellitus     GERD (gastroesophageal reflux disease)     Hypertension     Neuropathy in diabetes      History reviewed. No pertinent surgical history.  History reviewed. No pertinent family history.  Social History     Socioeconomic History    Marital status:    Tobacco Use    Smoking status: Never     Passive exposure: Never    Smokeless tobacco: Never   Vaping Use    Vaping Use: Never used   Substance and Sexual Activity    Alcohol use: Never    Drug use: No    Sexual activity: Defer     Allergies   Allergen Reactions    Adhesive Tape Other (See Comments)    Elastic Bandages & [Zinc] Other (See Comments)     \"Elastic in bra\"     Current Outpatient Medications   Medication Sig Dispense Refill    albuterol (PROVENTIL HFA;VENTOLIN HFA) 108 (90 Base) " MCG/ACT inhaler Inhale.      aspirin 81 MG chewable tablet Chew 1 tablet Daily.      atorvastatin (LIPITOR) 80 MG tablet Take 1 tablet by mouth Daily.      carvedilol (COREG) 12.5 MG tablet Take 1 tablet by mouth 2 (Two) Times a Day With Meals.      coenzyme Q10 100 MG capsule Take 1 capsule by mouth Daily.      gabapentin (NEURONTIN) 100 MG capsule Take 1 capsule by mouth 3 (Three) Times a Day.      Insulin Glargine (BASAGLAR KWIKPEN) 100 UNIT/ML injection pen Inject 100 Units under the skin into the appropriate area as directed.      Insulin Lispro (humaLOG) 100 UNIT/ML injection Inject 1 Units under the skin into the appropriate area as directed Daily.      levothyroxine (SYNTHROID, LEVOTHROID) 25 MCG tablet Take 1 tablet by mouth Daily.      lisinopril (PRINIVIL,ZESTRIL) 10 MG tablet Take 1 tablet by mouth Daily.      montelukast (SINGULAIR) 10 MG tablet Take 1 tablet by mouth Every Night.      omeprazole (priLOSEC) 40 MG capsule Take 1 capsule by mouth Daily.      pregabalin (LYRICA) 50 MG capsule Take 1 capsule by mouth 3 (Three) Times a Day.      sertraline (ZOLOFT) 50 MG tablet Take 1 tablet by mouth Daily.      vitamin B-12 (CYANOCOBALAMIN) 1000 MCG tablet Take 1 tablet by mouth Daily.       No current facility-administered medications for this visit.     Review of Systems   Constitutional:  Negative for chills and fever.   HENT:  Negative for congestion.    Respiratory:  Negative for shortness of breath.    Cardiovascular:  Positive for leg swelling. Negative for chest pain.   Gastrointestinal:  Negative for constipation, diarrhea, nausea and vomiting.   Musculoskeletal:  Positive for gait problem. Negative for arthralgias and myalgias.   Skin:  Negative for wound.   Neurological:  Positive for weakness and numbness.       OBJECTIVE     Vitals:    02/29/24 1338   BP: 120/58   Pulse: 76   SpO2: 99%           PHYSICAL EXAM  GEN:   Accompanied by none.     Foot/Ankle Exam    GENERAL  Diabetic foot exam  performed    Appearance:  elderly  Orientation:  AAOx3  Affect:  appropriate  Gait:  (Unsteady)  Assistance:  walker  Right shoe gear: casual shoe  Left shoe gear: casual shoe    VASCULAR     Right Foot Vascularity   Dorsalis pedis:  2+  Posterior tibial:  2+  Skin temperature:  warm  Edema grading:  None  CFT:  3  Pedal hair growth:  Present  Varicosities:  spider veins     Left Foot Vascularity   Dorsalis pedis:  2+  Posterior tibial:  2+  Skin temperature:  warm  Edema grading:  None  CFT:  3  Pedal hair growth:  Present  Varicosities:  spider veins     NEUROLOGIC     Right Foot Neurologic   Light touch sensation: diminished  Vibratory sensation: diminished  Hot/Cold sensation: diminished  Protective Sensation using [place]ren Monofilament:   Sites intact: 7  Sites tested: 10     Left Foot Neurologic   Light touch sensation: diminished  Vibratory sensation: diminished  Hot/Cold sensation:  diminished  Protective Sensation using [place]fren Monofilament:   Sites intact: 6  Sites tested: 10    MUSCULOSKELETAL     Right Foot Musculoskeletal   Ecchymosis:  none  Tenderness:  toenail problem    Arch:  Normal  Hallux valgus: Yes    Hallux limitus: No    Tailor's bunion: No       Left Foot Musculoskeletal   Ecchymosis:  none  Tenderness:  toenail problem  Arch:  Normal  Hallux valgus: Yes    Hallux limitus: No    Tailor's bunion: No      MUSCLE STRENGTH     Right Foot Muscle Strength   Foot dorsiflexion:  4+  Foot plantar flexion:  4+  Foot inversion:  4+  Foot eversion:  4+     Left Foot Muscle Strength   Foot dorsiflexion:  4+  Foot plantar flexion:  4+  Foot inversion:  4+  Foot eversion:  4+    RANGE OF MOTION     Right Foot Range of Motion   Foot and ankle ROM within normal limits       Left Foot Range of Motion   Foot and ankle ROM within normal limits      DERMATOLOGIC      Right Foot Dermatologic   Skin  Positive for atrophy.   Nails  1.  Positive for elongated, onychomycosis, abnormal thickness and  subungual debris.  2.  Positive for elongated, onychomycosis and abnormal thickness.  3.  Positive for elongated, onychomycosis and abnormal thickness.  4.  Positive for elongated, onychomycosis and abnormal thickness.  5.  Positive for elongated, onychomycosis and abnormal thickness.     Left Foot Dermatologic   Skin  Positive for atrophy.   Nails  1.  Positive for elongated, onychomycosis, abnormal thickness and subungual debris.  2.  Positive for elongated, onychomycosis and abnormal thickness.  3.  Positive for elongated, onychomycosis and abnormal thickness.  4.  Positive for elongated, onychomycosis and abnormally thick.  5.  Positive for elongated, onychomycosis and abnormally thick.      RADIOLOGY/NUCLEAR:  No results found.    LABORATORY/CULTURE RESULTS:      PATHOLOGY RESULTS:       ASSESSMENT/PLAN     Diagnoses and all orders for this visit:    1. Onychomycosis (Primary)    2. Type 2 diabetes mellitus with diabetic polyneuropathy, with long-term current use of insulin    3. Stage 3 chronic kidney disease, unspecified whether stage 3a or 3b CKD    4. Gait abnormality    5. Hemiplegia affecting dominant side    6. Encounter for diabetic foot exam          Comprehensive lower extremity examination and evaluation was performed.  Discussed findings and treatment plan including risks, benefits, and treatment options with patient in detail. Patient agreed with treatment plan.  Diabetic foot exam performed.   After verbal consent obtained, nail(s) x10 debrided of length and thickness with nail nipper without incidence  Patient may maintain nails and calluses at home utilizing emery board or pumice stone between visits as needed  Reviewed at home diabetic foot care including daily foot checks   Continue diabetic monitoring and control under direction of PCP.  Continue use of walker for ambulation to prevent falls.    May utilize compression; recommend using throughout the day and not at night.   An After Visit  Summary was printed and given to the patient at discharge, including (if requested) any available informative/educational handouts regarding diagnosis, treatment, or medications. All questions were answered to patient/family satisfaction. Should symptoms fail to improve or worsen they agree to call or return to clinic or to go to the Emergency Department. Discussed the importance of following up with any needed screening tests/labs/specialist appointments and any requested follow-up recommended by me today. Importance of maintaining follow-up discussed and patient accepts that missed appointments can delay diagnosis and potentially lead to worsening of conditions.  Return in about 10 weeks (around 5/9/2024) for Follow-up with Podiatry Provider, Schedule [facility]., or sooner if acute issues arise.    Lab Frequency Next Occurrence       This document has been electronically signed by MANOJ Monroe on February 29, 2024 14:05 CST

## 2024-02-20 RX ORDER — ATORVASTATIN CALCIUM 80 MG/1
80 TABLET, FILM COATED ORAL NIGHTLY
Qty: 90 TABLET | Refills: 3 | OUTPATIENT
Start: 2024-02-20

## 2024-02-28 ENCOUNTER — TELEPHONE (OUTPATIENT)
Dept: PODIATRY | Facility: CLINIC | Age: 84
End: 2024-02-28
Payer: MEDICARE

## 2024-02-28 NOTE — TELEPHONE ENCOUNTER
Called patient regarding appt on 02/29/2024. Left message for patient to return call if any questions or concerns arise.

## 2024-02-29 ENCOUNTER — OFFICE VISIT (OUTPATIENT)
Dept: PODIATRY | Facility: CLINIC | Age: 84
End: 2024-02-29
Payer: MEDICARE

## 2024-02-29 VITALS
WEIGHT: 165 LBS | HEIGHT: 66 IN | SYSTOLIC BLOOD PRESSURE: 120 MMHG | DIASTOLIC BLOOD PRESSURE: 58 MMHG | BODY MASS INDEX: 26.52 KG/M2 | HEART RATE: 76 BPM | OXYGEN SATURATION: 99 %

## 2024-02-29 DIAGNOSIS — Z79.4 TYPE 2 DIABETES MELLITUS WITH DIABETIC POLYNEUROPATHY, WITH LONG-TERM CURRENT USE OF INSULIN: ICD-10-CM

## 2024-02-29 DIAGNOSIS — B35.1 ONYCHOMYCOSIS: Primary | ICD-10-CM

## 2024-02-29 DIAGNOSIS — G81.90 HEMIPLEGIA AFFECTING DOMINANT SIDE: ICD-10-CM

## 2024-02-29 DIAGNOSIS — E11.42 TYPE 2 DIABETES MELLITUS WITH DIABETIC POLYNEUROPATHY, WITH LONG-TERM CURRENT USE OF INSULIN: ICD-10-CM

## 2024-02-29 DIAGNOSIS — E11.9 ENCOUNTER FOR DIABETIC FOOT EXAM: ICD-10-CM

## 2024-02-29 DIAGNOSIS — R26.9 GAIT ABNORMALITY: ICD-10-CM

## 2024-02-29 DIAGNOSIS — N18.30 STAGE 3 CHRONIC KIDNEY DISEASE, UNSPECIFIED WHETHER STAGE 3A OR 3B CKD: ICD-10-CM

## 2024-03-05 DIAGNOSIS — E11.22 TYPE 2 DIABETES MELLITUS WITH CHRONIC KIDNEY DISEASE, WITHOUT LONG-TERM CURRENT USE OF INSULIN, UNSPECIFIED CKD STAGE (HCC): ICD-10-CM

## 2024-03-06 NOTE — TELEPHONE ENCOUNTER
Last seen: 12/28/2023. Follow-up:   Future Appointments   Date Time Provider Department Center   4/11/2024 10:20 AM Hermilo Maya MD [facility]   4/18/2024  1:45 PM Umu Ball, APRN - CNP [facility]

## 2024-03-07 RX ORDER — INSULIN GLARGINE 100 [IU]/ML
INJECTION, SOLUTION SUBCUTANEOUS
Qty: 26 ML | Refills: 3 | Status: SHIPPED | OUTPATIENT
Start: 2024-03-07

## 2024-03-21 ENCOUNTER — TELEPHONE (OUTPATIENT)
Dept: UROLOGY | Age: 84
End: 2024-03-21

## 2024-03-25 DIAGNOSIS — E11.22 TYPE 2 DIABETES MELLITUS WITH STAGE 3A CHRONIC KIDNEY DISEASE, WITHOUT LONG-TERM CURRENT USE OF INSULIN (HCC): Primary | ICD-10-CM

## 2024-03-25 DIAGNOSIS — N18.31 TYPE 2 DIABETES MELLITUS WITH STAGE 3A CHRONIC KIDNEY DISEASE, WITHOUT LONG-TERM CURRENT USE OF INSULIN (HCC): Primary | ICD-10-CM

## 2024-03-25 NOTE — TELEPHONE ENCOUNTER
Sherice CURTIS Henley called to request a refill on her medication.      Last office visit : 2023   Next office visit : 2024     Requested Prescriptions     Pending Prescriptions Disp Refills    blood glucose test strips (TRUE METRIX BLOOD GLUCOSE TEST) strip 100 each 3     Si each by In Vitro route daily As needed.  E11.22    Blood Glucose Monitoring Suppl (TRUE METRIX METER) ADE 1 each 0     Si each by Does not apply route See Admin Instructions E11.22    Lancets MISC 100 each 3     Si each by Does not apply route daily E11.22            Rita Barron MA

## 2024-03-26 RX ORDER — CALCIUM CITRATE/VITAMIN D3 200MG-6.25
1 TABLET ORAL DAILY
Qty: 100 EACH | Refills: 3 | Status: SHIPPED | OUTPATIENT
Start: 2024-03-26

## 2024-03-26 RX ORDER — LANCETS 30 GAUGE
1 EACH MISCELLANEOUS DAILY
Qty: 100 EACH | Refills: 3 | Status: SHIPPED | OUTPATIENT
Start: 2024-03-26

## 2024-03-26 RX ORDER — BLOOD-GLUCOSE METER
1 EACH MISCELLANEOUS SEE ADMIN INSTRUCTIONS
Qty: 1 EACH | Refills: 0 | Status: SHIPPED | OUTPATIENT
Start: 2024-03-26

## 2024-04-11 ENCOUNTER — OFFICE VISIT (OUTPATIENT)
Dept: PRIMARY CARE CLINIC | Age: 84
End: 2024-04-11

## 2024-04-11 VITALS
OXYGEN SATURATION: 96 % | BODY MASS INDEX: 26.03 KG/M2 | HEART RATE: 77 BPM | TEMPERATURE: 97 F | DIASTOLIC BLOOD PRESSURE: 62 MMHG | WEIGHT: 162 LBS | SYSTOLIC BLOOD PRESSURE: 128 MMHG | HEIGHT: 66 IN

## 2024-04-11 DIAGNOSIS — L40.50 PSORIATIC ARTHRITIS (HCC): ICD-10-CM

## 2024-04-11 DIAGNOSIS — E55.9 AVITAMINOSIS D: ICD-10-CM

## 2024-04-11 DIAGNOSIS — E03.9 PRIMARY HYPOTHYROIDISM: ICD-10-CM

## 2024-04-11 DIAGNOSIS — I25.119 ATHEROSCLEROSIS OF NATIVE CORONARY ARTERY OF NATIVE HEART WITH ANGINA PECTORIS (HCC): ICD-10-CM

## 2024-04-11 DIAGNOSIS — H60.312 CHRONIC DIFFUSE OTITIS EXTERNA OF LEFT EAR: ICD-10-CM

## 2024-04-11 DIAGNOSIS — N18.31 TYPE 2 DIABETES MELLITUS WITH STAGE 3A CHRONIC KIDNEY DISEASE, WITHOUT LONG-TERM CURRENT USE OF INSULIN (HCC): ICD-10-CM

## 2024-04-11 DIAGNOSIS — R53.83 OTHER FATIGUE: ICD-10-CM

## 2024-04-11 DIAGNOSIS — E11.42 DIABETIC POLYNEUROPATHY ASSOCIATED WITH TYPE 2 DIABETES MELLITUS (HCC): ICD-10-CM

## 2024-04-11 DIAGNOSIS — I69.351 HEMIPARESIS AFFECTING RIGHT SIDE AS LATE EFFECT OF STROKE (HCC): ICD-10-CM

## 2024-04-11 DIAGNOSIS — D64.9 ANEMIA, UNSPECIFIED TYPE: ICD-10-CM

## 2024-04-11 DIAGNOSIS — M79.10 MYALGIA: ICD-10-CM

## 2024-04-11 DIAGNOSIS — N18.31 STAGE 3A CHRONIC KIDNEY DISEASE (HCC): ICD-10-CM

## 2024-04-11 DIAGNOSIS — I10 ESSENTIAL (PRIMARY) HYPERTENSION: ICD-10-CM

## 2024-04-11 DIAGNOSIS — E11.22 TYPE 2 DIABETES MELLITUS WITH STAGE 3A CHRONIC KIDNEY DISEASE, WITHOUT LONG-TERM CURRENT USE OF INSULIN (HCC): ICD-10-CM

## 2024-04-11 DIAGNOSIS — F41.8 DEPRESSION WITH ANXIETY: ICD-10-CM

## 2024-04-11 DIAGNOSIS — E78.2 MIXED HYPERLIPIDEMIA: ICD-10-CM

## 2024-04-11 DIAGNOSIS — N18.31 TYPE 2 DIABETES MELLITUS WITH STAGE 3A CHRONIC KIDNEY DISEASE, WITHOUT LONG-TERM CURRENT USE OF INSULIN (HCC): Primary | ICD-10-CM

## 2024-04-11 DIAGNOSIS — J44.9 CHRONIC OBSTRUCTIVE PULMONARY DISEASE, UNSPECIFIED COPD TYPE (HCC): ICD-10-CM

## 2024-04-11 DIAGNOSIS — R91.1 PULMONARY NODULE: ICD-10-CM

## 2024-04-11 DIAGNOSIS — E11.22 TYPE 2 DIABETES MELLITUS WITH STAGE 3A CHRONIC KIDNEY DISEASE, WITHOUT LONG-TERM CURRENT USE OF INSULIN (HCC): Primary | ICD-10-CM

## 2024-04-11 LAB
25(OH)D3 SERPL-MCNC: 31 NG/ML
ALBUMIN SERPL-MCNC: 4.1 G/DL (ref 3.5–5.2)
ALP SERPL-CCNC: 137 U/L (ref 35–104)
ALT SERPL-CCNC: 25 U/L (ref 5–33)
ANION GAP SERPL CALCULATED.3IONS-SCNC: 18 MMOL/L (ref 7–19)
AST SERPL-CCNC: 17 U/L (ref 5–32)
BASOPHILS # BLD: 0 K/UL (ref 0–0.2)
BASOPHILS NFR BLD: 0.5 % (ref 0–1)
BILIRUB SERPL-MCNC: 0.4 MG/DL (ref 0.2–1.2)
BUN SERPL-MCNC: 25 MG/DL (ref 8–23)
CALCIUM SERPL-MCNC: 9.7 MG/DL (ref 8.8–10.2)
CHLORIDE SERPL-SCNC: 103 MMOL/L (ref 98–111)
CO2 SERPL-SCNC: 22 MMOL/L (ref 22–29)
CREAT SERPL-MCNC: 1 MG/DL (ref 0.5–0.9)
CREAT UR-MCNC: 149.1 MG/DL (ref 28–217)
EOSINOPHIL # BLD: 0.3 K/UL (ref 0–0.6)
EOSINOPHIL NFR BLD: 4.2 % (ref 0–5)
ERYTHROCYTE [DISTWIDTH] IN BLOOD BY AUTOMATED COUNT: 13.5 % (ref 11.5–14.5)
GLUCOSE SERPL-MCNC: 182 MG/DL (ref 74–109)
HBA1C MFR BLD: 7.2 % (ref 4–6)
HCT VFR BLD AUTO: 40.9 % (ref 37–47)
HGB BLD-MCNC: 13.6 G/DL (ref 12–16)
IMM GRANULOCYTES # BLD: 0 K/UL
IRON SERPL-MCNC: 75 UG/DL (ref 37–145)
LYMPHOCYTES # BLD: 1.5 K/UL (ref 1.1–4.5)
LYMPHOCYTES NFR BLD: 19 % (ref 20–40)
MAGNESIUM SERPL-MCNC: 1.7 MG/DL (ref 1.6–2.4)
MCH RBC QN AUTO: 30.2 PG (ref 27–31)
MCHC RBC AUTO-ENTMCNC: 33.3 G/DL (ref 33–37)
MCV RBC AUTO: 90.7 FL (ref 81–99)
MICROALBUMIN UR-MCNC: 3.1 MG/DL (ref 0–19)
MICROALBUMIN/CREAT UR-RTO: 20.8 MG/G
MONOCYTES # BLD: 0.8 K/UL (ref 0–0.9)
MONOCYTES NFR BLD: 9.6 % (ref 0–10)
NEUTROPHILS # BLD: 5.3 K/UL (ref 1.5–7.5)
NEUTS SEG NFR BLD: 66.3 % (ref 50–65)
PLATELET # BLD AUTO: 230 K/UL (ref 130–400)
PMV BLD AUTO: 10 FL (ref 9.4–12.3)
POTASSIUM SERPL-SCNC: 3.9 MMOL/L (ref 3.5–5)
PROT SERPL-MCNC: 6.8 G/DL (ref 6.6–8.7)
RBC # BLD AUTO: 4.51 M/UL (ref 4.2–5.4)
SODIUM SERPL-SCNC: 143 MMOL/L (ref 136–145)
T4 FREE SERPL-MCNC: 1.13 NG/DL (ref 0.93–1.7)
TSH SERPL DL<=0.005 MIU/L-ACNC: 2.34 UIU/ML (ref 0.27–4.2)
WBC # BLD AUTO: 8 K/UL (ref 4.8–10.8)

## 2024-04-11 RX ORDER — NEOMYCIN SULFATE, POLYMYXIN B SULFATE, HYDROCORTISONE 3.5; 10000; 1 MG/ML; [USP'U]/ML; MG/ML
2 SOLUTION/ DROPS AURICULAR (OTIC) EVERY 8 HOURS SCHEDULED
Qty: 10 ML | Refills: 1 | Status: SHIPPED | OUTPATIENT
Start: 2024-04-11 | End: 2024-04-21

## 2024-04-11 RX ORDER — INSULIN LISPRO 100 [IU]/ML
INJECTION, SOLUTION INTRAVENOUS; SUBCUTANEOUS
Qty: 6 ADJUSTABLE DOSE PRE-FILLED PEN SYRINGE | Refills: 5 | Status: SHIPPED | OUTPATIENT
Start: 2024-04-11

## 2024-04-11 ASSESSMENT — ENCOUNTER SYMPTOMS
COUGH: 0
ANAL BLEEDING: 0
SHORTNESS OF BREATH: 0
CONSTIPATION: 0
DIARRHEA: 0
NAUSEA: 0
CHEST TIGHTNESS: 0
ABDOMINAL PAIN: 0

## 2024-04-11 NOTE — PATIENT INSTRUCTIONS
Diabetes:  Continue with Basaglar 28 units at night  Start Humalog 6 units before breakfast lunch and dinner  If you do not receive Humalog from your mail order pharmacy within the next 2 weeks contact me    Use the prescription eardrops in the left ear as prescribed.  If no relief in 10 to 14 days, contact me for referral to ENT    If you continue to have issues with your right hand for balance contact me and I can arrange follow-up with physical therapy

## 2024-04-16 ENCOUNTER — OFFICE VISIT (OUTPATIENT)
Dept: UROLOGY | Age: 84
End: 2024-04-16
Payer: MEDICARE

## 2024-04-16 VITALS — TEMPERATURE: 97.2 F | BODY MASS INDEX: 27 KG/M2 | HEIGHT: 66 IN | WEIGHT: 168 LBS

## 2024-04-16 DIAGNOSIS — N32.81 OAB (OVERACTIVE BLADDER): Primary | ICD-10-CM

## 2024-04-16 DIAGNOSIS — N39.46 MIXED STRESS AND URGE URINARY INCONTINENCE: ICD-10-CM

## 2024-04-16 LAB
APPEARANCE FLUID: CLEAR
BILIRUBIN, POC: NORMAL
BLOOD URINE, POC: NORMAL
CLARITY, POC: CLEAR
COLOR, POC: YELLOW
GLUCOSE URINE, POC: NORMAL
KETONES, POC: NORMAL
LEUKOCYTE EST, POC: NORMAL
NITRITE, POC: NORMAL
PH, POC: 5.5
POST VOID RESIDUAL (PVR): NORMAL ML
PROTEIN, POC: NORMAL
SPECIFIC GRAVITY, POC: 1.02
UROBILINOGEN, POC: 0.2

## 2024-04-16 PROCEDURE — G8417 CALC BMI ABV UP PARAM F/U: HCPCS | Performed by: NURSE PRACTITIONER

## 2024-04-16 PROCEDURE — 0509F URINE INCON PLAN DOCD: CPT | Performed by: NURSE PRACTITIONER

## 2024-04-16 PROCEDURE — 99213 OFFICE O/P EST LOW 20 MIN: CPT | Performed by: NURSE PRACTITIONER

## 2024-04-16 PROCEDURE — G8427 DOCREV CUR MEDS BY ELIG CLIN: HCPCS | Performed by: NURSE PRACTITIONER

## 2024-04-16 PROCEDURE — 1123F ACP DISCUSS/DSCN MKR DOCD: CPT | Performed by: NURSE PRACTITIONER

## 2024-04-16 PROCEDURE — 81002 URINALYSIS NONAUTO W/O SCOPE: CPT | Performed by: NURSE PRACTITIONER

## 2024-04-16 PROCEDURE — 1036F TOBACCO NON-USER: CPT | Performed by: NURSE PRACTITIONER

## 2024-04-16 PROCEDURE — 1090F PRES/ABSN URINE INCON ASSESS: CPT | Performed by: NURSE PRACTITIONER

## 2024-04-16 PROCEDURE — 51798 US URINE CAPACITY MEASURE: CPT | Performed by: NURSE PRACTITIONER

## 2024-04-16 PROCEDURE — G8399 PT W/DXA RESULTS DOCUMENT: HCPCS | Performed by: NURSE PRACTITIONER

## 2024-04-16 NOTE — PROGRESS NOTES
Sherice Henley is a 83 y.o., female, Established patient who presents today   Chief Complaint   Patient presents with    Follow-up     I am here today for my 3 month OAB/Incontinence follow up.     Patient presents for follow-up of lower urinary tract symptoms. Original complaints were mostly of mixed stress and urge incontinence.  She reports she is doing much better with the Gemtesa.  We had attempted to initiate combination therapy with [place] and Gemtesa, however, patient did not wish to take both of these medications or was not sure she could take them together.  Her frequency and urgency has improved.  She is now utilizing a pad or briefs only twice daily and is having significantly less incontinence.  She had previously been maintained on Myrbetriq, but is not a good candidate for this medication secondary to her use of a beta-blocker. Patient also reports previous history of CVA.     REVIEW OF SYSTEMS:  Review of Systems   Constitutional:  Negative for chills and fever.   Gastrointestinal:  Negative for abdominal distention, abdominal pain, nausea and vomiting.   Genitourinary:  Negative for difficulty urinating, dysuria, flank pain, frequency, hematuria and urgency.   Musculoskeletal:  Positive for gait problem. Negative for back pain.   Neurological:  Positive for weakness.   Psychiatric/Behavioral:  Negative for agitation and confusion.        PHYSICAL EXAM:  Temp 97.2 °F (36.2 °C) (Temporal)   Ht 1.676 m (5' 6\")   Wt 76.2 kg (168 lb)   BMI 27.12 kg/m²   Physical Exam  Vitals and nursing note reviewed.   Constitutional:       General: She is not in acute distress.     Appearance: Normal appearance. She is not ill-appearing.   Pulmonary:      Effort: Pulmonary effort is normal. No respiratory distress.   Abdominal:      General: There is no distension.      Tenderness: There is no abdominal tenderness. There is no right CVA tenderness or left CVA tenderness.   Neurological:      Mental Status: She is

## 2024-04-17 ASSESSMENT — ENCOUNTER SYMPTOMS
ABDOMINAL PAIN: 0
NAUSEA: 0
ABDOMINAL DISTENTION: 0
BACK PAIN: 0
VOMITING: 0

## 2024-04-18 DIAGNOSIS — F41.8 DEPRESSION WITH ANXIETY: ICD-10-CM

## 2024-04-18 RX ORDER — CARVEDILOL 12.5 MG/1
12.5 TABLET ORAL 2 TIMES DAILY WITH MEALS
Qty: 180 TABLET | Refills: 1 | OUTPATIENT
Start: 2024-04-18

## 2024-04-18 RX ORDER — ATORVASTATIN CALCIUM 80 MG/1
80 TABLET, FILM COATED ORAL NIGHTLY
Qty: 90 TABLET | Refills: 3 | Status: SHIPPED | OUTPATIENT
Start: 2024-04-18

## 2024-04-23 ENCOUNTER — TELEPHONE (OUTPATIENT)
Dept: PRIMARY CARE CLINIC | Age: 84
End: 2024-04-23

## 2024-04-23 DIAGNOSIS — N18.31 TYPE 2 DIABETES MELLITUS WITH STAGE 3A CHRONIC KIDNEY DISEASE, WITHOUT LONG-TERM CURRENT USE OF INSULIN (HCC): Primary | ICD-10-CM

## 2024-04-23 DIAGNOSIS — E11.22 TYPE 2 DIABETES MELLITUS WITH STAGE 3A CHRONIC KIDNEY DISEASE, WITHOUT LONG-TERM CURRENT USE OF INSULIN (HCC): Primary | ICD-10-CM

## 2024-04-23 RX ORDER — GLUCOSAMINE HCL/CHONDROITIN SU 500-400 MG
100 CAPSULE ORAL DAILY
Qty: 100 STRIP | Refills: 1 | Status: SHIPPED | OUTPATIENT
Start: 2024-04-23

## 2024-04-23 NOTE — TELEPHONE ENCOUNTER
LM for patient that I will send generic prescription for test strips since she has a new monitor. She is also having trouble getting it to work. I advised her to call the company to report it or take it to the pharmacy.

## 2024-04-26 ENCOUNTER — TELEPHONE (OUTPATIENT)
Dept: UROLOGY | Age: 84
End: 2024-04-26

## 2024-04-26 NOTE — TELEPHONE ENCOUNTER
I called the patient insurance company today to check on the status of the appeal I sent off on 4/17/24. This was for her Gemtesa. Fco is who I spoke with, and he said that it is still in process at this time.

## 2024-05-21 ENCOUNTER — OFFICE VISIT (OUTPATIENT)
Dept: PRIMARY CARE CLINIC | Age: 84
End: 2024-05-21
Payer: MEDICARE

## 2024-05-21 VITALS
SYSTOLIC BLOOD PRESSURE: 106 MMHG | HEART RATE: 81 BPM | WEIGHT: 167 LBS | TEMPERATURE: 97 F | DIASTOLIC BLOOD PRESSURE: 62 MMHG | OXYGEN SATURATION: 97 % | BODY MASS INDEX: 26.95 KG/M2

## 2024-05-21 DIAGNOSIS — E11.22 TYPE 2 DIABETES MELLITUS WITH STAGE 3A CHRONIC KIDNEY DISEASE, WITHOUT LONG-TERM CURRENT USE OF INSULIN (HCC): ICD-10-CM

## 2024-05-21 DIAGNOSIS — R53.1 WEAKNESS: ICD-10-CM

## 2024-05-21 DIAGNOSIS — N18.31 TYPE 2 DIABETES MELLITUS WITH STAGE 3A CHRONIC KIDNEY DISEASE, WITHOUT LONG-TERM CURRENT USE OF INSULIN (HCC): ICD-10-CM

## 2024-05-21 DIAGNOSIS — R42 DIZZINESS: Primary | ICD-10-CM

## 2024-05-21 DIAGNOSIS — R53.83 OTHER FATIGUE: ICD-10-CM

## 2024-05-21 DIAGNOSIS — H65.02 NON-RECURRENT ACUTE SEROUS OTITIS MEDIA OF LEFT EAR: ICD-10-CM

## 2024-05-21 LAB
ALBUMIN SERPL-MCNC: 4 G/DL (ref 3.5–5.2)
ALP SERPL-CCNC: 121 U/L (ref 35–104)
ALT SERPL-CCNC: 28 U/L (ref 5–33)
ANION GAP SERPL CALCULATED.3IONS-SCNC: 16 MMOL/L (ref 7–19)
AST SERPL-CCNC: 19 U/L (ref 5–32)
BACTERIA URNS QL MICRO: NEGATIVE /HPF
BASOPHILS # BLD: 0 K/UL (ref 0–0.2)
BASOPHILS NFR BLD: 0.4 % (ref 0–1)
BILIRUB SERPL-MCNC: 0.5 MG/DL (ref 0.2–1.2)
BILIRUB UR QL STRIP: NEGATIVE
BUN SERPL-MCNC: 28 MG/DL (ref 8–23)
CALCIUM SERPL-MCNC: 9.4 MG/DL (ref 8.8–10.2)
CHLORIDE SERPL-SCNC: 102 MMOL/L (ref 98–111)
CLARITY UR: CLEAR
CO2 SERPL-SCNC: 22 MMOL/L (ref 22–29)
COLOR UR: YELLOW
CREAT SERPL-MCNC: 1 MG/DL (ref 0.5–0.9)
CRYSTALS URNS MICRO: ABNORMAL /HPF
EOSINOPHIL # BLD: 0.4 K/UL (ref 0–0.6)
EOSINOPHIL NFR BLD: 4.5 % (ref 0–5)
EPI CELLS #/AREA URNS AUTO: 2 /HPF (ref 0–5)
ERYTHROCYTE [DISTWIDTH] IN BLOOD BY AUTOMATED COUNT: 13.7 % (ref 11.5–14.5)
ERYTHROCYTE [SEDIMENTATION RATE] IN BLOOD BY WESTERGREN METHOD: 15 MM/HR (ref 0–25)
GLUCOSE SERPL-MCNC: 182 MG/DL (ref 74–109)
GLUCOSE UR STRIP.AUTO-MCNC: NEGATIVE MG/DL
HCT VFR BLD AUTO: 42.5 % (ref 37–47)
HGB BLD-MCNC: 14.3 G/DL (ref 12–16)
HGB UR STRIP.AUTO-MCNC: NEGATIVE MG/L
HYALINE CASTS #/AREA URNS AUTO: 3 /HPF (ref 0–8)
IMM GRANULOCYTES # BLD: 0 K/UL
KETONES UR STRIP.AUTO-MCNC: NEGATIVE MG/DL
LEUKOCYTE ESTERASE UR QL STRIP.AUTO: ABNORMAL
LYMPHOCYTES # BLD: 1.7 K/UL (ref 1.1–4.5)
LYMPHOCYTES NFR BLD: 21 % (ref 20–40)
MAGNESIUM SERPL-MCNC: 1.8 MG/DL (ref 1.6–2.4)
MCH RBC QN AUTO: 30.4 PG (ref 27–31)
MCHC RBC AUTO-ENTMCNC: 33.6 G/DL (ref 33–37)
MCV RBC AUTO: 90.2 FL (ref 81–99)
MONOCYTES # BLD: 0.7 K/UL (ref 0–0.9)
MONOCYTES NFR BLD: 9.3 % (ref 0–10)
NEUTROPHILS # BLD: 5.2 K/UL (ref 1.5–7.5)
NEUTS SEG NFR BLD: 64.5 % (ref 50–65)
NITRITE UR QL STRIP.AUTO: NEGATIVE
PH UR STRIP.AUTO: 5.5 [PH] (ref 5–8)
PLATELET # BLD AUTO: 214 K/UL (ref 130–400)
PMV BLD AUTO: 9.9 FL (ref 9.4–12.3)
POTASSIUM SERPL-SCNC: 3.9 MMOL/L (ref 3.5–5)
PROT SERPL-MCNC: 6.8 G/DL (ref 6.6–8.7)
PROT UR STRIP.AUTO-MCNC: NEGATIVE MG/DL
RBC # BLD AUTO: 4.71 M/UL (ref 4.2–5.4)
RBC #/AREA URNS AUTO: 1 /HPF (ref 0–4)
SODIUM SERPL-SCNC: 140 MMOL/L (ref 136–145)
SP GR UR STRIP.AUTO: 1.01 (ref 1–1.03)
T4 FREE SERPL-MCNC: 1.15 NG/DL (ref 0.93–1.7)
TSH SERPL DL<=0.005 MIU/L-ACNC: 3.9 UIU/ML (ref 0.27–4.2)
UROBILINOGEN UR STRIP.AUTO-MCNC: 0.2 E.U./DL
WBC # BLD AUTO: 8 K/UL (ref 4.8–10.8)
WBC #/AREA URNS AUTO: 4 /HPF (ref 0–5)

## 2024-05-21 PROCEDURE — G8399 PT W/DXA RESULTS DOCUMENT: HCPCS | Performed by: FAMILY MEDICINE

## 2024-05-21 PROCEDURE — G8417 CALC BMI ABV UP PARAM F/U: HCPCS | Performed by: FAMILY MEDICINE

## 2024-05-21 PROCEDURE — 1036F TOBACCO NON-USER: CPT | Performed by: FAMILY MEDICINE

## 2024-05-21 PROCEDURE — 1090F PRES/ABSN URINE INCON ASSESS: CPT | Performed by: FAMILY MEDICINE

## 2024-05-21 PROCEDURE — 3074F SYST BP LT 130 MM HG: CPT | Performed by: FAMILY MEDICINE

## 2024-05-21 PROCEDURE — G8427 DOCREV CUR MEDS BY ELIG CLIN: HCPCS | Performed by: FAMILY MEDICINE

## 2024-05-21 PROCEDURE — 3051F HG A1C>EQUAL 7.0%<8.0%: CPT | Performed by: FAMILY MEDICINE

## 2024-05-21 PROCEDURE — 1123F ACP DISCUSS/DSCN MKR DOCD: CPT | Performed by: FAMILY MEDICINE

## 2024-05-21 PROCEDURE — G2211 COMPLEX E/M VISIT ADD ON: HCPCS | Performed by: FAMILY MEDICINE

## 2024-05-21 PROCEDURE — 93000 ELECTROCARDIOGRAM COMPLETE: CPT | Performed by: FAMILY MEDICINE

## 2024-05-21 PROCEDURE — 3078F DIAST BP <80 MM HG: CPT | Performed by: FAMILY MEDICINE

## 2024-05-21 PROCEDURE — 99214 OFFICE O/P EST MOD 30 MIN: CPT | Performed by: FAMILY MEDICINE

## 2024-05-21 RX ORDER — ACYCLOVIR 400 MG/1
TABLET ORAL
Qty: 1 EACH | Refills: 0 | Status: SHIPPED | OUTPATIENT
Start: 2024-05-21

## 2024-05-21 RX ORDER — CEFDINIR 300 MG/1
300 CAPSULE ORAL 2 TIMES DAILY
Qty: 20 CAPSULE | Refills: 0 | Status: SHIPPED | OUTPATIENT
Start: 2024-05-21 | End: 2024-05-31

## 2024-05-21 RX ORDER — ACYCLOVIR 400 MG/1
TABLET ORAL
Qty: 3 EACH | Refills: 11 | Status: SHIPPED | OUTPATIENT
Start: 2024-05-21

## 2024-05-21 ASSESSMENT — ENCOUNTER SYMPTOMS
COUGH: 0
SHORTNESS OF BREATH: 0
CHOKING: 0
STRIDOR: 0
FACIAL SWELLING: 0
CHEST TIGHTNESS: 0
APNEA: 0
WHEEZING: 0

## 2024-05-21 NOTE — PROGRESS NOTES
[facility]  [address]  [address]  [place] [address]  Dept: [phone]  Dept Fax: [phone]  Loc: [phone]    Sherice Henley is a 83 y.o. female who presents today for her medical conditions/complaints as noted below.  Sherice Henley is here for Dizziness and Fatigue        Subjective:   CC:  Here today to discuss the following:    The past 2 weeks has been experiencing vertigo and lightheadedness.  She has the sensation of the \"room is moving\".  She has fallen twice in the past 6 weeks.  -Most recently she fell about a week ago she states she leaned over the couch lost her balance.  She did not lose consciousness.  -She is also complaining of left ear pain.      She denies any fever or chills chest pain palpitations.  She has had no syncopal episodes.  She has had no episodes of hypoglycemia      Review of Systems   Constitutional:  Positive for fatigue.   HENT:  Positive for ear pain. Negative for congestion, dental problem, drooling, ear discharge, facial swelling, hearing loss, mouth sores and nosebleeds.    Respiratory:  Negative for apnea, cough, choking, chest tightness, shortness of breath, wheezing and stridor.    Cardiovascular:  Negative for chest pain, palpitations and leg swelling.   Neurological:  Positive for dizziness, weakness and light-headedness. Negative for tremors, seizures, syncope, facial asymmetry, speech difficulty, numbness and headaches.     Objective:   /62   Pulse 81   Temp 97 °F (36.1 °C)   Wt 75.8 kg (167 lb)   SpO2 97%   BMI 26.95 kg/m²   BP Readings from Last 3 Encounters:   05/21/24 106/62   04/11/24 128/62   12/28/23 101/60    Wt Readings from Last 3 Encounters:   05/21/24 75.8 kg (167 lb)   04/16/24 76.2 kg (168 lb)   04/11/24 73.5 kg (162 lb)           Physical Exam  Physical Exam   Constitutional: She appears well. Does not appear ill.   Neck: Neck supple. No masses.  Neck Symmetric.  Normal tracheal

## 2024-05-31 NOTE — PROGRESS NOTES
"    [facility] - PODIATRY    Today's Date: 06/06/2024     Patient Name: Kary Alcaraz  MRN: [id number]  CSN: [id number]  PCP: Nate Whitley MD  Referring Provider: No ref. provider found    SUBJECTIVE     Chief Complaint   Patient presents with    Follow-up     Nate Whitley MD 04/11/2024 10 WK FU DIABETIC-pt states she is here today for diabetic nail/foot care believes she has a corn on left foot-pt denies pain    Diabetes     HPI: Kary Alcaraz, a 83 y.o.female, comes to clinic as a(n) established patient complaining of toenail/callus issues. Patient has h/o DM, HTN, GERD, CVA, and CKD . Patient is NIDDM and unsure of last BG level. Patient presents with complaints of thickened, irregular toenail growth of both feet. States she is diabetic with mild numbness and tingling in feet.   Patient uses a walker for ambulation. Denies pain today. Relates previous treatment(s) including care by podiatry . Denies any constitutional symptoms. No other pedal complaints at this time.     Past Medical History:   Diagnosis Date    CKD (chronic kidney disease)     CVA (cerebral vascular accident)     Diabetes mellitus     GERD (gastroesophageal reflux disease)     Hypertension     Neuropathy in diabetes      History reviewed. No pertinent surgical history.  History reviewed. No pertinent family history.  Social History     Socioeconomic History    Marital status:    Tobacco Use    Smoking status: Never     Passive exposure: Never    Smokeless tobacco: Never   Vaping Use    Vaping status: Never Used   Substance and Sexual Activity    Alcohol use: Never    Drug use: No    Sexual activity: Defer     Allergies   Allergen Reactions    Adhesive Tape Other (See Comments)    Elastic Bandages & [Zinc] Other (See Comments)     \"Elastic in bra\"     Current Outpatient Medications   Medication Sig Dispense Refill    albuterol (PROVENTIL HFA;VENTOLIN HFA) 108 (90 Base) MCG/ACT inhaler Inhale.      aspirin 81 " MG chewable tablet Chew 1 tablet Daily.      atorvastatin (LIPITOR) 80 MG tablet Take 1 tablet by mouth Daily.      carvedilol (COREG) 12.5 MG tablet Take 1 tablet by mouth 2 (Two) Times a Day With Meals.      coenzyme Q10 100 MG capsule Take 1 capsule by mouth Daily.      gabapentin (NEURONTIN) 100 MG capsule Take 1 capsule by mouth 3 (Three) Times a Day.      Insulin Glargine (BASAGLAR KWIKPEN) 100 UNIT/ML injection pen Inject 100 Units under the skin into the appropriate area as directed.      Insulin Lispro (humaLOG) 100 UNIT/ML injection Inject 1 Units under the skin into the appropriate area as directed Daily.      levothyroxine (SYNTHROID, LEVOTHROID) 25 MCG tablet Take 1 tablet by mouth Daily.      lisinopril (PRINIVIL,ZESTRIL) 10 MG tablet Take 1 tablet by mouth Daily.      montelukast (SINGULAIR) 10 MG tablet Take 1 tablet by mouth Every Night.      omeprazole (priLOSEC) 40 MG capsule Take 1 capsule by mouth Daily.      pregabalin (LYRICA) 50 MG capsule Take 1 capsule by mouth 3 (Three) Times a Day.      sertraline (ZOLOFT) 50 MG tablet Take 1 tablet by mouth Daily.      vitamin B-12 (CYANOCOBALAMIN) 1000 MCG tablet Take 1 tablet by mouth Daily.       No current facility-administered medications for this visit.     Review of Systems   Constitutional:  Negative for chills and fever.   HENT:  Negative for congestion.    Respiratory:  Negative for shortness of breath.    Cardiovascular:  Positive for leg swelling. Negative for chest pain.   Gastrointestinal:  Negative for constipation, diarrhea, nausea and vomiting.   Musculoskeletal:  Positive for gait problem. Negative for arthralgias and myalgias.   Skin:  Negative for wound.   Neurological:  Positive for weakness and numbness.       OBJECTIVE     Vitals:    06/06/24 1343   BP: 138/62   Pulse: 83   SpO2: 98%     PHYSICAL EXAM  GEN:   Accompanied by none.     Foot/Ankle Exam    GENERAL  Diabetic foot exam performed    Appearance:  elderly  Orientation:   AAOx3  Affect:  appropriate  Gait:  (Unsteady)  Assistance:  walker  Right shoe gear: casual shoe  Left shoe gear: casual shoe    VASCULAR     Right Foot Vascularity   Dorsalis pedis:  2+  Posterior tibial:  2+  Skin temperature:  warm  Edema grading:  None  CFT:  3  Pedal hair growth:  Present  Varicosities:  spider veins     Left Foot Vascularity   Dorsalis pedis:  2+  Posterior tibial:  2+  Skin temperature:  warm  Edema grading:  None  CFT:  3  Pedal hair growth:  Present  Varicosities:  spider veins     NEUROLOGIC     Right Foot Neurologic   Light touch sensation: diminished  Vibratory sensation: diminished  Hot/Cold sensation: diminished  Protective Sensation using Trenary-Efren Monofilament:   Sites intact: 7  Sites tested: 10     Left Foot Neurologic   Light touch sensation: diminished  Vibratory sensation: diminished  Hot/Cold sensation:  diminished  Protective Sensation using Trenary-Efren Monofilament:   Sites intact: 6  Sites tested: 10    MUSCULOSKELETAL     Right Foot Musculoskeletal   Ecchymosis:  none  Tenderness:  toenail problem    Arch:  Normal  Hallux valgus: Yes    Hallux limitus: No    Tailor's bunion: No       Left Foot Musculoskeletal   Ecchymosis:  none  Tenderness:  toenail problem  Arch:  Normal  Hallux valgus: Yes    Hallux limitus: No    Tailor's bunion: No      MUSCLE STRENGTH     Right Foot Muscle Strength   Foot dorsiflexion:  4+  Foot plantar flexion:  4+  Foot inversion:  4+  Foot eversion:  4+     Left Foot Muscle Strength   Foot dorsiflexion:  4+  Foot plantar flexion:  4+  Foot inversion:  4+  Foot eversion:  4+    RANGE OF MOTION     Right Foot Range of Motion   Foot and ankle ROM within normal limits       Left Foot Range of Motion   Foot and ankle ROM within normal limits      DERMATOLOGIC      Right Foot Dermatologic   Skin  Positive for atrophy.   Nails  1.  Positive for elongated, onychomycosis, abnormal thickness and subungual debris.  2.  Positive for elongated,  onychomycosis and abnormal thickness.  3.  Positive for elongated, onychomycosis and abnormal thickness.  4.  Positive for elongated, onychomycosis and abnormal thickness.  5.  Positive for elongated, onychomycosis and abnormal thickness.     Left Foot Dermatologic   Skin  Positive for atrophy.   Nails  1.  Positive for elongated, onychomycosis, abnormal thickness and subungual debris.  2.  Positive for elongated, onychomycosis and abnormal thickness.  3.  Positive for elongated, onychomycosis and abnormal thickness.  4.  Positive for elongated, onychomycosis and abnormally thick.  5.  Positive for elongated, onychomycosis and abnormally thick.      RADIOLOGY/NUCLEAR:  No results found.    LABORATORY/CULTURE RESULTS:      PATHOLOGY RESULTS:       ASSESSMENT/PLAN     Diagnoses and all orders for this visit:    1. Onychomycosis (Primary)    2. Type 2 diabetes mellitus with diabetic polyneuropathy, with long-term current use of insulin    3. Stage 3 chronic kidney disease, unspecified whether stage 3a or 3b CKD    4. Gait abnormality    5. Hemiplegia affecting dominant side      Comprehensive lower extremity examination and evaluation was performed.  Discussed findings and treatment plan including risks, benefits, and treatment options with patient in detail. Patient agreed with treatment plan.  After verbal consent obtained, nail(s) x10 debrided of length and thickness with nail nipper without incidence  Patient may maintain nails and calluses at home utilizing emery board or pumice stone between visits as needed  Reviewed at home diabetic foot care including daily foot checks   Continue diabetic monitoring and control under direction of PCP.  Continue use of walker for ambulation to prevent falls.    May utilize compression; recommend using throughout the day and not at night.   An After Visit Summary was printed and given to the patient at discharge, including (if requested) any available informative/educational  handouts regarding diagnosis, treatment, or medications. All questions were answered to patient/family satisfaction. Should symptoms fail to improve or worsen they agree to call or return to clinic or to go to the Emergency Department. Discussed the importance of following up with any needed screening tests/labs/specialist appointments and any requested follow-up recommended by me today. Importance of maintaining follow-up discussed and patient accepts that missed appointments can delay diagnosis and potentially lead to worsening of conditions.  Return in about 3 months (around 9/6/2024) for Follow-up with Podiatry APRN, With Suzette ARANDA., or sooner if acute issues arise.    Lab Frequency Next Occurrence       This document has been electronically signed by MANOJ Monroe on June 6, 2024 18:17 CDT

## 2024-06-06 ENCOUNTER — OFFICE VISIT (OUTPATIENT)
Dept: PODIATRY | Facility: CLINIC | Age: 84
End: 2024-06-06
Payer: MEDICARE

## 2024-06-06 VITALS
HEIGHT: 66 IN | BODY MASS INDEX: 26.52 KG/M2 | WEIGHT: 165 LBS | HEART RATE: 83 BPM | DIASTOLIC BLOOD PRESSURE: 62 MMHG | OXYGEN SATURATION: 98 % | SYSTOLIC BLOOD PRESSURE: 138 MMHG

## 2024-06-06 DIAGNOSIS — N18.30 STAGE 3 CHRONIC KIDNEY DISEASE, UNSPECIFIED WHETHER STAGE 3A OR 3B CKD: ICD-10-CM

## 2024-06-06 DIAGNOSIS — G81.90 HEMIPLEGIA AFFECTING DOMINANT SIDE: ICD-10-CM

## 2024-06-06 DIAGNOSIS — R26.9 GAIT ABNORMALITY: ICD-10-CM

## 2024-06-06 DIAGNOSIS — E11.42 TYPE 2 DIABETES MELLITUS WITH DIABETIC POLYNEUROPATHY, WITH LONG-TERM CURRENT USE OF INSULIN: ICD-10-CM

## 2024-06-06 DIAGNOSIS — Z79.4 TYPE 2 DIABETES MELLITUS WITH DIABETIC POLYNEUROPATHY, WITH LONG-TERM CURRENT USE OF INSULIN: ICD-10-CM

## 2024-06-06 DIAGNOSIS — B35.1 ONYCHOMYCOSIS: Primary | ICD-10-CM

## 2024-06-06 DIAGNOSIS — R42 DIZZINESS: Primary | ICD-10-CM

## 2024-06-06 DIAGNOSIS — H92.09 OTALGIA, UNSPECIFIED LATERALITY: ICD-10-CM

## 2024-06-10 ENCOUNTER — TELEPHONE (OUTPATIENT)
Dept: PRIMARY CARE CLINIC | Age: 84
End: 2024-06-10

## 2024-06-10 DIAGNOSIS — M25.50 POLYARTHRALGIA: Primary | ICD-10-CM

## 2024-06-10 DIAGNOSIS — G89.29 CHRONIC PAIN OF BOTH KNEES: ICD-10-CM

## 2024-06-10 DIAGNOSIS — Z74.09 LIMITED MOBILITY: ICD-10-CM

## 2024-06-10 DIAGNOSIS — M25.562 CHRONIC PAIN OF BOTH KNEES: ICD-10-CM

## 2024-06-10 DIAGNOSIS — M25.561 CHRONIC PAIN OF BOTH KNEES: ICD-10-CM

## 2024-06-10 NOTE — TELEPHONE ENCOUNTER
Patient called asking for an order for a w/c. Are you able to addend her last ov note stating the need for one?

## 2024-06-12 DIAGNOSIS — N39.46 MIXED STRESS AND URGE URINARY INCONTINENCE: ICD-10-CM

## 2024-06-12 DIAGNOSIS — N32.81 OAB (OVERACTIVE BLADDER): ICD-10-CM

## 2024-06-12 NOTE — TELEPHONE ENCOUNTER
Patient called stating she received another denial letter for gemtesa. I stated we can send to the assistance program if she would like to try that before any other medications. She stated that this would be perfect and she understands that it will be coming from our of state number when they call to confirm.

## 2024-06-24 ENCOUNTER — TELEPHONE (OUTPATIENT)
Dept: PRIMARY CARE CLINIC | Age: 84
End: 2024-06-24

## 2024-06-24 NOTE — TELEPHONE ENCOUNTER
Pt called asking us to change something on the WC order so that medicare will pay for it. I left her a msg that we added the appropriate diagnosis but insurance just doesn't want to pay for it. Unfortunately we can't do anything to change that.

## 2024-06-27 DIAGNOSIS — M25.561 CHRONIC PAIN OF BOTH KNEES: ICD-10-CM

## 2024-06-27 DIAGNOSIS — M25.50 POLYARTHRALGIA: Primary | ICD-10-CM

## 2024-06-27 DIAGNOSIS — Z74.09 LIMITED MOBILITY: ICD-10-CM

## 2024-06-27 DIAGNOSIS — G89.29 CHRONIC PAIN OF BOTH KNEES: ICD-10-CM

## 2024-06-27 DIAGNOSIS — M25.562 CHRONIC PAIN OF BOTH KNEES: ICD-10-CM

## 2024-07-15 DIAGNOSIS — E03.9 ACQUIRED HYPOTHYROIDISM: ICD-10-CM

## 2024-07-15 RX ORDER — LEVOTHYROXINE SODIUM 0.03 MG/1
25 TABLET ORAL DAILY
Qty: 90 TABLET | Refills: 1 | Status: SHIPPED | OUTPATIENT
Start: 2024-07-15

## 2024-07-15 RX ORDER — CARVEDILOL 12.5 MG/1
12.5 TABLET ORAL 2 TIMES DAILY
Qty: 180 TABLET | Refills: 1 | Status: SHIPPED | OUTPATIENT
Start: 2024-07-15

## 2024-07-16 ENCOUNTER — OFFICE VISIT (OUTPATIENT)
Dept: PRIMARY CARE CLINIC | Age: 84
End: 2024-07-16
Payer: MEDICARE

## 2024-07-16 VITALS
WEIGHT: 169 LBS | OXYGEN SATURATION: 94 % | HEIGHT: 66 IN | BODY MASS INDEX: 27.16 KG/M2 | TEMPERATURE: 96.9 F | SYSTOLIC BLOOD PRESSURE: 138 MMHG | DIASTOLIC BLOOD PRESSURE: 82 MMHG | HEART RATE: 81 BPM

## 2024-07-16 DIAGNOSIS — N18.31 STAGE 3A CHRONIC KIDNEY DISEASE (HCC): ICD-10-CM

## 2024-07-16 DIAGNOSIS — E78.2 MIXED HYPERLIPIDEMIA: ICD-10-CM

## 2024-07-16 DIAGNOSIS — E11.22 TYPE 2 DIABETES MELLITUS WITH STAGE 3A CHRONIC KIDNEY DISEASE, WITHOUT LONG-TERM CURRENT USE OF INSULIN (HCC): Primary | ICD-10-CM

## 2024-07-16 DIAGNOSIS — E03.9 PRIMARY HYPOTHYROIDISM: ICD-10-CM

## 2024-07-16 DIAGNOSIS — N18.31 TYPE 2 DIABETES MELLITUS WITH STAGE 3A CHRONIC KIDNEY DISEASE, WITHOUT LONG-TERM CURRENT USE OF INSULIN (HCC): ICD-10-CM

## 2024-07-16 DIAGNOSIS — F41.8 DEPRESSION WITH ANXIETY: ICD-10-CM

## 2024-07-16 DIAGNOSIS — N18.31 TYPE 2 DIABETES MELLITUS WITH STAGE 3A CHRONIC KIDNEY DISEASE, WITHOUT LONG-TERM CURRENT USE OF INSULIN (HCC): Primary | ICD-10-CM

## 2024-07-16 DIAGNOSIS — R53.83 OTHER FATIGUE: ICD-10-CM

## 2024-07-16 DIAGNOSIS — I10 PRIMARY HYPERTENSION: ICD-10-CM

## 2024-07-16 DIAGNOSIS — E11.22 TYPE 2 DIABETES MELLITUS WITH STAGE 3A CHRONIC KIDNEY DISEASE, WITHOUT LONG-TERM CURRENT USE OF INSULIN (HCC): ICD-10-CM

## 2024-07-16 LAB
ALBUMIN SERPL-MCNC: 4.1 G/DL (ref 3.5–5.2)
ALP SERPL-CCNC: 119 U/L (ref 35–104)
ALT SERPL-CCNC: 19 U/L (ref 5–33)
ANION GAP SERPL CALCULATED.3IONS-SCNC: 16 MMOL/L (ref 7–19)
AST SERPL-CCNC: 17 U/L (ref 5–32)
BASOPHILS # BLD: 0 K/UL (ref 0–0.2)
BASOPHILS NFR BLD: 0.4 % (ref 0–1)
BILIRUB SERPL-MCNC: 0.5 MG/DL (ref 0.2–1.2)
BILIRUB UR QL STRIP: NEGATIVE
BUN SERPL-MCNC: 17 MG/DL (ref 8–23)
CALCIUM SERPL-MCNC: 9.2 MG/DL (ref 8.8–10.2)
CHLORIDE SERPL-SCNC: 103 MMOL/L (ref 98–111)
CLARITY UR: CLEAR
CO2 SERPL-SCNC: 22 MMOL/L (ref 22–29)
COLOR UR: YELLOW
CREAT SERPL-MCNC: 0.8 MG/DL (ref 0.5–0.9)
EOSINOPHIL # BLD: 0.3 K/UL (ref 0–0.6)
EOSINOPHIL NFR BLD: 4.2 % (ref 0–5)
ERYTHROCYTE [DISTWIDTH] IN BLOOD BY AUTOMATED COUNT: 14 % (ref 11.5–14.5)
FERRITIN SERPL-MCNC: 61.4 NG/ML (ref 13–150)
GLUCOSE SERPL-MCNC: 210 MG/DL (ref 74–109)
GLUCOSE UR STRIP.AUTO-MCNC: NEGATIVE MG/DL
HBA1C MFR BLD: 7.6 % (ref 4–6)
HCT VFR BLD AUTO: 38.3 % (ref 37–47)
HGB BLD-MCNC: 12.8 G/DL (ref 12–16)
HGB UR STRIP.AUTO-MCNC: NEGATIVE MG/L
IMM GRANULOCYTES # BLD: 0 K/UL
IRON SERPL-MCNC: 80 UG/DL (ref 37–145)
KETONES UR STRIP.AUTO-MCNC: NEGATIVE MG/DL
LEUKOCYTE ESTERASE UR QL STRIP.AUTO: NEGATIVE
LYMPHOCYTES # BLD: 1.2 K/UL (ref 1.1–4.5)
LYMPHOCYTES NFR BLD: 16.5 % (ref 20–40)
MCH RBC QN AUTO: 30.8 PG (ref 27–31)
MCHC RBC AUTO-ENTMCNC: 33.4 G/DL (ref 33–37)
MCV RBC AUTO: 92.3 FL (ref 81–99)
MONOCYTES # BLD: 0.7 K/UL (ref 0–0.9)
MONOCYTES NFR BLD: 9.2 % (ref 0–10)
NEUTROPHILS # BLD: 5 K/UL (ref 1.5–7.5)
NEUTS SEG NFR BLD: 69.6 % (ref 50–65)
NITRITE UR QL STRIP.AUTO: NEGATIVE
PH UR STRIP.AUTO: 5.5 [PH] (ref 5–8)
PHOSPHATE SERPL-MCNC: 4.1 MG/DL (ref 2.5–4.5)
PLATELET # BLD AUTO: 205 K/UL (ref 130–400)
PMV BLD AUTO: 10.1 FL (ref 9.4–12.3)
POTASSIUM SERPL-SCNC: 4.4 MMOL/L (ref 3.5–5)
PROT SERPL-MCNC: 6.7 G/DL (ref 6.6–8.7)
PROT UR STRIP.AUTO-MCNC: NEGATIVE MG/DL
PTH-INTACT SERPL-MCNC: 39.7 PG/ML (ref 15–65)
RBC # BLD AUTO: 4.15 M/UL (ref 4.2–5.4)
SODIUM SERPL-SCNC: 141 MMOL/L (ref 136–145)
SP GR UR STRIP.AUTO: 1.02 (ref 1–1.03)
T4 FREE SERPL-MCNC: 1.16 NG/DL (ref 0.93–1.7)
TSH SERPL DL<=0.005 MIU/L-ACNC: 2.11 UIU/ML (ref 0.27–4.2)
URATE SERPL-MCNC: 4.1 MG/DL (ref 2.4–5.7)
UROBILINOGEN UR STRIP.AUTO-MCNC: 0.2 E.U./DL
WBC # BLD AUTO: 7.2 K/UL (ref 4.8–10.8)

## 2024-07-16 PROCEDURE — G8399 PT W/DXA RESULTS DOCUMENT: HCPCS | Performed by: FAMILY MEDICINE

## 2024-07-16 PROCEDURE — 3051F HG A1C>EQUAL 7.0%<8.0%: CPT | Performed by: FAMILY MEDICINE

## 2024-07-16 PROCEDURE — 1123F ACP DISCUSS/DSCN MKR DOCD: CPT | Performed by: FAMILY MEDICINE

## 2024-07-16 PROCEDURE — 3079F DIAST BP 80-89 MM HG: CPT | Performed by: FAMILY MEDICINE

## 2024-07-16 PROCEDURE — G2211 COMPLEX E/M VISIT ADD ON: HCPCS | Performed by: FAMILY MEDICINE

## 2024-07-16 PROCEDURE — 3075F SYST BP GE 130 - 139MM HG: CPT | Performed by: FAMILY MEDICINE

## 2024-07-16 PROCEDURE — G8417 CALC BMI ABV UP PARAM F/U: HCPCS | Performed by: FAMILY MEDICINE

## 2024-07-16 PROCEDURE — G8427 DOCREV CUR MEDS BY ELIG CLIN: HCPCS | Performed by: FAMILY MEDICINE

## 2024-07-16 PROCEDURE — 1090F PRES/ABSN URINE INCON ASSESS: CPT | Performed by: FAMILY MEDICINE

## 2024-07-16 PROCEDURE — 99214 OFFICE O/P EST MOD 30 MIN: CPT | Performed by: FAMILY MEDICINE

## 2024-07-16 PROCEDURE — 1036F TOBACCO NON-USER: CPT | Performed by: FAMILY MEDICINE

## 2024-07-16 RX ORDER — ACYCLOVIR 400 MG/1
TABLET ORAL
Qty: 1 EACH | Refills: 0 | Status: SHIPPED | OUTPATIENT
Start: 2024-07-16

## 2024-07-16 SDOH — ECONOMIC STABILITY: FOOD INSECURITY: WITHIN THE PAST 12 MONTHS, YOU WORRIED THAT YOUR FOOD WOULD RUN OUT BEFORE YOU GOT MONEY TO BUY MORE.: NEVER TRUE

## 2024-07-16 SDOH — ECONOMIC STABILITY: INCOME INSECURITY: HOW HARD IS IT FOR YOU TO PAY FOR THE VERY BASICS LIKE FOOD, HOUSING, MEDICAL CARE, AND HEATING?: NOT VERY HARD

## 2024-07-16 SDOH — ECONOMIC STABILITY: FOOD INSECURITY: WITHIN THE PAST 12 MONTHS, THE FOOD YOU BOUGHT JUST DIDN'T LAST AND YOU DIDN'T HAVE MONEY TO GET MORE.: NEVER TRUE

## 2024-07-16 ASSESSMENT — ENCOUNTER SYMPTOMS
COUGH: 0
CHEST TIGHTNESS: 0
SHORTNESS OF BREATH: 0
ABDOMINAL PAIN: 0
CONSTIPATION: 0
DIARRHEA: 0
ANAL BLEEDING: 0
NAUSEA: 0

## 2024-07-16 ASSESSMENT — PATIENT HEALTH QUESTIONNAIRE - PHQ9
8. MOVING OR SPEAKING SO SLOWLY THAT OTHER PEOPLE COULD HAVE NOTICED. OR THE OPPOSITE, BEING SO FIGETY OR RESTLESS THAT YOU HAVE BEEN MOVING AROUND A LOT MORE THAN USUAL: NOT AT ALL
5. POOR APPETITE OR OVEREATING: NOT AT ALL
7. TROUBLE CONCENTRATING ON THINGS, SUCH AS READING THE NEWSPAPER OR WATCHING TELEVISION: NOT AT ALL
2. FEELING DOWN, DEPRESSED OR HOPELESS: MORE THAN HALF THE DAYS
10. IF YOU CHECKED OFF ANY PROBLEMS, HOW DIFFICULT HAVE THESE PROBLEMS MADE IT FOR YOU TO DO YOUR WORK, TAKE CARE OF THINGS AT HOME, OR GET ALONG WITH OTHER PEOPLE: SOMEWHAT DIFFICULT
SUM OF ALL RESPONSES TO PHQ QUESTIONS 1-9: 7
4. FEELING TIRED OR HAVING LITTLE ENERGY: MORE THAN HALF THE DAYS
SUM OF ALL RESPONSES TO PHQ QUESTIONS 1-9: 7
SUM OF ALL RESPONSES TO PHQ QUESTIONS 1-9: 7
SUM OF ALL RESPONSES TO PHQ9 QUESTIONS 1 & 2: 4
6. FEELING BAD ABOUT YOURSELF - OR THAT YOU ARE A FAILURE OR HAVE LET YOURSELF OR YOUR FAMILY DOWN: NOT AT ALL
9. THOUGHTS THAT YOU WOULD BE BETTER OFF DEAD, OR OF HURTING YOURSELF: NOT AT ALL
1. LITTLE INTEREST OR PLEASURE IN DOING THINGS: MORE THAN HALF THE DAYS
SUM OF ALL RESPONSES TO PHQ QUESTIONS 1-9: 7
3. TROUBLE FALLING OR STAYING ASLEEP: SEVERAL DAYS

## 2024-07-16 NOTE — PROGRESS NOTES
[facility]  [address]  [address]  [place] [address]  Dept: [phone]  Dept Fax: [phone]  Loc: [phone]    Sherice Henley is a 83 y.o. female who presents today for her medical conditions/complaints as noted below.  Sherice Henley is here for 3 Month Follow-Up        Subjective:   CC:  Here today to discuss the following:    She was last here in May complaining of vertigo and lightheadedness.  Blood pressure was also low at 106/62.  -Acute otitis media of the left ear: Omnicef  -Hypertension: Discontinued lisinopril  - Dizziness is better.  No ear or sinus issues.   - she has since received a wheelchair.     Diabetes Mellitus  Has been compliant with medications.   No side effects of medications since last visit.   No polyuria, polydipsia, or vision changes since last visit.   No symptomatic episodes of hypoglycemia.     Hypertension  Compliant with medications.  No adverse effects from medication.  No lightheadedness, palpitations, or chest pain.    Hypothyroidism  Symptoms are stable.  No temperature intolerance, fatigue, or mood disturbance from baseline. Complaint with current medication.                Review of Systems   Constitutional:  Positive for fatigue. Negative for chills and fever.   HENT:  Negative for congestion.    Respiratory:  Negative for cough, chest tightness and shortness of breath.    Cardiovascular:  Negative for chest pain, palpitations and leg swelling.   Gastrointestinal:  Negative for abdominal pain, anal bleeding, constipation, diarrhea and nausea.   Genitourinary:  Negative for difficulty urinating.   Musculoskeletal:  Positive for arthralgias and gait problem.   Psychiatric/Behavioral: Negative.       Objective:   /82   Pulse 81   Temp 96.9 °F (36.1 °C)   Ht 1.676 m (5' 6\")   Wt 76.7 kg (169 lb)   SpO2 94%   BMI 27.28 kg/m²   BP Readings from Last 3 Encounters:   07/16/24 138/82   05/21/24 106/62   04/11/24

## 2024-07-16 NOTE — PATIENT INSTRUCTIONS
Examine your lifestyle and the barriers to bad and good habits and how you can design your life to make better choices      Make it EASY to do the RIGHT THINGS.    1)  You can choose to Get 150 min/week of moderate exercise (can talk but can't sing) or 75 min/week of vigorous exercise (can't talk)   Examples: Walking, treadmill, bicycling, attending a gym.    2)  You can choose to Get 7-9 hours of sleep per night    Allow enough time each night to get adequate sleep.  Keep regular evening hours, same to bed and getting up every day.    3)  You can choose to Strength Train 2 x a week on non-consecutive days   Bodyweight exercises such push ups, sit ups, pilates or yoga   Purchase resistance bands to perform exercises   Utilize phone apps such as: [employer] or ExtraFootie   Contact your local gym for a     4)  You can choose good nutrition.  Only eat your goal weight (in lbs) x 10 calories/day and get 5 servings of Vegetables/day   Choose to eat a healthy diet such as the Mediterranean diet.    Plant based diets reduce risk of heart attack/stroke and will help you feel full on less food.    Avoid highly processed foods and processed carbohydrates.   Utilize apps to track your food:  Examples: Loseit, Myfitnesspal, or CalorieCounter by needmade   Choose to follow a program.   Examples: Weight Watchers, Sachin Soto or Nutrisystem   Utilize apps identify and improve eating habits:   Examples: Eat Right Now, Noom    5)  You can choose to drink less alcohol: Drink less than 1-2 drinks/day no more than 7 drinks per week.    Alcohol will disrupt your sleep and add calories to your day    6)  You can choose to Practice Mindfulness and work on stress reduction.    Utilize apps such as Calm, Headspace, Abide  Contact local behavioral health specialists      Small changes every day will add up

## 2024-07-18 LAB — TRANSFERRIN SERPL-MCNC: 227 MG/DL (ref 200–360)

## 2024-07-18 RX ORDER — PEN NEEDLE, DIABETIC 32 GX 1/4"
NEEDLE, DISPOSABLE MISCELLANEOUS
Qty: 100 EACH | Refills: 1 | Status: SHIPPED | OUTPATIENT
Start: 2024-07-18

## 2024-07-25 ENCOUNTER — OFFICE VISIT (OUTPATIENT)
Dept: ENT CLINIC | Age: 84
End: 2024-07-25
Payer: MEDICARE

## 2024-07-25 VITALS
WEIGHT: 169 LBS | BODY MASS INDEX: 27.16 KG/M2 | DIASTOLIC BLOOD PRESSURE: 64 MMHG | HEIGHT: 66 IN | SYSTOLIC BLOOD PRESSURE: 122 MMHG

## 2024-07-25 DIAGNOSIS — H60.542 DERMATITIS OF EAR CANAL, LEFT: Primary | ICD-10-CM

## 2024-07-25 DIAGNOSIS — H90.3 SENSORINEURAL HEARING LOSS (SNHL) OF BOTH EARS: ICD-10-CM

## 2024-07-25 PROCEDURE — 3078F DIAST BP <80 MM HG: CPT | Performed by: OTOLARYNGOLOGY

## 2024-07-25 PROCEDURE — 1123F ACP DISCUSS/DSCN MKR DOCD: CPT | Performed by: OTOLARYNGOLOGY

## 2024-07-25 PROCEDURE — G8417 CALC BMI ABV UP PARAM F/U: HCPCS | Performed by: OTOLARYNGOLOGY

## 2024-07-25 PROCEDURE — 1036F TOBACCO NON-USER: CPT | Performed by: OTOLARYNGOLOGY

## 2024-07-25 PROCEDURE — 99213 OFFICE O/P EST LOW 20 MIN: CPT | Performed by: OTOLARYNGOLOGY

## 2024-07-25 PROCEDURE — 3074F SYST BP LT 130 MM HG: CPT | Performed by: OTOLARYNGOLOGY

## 2024-07-25 PROCEDURE — G8399 PT W/DXA RESULTS DOCUMENT: HCPCS | Performed by: OTOLARYNGOLOGY

## 2024-07-25 PROCEDURE — 1090F PRES/ABSN URINE INCON ASSESS: CPT | Performed by: OTOLARYNGOLOGY

## 2024-07-25 PROCEDURE — 4130F TOPICAL PREP RX AOE: CPT | Performed by: OTOLARYNGOLOGY

## 2024-07-25 PROCEDURE — G8427 DOCREV CUR MEDS BY ELIG CLIN: HCPCS | Performed by: OTOLARYNGOLOGY

## 2024-07-25 RX ORDER — PREDNISOLONE ACETATE 10 MG/ML
SUSPENSION/ DROPS OPHTHALMIC
Qty: 10 ML | Refills: 4 | Status: SHIPPED | OUTPATIENT
Start: 2024-07-25

## 2024-07-25 ASSESSMENT — ENCOUNTER SYMPTOMS
EYES NEGATIVE: 1
RESPIRATORY NEGATIVE: 1
ALLERGIC/IMMUNOLOGIC NEGATIVE: 1
GASTROINTESTINAL NEGATIVE: 1

## 2024-07-25 NOTE — PROGRESS NOTES
2024    Sherice Henley (:  1940) is a 83 y.o. female, Established patient, here for evaluation of the following chief complaint(s):  Follow-up (Dizziness, left ear pain)      Vitals:    24 1427   BP: 122/64   Weight: 76.7 kg (169 lb)   Height: 1.676 m (5' 6\")       Wt Readings from Last 3 Encounters:   24 76.7 kg (169 lb)   24 76.7 kg (169 lb)   24 75.8 kg (167 lb)       BP Readings from Last 3 Encounters:   24 122/64   24 138/82   24 106/62         SUBJECTIVE/OBJECTIVE:    Patient seen today for her ears.  She suffers from intermittent swelling of her left ear especially when she puts her hearing aid in.  She says sometimes it is very painful and close.  She is resigned to the fact that this may always happen.        Review of Systems   Constitutional: Negative.    HENT:  Positive for ear pain and hearing loss.    Eyes: Negative.    Respiratory: Negative.     Cardiovascular: Negative.    Gastrointestinal: Negative.    Endocrine: Negative.    Musculoskeletal: Negative.    Skin: Negative.    Allergic/Immunologic: Negative.    Neurological: Negative.    Hematological: Negative.    Psychiatric/Behavioral: Negative.          Physical Exam  Vitals reviewed.   Constitutional:       Appearance: Normal appearance. She is normal weight.   HENT:      Head: Normocephalic and atraumatic.      Right Ear: Tympanic membrane, ear canal and external ear normal.      Left Ear: Tympanic membrane, ear canal and external ear normal.      Nose: Nose normal.      Mouth/Throat:      Mouth: Mucous membranes are moist.      Pharynx: Oropharynx is clear.   Eyes:      Extraocular Movements: Extraocular movements intact.      Pupils: Pupils are equal, round, and reactive to light.   Cardiovascular:      Rate and Rhythm: Normal rate and regular rhythm.   Pulmonary:      Effort: Pulmonary effort is normal.      Breath sounds: Normal breath sounds.   Musculoskeletal:      Cervical back:  Dimension 6  D) Met with client and mom for 45 minute family meeting. Also present was Mariely RODRIGUEZ NP. Talked about medication increase and if there were any noticeable changes. Client does not recognize any but mom does. She reports his affect is brighter and he is able to pull himself back more. Asked mom about client report she will pull him in 3 weeks she said no she supports him being here. She asked him to give it 3 weeks and see where he is at. He mis interpreted this. Client reported being irritated by the meeting and was struggling to particiapte and engage. He said he wanted to not be in treatment. He was encouraged to be patient with the process. He was asked to talk about stage 2 and declined and said he did not want to talk about it anymore. We talked about the need to build trust on both sides. They have not completed the home contract   I) Asked questions, encouraged client to interact and to participate,   A) Client had difficulty participating. Seemed uncomfortable and stuck in concrete negative thought. Was defended. Mom supportive.  P) Complete home contract continue with family meetings.

## 2024-08-21 ENCOUNTER — TELEPHONE (OUTPATIENT)
Dept: PRIMARY CARE CLINIC | Age: 84
End: 2024-08-21

## 2024-08-21 NOTE — TELEPHONE ENCOUNTER
They need her plan of care sent back asap It has been faxed 3 time per facility. IF we do not have it number is in reason for call.

## 2024-09-10 NOTE — PROGRESS NOTES
"    [facility] - PODIATRY    Today's Date: 09/12/2024     Patient Name: Kary Alcaraz  MRN: [id number]  CSN: [id number]  PCP: Nate Whitley MD  Referring Provider: No ref. provider found    SUBJECTIVE     Chief Complaint   Patient presents with    Follow-up     Nate Whitley MD 08/13/2024 3 MO FU [facility]- pt states feet doing ok other than neuropathy- pt denies pain other than neuropathy    Diabetes     HPI: Kary Alcaraz, a 83 y.o.female, comes to clinic as a(n) established patient complaining of toenail/callus issues. Patient has h/o DM, HTN, GERD, CVA, and CKD . Patient is NIDDM and unsure of last BG level.  Notes that she does not regularly check her blood glucose and is unsure of her last A1c.  Here today reporting that her toenails are thickened, irregular toenail growth of both feet.  Notes mild numbness and tingling in feet.  Continues to use a walker for ambulation.  Open wounds or sores today.  Denies pain today with the exception of occasional neuropathic pain.  Relates previous treatment(s) including care by podiatry . Denies any constitutional symptoms. No other pedal complaints at this time.     Past Medical History:   Diagnosis Date    CKD (chronic kidney disease)     CVA (cerebral vascular accident)     Diabetes mellitus     GERD (gastroesophageal reflux disease)     Hypertension     Neuropathy in diabetes      History reviewed. No pertinent surgical history.  History reviewed. No pertinent family history.  Social History     Socioeconomic History    Marital status:    Tobacco Use    Smoking status: Never     Passive exposure: Never    Smokeless tobacco: Never   Vaping Use    Vaping status: Never Used   Substance and Sexual Activity    Alcohol use: Never    Drug use: No    Sexual activity: Defer     Allergies   Allergen Reactions    Adhesive Tape Other (See Comments)    Elastic Bandages & [Zinc] Other (See Comments)     \"Elastic in bra\" "     Current Outpatient Medications   Medication Sig Dispense Refill    albuterol (PROVENTIL HFA;VENTOLIN HFA) 108 (90 Base) MCG/ACT inhaler Inhale.      aspirin 81 MG chewable tablet Chew 1 tablet Daily.      atorvastatin (LIPITOR) 80 MG tablet Take 1 tablet by mouth Daily.      carvedilol (COREG) 12.5 MG tablet Take 1 tablet by mouth 2 (Two) Times a Day With Meals.      coenzyme Q10 100 MG capsule Take 1 capsule by mouth Daily.      Insulin Glargine (BASAGLAR KWIKPEN) 100 UNIT/ML injection pen Inject 100 Units under the skin into the appropriate area as directed.      Insulin Lispro (humaLOG) 100 UNIT/ML injection Inject 1 Units under the skin into the appropriate area as directed Daily.      levothyroxine (SYNTHROID, LEVOTHROID) 25 MCG tablet Take 1 tablet by mouth Daily.      montelukast (SINGULAIR) 10 MG tablet Take 1 tablet by mouth Every Night.      omeprazole (priLOSEC) 40 MG capsule Take 1 capsule by mouth Daily.      pregabalin (LYRICA) 50 MG capsule Take 1 capsule by mouth 3 (Three) Times a Day.      sertraline (ZOLOFT) 50 MG tablet Take 1 tablet by mouth Daily.      vitamin B-12 (CYANOCOBALAMIN) 1000 MCG tablet Take 1 tablet by mouth Daily.      gabapentin (NEURONTIN) 100 MG capsule Take 1 capsule by mouth 3 (Three) Times a Day. (Patient not taking: Reported on 9/12/2024)      lisinopril (PRINIVIL,ZESTRIL) 10 MG tablet Take 1 tablet by mouth Daily. (Patient not taking: Reported on 9/12/2024)       No current facility-administered medications for this visit.     Review of Systems   Constitutional:  Negative for chills and fever.   HENT:  Negative for congestion.    Respiratory:  Negative for shortness of breath.    Cardiovascular:  Positive for leg swelling. Negative for chest pain.   Gastrointestinal:  Negative for constipation, diarrhea, nausea and vomiting.   Musculoskeletal:  Positive for gait problem. Negative for arthralgias and myalgias.   Skin:  Negative for wound.        Thickened, elongated  toenails.   Neurological:  Positive for weakness and numbness.   Hematological:  Does not bruise/bleed easily.   Psychiatric/Behavioral:  Negative for agitation and behavioral problems.        OBJECTIVE     Vitals:    09/12/24 1418   BP: 128/66   Pulse: 67   SpO2: 98%       PHYSICAL EXAM  GEN:   Accompanied by none.     Foot/Ankle Exam    GENERAL  Diabetic foot exam performed    Appearance:  elderly  Orientation:  AAOx3  Affect:  appropriate  Gait:  (Unsteady)  Assistance:  walker  Right shoe gear: casual shoe  Left shoe gear: casual shoe    VASCULAR     Right Foot Vascularity   Dorsalis pedis:  2+  Posterior tibial:  2+  Skin temperature:  warm  Edema grading:  None  CFT:  3  Pedal hair growth:  Present  Varicosities:  spider veins     Left Foot Vascularity   Dorsalis pedis:  2+  Posterior tibial:  2+  Skin temperature:  warm  Edema grading:  None  CFT:  3  Pedal hair growth:  Present  Varicosities:  spider veins     NEUROLOGIC     Right Foot Neurologic   Light touch sensation: diminished  Vibratory sensation: diminished  Hot/Cold sensation: diminished  Protective Sensation using [place] Monofilament:   Sites intact: 7  Sites tested: 10     Left Foot Neurologic   Light touch sensation: diminished  Vibratory sensation: diminished  Hot/Cold sensation:  diminished  Protective Sensation using [place] Monofilament:   Sites intact: 6  Sites tested: 10    MUSCULOSKELETAL     Right Foot Musculoskeletal   Ecchymosis:  none  Tenderness:  toenail problem    Arch:  Normal  Hallux valgus: Yes    Hallux limitus: No    Tailor's bunion: No       Left Foot Musculoskeletal   Ecchymosis:  none  Tenderness:  toenail problem  Arch:  Normal  Hallux valgus: Yes    Hallux limitus: No    Tailor's bunion: No      MUSCLE STRENGTH     Right Foot Muscle Strength   Foot dorsiflexion:  4+  Foot plantar flexion:  4+  Foot inversion:  4+  Foot eversion:  4+     Left Foot Muscle Strength   Foot dorsiflexion:  4+  Foot plantar  flexion:  4+  Foot inversion:  4+  Foot eversion:  4+    RANGE OF MOTION     Right Foot Range of Motion   Foot and ankle ROM within normal limits       Left Foot Range of Motion   Foot and ankle ROM within normal limits      DERMATOLOGIC      Right Foot Dermatologic   Skin  Positive for atrophy.   Nails  1.  Positive for elongated, onychomycosis, abnormal thickness and subungual debris.  2.  Positive for elongated, onychomycosis and abnormal thickness.  3.  Positive for elongated, onychomycosis and abnormal thickness.  4.  Positive for elongated, onychomycosis and abnormal thickness.  5.  Positive for elongated, onychomycosis and abnormal thickness.     Left Foot Dermatologic   Skin  Positive for atrophy.   Nails  1.  Positive for elongated, onychomycosis, abnormal thickness and subungual debris.  2.  Positive for elongated, onychomycosis and abnormal thickness.  3.  Positive for elongated, onychomycosis and abnormal thickness.  4.  Positive for elongated, onychomycosis and abnormally thick.  5.  Positive for elongated, onychomycosis and abnormally thick.      RADIOLOGY/NUCLEAR:  No results found.    LABORATORY/CULTURE RESULTS:      PATHOLOGY RESULTS:       ASSESSMENT/PLAN     Diagnoses and all orders for this visit:    1. Onychomycosis (Primary)    2. Type 2 diabetes mellitus with diabetic polyneuropathy, with long-term current use of insulin    3. Encounter for diabetic foot exam    4. Stage 3 chronic kidney disease, unspecified whether stage 3a or 3b CKD    5. Gait abnormality    6. Hemiplegia affecting dominant side        Comprehensive lower extremity examination and evaluation was performed.  Discussed findings and treatment plan including risks, benefits, and treatment options with patient in detail. Patient agreed with treatment plan.  After verbal consent obtained, nail(s) x10 debrided of length and thickness with nail nipper without incidence  Patient may maintain nails and calluses at home utilizing emery  board or pumice stone between visits as needed  Reviewed at home diabetic foot care including daily foot checks   DFE performed at today's visit.  Continue diabetic monitoring and control under direction of PCP.  Recommend patient check her BG levels more often.  Continue use of walker for ambulation to prevent falls.    May utilize compression and keep feet elevated while at rest.    An After Visit Summary was printed and given to the patient at discharge, including (if requested) any available informative/educational handouts regarding diagnosis, treatment, or medications. All questions were answered to patient/family satisfaction. Should symptoms fail to improve or worsen they agree to call or return to clinic or to go to the Emergency Department. Discussed the importance of following up with any needed screening tests/labs/specialist appointments and any requested follow-up recommended by me today. Importance of maintaining follow-up discussed and patient accepts that missed appointments can delay diagnosis and potentially lead to worsening of conditions.  Return in about 3 months (around 12/12/2024) for Follow-up with APRN, Follow-up in [facility]., or sooner if acute issues arise.    Advance Care Planning   ACP discussion was declined by the patient. Patient does not have an advance directive, declines further assistance.         This document has been electronically signed by MANOJ Brooks on September 12, 2024 16:46 CDT

## 2024-09-11 ENCOUNTER — TELEPHONE (OUTPATIENT)
Age: 84
End: 2024-09-11
Payer: MEDICARE

## 2024-09-12 ENCOUNTER — OFFICE VISIT (OUTPATIENT)
Age: 84
End: 2024-09-12
Payer: MEDICARE

## 2024-09-12 VITALS
SYSTOLIC BLOOD PRESSURE: 128 MMHG | WEIGHT: 165 LBS | BODY MASS INDEX: 26.52 KG/M2 | DIASTOLIC BLOOD PRESSURE: 66 MMHG | HEART RATE: 67 BPM | OXYGEN SATURATION: 98 % | HEIGHT: 66 IN

## 2024-09-12 DIAGNOSIS — E11.9 ENCOUNTER FOR DIABETIC FOOT EXAM: ICD-10-CM

## 2024-09-12 DIAGNOSIS — G81.90 HEMIPLEGIA AFFECTING DOMINANT SIDE: ICD-10-CM

## 2024-09-12 DIAGNOSIS — B35.1 ONYCHOMYCOSIS: Primary | ICD-10-CM

## 2024-09-12 DIAGNOSIS — N18.30 STAGE 3 CHRONIC KIDNEY DISEASE, UNSPECIFIED WHETHER STAGE 3A OR 3B CKD: ICD-10-CM

## 2024-09-12 DIAGNOSIS — Z79.4 TYPE 2 DIABETES MELLITUS WITH DIABETIC POLYNEUROPATHY, WITH LONG-TERM CURRENT USE OF INSULIN: ICD-10-CM

## 2024-09-12 DIAGNOSIS — R26.9 GAIT ABNORMALITY: ICD-10-CM

## 2024-09-12 DIAGNOSIS — E11.42 TYPE 2 DIABETES MELLITUS WITH DIABETIC POLYNEUROPATHY, WITH LONG-TERM CURRENT USE OF INSULIN: ICD-10-CM

## 2024-09-16 ENCOUNTER — TELEMEDICINE (OUTPATIENT)
Dept: PRIMARY CARE CLINIC | Age: 84
End: 2024-09-16

## 2024-09-16 DIAGNOSIS — U07.1 COVID-19 VIRUS INFECTION: Primary | ICD-10-CM

## 2024-09-16 ASSESSMENT — ENCOUNTER SYMPTOMS
DIARRHEA: 0
CHEST TIGHTNESS: 0
SHORTNESS OF BREATH: 0
CONSTIPATION: 0
NAUSEA: 0
ANAL BLEEDING: 0
ABDOMINAL PAIN: 0
COUGH: 1

## 2024-09-17 ENCOUNTER — TELEPHONE (OUTPATIENT)
Dept: PRIMARY CARE CLINIC | Age: 84
End: 2024-09-17

## 2024-10-04 ENCOUNTER — LAB REQUISITION (OUTPATIENT)
Dept: LAB | Facility: HOSPITAL | Age: 84
End: 2024-10-04
Payer: MEDICARE

## 2024-10-04 DIAGNOSIS — E03.9 HYPOTHYROIDISM, UNSPECIFIED: ICD-10-CM

## 2024-10-04 DIAGNOSIS — D51.9 VITAMIN B12 DEFICIENCY ANEMIA, UNSPECIFIED: ICD-10-CM

## 2024-10-04 DIAGNOSIS — R53.83 OTHER FATIGUE: ICD-10-CM

## 2024-10-04 LAB
ALBUMIN SERPL-MCNC: 3.7 G/DL (ref 3.5–5.2)
ALBUMIN/GLOB SERPL: 1.2 G/DL
ALP SERPL-CCNC: 121 U/L (ref 39–117)
ALT SERPL W P-5'-P-CCNC: 28 U/L (ref 1–33)
ANION GAP SERPL CALCULATED.3IONS-SCNC: 12 MMOL/L (ref 5–15)
AST SERPL-CCNC: 28 U/L (ref 1–32)
BASOPHILS # BLD AUTO: 0.02 10*3/MM3 (ref 0–0.2)
BASOPHILS NFR BLD AUTO: 0.3 % (ref 0–1.5)
BILIRUB SERPL-MCNC: 0.4 MG/DL (ref 0–1.2)
BUN SERPL-MCNC: 14 MG/DL (ref 8–23)
BUN/CREAT SERPL: 19.4 (ref 7–25)
CALCIUM SPEC-SCNC: 8.9 MG/DL (ref 8.6–10.5)
CHLORIDE SERPL-SCNC: 102 MMOL/L (ref 98–107)
CO2 SERPL-SCNC: 24 MMOL/L (ref 22–29)
CREAT SERPL-MCNC: 0.72 MG/DL (ref 0.57–1)
DEPRECATED RDW RBC AUTO: 45 FL (ref 37–54)
EGFRCR SERPLBLD CKD-EPI 2021: 83.6 ML/MIN/1.73
EOSINOPHIL # BLD AUTO: 0.26 10*3/MM3 (ref 0–0.4)
EOSINOPHIL NFR BLD AUTO: 3.3 % (ref 0.3–6.2)
ERYTHROCYTE [DISTWIDTH] IN BLOOD BY AUTOMATED COUNT: 13.8 % (ref 12.3–15.4)
GLOBULIN UR ELPH-MCNC: 3.2 GM/DL
GLUCOSE SERPL-MCNC: 191 MG/DL (ref 65–99)
HCT VFR BLD AUTO: 40.2 % (ref 34–46.6)
HGB BLD-MCNC: 13.4 G/DL (ref 12–15.9)
IMM GRANULOCYTES # BLD AUTO: 0.03 10*3/MM3 (ref 0–0.05)
IMM GRANULOCYTES NFR BLD AUTO: 0.4 % (ref 0–0.5)
LYMPHOCYTES # BLD AUTO: 1.3 10*3/MM3 (ref 0.7–3.1)
LYMPHOCYTES NFR BLD AUTO: 16.5 % (ref 19.6–45.3)
MCH RBC QN AUTO: 30 PG (ref 26.6–33)
MCHC RBC AUTO-ENTMCNC: 33.3 G/DL (ref 31.5–35.7)
MCV RBC AUTO: 90.1 FL (ref 79–97)
MONOCYTES # BLD AUTO: 0.64 10*3/MM3 (ref 0.1–0.9)
MONOCYTES NFR BLD AUTO: 8.1 % (ref 5–12)
NEUTROPHILS NFR BLD AUTO: 5.62 10*3/MM3 (ref 1.7–7)
NEUTROPHILS NFR BLD AUTO: 71.4 % (ref 42.7–76)
NRBC BLD AUTO-RTO: 0 /100 WBC (ref 0–0.2)
PLATELET # BLD AUTO: 231 10*3/MM3 (ref 140–450)
PMV BLD AUTO: 10.3 FL (ref 6–12)
POTASSIUM SERPL-SCNC: 3.5 MMOL/L (ref 3.5–5.2)
PROT SERPL-MCNC: 6.9 G/DL (ref 6–8.5)
RBC # BLD AUTO: 4.46 10*6/MM3 (ref 3.77–5.28)
SODIUM SERPL-SCNC: 138 MMOL/L (ref 136–145)
T4 FREE SERPL-MCNC: 1.08 NG/DL (ref 0.93–1.7)
TSH SERPL DL<=0.05 MIU/L-ACNC: 1.52 UIU/ML (ref 0.27–4.2)
WBC NRBC COR # BLD AUTO: 7.87 10*3/MM3 (ref 3.4–10.8)

## 2024-10-04 PROCEDURE — 82607 VITAMIN B-12: CPT

## 2024-10-04 PROCEDURE — 82306 VITAMIN D 25 HYDROXY: CPT

## 2024-10-04 PROCEDURE — 80053 COMPREHEN METABOLIC PANEL: CPT

## 2024-10-04 PROCEDURE — 84439 ASSAY OF FREE THYROXINE: CPT

## 2024-10-04 PROCEDURE — 36415 COLL VENOUS BLD VENIPUNCTURE: CPT

## 2024-10-04 PROCEDURE — 84443 ASSAY THYROID STIM HORMONE: CPT

## 2024-10-04 PROCEDURE — 85025 COMPLETE CBC W/AUTO DIFF WBC: CPT

## 2024-10-05 LAB
25(OH)D3 SERPL-MCNC: 34.4 NG/ML (ref 30–100)
VIT B12 BLD-MCNC: >2000 PG/ML (ref 211–946)

## 2024-10-17 ENCOUNTER — OFFICE VISIT (OUTPATIENT)
Dept: UROLOGY | Age: 84
End: 2024-10-17
Payer: MEDICARE

## 2024-10-17 VITALS
TEMPERATURE: 97.2 F | OXYGEN SATURATION: 99 % | HEART RATE: 56 BPM | BODY MASS INDEX: 27.42 KG/M2 | WEIGHT: 170.6 LBS | HEIGHT: 66 IN

## 2024-10-17 DIAGNOSIS — N32.81 OAB (OVERACTIVE BLADDER): Primary | ICD-10-CM

## 2024-10-17 DIAGNOSIS — N39.46 MIXED STRESS AND URGE URINARY INCONTINENCE: ICD-10-CM

## 2024-10-17 LAB
APPEARANCE FLUID: CLEAR
BILIRUBIN, POC: NEGATIVE
BLOOD URINE, POC: NEGATIVE
CLARITY, POC: CLEAR
COLOR, POC: YELLOW
GLUCOSE URINE, POC: NEGATIVE MG/DL
KETONES, POC: NEGATIVE MG/DL
LEUKOCYTE EST, POC: NEGATIVE
NITRITE, POC: NEGATIVE
PH, POC: 5.5
PROTEIN, POC: NEGATIVE MG/DL
SPECIFIC GRAVITY, POC: 1.01
UROBILINOGEN, POC: 0.2 MG/DL

## 2024-10-17 PROCEDURE — 99214 OFFICE O/P EST MOD 30 MIN: CPT | Performed by: NURSE PRACTITIONER

## 2024-10-17 PROCEDURE — 1036F TOBACCO NON-USER: CPT | Performed by: NURSE PRACTITIONER

## 2024-10-17 PROCEDURE — G8417 CALC BMI ABV UP PARAM F/U: HCPCS | Performed by: NURSE PRACTITIONER

## 2024-10-17 PROCEDURE — G8399 PT W/DXA RESULTS DOCUMENT: HCPCS | Performed by: NURSE PRACTITIONER

## 2024-10-17 PROCEDURE — G8484 FLU IMMUNIZE NO ADMIN: HCPCS | Performed by: NURSE PRACTITIONER

## 2024-10-17 PROCEDURE — 1123F ACP DISCUSS/DSCN MKR DOCD: CPT | Performed by: NURSE PRACTITIONER

## 2024-10-17 PROCEDURE — 0509F URINE INCON PLAN DOCD: CPT | Performed by: NURSE PRACTITIONER

## 2024-10-17 PROCEDURE — 1090F PRES/ABSN URINE INCON ASSESS: CPT | Performed by: NURSE PRACTITIONER

## 2024-10-17 PROCEDURE — 51798 US URINE CAPACITY MEASURE: CPT | Performed by: NURSE PRACTITIONER

## 2024-10-17 PROCEDURE — G8427 DOCREV CUR MEDS BY ELIG CLIN: HCPCS | Performed by: NURSE PRACTITIONER

## 2024-10-17 PROCEDURE — 81002 URINALYSIS NONAUTO W/O SCOPE: CPT | Performed by: NURSE PRACTITIONER

## 2024-10-17 RX ORDER — FESOTERODINE FUMARATE 8 MG/1
8 TABLET, FILM COATED, EXTENDED RELEASE ORAL DAILY
Qty: 90 TABLET | Refills: 3 | Status: SHIPPED | OUTPATIENT
Start: 2024-10-17

## 2024-10-17 ASSESSMENT — ENCOUNTER SYMPTOMS
ABDOMINAL DISTENTION: 0
VOMITING: 0
NAUSEA: 0
BACK PAIN: 0
ROS SKIN COMMENTS: SCAR
ABDOMINAL PAIN: 0
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
## 2024-10-22 ENCOUNTER — OFFICE VISIT (OUTPATIENT)
Dept: PRIMARY CARE CLINIC | Age: 84
End: 2024-10-22

## 2024-10-22 ENCOUNTER — TELEPHONE (OUTPATIENT)
Age: 84
End: 2024-10-22
Payer: MEDICARE

## 2024-10-22 VITALS
SYSTOLIC BLOOD PRESSURE: 122 MMHG | WEIGHT: 168 LBS | TEMPERATURE: 97.3 F | DIASTOLIC BLOOD PRESSURE: 62 MMHG | OXYGEN SATURATION: 96 % | BODY MASS INDEX: 27.12 KG/M2 | HEART RATE: 72 BPM

## 2024-10-22 DIAGNOSIS — N18.31 TYPE 2 DIABETES MELLITUS WITH STAGE 3A CHRONIC KIDNEY DISEASE, WITHOUT LONG-TERM CURRENT USE OF INSULIN (HCC): ICD-10-CM

## 2024-10-22 DIAGNOSIS — E11.22 TYPE 2 DIABETES MELLITUS WITH STAGE 3A CHRONIC KIDNEY DISEASE, WITHOUT LONG-TERM CURRENT USE OF INSULIN (HCC): ICD-10-CM

## 2024-10-22 DIAGNOSIS — Z23 NEED FOR INFLUENZA VACCINATION: ICD-10-CM

## 2024-10-22 DIAGNOSIS — Z86.73 HISTORY OF CVA (CEREBROVASCULAR ACCIDENT): ICD-10-CM

## 2024-10-22 DIAGNOSIS — E78.2 MIXED HYPERLIPIDEMIA: ICD-10-CM

## 2024-10-22 DIAGNOSIS — I10 PRIMARY HYPERTENSION: ICD-10-CM

## 2024-10-22 DIAGNOSIS — E03.9 PRIMARY HYPOTHYROIDISM: ICD-10-CM

## 2024-10-22 DIAGNOSIS — Z00.00 ANNUAL PHYSICAL EXAM: Primary | ICD-10-CM

## 2024-10-22 DIAGNOSIS — F41.8 DEPRESSION WITH ANXIETY: ICD-10-CM

## 2024-10-22 PROBLEM — R47.1 DYSARTHRIA: Status: RESOLVED | Noted: 2021-11-23 | Resolved: 2024-10-22

## 2024-10-22 PROBLEM — I63.9 ACUTE CVA (CEREBROVASCULAR ACCIDENT) (HCC): Status: RESOLVED | Noted: 2021-11-20 | Resolved: 2024-10-22

## 2024-10-22 PROBLEM — I20.9 ANGINA PECTORIS, UNSPECIFIED (HCC): Status: RESOLVED | Noted: 2023-12-28 | Resolved: 2024-10-22

## 2024-10-22 PROBLEM — I69.391 DYSPHAGIA DUE TO RECENT CEREBRAL INFARCTION: Status: RESOLVED | Noted: 2021-11-23 | Resolved: 2024-10-22

## 2024-10-22 LAB
ALBUMIN SERPL-MCNC: 3.9 G/DL (ref 3.5–5.2)
ALP SERPL-CCNC: 128 U/L (ref 35–104)
ALT SERPL-CCNC: 20 U/L (ref 5–33)
ANION GAP SERPL CALCULATED.3IONS-SCNC: 13 MMOL/L (ref 7–19)
AST SERPL-CCNC: 20 U/L (ref 5–32)
BASOPHILS # BLD: 0 K/UL (ref 0–0.2)
BASOPHILS NFR BLD: 0.4 % (ref 0–1)
BILIRUB SERPL-MCNC: 0.4 MG/DL (ref 0.2–1.2)
BUN SERPL-MCNC: 19 MG/DL (ref 8–23)
CALCIUM SERPL-MCNC: 9 MG/DL (ref 8.8–10.2)
CHLORIDE SERPL-SCNC: 102 MMOL/L (ref 98–111)
CHOLEST SERPL-MCNC: 121 MG/DL (ref 0–199)
CO2 SERPL-SCNC: 23 MMOL/L (ref 22–29)
CREAT SERPL-MCNC: 0.9 MG/DL (ref 0.5–0.9)
EOSINOPHIL # BLD: 0.3 K/UL (ref 0–0.6)
EOSINOPHIL NFR BLD: 3.7 % (ref 0–5)
ERYTHROCYTE [DISTWIDTH] IN BLOOD BY AUTOMATED COUNT: 14.2 % (ref 11.5–14.5)
GLUCOSE SERPL-MCNC: 188 MG/DL (ref 70–99)
HBA1C MFR BLD: 7.8 % (ref 4–5.6)
HCT VFR BLD AUTO: 39.5 % (ref 37–47)
HDLC SERPL-MCNC: 32 MG/DL (ref 40–60)
HGB BLD-MCNC: 12.9 G/DL (ref 12–16)
IMM GRANULOCYTES # BLD: 0 K/UL
LDLC SERPL CALC-MCNC: 25 MG/DL
LYMPHOCYTES # BLD: 1.5 K/UL (ref 1.1–4.5)
LYMPHOCYTES NFR BLD: 18.7 % (ref 20–40)
MCH RBC QN AUTO: 30 PG (ref 27–31)
MCHC RBC AUTO-ENTMCNC: 32.7 G/DL (ref 33–37)
MCV RBC AUTO: 91.9 FL (ref 81–99)
MONOCYTES # BLD: 0.8 K/UL (ref 0–0.9)
MONOCYTES NFR BLD: 9.4 % (ref 0–10)
NEUTROPHILS # BLD: 5.5 K/UL (ref 1.5–7.5)
NEUTS SEG NFR BLD: 67.6 % (ref 50–65)
PLATELET # BLD AUTO: 220 K/UL (ref 130–400)
PMV BLD AUTO: 10 FL (ref 9.4–12.3)
POTASSIUM SERPL-SCNC: 4.3 MMOL/L (ref 3.5–5)
PROT SERPL-MCNC: 6.7 G/DL (ref 6.4–8.3)
RBC # BLD AUTO: 4.3 M/UL (ref 4.2–5.4)
SODIUM SERPL-SCNC: 138 MMOL/L (ref 136–145)
TRIGL SERPL-MCNC: 318 MG/DL (ref 0–149)
WBC # BLD AUTO: 8.1 K/UL (ref 4.8–10.8)

## 2024-10-22 ASSESSMENT — PATIENT HEALTH QUESTIONNAIRE - PHQ9
4. FEELING TIRED OR HAVING LITTLE ENERGY: SEVERAL DAYS
10. IF YOU CHECKED OFF ANY PROBLEMS, HOW DIFFICULT HAVE THESE PROBLEMS MADE IT FOR YOU TO DO YOUR WORK, TAKE CARE OF THINGS AT HOME, OR GET ALONG WITH OTHER PEOPLE: SOMEWHAT DIFFICULT
8. MOVING OR SPEAKING SO SLOWLY THAT OTHER PEOPLE COULD HAVE NOTICED. OR THE OPPOSITE, BEING SO FIGETY OR RESTLESS THAT YOU HAVE BEEN MOVING AROUND A LOT MORE THAN USUAL: SEVERAL DAYS
7. TROUBLE CONCENTRATING ON THINGS, SUCH AS READING THE NEWSPAPER OR WATCHING TELEVISION: SEVERAL DAYS
2. FEELING DOWN, DEPRESSED OR HOPELESS: SEVERAL DAYS
6. FEELING BAD ABOUT YOURSELF - OR THAT YOU ARE A FAILURE OR HAVE LET YOURSELF OR YOUR FAMILY DOWN: SEVERAL DAYS
SUM OF ALL RESPONSES TO PHQ QUESTIONS 1-9: 8
SUM OF ALL RESPONSES TO PHQ QUESTIONS 1-9: 8
9. THOUGHTS THAT YOU WOULD BE BETTER OFF DEAD, OR OF HURTING YOURSELF: NOT AT ALL
5. POOR APPETITE OR OVEREATING: SEVERAL DAYS
SUM OF ALL RESPONSES TO PHQ QUESTIONS 1-9: 8
3. TROUBLE FALLING OR STAYING ASLEEP: SEVERAL DAYS
SUM OF ALL RESPONSES TO PHQ9 QUESTIONS 1 & 2: 2
1. LITTLE INTEREST OR PLEASURE IN DOING THINGS: SEVERAL DAYS
SUM OF ALL RESPONSES TO PHQ QUESTIONS 1-9: 8

## 2024-10-22 ASSESSMENT — LIFESTYLE VARIABLES
HOW OFTEN DO YOU HAVE A DRINK CONTAINING ALCOHOL: NEVER
HOW MANY STANDARD DRINKS CONTAINING ALCOHOL DO YOU HAVE ON A TYPICAL DAY: PATIENT DOES NOT DRINK

## 2024-10-22 NOTE — PROGRESS NOTES
before breakfast, lunch, and dinner. (Patient not taking: Reported on 10/17/2024) 6 Adjustable Dose Pre-filled Pen Syringe 5     No current facility-administered medications for this visit.       Return in about 3 months (around 1/22/2025) for Follow Up - 20 minutes.     Discussed use, benefit, and side effects of prescribed medications.         History, Medications, Allergies     Allergies   Allergen Reactions    Tape [Adhesive Tape] Rash     _______________________________________________________________  Past Medical History:   Diagnosis Date    Acute CVA (cerebrovascular accident) ([facility]) 11/20/2021    Arthritis     Psoriatic    Asthma     CAD (coronary artery disease)     CHF (congestive heart failure) ([facility])     COPD (chronic obstructive pulmonary disease) ([facility])     DM (diabetes mellitus) ([facility])     GERD (gastroesophageal reflux disease)     HTN (hypertension)     Hyperlipidemia     MI (myocardial infarction) ([facility])     x2    Thyroid disease      Past Surgical History:   Procedure Laterality Date    CATARACT REMOVAL      CHOLECYSTECTOMY      CORONARY ANGIOPLASTY  06/2018    Angioplasty to in-stent restenosis to the distal LAD    HIP SURGERY      HYSTERECTOMY (CERVIX STATUS UNKNOWN)      JOINT REPLACEMENT Right     Right knee replacement    OVARY REMOVAL        Family History   Problem Relation Age of Onset    Heart Disease Mother     Heart Disease Father     Social History     Tobacco Use    Smoking status: Never    Smokeless tobacco: Never   Substance Use Topics    Alcohol use: No        _______________________________________________________________    Note dictated using Dragon Dictation software  Sometimes this dictation software makes erroneous transcriptions.    
10/31/2016    Pneumococcal, PPSV23, PNEUMOVAX 23, (age 2y+), SC/IM, 0.5mL 10/31/2017    Td vaccine (adult) 11/20/2017    Td, unspecified formulation 11/20/2017    Zoster Recombinant (Shingrix) 04/23/2018, 06/28/2018        Health Maintenance   Topic Date Due    Colorectal Cancer Screen  Never done    Respiratory Syncytial Virus (RSV) Pregnant or age 60 yrs+ (1 - 1-dose 60+ series) Never done    Breast cancer screen  05/05/2023    Flu vaccine (1) 08/01/2024    COVID-19 Vaccine (3 - 2023-24 season) 09/01/2024    Lipids  09/22/2024    Annual Wellness Visit (Medicare)  09/22/2024    DTaP/Tdap/Td vaccine (1 - Tdap) 11/20/2027 (Originally 11/21/2017)    Depression Monitoring  07/16/2025    DEXA (modify frequency per FRAX score)  Completed    Shingles vaccine  Completed    Pneumococcal 65+ years Vaccine  Completed    Hepatitis A vaccine  Aged Out    Hepatitis B vaccine  Aged Out    Hib vaccine  Aged Out    Polio vaccine  Aged Out    Meningococcal (ACWY) vaccine  Aged Out       Recommendations for Preventive Services Due: see orders.  Recommended screening schedule for the next 5-10 years is provided to the patient in written form: see Patient Instructions/AVS.

## 2024-11-22 ENCOUNTER — TELEPHONE (OUTPATIENT)
Age: 84
End: 2024-11-22
Payer: MEDICARE

## 2024-11-22 NOTE — TELEPHONE ENCOUNTER
CALLED PT TO R/S APPT FROM 12/19/24 TO 12/17/24 DUE TO PROVIDER BEING OUT OF OFFICE. NO ANSWER, LEFT VM. OK FOR HUB TO READ

## 2024-12-13 DIAGNOSIS — E03.9 ACQUIRED HYPOTHYROIDISM: ICD-10-CM

## 2024-12-13 RX ORDER — LEVOTHYROXINE SODIUM 25 UG/1
25 TABLET ORAL DAILY
Qty: 90 TABLET | Refills: 1 | Status: SHIPPED | OUTPATIENT
Start: 2024-12-13

## 2024-12-13 RX ORDER — CARVEDILOL 12.5 MG/1
12.5 TABLET ORAL 2 TIMES DAILY
Qty: 180 TABLET | Refills: 1 | Status: SHIPPED | OUTPATIENT
Start: 2024-12-13

## 2024-12-20 ENCOUNTER — TELEPHONE (OUTPATIENT)
Dept: PRIMARY CARE CLINIC | Age: 84
End: 2024-12-20

## 2024-12-20 ENCOUNTER — OFFICE VISIT (OUTPATIENT)
Dept: ENT CLINIC | Age: 84
End: 2024-12-20
Payer: MEDICARE

## 2024-12-20 VITALS — SYSTOLIC BLOOD PRESSURE: 110 MMHG | DIASTOLIC BLOOD PRESSURE: 62 MMHG

## 2024-12-20 DIAGNOSIS — H60.312 ACUTE DIFFUSE OTITIS EXTERNA OF LEFT EAR: Primary | ICD-10-CM

## 2024-12-20 PROCEDURE — G8427 DOCREV CUR MEDS BY ELIG CLIN: HCPCS | Performed by: PHYSICIAN ASSISTANT

## 2024-12-20 PROCEDURE — 1090F PRES/ABSN URINE INCON ASSESS: CPT | Performed by: PHYSICIAN ASSISTANT

## 2024-12-20 PROCEDURE — 1123F ACP DISCUSS/DSCN MKR DOCD: CPT | Performed by: PHYSICIAN ASSISTANT

## 2024-12-20 PROCEDURE — G8399 PT W/DXA RESULTS DOCUMENT: HCPCS | Performed by: PHYSICIAN ASSISTANT

## 2024-12-20 PROCEDURE — 3074F SYST BP LT 130 MM HG: CPT | Performed by: PHYSICIAN ASSISTANT

## 2024-12-20 PROCEDURE — 1036F TOBACCO NON-USER: CPT | Performed by: PHYSICIAN ASSISTANT

## 2024-12-20 PROCEDURE — G8482 FLU IMMUNIZE ORDER/ADMIN: HCPCS | Performed by: PHYSICIAN ASSISTANT

## 2024-12-20 PROCEDURE — 1160F RVW MEDS BY RX/DR IN RCRD: CPT | Performed by: PHYSICIAN ASSISTANT

## 2024-12-20 PROCEDURE — G8417 CALC BMI ABV UP PARAM F/U: HCPCS | Performed by: PHYSICIAN ASSISTANT

## 2024-12-20 PROCEDURE — 1159F MED LIST DOCD IN RCRD: CPT | Performed by: PHYSICIAN ASSISTANT

## 2024-12-20 PROCEDURE — 3078F DIAST BP <80 MM HG: CPT | Performed by: PHYSICIAN ASSISTANT

## 2024-12-20 PROCEDURE — 99213 OFFICE O/P EST LOW 20 MIN: CPT | Performed by: PHYSICIAN ASSISTANT

## 2024-12-20 PROCEDURE — 4130F TOPICAL PREP RX AOE: CPT | Performed by: PHYSICIAN ASSISTANT

## 2024-12-20 RX ORDER — CIPROFLOXACIN AND DEXAMETHASONE 3; 1 MG/ML; MG/ML
4 SUSPENSION/ DROPS AURICULAR (OTIC) 2 TIMES DAILY
Qty: 7.5 ML | Refills: 1 | Status: SHIPPED | OUTPATIENT
Start: 2024-12-20 | End: 2024-12-30

## 2024-12-20 RX ORDER — CEFUROXIME AXETIL 500 MG/1
500 TABLET ORAL 2 TIMES DAILY
Qty: 20 TABLET | Refills: 0 | Status: SHIPPED | OUTPATIENT
Start: 2024-12-20 | End: 2024-12-30

## 2024-12-20 NOTE — PROGRESS NOTES
Ms. Henley is a pleasant 83-year-old  female that presents to the clinic with complaints of left otalgia with swelling around the left ear.  She reports the onset began about a week ago and seems to be worsening.  She denies any issues with fever, chills, or drainage from the ear.  She has been treating her ear with Ciprodex, which she does prophylactically once a day.      Physical examination with the microscope revealed the patient to have a normal TM canal to the right side.  Left side demonstrated a stenotic canal with edema to the tragal area that is mild to moderately tender.  There is no erythematous changes to the facial area.  I was able to place a wick to the left canal without any complications.  Again due to the edema I was unable to visualize the TM.  Neck exam demonstrated some mild tenderness along the left eustachian tube region with no lymphadenopathy appreciated.  No thyromegaly is grossly noted.  Oral exam was unrevealing.      Impression: Left otitis externa-swimmer's ear requiring wick placement    Plan: I will place the patient on a 10-day course of Ceftin and continue Ciprodex twice a day.  She is to follow-up in 2 weeks for reevaluation.  She was reminded to call if her symptoms worsen or she has questions.      Electronically signed by REYNA DORSEY PA-C on 12/20/24 at 11:53 AM CST

## 2024-12-20 NOTE — TELEPHONE ENCOUNTER
This is a FYI:  The patient called in today with complaints of her left ear. It is red,swollen ,closed and painful with throbbing,with drainage of white in color like cottage cheese and pus . She reports no fever. She states it hurts to talk,eat and chew and sleep on the left side.The swelling is on her left side of face. She is using the steroid ear drops every day that was given by .      I called Dr. Marcos's office to schedule her appointment for today. Patient's appointment is at 10:15 am with Jaret Wright at [facility].     I called the patient to inform her of the appointment details.

## 2024-12-23 ENCOUNTER — TELEPHONE (OUTPATIENT)
Dept: ENT CLINIC | Age: 84
End: 2024-12-23

## 2024-12-23 NOTE — TELEPHONE ENCOUNTER
Called and talked to Dakotah puentes regarding her prescription.  Apparently the patient lost her Ciprodex before she got home.  Due to cost she is requesting something different that is covered by her insurance  Cortisporin drops were called in for 10 days.      Electronically signed by REYNA DORSEY PA-C on 12/23/24 at 3:53 PM CST

## 2024-12-23 NOTE — TELEPHONE ENCOUNTER
Pt left called office and stated she had an issue with her ear drop Rx and would like a call back to discuss

## 2024-12-27 ENCOUNTER — TELEPHONE (OUTPATIENT)
Dept: PRIMARY CARE CLINIC | Age: 84
End: 2024-12-27

## 2024-12-30 RX ORDER — INSULIN GLARGINE 100 [IU]/ML
28 INJECTION, SOLUTION SUBCUTANEOUS NIGHTLY
Qty: 9 ADJUSTABLE DOSE PRE-FILLED PEN SYRINGE | Refills: 3 | Status: SHIPPED | OUTPATIENT
Start: 2024-12-30

## 2025-01-02 ENCOUNTER — OFFICE VISIT (OUTPATIENT)
Dept: ENT CLINIC | Age: 85
End: 2025-01-02
Payer: MEDICARE

## 2025-01-02 VITALS
DIASTOLIC BLOOD PRESSURE: 72 MMHG | BODY MASS INDEX: 27 KG/M2 | WEIGHT: 168 LBS | SYSTOLIC BLOOD PRESSURE: 124 MMHG | HEIGHT: 66 IN

## 2025-01-02 DIAGNOSIS — H60.312 ACUTE DIFFUSE OTITIS EXTERNA OF LEFT EAR: Primary | ICD-10-CM

## 2025-01-02 PROCEDURE — 99213 OFFICE O/P EST LOW 20 MIN: CPT | Performed by: PHYSICIAN ASSISTANT

## 2025-01-02 PROCEDURE — 3078F DIAST BP <80 MM HG: CPT | Performed by: PHYSICIAN ASSISTANT

## 2025-01-02 PROCEDURE — 1036F TOBACCO NON-USER: CPT | Performed by: PHYSICIAN ASSISTANT

## 2025-01-02 PROCEDURE — G8417 CALC BMI ABV UP PARAM F/U: HCPCS | Performed by: PHYSICIAN ASSISTANT

## 2025-01-02 PROCEDURE — 1159F MED LIST DOCD IN RCRD: CPT | Performed by: PHYSICIAN ASSISTANT

## 2025-01-02 PROCEDURE — G8399 PT W/DXA RESULTS DOCUMENT: HCPCS | Performed by: PHYSICIAN ASSISTANT

## 2025-01-02 PROCEDURE — 3074F SYST BP LT 130 MM HG: CPT | Performed by: PHYSICIAN ASSISTANT

## 2025-01-02 PROCEDURE — 4130F TOPICAL PREP RX AOE: CPT | Performed by: PHYSICIAN ASSISTANT

## 2025-01-02 PROCEDURE — 1090F PRES/ABSN URINE INCON ASSESS: CPT | Performed by: PHYSICIAN ASSISTANT

## 2025-01-02 PROCEDURE — 1123F ACP DISCUSS/DSCN MKR DOCD: CPT | Performed by: PHYSICIAN ASSISTANT

## 2025-01-02 PROCEDURE — G8427 DOCREV CUR MEDS BY ELIG CLIN: HCPCS | Performed by: PHYSICIAN ASSISTANT

## 2025-01-02 NOTE — PROGRESS NOTES
Ms. Henley is a pleasant 84-year-old  female that presents for a 2-week follow-up after treatment for acute left otitis externa.  She was noted to have severe stenosis requiring a wick to be placed.  Currently she reports that the ear feels much improved and she is able to sleep on her left side with no issues.      Physical examination with the microscope revealed a normal TM canal to the right side.  Left side demonstrated the wick to still be present with the stenosis of the canal noted be resolved.  The wick was successfully removed with microscopic guidance without any complications.  I suctioned down to the TM to get the medication residue.  After suctioning the TM, appears to be back to normal as well as the canal with no evidence of infection.  Neck exam demonstrated no lymphadenopathy or thyromegaly.      Impression: Resolved left acute otitis externa    Plan: Due to the patient being back to baseline, she is to follow-up with me as needed.  She was reminded to call if she has any questions or concerns.      Electronically signed by REYNA DORSEY PA-C on 1/2/25 at 12:20 PM CST

## 2025-01-15 NOTE — PROGRESS NOTES
[facility] - PODIATRY    Today's Date: 01/16/2025     Patient Name: Kary Alcaraz  MRN: [id number]  CSN: [id number]  PCP: Nate Whitley MD  Referring Provider: No ref. provider found    SUBJECTIVE     Chief Complaint   Patient presents with    Follow-up     Nate Whitley MD 12/27/24  Return in about 3 months DIABETIC FOOT CARE CLINIC// RESCHEDULED   Pt states she is here today for diabetic foot/nail care. Pt denies pain.     Diabetes     HPI: Kary Alcaraz, a 84 y.o.female, comes to clinic as a(n) established patient complaining of toenail/callus issues. Patient has h/o DM, HTN, GERD, CVA, and CKD . Patient is NIDDM and unsure of last BG level.  Notes that she does not regularly check her blood glucose and is unsure of her last A1c.  Complains that toenails are thickened, irregular toenail growth of both feet.  Notes mild numbness and tingling in feet.  Utilizing wheelchair for ambulation for today.  Open wounds or sores today.  Denies pain currently with the exception of occasional neuropathic pain.  Has several questions about which footwear would be most appropriate for her.  Relates previous treatment(s) including care by podiatry . Denies any constitutional symptoms. No other pedal complaints at this time.     Past Medical History:   Diagnosis Date    CKD (chronic kidney disease)     CVA (cerebral vascular accident)     Diabetes mellitus     GERD (gastroesophageal reflux disease)     Hypertension     Neuropathy in diabetes      History reviewed. No pertinent surgical history.  History reviewed. No pertinent family history.  Social History     Socioeconomic History    Marital status:    Tobacco Use    Smoking status: Never     Passive exposure: Never    Smokeless tobacco: Never   Vaping Use    Vaping status: Never Used   Substance and Sexual Activity    Alcohol use: Never    Drug use: No    Sexual activity: Defer     Allergies   Allergen Reactions    Adhesive Tape Other  "(See Comments)    Elastic Bandages & [Zinc] Other (See Comments)     \"Elastic in bra\"     Current Outpatient Medications   Medication Sig Dispense Refill    albuterol (PROVENTIL HFA;VENTOLIN HFA) 108 (90 Base) MCG/ACT inhaler Inhale.      aspirin 81 MG chewable tablet Chew 1 tablet Daily.      atorvastatin (LIPITOR) 80 MG tablet Take 1 tablet by mouth Daily.      carvedilol (COREG) 12.5 MG tablet Take 1 tablet by mouth 2 (Two) Times a Day With Meals.      coenzyme Q10 100 MG capsule Take 1 capsule by mouth Daily.      gabapentin (NEURONTIN) 100 MG capsule Take 1 capsule by mouth 3 (Three) Times a Day.      Insulin Glargine (BASAGLAR KWIKPEN) 100 UNIT/ML injection pen Inject 100 Units under the skin into the appropriate area as directed.      Insulin Lispro (humaLOG) 100 UNIT/ML injection Inject 1 Units under the skin into the appropriate area as directed Daily.      levothyroxine (SYNTHROID, LEVOTHROID) 25 MCG tablet Take 1 tablet by mouth Daily.      lisinopril (PRINIVIL,ZESTRIL) 10 MG tablet Take 1 tablet by mouth Daily.      montelukast (SINGULAIR) 10 MG tablet Take 1 tablet by mouth Every Night.      omeprazole (priLOSEC) 40 MG capsule Take 1 capsule by mouth Daily.      pregabalin (LYRICA) 50 MG capsule Take 1 capsule by mouth 3 (Three) Times a Day.      sertraline (ZOLOFT) 50 MG tablet Take 1 tablet by mouth Daily.      vitamin B-12 (CYANOCOBALAMIN) 1000 MCG tablet Take 1 tablet by mouth Daily.       No current facility-administered medications for this visit.     Review of Systems   Constitutional:  Negative for chills and fever.   HENT:  Negative for congestion.    Respiratory:  Negative for shortness of breath.    Cardiovascular:  Positive for leg swelling. Negative for chest pain.   Gastrointestinal:  Negative for constipation, diarrhea, nausea and vomiting.   Musculoskeletal:  Positive for gait problem. Negative for arthralgias and myalgias.   Skin:  Negative for wound.        Thickened, elongated " toenails.   Neurological:  Positive for weakness and numbness.   Hematological:  Does not bruise/bleed easily.   Psychiatric/Behavioral:  Negative for agitation and behavioral problems.        OBJECTIVE     Vitals:    01/16/25 1110   BP: 122/84   Pulse: 64   SpO2: 95%         PHYSICAL EXAM  GEN:   Accompanied by none.     Foot/Ankle Exam    GENERAL  Diabetic foot exam performed    Appearance:  elderly  Orientation:  AAOx3  Affect:  appropriate  Gait:  (Unsteady)  Assistance:  walker  Right shoe gear: casual shoe  Left shoe gear: casual shoe    VASCULAR     Right Foot Vascularity   Dorsalis pedis:  2+  Posterior tibial:  2+  Skin temperature:  warm  Edema grading:  None  CFT:  3  Pedal hair growth:  Present  Varicosities:  spider veins     Left Foot Vascularity   Dorsalis pedis:  2+  Posterior tibial:  2+  Skin temperature:  warm  Edema grading:  None  CFT:  3  Pedal hair growth:  Present  Varicosities:  spider veins     NEUROLOGIC     Right Foot Neurologic   Light touch sensation: diminished  Vibratory sensation: diminished  Hot/Cold sensation: diminished  Protective Sensation using [place] Monofilament:   Sites intact: 7  Sites tested: 10     Left Foot Neurologic   Light touch sensation: diminished  Vibratory sensation: diminished  Hot/Cold sensation:  diminished  Protective Sensation using [place] Monofilament:   Sites intact: 6  Sites tested: 10    MUSCULOSKELETAL     Right Foot Musculoskeletal   Ecchymosis:  none  Tenderness:  toenail problem    Arch:  Normal  Hallux valgus: Yes    Hallux limitus: No    Tailor's bunion: No       Left Foot Musculoskeletal   Ecchymosis:  none  Tenderness:  toenail problem  Arch:  Normal  Hallux valgus: Yes    Hallux limitus: No    Tailor's bunion: No      MUSCLE STRENGTH     Right Foot Muscle Strength   Foot dorsiflexion:  4+  Foot plantar flexion:  4+  Foot inversion:  4+  Foot eversion:  4+     Left Foot Muscle Strength   Foot dorsiflexion:  4+  Foot plantar  flexion:  4+  Foot inversion:  4+  Foot eversion:  4+    RANGE OF MOTION     Right Foot Range of Motion   Foot and ankle ROM within normal limits       Left Foot Range of Motion   Foot and ankle ROM within normal limits      DERMATOLOGIC      Right Foot Dermatologic   Skin  Positive for atrophy.   Nails  1.  Positive for elongated, onychomycosis, abnormal thickness and subungual debris.  2.  Positive for elongated, onychomycosis and abnormal thickness.  3.  Positive for elongated, onychomycosis and abnormal thickness.  4.  Positive for elongated, onychomycosis and abnormal thickness.  5.  Positive for elongated, onychomycosis and abnormal thickness.     Left Foot Dermatologic   Skin  Positive for atrophy.   Nails  1.  Positive for elongated, onychomycosis, abnormal thickness and subungual debris.  2.  Positive for elongated, onychomycosis and abnormal thickness.  3.  Positive for elongated, onychomycosis and abnormal thickness.  4.  Positive for elongated, onychomycosis and abnormally thick.  5.  Positive for elongated, onychomycosis and abnormally thick.      RADIOLOGY/NUCLEAR:  No results found.    LABORATORY/CULTURE RESULTS:      PATHOLOGY RESULTS:       ASSESSMENT/PLAN     Diagnoses and all orders for this visit:    1. Onychomycosis (Primary)    2. Type 2 diabetes mellitus with diabetic polyneuropathy, with long-term current use of insulin    3. Stage 3 chronic kidney disease, unspecified whether stage 3a or 3b CKD    4. Gait abnormality    5. Hemiplegia affecting dominant side          Comprehensive lower extremity examination and evaluation was performed.  Discussed findings and treatment plan including risks, benefits, and treatment options with patient in detail. Patient agreed with treatment plan.  After verbal consent obtained, nail(s) x10 debrided of length and thickness with nail nipper without incidence  Patient may maintain nails and calluses at home utilizing emery board or pumice stone between visits  as needed  Reviewed at home diabetic foot care including daily foot checks   Continue diabetic monitoring and control under direction of PCP.  Recommend patient check her BG levels more often.  Continue use of chair for ambulation support.  Discussed with patient that I do not recommend a specific type or brand of footwear more so something that is easy for her to get on and off and that she is wearing the appropriate size.  Do recommend against narrow fitting shoes-encouraged her to wear shoes that have a wide toebox.  May utilize compression and keep feet elevated while at rest.  Scheduled to return in 3 months.  Encouraged her to call sooner with any questions or concerns.    An After Visit Summary was printed and given to the patient at discharge, including (if requested) any available informative/educational handouts regarding diagnosis, treatment, or medications. All questions were answered to patient/family satisfaction. Should symptoms fail to improve or worsen they agree to call or return to clinic or to go to the Emergency Department. Discussed the importance of following up with any needed screening tests/labs/specialist appointments and any requested follow-up recommended by me today. Importance of maintaining follow-up discussed and patient accepts that missed appointments can delay diagnosis and potentially lead to worsening of conditions.  Return in about 10 weeks (around 3/27/2025) for Follow-up with APRN, Follow-up in [facility]., or sooner if acute issues arise.    Advance Care Planning   ACP discussion was declined by the patient. Patient does not have an advance directive, declines further assistance.     I spent 10 minutes caring for Kary on this date of service. This time includes time spent by me in the following activities: preparing for the visit, performing a medically appropriate examination and/or evaluation, counseling and educating the patient/family/caregiver, and documenting  information in the medical record  I spent an additional 5 minutes caring for Kary on this day of service.  This time includes office of any of the following activities: Toenail debridement.      This document has been electronically signed by MANOJ Brooks on January 16, 2025 12:18 CST

## 2025-01-16 ENCOUNTER — OFFICE VISIT (OUTPATIENT)
Age: 85
End: 2025-01-16
Payer: MEDICARE

## 2025-01-16 VITALS
HEIGHT: 66 IN | OXYGEN SATURATION: 95 % | HEART RATE: 64 BPM | DIASTOLIC BLOOD PRESSURE: 84 MMHG | SYSTOLIC BLOOD PRESSURE: 122 MMHG | BODY MASS INDEX: 26.52 KG/M2 | WEIGHT: 165 LBS

## 2025-01-16 DIAGNOSIS — E11.42 TYPE 2 DIABETES MELLITUS WITH DIABETIC POLYNEUROPATHY, WITH LONG-TERM CURRENT USE OF INSULIN: ICD-10-CM

## 2025-01-16 DIAGNOSIS — N18.30 STAGE 3 CHRONIC KIDNEY DISEASE, UNSPECIFIED WHETHER STAGE 3A OR 3B CKD: ICD-10-CM

## 2025-01-16 DIAGNOSIS — G81.90 HEMIPLEGIA AFFECTING DOMINANT SIDE: ICD-10-CM

## 2025-01-16 DIAGNOSIS — Z79.4 TYPE 2 DIABETES MELLITUS WITH DIABETIC POLYNEUROPATHY, WITH LONG-TERM CURRENT USE OF INSULIN: ICD-10-CM

## 2025-01-16 DIAGNOSIS — B35.1 ONYCHOMYCOSIS: Primary | ICD-10-CM

## 2025-01-16 DIAGNOSIS — R26.9 GAIT ABNORMALITY: ICD-10-CM

## 2025-01-21 ENCOUNTER — OFFICE VISIT (OUTPATIENT)
Dept: UROLOGY | Age: 85
End: 2025-01-21
Payer: MEDICARE

## 2025-01-21 VITALS — HEIGHT: 64 IN | BODY MASS INDEX: 27.31 KG/M2 | WEIGHT: 160 LBS | TEMPERATURE: 97.3 F

## 2025-01-21 DIAGNOSIS — N32.81 OAB (OVERACTIVE BLADDER): Primary | ICD-10-CM

## 2025-01-21 DIAGNOSIS — N39.46 MIXED STRESS AND URGE URINARY INCONTINENCE: ICD-10-CM

## 2025-01-21 LAB
APPEARANCE FLUID: CLEAR
BILIRUBIN, POC: NORMAL
BLOOD URINE, POC: NORMAL
CLARITY, POC: CLEAR
COLOR, POC: YELLOW
GLUCOSE URINE, POC: NORMAL MG/DL
KETONES, POC: NORMAL MG/DL
LEUKOCYTE EST, POC: NORMAL
NITRITE, POC: NORMAL
PH, POC: 5.5
PROTEIN, POC: NORMAL MG/DL
SPECIFIC GRAVITY, POC: 1.01
UROBILINOGEN, POC: 0.2 MG/DL

## 2025-01-21 PROCEDURE — 51798 US URINE CAPACITY MEASURE: CPT | Performed by: NURSE PRACTITIONER

## 2025-01-21 PROCEDURE — 99213 OFFICE O/P EST LOW 20 MIN: CPT | Performed by: NURSE PRACTITIONER

## 2025-01-21 PROCEDURE — 1036F TOBACCO NON-USER: CPT | Performed by: NURSE PRACTITIONER

## 2025-01-21 PROCEDURE — 81002 URINALYSIS NONAUTO W/O SCOPE: CPT | Performed by: NURSE PRACTITIONER

## 2025-01-21 PROCEDURE — G8427 DOCREV CUR MEDS BY ELIG CLIN: HCPCS | Performed by: NURSE PRACTITIONER

## 2025-01-21 PROCEDURE — G8399 PT W/DXA RESULTS DOCUMENT: HCPCS | Performed by: NURSE PRACTITIONER

## 2025-01-21 PROCEDURE — G8417 CALC BMI ABV UP PARAM F/U: HCPCS | Performed by: NURSE PRACTITIONER

## 2025-01-21 PROCEDURE — 1159F MED LIST DOCD IN RCRD: CPT | Performed by: NURSE PRACTITIONER

## 2025-01-21 PROCEDURE — 1090F PRES/ABSN URINE INCON ASSESS: CPT | Performed by: NURSE PRACTITIONER

## 2025-01-21 PROCEDURE — 1160F RVW MEDS BY RX/DR IN RCRD: CPT | Performed by: NURSE PRACTITIONER

## 2025-01-21 PROCEDURE — 1123F ACP DISCUSS/DSCN MKR DOCD: CPT | Performed by: NURSE PRACTITIONER

## 2025-01-21 PROCEDURE — 0509F URINE INCON PLAN DOCD: CPT | Performed by: NURSE PRACTITIONER

## 2025-01-21 ASSESSMENT — ENCOUNTER SYMPTOMS
NAUSEA: 0
ABDOMINAL PAIN: 0
VOMITING: 0
ABDOMINAL DISTENTION: 0

## 2025-01-21 NOTE — PROGRESS NOTES
Sherice Henley is a 84 y.o., female, Established patient who presents today   Chief Complaint   Patient presents with    Follow-up     I am here for a 3 month follow up         Patient presents for follow-up of lower urinary tract symptoms. Original complaints were mostly of mixed stress and urge incontinence.  She had been maintained on Gemtesa, however, her insurance denied the medication.  She is not a good candidate for anticholinergics and cannot utilize Myrbetriq.  Patient does note an increase in her frequency and urgency as well as incontinence without the medication.  She has prior history of CVA.  At her last appointment, we initiated Toviaz.  She reports she stopped taking the medication a couple of weeks ago.  She states she believes she has learned to \"manage the problem and is no longer having accidents\".     REVIEW OF SYSTEMS:  Review of Systems   Constitutional:  Negative for chills and fever.   Gastrointestinal:  Negative for abdominal distention, abdominal pain, nausea and vomiting.   Genitourinary:  Negative for difficulty urinating, dysuria, flank pain, frequency, hematuria and urgency.   Neurological:  Positive for weakness.   Psychiatric/Behavioral:  Negative for agitation and confusion.        PHYSICAL EXAM:  Temp 97.3 °F (36.3 °C) (Temporal)   Ht 1.626 m (5' 4\")   Wt 72.6 kg (160 lb)   BMI 27.46 kg/m²   Physical Exam  Vitals and nursing note reviewed.   Constitutional:       General: She is not in acute distress.     Appearance: Normal appearance. She is not ill-appearing.   Pulmonary:      Effort: Pulmonary effort is normal. No respiratory distress.   Abdominal:      General: There is no distension.      Tenderness: There is no abdominal tenderness. There is no right CVA tenderness or left CVA tenderness.   Neurological:      Mental Status: She is alert and oriented to person, place, and time. Mental status is at baseline.      Motor: Weakness: walker.      Gait: Gait abnormal.

## 2025-03-11 ENCOUNTER — OFFICE VISIT (OUTPATIENT)
Dept: PRIMARY CARE CLINIC | Age: 85
End: 2025-03-11
Payer: MEDICARE

## 2025-03-11 VITALS
HEIGHT: 65 IN | WEIGHT: 158 LBS | TEMPERATURE: 97.7 F | BODY MASS INDEX: 26.33 KG/M2 | HEART RATE: 72 BPM | OXYGEN SATURATION: 95 % | SYSTOLIC BLOOD PRESSURE: 122 MMHG | DIASTOLIC BLOOD PRESSURE: 62 MMHG

## 2025-03-11 DIAGNOSIS — F41.8 DEPRESSION WITH ANXIETY: ICD-10-CM

## 2025-03-11 DIAGNOSIS — E11.22 TYPE 2 DIABETES MELLITUS WITH STAGE 3A CHRONIC KIDNEY DISEASE, WITHOUT LONG-TERM CURRENT USE OF INSULIN (HCC): ICD-10-CM

## 2025-03-11 DIAGNOSIS — J44.9 CHRONIC OBSTRUCTIVE PULMONARY DISEASE, UNSPECIFIED COPD TYPE (HCC): ICD-10-CM

## 2025-03-11 DIAGNOSIS — E03.9 ACQUIRED HYPOTHYROIDISM: Primary | ICD-10-CM

## 2025-03-11 DIAGNOSIS — E78.2 MIXED HYPERLIPIDEMIA: ICD-10-CM

## 2025-03-11 DIAGNOSIS — I69.351 HEMIPARESIS AFFECTING RIGHT SIDE AS LATE EFFECT OF STROKE (HCC): ICD-10-CM

## 2025-03-11 DIAGNOSIS — L40.50 PSORIATIC ARTHRITIS (HCC): ICD-10-CM

## 2025-03-11 DIAGNOSIS — N18.31 TYPE 2 DIABETES MELLITUS WITH STAGE 3A CHRONIC KIDNEY DISEASE, WITHOUT LONG-TERM CURRENT USE OF INSULIN (HCC): ICD-10-CM

## 2025-03-11 DIAGNOSIS — I10 PRIMARY HYPERTENSION: ICD-10-CM

## 2025-03-11 DIAGNOSIS — H60.502 ACUTE OTITIS EXTERNA OF LEFT EAR, UNSPECIFIED TYPE: ICD-10-CM

## 2025-03-11 PROBLEM — N18.30 CHRONIC KIDNEY DISEASE, STAGE III (MODERATE) (HCC): Status: RESOLVED | Noted: 2019-10-14 | Resolved: 2025-03-11

## 2025-03-11 PROCEDURE — G8399 PT W/DXA RESULTS DOCUMENT: HCPCS | Performed by: FAMILY MEDICINE

## 2025-03-11 PROCEDURE — G2211 COMPLEX E/M VISIT ADD ON: HCPCS | Performed by: FAMILY MEDICINE

## 2025-03-11 PROCEDURE — 4130F TOPICAL PREP RX AOE: CPT | Performed by: FAMILY MEDICINE

## 2025-03-11 PROCEDURE — 1123F ACP DISCUSS/DSCN MKR DOCD: CPT | Performed by: FAMILY MEDICINE

## 2025-03-11 PROCEDURE — 1090F PRES/ABSN URINE INCON ASSESS: CPT | Performed by: FAMILY MEDICINE

## 2025-03-11 PROCEDURE — 3078F DIAST BP <80 MM HG: CPT | Performed by: FAMILY MEDICINE

## 2025-03-11 PROCEDURE — 3074F SYST BP LT 130 MM HG: CPT | Performed by: FAMILY MEDICINE

## 2025-03-11 PROCEDURE — G8417 CALC BMI ABV UP PARAM F/U: HCPCS | Performed by: FAMILY MEDICINE

## 2025-03-11 PROCEDURE — 99214 OFFICE O/P EST MOD 30 MIN: CPT | Performed by: FAMILY MEDICINE

## 2025-03-11 PROCEDURE — 1159F MED LIST DOCD IN RCRD: CPT | Performed by: FAMILY MEDICINE

## 2025-03-11 PROCEDURE — 1036F TOBACCO NON-USER: CPT | Performed by: FAMILY MEDICINE

## 2025-03-11 PROCEDURE — 3023F SPIROM DOC REV: CPT | Performed by: FAMILY MEDICINE

## 2025-03-11 PROCEDURE — G8427 DOCREV CUR MEDS BY ELIG CLIN: HCPCS | Performed by: FAMILY MEDICINE

## 2025-03-11 RX ORDER — NEOMYCIN SULFATE, POLYMYXIN B SULFATE, HYDROCORTISONE 3.5; 10000; 1 MG/ML; [USP'U]/ML; MG/ML
4 SOLUTION/ DROPS AURICULAR (OTIC) 3 TIMES DAILY
Qty: 10 ML | Refills: 0 | Status: SHIPPED | OUTPATIENT
Start: 2025-03-11 | End: 2025-03-21

## 2025-03-11 RX ORDER — FLUTICASONE PROPIONATE 50 MCG
1 SPRAY, SUSPENSION (ML) NASAL 2 TIMES DAILY
Qty: 16 G | Refills: 2 | Status: SHIPPED | OUTPATIENT
Start: 2025-03-11

## 2025-03-11 ASSESSMENT — ENCOUNTER SYMPTOMS
SINUS PRESSURE: 1
ABDOMINAL PAIN: 0
CHEST TIGHTNESS: 0
CONSTIPATION: 0
SINUS PAIN: 1
RHINORRHEA: 0
NAUSEA: 0
DIARRHEA: 0
SHORTNESS OF BREATH: 0
SORE THROAT: 0
FACIAL SWELLING: 0
COUGH: 1
ANAL BLEEDING: 0

## 2025-03-11 NOTE — PROGRESS NOTES
0    Coenzyme Q10 (CO Q 10) 10 MG CAPS Take 1 capsule by mouth daily 30 capsule 0    Probiotic Product (ALIGN) 4 MG CAPS Once capsule daily 30 capsule 5    Carbamide Peroxide (EAR DROPS OT) Place in ear(s) Steroid ear drops (Patient not taking: Reported on 3/11/2025)      prednisoLONE acetate (PRED FORTE) 1 % ophthalmic suspension 2 drops to the left ear every night. (Patient not taking: Reported on 3/11/2025) 10 mL 4    insulin lispro, 1 Unit Dial, (HUMALOG KWIKPEN) 100 UNIT/ML SOPN 6 units before breakfast, lunch, and dinner. (Patient not taking: Reported on 3/11/2025) 6 Adjustable Dose Pre-filled Pen Syringe 5     No current facility-administered medications for this visit.       Return in about 3 months (around 6/11/2025) for Follow Up - 20 minutes.     Discussed use, benefit, and side effects of prescribed medications.         History, Medications, Allergies     Allergies   Allergen Reactions    Tape [Adhesive Tape] Rash     _______________________________________________________________  Past Medical History:   Diagnosis Date    Acute CVA (cerebrovascular accident) ([facility]) 11/20/2021    Arthritis     Psoriatic    Asthma     CAD (coronary artery disease)     CHF (congestive heart failure) ([facility])     COPD (chronic obstructive pulmonary disease) ([facility])     DM (diabetes mellitus) ([facility])     GERD (gastroesophageal reflux disease)     HTN (hypertension)     Hyperlipidemia     MI (myocardial infarction) ([facility])     x2    Thyroid disease      Past Surgical History:   Procedure Laterality Date    CATARACT REMOVAL      CHOLECYSTECTOMY      CORONARY ANGIOPLASTY  06/2018    Angioplasty to in-stent restenosis to the distal LAD    HIP SURGERY      HYSTERECTOMY (CERVIX STATUS UNKNOWN)      JOINT REPLACEMENT Right     Right knee replacement    OVARY REMOVAL        Family History   Problem Relation Age of Onset    Heart Disease Mother     Heart Disease Father     Social History     Tobacco Use    Smoking status: Never    Smokeless

## 2025-03-14 NOTE — PROGRESS NOTES
[facility] - PODIATRY    Today's Date: 03/27/2025     Patient Name: Kary Alcaraz  MRN: [id number]  CSN: [id number]  PCP: Nate Whitley MD  Referring Provider: No ref. provider found    SUBJECTIVE     Chief Complaint   Patient presents with    Follow-up     Nate Whitley MD 01/02/25   10 weeks (around 3/27/2025) for Follow-up with APRN, Follow[facility]up in [facility].   Pt states she is here today for diabetic foot/nail care. PT denies pain.     Diabetes     HPI: Kary Alcaraz, a 84 y.o.female, comes to clinic as a(n) established patient complaining of toenail/callus issues. Patient has h/o DM, HTN, GERD, CVA, and CKD . Patient is NIDDM and unsure of last BG level.  Notes that she does not regularly check her blood glucose and is unsure of her last A1c.  Here today for thickened, elongated toenails that are in need of care.  Notes mild numbness and tingling in feet.  Utilizing wheelchair for ambulation support.  Open wounds or sores today.  Denies pain currently with the exception of occasional neuropathic pain. Does complain of arthritic like pain to her left second toe that will occasionally cause minor pressure and aches.  Relates previous treatment(s) including care by podiatry . Denies any constitutional symptoms. No other pedal complaints at this time.     Past Medical History:   Diagnosis Date    CKD (chronic kidney disease)     CVA (cerebral vascular accident)     Diabetes mellitus     GERD (gastroesophageal reflux disease)     Hypertension     Neuropathy in diabetes      History reviewed. No pertinent surgical history.  History reviewed. No pertinent family history.  Social History     Socioeconomic History    Marital status:    Tobacco Use    Smoking status: Never     Passive exposure: Never    Smokeless tobacco: Never   Vaping Use    Vaping status: Never Used   Substance and Sexual Activity    Alcohol use: Never    Drug use: No    Sexual activity: Defer     Allergies  "  Allergen Reactions    Adhesive Tape Other (See Comments)    Elastic Bandages & [Zinc] Other (See Comments)     \"Elastic in bra\"     Current Outpatient Medications   Medication Sig Dispense Refill    albuterol (PROVENTIL HFA;VENTOLIN HFA) 108 (90 Base) MCG/ACT inhaler Inhale.      aspirin 81 MG chewable tablet Chew 1 tablet Daily.      atorvastatin (LIPITOR) 80 MG tablet Take 1 tablet by mouth Daily.      carvedilol (COREG) 12.5 MG tablet Take 1 tablet by mouth 2 (Two) Times a Day With Meals.      coenzyme Q10 100 MG capsule Take 1 capsule by mouth Daily.      gabapentin (NEURONTIN) 100 MG capsule Take 1 capsule by mouth 3 (Three) Times a Day.      Insulin Glargine (BASAGLAR KWIKPEN) 100 UNIT/ML injection pen Inject 100 Units under the skin into the appropriate area as directed.      Insulin Lispro (humaLOG) 100 UNIT/ML injection Inject 1 Units under the skin into the appropriate area as directed Daily.      levothyroxine (SYNTHROID, LEVOTHROID) 25 MCG tablet Take 1 tablet by mouth Daily.      lisinopril (PRINIVIL,ZESTRIL) 10 MG tablet Take 1 tablet by mouth Daily.      montelukast (SINGULAIR) 10 MG tablet Take 1 tablet by mouth Every Night.      omeprazole (priLOSEC) 40 MG capsule Take 1 capsule by mouth Daily.      pregabalin (LYRICA) 50 MG capsule Take 1 capsule by mouth 3 (Three) Times a Day.      sertraline (ZOLOFT) 50 MG tablet Take 1 tablet by mouth Daily.      vitamin B-12 (CYANOCOBALAMIN) 1000 MCG tablet Take 1 tablet by mouth Daily.       No current facility-administered medications for this visit.     Review of Systems   Constitutional:  Negative for chills and fever.   HENT:  Negative for congestion.    Respiratory:  Negative for shortness of breath.    Cardiovascular:  Positive for leg swelling. Negative for chest pain.   Gastrointestinal:  Negative for constipation, diarrhea, nausea and vomiting.   Musculoskeletal:  Positive for arthralgias and gait problem. Negative for myalgias.        Pain in left " toe   Skin:  Negative for wound.        Thickened, elongated toenails.   Neurological:  Positive for weakness and numbness.   Hematological:  Does not bruise/bleed easily.   Psychiatric/Behavioral:  Negative for agitation and behavioral problems.        OBJECTIVE     Vitals:    03/27/25 1415   BP: 118/78   Pulse: 72   SpO2: 97%           PHYSICAL EXAM  GEN:   Accompanied by none.     Foot/Ankle Exam    GENERAL  Diabetic foot exam performed    Appearance:  elderly  Orientation:  AAOx3  Affect:  appropriate  Gait:  (Unsteady)  Assistance:  wheelchair  Right shoe gear: casual shoe  Left shoe gear: casual shoe    VASCULAR     Right Foot Vascularity   Dorsalis pedis:  2+  Posterior tibial:  2+  Skin temperature:  warm  Edema grading:  None  CFT:  3  Pedal hair growth:  Present  Varicosities:  spider veins     Left Foot Vascularity   Dorsalis pedis:  2+  Posterior tibial:  2+  Skin temperature:  warm  Edema grading:  None  CFT:  3  Pedal hair growth:  Present  Varicosities:  spider veins     NEUROLOGIC     Right Foot Neurologic   Light touch sensation: diminished  Vibratory sensation: diminished  Hot/Cold sensation: diminished  Protective Sensation using [place] Monofilament:   Sites intact: 7  Sites tested: 10     Left Foot Neurologic   Light touch sensation: diminished  Vibratory sensation: diminished  Hot/Cold sensation:  diminished  Protective Sensation using [place] Monofilament:   Sites intact: 6  Sites tested: 10    MUSCULOSKELETAL     Right Foot Musculoskeletal   Ecchymosis:  none  Tenderness:  toenail problem    Arch:  Normal  Hallux valgus: Yes    Hallux limitus: No    Tailor's bunion: No       Left Foot Musculoskeletal   Ecchymosis:  none  Tenderness:  toe 1 tenderness, toe 2 tenderness and toenail problem  Arch:  Normal  Hallux valgus: Yes    Hallux limitus: No    Tailor's bunion: No      MUSCLE STRENGTH     Right Foot Muscle Strength   Foot dorsiflexion:  4+  Foot plantar flexion:  4+  Foot  inversion:  4+  Foot eversion:  4+     Left Foot Muscle Strength   Foot dorsiflexion:  4+  Foot plantar flexion:  4+  Foot inversion:  4+  Foot eversion:  4+    RANGE OF MOTION     Right Foot Range of Motion   Foot and ankle ROM within normal limits       Left Foot Range of Motion   Foot and ankle ROM within normal limits      DERMATOLOGIC      Right Foot Dermatologic   Skin  Positive for atrophy.   Nails  1.  Positive for elongated, onychomycosis, abnormal thickness and subungual debris.  2.  Positive for elongated, onychomycosis and abnormal thickness.  3.  Positive for elongated, onychomycosis and abnormal thickness.  4.  Positive for elongated, onychomycosis and abnormal thickness.  5.  Positive for elongated, onychomycosis and abnormal thickness.     Left Foot Dermatologic   Skin  Positive for atrophy.   Nails  1.  Positive for elongated, onychomycosis, abnormal thickness and subungual debris.  2.  Positive for elongated, onychomycosis and abnormal thickness.  3.  Positive for elongated, onychomycosis and abnormal thickness.  4.  Positive for elongated, onychomycosis and abnormally thick.  5.  Positive for elongated, onychomycosis and abnormally thick.     Left foot additional comments: Mild discomfort upon palpation of second toe.      RADIOLOGY/NUCLEAR:  No results found.    LABORATORY/CULTURE RESULTS:      PATHOLOGY RESULTS:       ASSESSMENT/PLAN     Diagnoses and all orders for this visit:    1. Onychomycosis (Primary)    2. Type 2 diabetes mellitus with diabetic polyneuropathy, with long-term current use of insulin    3. Encounter for diabetic foot exam    4. Pain in left toe(s)    5. Hallux valgus of left foot    6. Stage 3 chronic kidney disease, unspecified whether stage 3a or 3b CKD    7. Gait abnormality    8. Hemiplegia affecting dominant side            Comprehensive lower extremity examination and evaluation was performed.  Discussed findings and treatment plan including risks, benefits, and  treatment options with patient in detail. Patient agreed with treatment plan.  After verbal consent obtained, nail(s) x10 debrided of length and thickness with nail nipper without incidence  Patient may maintain nails and calluses at home utilizing emery board or pumice stone between visits as needed  Reviewed at home diabetic foot care including daily foot checks   DFE performed today.  Last A1c for review 7% obtained through [facility].  Reviewed PCPs most recent note.  Continue diabetic monitoring and control under direction of PCP.  Recommend patient check her BG levels more often.  Continue use of chair for ambulation support.  For pain in left toes-do believe this is related to hallux valgus deformity and great toe and second toe rubbing up on each other.  Dispense toe cap for both toes today.  Patient may benefit from use of gel toe spacer if continues to be bothersome.    Recommend she remain conservative with foot deformities.  May utilize compression and keep feet elevated while at rest.  Scheduled to return in 3 months.  Encouraged her to call sooner with any questions or concerns.    An After Visit Summary was printed and given to the patient at discharge, including (if requested) any available informative/educational handouts regarding diagnosis, treatment, or medications. All questions were answered to patient/family satisfaction. Should symptoms fail to improve or worsen they agree to call or return to clinic or to go to the Emergency Department. Discussed the importance of following up with any needed screening tests/labs/specialist appointments and any requested follow-up recommended by me today. Importance of maintaining follow-up discussed and patient accepts that missed appointments can delay diagnosis and potentially lead to worsening of conditions.  Return in about 2 months (around 5/29/2025) for Follow-up with APRN, Follow-up in [facility]., or sooner if acute issues arise.    Advance  Care Planning   ACP discussion was declined by the patient. Patient does not have an advance directive, declines further assistance.     I spent 10 minutes caring for Kary on this date of service. This time includes time spent by me in the following activities: preparing for the visit, performing a medically appropriate examination and/or evaluation, counseling and educating the patient/family/caregiver, and documenting information in the medical record  I spent an additional 5 minutes caring for Kary on this day of service.  This time includes office of any of the following activities: Toenail debridement.      This document has been electronically signed by MANOJ Brooks on March 27, 2025 15:06 CDT

## 2025-03-18 ENCOUNTER — RESULTS FOLLOW-UP (OUTPATIENT)
Dept: PRIMARY CARE CLINIC | Age: 85
End: 2025-03-18

## 2025-03-18 DIAGNOSIS — E11.22 TYPE 2 DIABETES MELLITUS WITH STAGE 3A CHRONIC KIDNEY DISEASE, WITHOUT LONG-TERM CURRENT USE OF INSULIN (HCC): ICD-10-CM

## 2025-03-18 DIAGNOSIS — E03.9 ACQUIRED HYPOTHYROIDISM: ICD-10-CM

## 2025-03-18 DIAGNOSIS — E78.2 MIXED HYPERLIPIDEMIA: ICD-10-CM

## 2025-03-18 DIAGNOSIS — I10 PRIMARY HYPERTENSION: ICD-10-CM

## 2025-03-18 DIAGNOSIS — N18.31 TYPE 2 DIABETES MELLITUS WITH STAGE 3A CHRONIC KIDNEY DISEASE, WITHOUT LONG-TERM CURRENT USE OF INSULIN (HCC): ICD-10-CM

## 2025-03-18 LAB
ALBUMIN SERPL-MCNC: 3.9 G/DL (ref 3.5–5.2)
ALP SERPL-CCNC: 131 U/L (ref 35–104)
ALT SERPL-CCNC: 23 U/L (ref 10–35)
ANION GAP SERPL CALCULATED.3IONS-SCNC: 11 MMOL/L (ref 8–16)
AST SERPL-CCNC: 22 U/L (ref 10–35)
BASOPHILS # BLD: 0 K/UL (ref 0–0.2)
BASOPHILS NFR BLD: 0.4 % (ref 0–1)
BILIRUB SERPL-MCNC: 0.6 MG/DL (ref 0.2–1.2)
BUN SERPL-MCNC: 15 MG/DL (ref 8–23)
CALCIUM SERPL-MCNC: 9.2 MG/DL (ref 8.8–10.2)
CHLORIDE SERPL-SCNC: 104 MMOL/L (ref 98–107)
CHOLEST SERPL-MCNC: 90 MG/DL (ref 0–199)
CO2 SERPL-SCNC: 25 MMOL/L (ref 22–29)
CREAT SERPL-MCNC: 0.7 MG/DL (ref 0.5–0.9)
CREAT UR-MCNC: 111 MG/DL (ref 28–217)
EOSINOPHIL # BLD: 0.4 K/UL (ref 0–0.6)
EOSINOPHIL NFR BLD: 4.9 % (ref 0–5)
ERYTHROCYTE [DISTWIDTH] IN BLOOD BY AUTOMATED COUNT: 13.7 % (ref 11.5–14.5)
GLUCOSE SERPL-MCNC: 131 MG/DL (ref 70–99)
HBA1C MFR BLD: 7 % (ref 4–5.6)
HCT VFR BLD AUTO: 39.6 % (ref 37–47)
HDLC SERPL-MCNC: 38 MG/DL (ref 40–60)
HGB BLD-MCNC: 13.1 G/DL (ref 12–16)
IMM GRANULOCYTES # BLD: 0 K/UL
LDLC SERPL CALC-MCNC: 27 MG/DL
LYMPHOCYTES # BLD: 1.7 K/UL (ref 1.1–4.5)
LYMPHOCYTES NFR BLD: 22.7 % (ref 20–40)
MCH RBC QN AUTO: 30 PG (ref 27–31)
MCHC RBC AUTO-ENTMCNC: 33.1 G/DL (ref 33–37)
MCV RBC AUTO: 90.8 FL (ref 81–99)
MICROALBUMIN UR-MCNC: 10.3 MG/DL (ref 0–1.99)
MICROALBUMIN/CREAT UR-RTO: 92.8 MG/G (ref 0–29)
MONOCYTES # BLD: 0.7 K/UL (ref 0–0.9)
MONOCYTES NFR BLD: 9.2 % (ref 0–10)
NEUTROPHILS # BLD: 4.8 K/UL (ref 1.5–7.5)
NEUTS SEG NFR BLD: 62.5 % (ref 50–65)
PLATELET # BLD AUTO: 228 K/UL (ref 130–400)
PMV BLD AUTO: 10.1 FL (ref 9.4–12.3)
POTASSIUM SERPL-SCNC: 4 MMOL/L (ref 3.5–5.1)
PROT SERPL-MCNC: 6.8 G/DL (ref 6.4–8.3)
RBC # BLD AUTO: 4.36 M/UL (ref 4.2–5.4)
SODIUM SERPL-SCNC: 140 MMOL/L (ref 136–145)
T4 FREE SERPL-MCNC: 1.11 NG/DL (ref 0.93–1.7)
TRIGL SERPL-MCNC: 123 MG/DL (ref 0–149)
TSH SERPL DL<=0.005 MIU/L-ACNC: 4.46 UIU/ML (ref 0.27–4.2)
WBC # BLD AUTO: 7.6 K/UL (ref 4.8–10.8)

## 2025-03-27 ENCOUNTER — OFFICE VISIT (OUTPATIENT)
Age: 85
End: 2025-03-27
Payer: MEDICARE

## 2025-03-27 VITALS
BODY MASS INDEX: 26.52 KG/M2 | DIASTOLIC BLOOD PRESSURE: 78 MMHG | HEART RATE: 72 BPM | OXYGEN SATURATION: 97 % | HEIGHT: 66 IN | SYSTOLIC BLOOD PRESSURE: 118 MMHG | WEIGHT: 165 LBS

## 2025-03-27 DIAGNOSIS — M79.675 PAIN IN LEFT TOE(S): ICD-10-CM

## 2025-03-27 DIAGNOSIS — M20.12 HALLUX VALGUS OF LEFT FOOT: ICD-10-CM

## 2025-03-27 DIAGNOSIS — R26.9 GAIT ABNORMALITY: ICD-10-CM

## 2025-03-27 DIAGNOSIS — Z79.4 TYPE 2 DIABETES MELLITUS WITH DIABETIC POLYNEUROPATHY, WITH LONG-TERM CURRENT USE OF INSULIN: ICD-10-CM

## 2025-03-27 DIAGNOSIS — E11.42 TYPE 2 DIABETES MELLITUS WITH DIABETIC POLYNEUROPATHY, WITH LONG-TERM CURRENT USE OF INSULIN: ICD-10-CM

## 2025-03-27 DIAGNOSIS — B35.1 ONYCHOMYCOSIS: Primary | ICD-10-CM

## 2025-03-27 DIAGNOSIS — N18.30 STAGE 3 CHRONIC KIDNEY DISEASE, UNSPECIFIED WHETHER STAGE 3A OR 3B CKD: ICD-10-CM

## 2025-03-27 DIAGNOSIS — G81.90 HEMIPLEGIA AFFECTING DOMINANT SIDE: ICD-10-CM

## 2025-03-27 DIAGNOSIS — E11.9 ENCOUNTER FOR DIABETIC FOOT EXAM: ICD-10-CM

## 2025-04-15 ENCOUNTER — OFFICE VISIT (OUTPATIENT)
Dept: ENT CLINIC | Age: 85
End: 2025-04-15
Payer: MEDICARE

## 2025-04-15 VITALS
BODY MASS INDEX: 26.98 KG/M2 | SYSTOLIC BLOOD PRESSURE: 112 MMHG | DIASTOLIC BLOOD PRESSURE: 74 MMHG | HEIGHT: 64 IN | WEIGHT: 158 LBS

## 2025-04-15 DIAGNOSIS — J01.00 ACUTE NON-RECURRENT MAXILLARY SINUSITIS: Primary | ICD-10-CM

## 2025-04-15 PROCEDURE — 3074F SYST BP LT 130 MM HG: CPT | Performed by: PHYSICIAN ASSISTANT

## 2025-04-15 PROCEDURE — 1159F MED LIST DOCD IN RCRD: CPT | Performed by: PHYSICIAN ASSISTANT

## 2025-04-15 PROCEDURE — 1090F PRES/ABSN URINE INCON ASSESS: CPT | Performed by: PHYSICIAN ASSISTANT

## 2025-04-15 PROCEDURE — 1160F RVW MEDS BY RX/DR IN RCRD: CPT | Performed by: PHYSICIAN ASSISTANT

## 2025-04-15 PROCEDURE — G8427 DOCREV CUR MEDS BY ELIG CLIN: HCPCS | Performed by: PHYSICIAN ASSISTANT

## 2025-04-15 PROCEDURE — 3078F DIAST BP <80 MM HG: CPT | Performed by: PHYSICIAN ASSISTANT

## 2025-04-15 PROCEDURE — 99213 OFFICE O/P EST LOW 20 MIN: CPT | Performed by: PHYSICIAN ASSISTANT

## 2025-04-15 PROCEDURE — 1036F TOBACCO NON-USER: CPT | Performed by: PHYSICIAN ASSISTANT

## 2025-04-15 PROCEDURE — G8399 PT W/DXA RESULTS DOCUMENT: HCPCS | Performed by: PHYSICIAN ASSISTANT

## 2025-04-15 PROCEDURE — 1123F ACP DISCUSS/DSCN MKR DOCD: CPT | Performed by: PHYSICIAN ASSISTANT

## 2025-04-15 PROCEDURE — G8417 CALC BMI ABV UP PARAM F/U: HCPCS | Performed by: PHYSICIAN ASSISTANT

## 2025-04-15 RX ORDER — CEFUROXIME AXETIL 500 MG/1
500 TABLET ORAL 2 TIMES DAILY
Qty: 20 TABLET | Refills: 0 | Status: SHIPPED | OUTPATIENT
Start: 2025-04-15 | End: 2025-04-25

## 2025-04-15 ASSESSMENT — ENCOUNTER SYMPTOMS
FACIAL SWELLING: 0
EYE DISCHARGE: 0
EYE PAIN: 0
SINUS PAIN: 0
TROUBLE SWALLOWING: 0
SINUS PRESSURE: 0
RHINORRHEA: 0
SORE THROAT: 0
VOICE CHANGE: 0

## 2025-04-15 NOTE — PROGRESS NOTES
[facility] OTOLARYNGOLOGY/ENT  Mrs. Henley is a pleasant 84-year-old  female that presents with complaints of fullness to the left ear and pressure to the left maxillary sinus.  She reports that she was recently seen by Dr. Maya where she was discovered to have a recurrent ear infection.  This was treated with Cortisporin drops.  Currently denies any drainage from the ear canals or any fever or chills.      Allergies: Tape [adhesive tape]      Current Outpatient Medications   Medication Sig Dispense Refill    cefUROXime (CEFTIN) 500 MG tablet Take 1 tablet by mouth 2 times daily for 10 days 20 tablet 0    fluticasone (FLONASE) 50 MCG/ACT nasal spray 1 spray by Each Nostril route in the morning and at bedtime 16 g 2    insulin glargine (LANTUS SOLOSTAR) 100 UNIT/ML injection pen Inject 28 Units into the skin nightly (Patient taking differently: Inject 30 Units into the skin nightly) 9 Adjustable Dose Pre-filled Pen Syringe 3    levothyroxine (SYNTHROID) 25 MCG tablet Take 1 tablet by mouth daily 90 tablet 1    carvedilol (COREG) 12.5 MG tablet Take 1 tablet by mouth 2 times daily 180 tablet 1    polyethyl glycol-propyl glycol 0.4-0.3 % (SYSTANE) 0.4-0.3 % ophthalmic solution 1 drop as needed for Dry Eyes      Carbamide Peroxide (EAR DROPS OT) Place in ear(s) Steroid ear drops (Patient not taking: Reported on 3/11/2025)      prednisoLONE acetate (PRED FORTE) 1 % ophthalmic suspension 2 drops to the left ear every night. (Patient not taking: Reported on 3/11/2025) 10 mL 4    Insulin Pen Needle (BD PEN NEEDLE MICRO U/F) 32G X 6 MM MISC USE AND DISCARD 1 PEN      NEEDLE DAILY 100 each 1    Misc. Devices MISC Wheelchair seat and back cushion 1 each 0    atorvastatin (LIPITOR) 80 MG tablet TAKE 1 TABLET NIGHTLY 90 tablet 3    sertraline (ZOLOFT) 50 MG tablet TAKE 1 AND 1/2 TABLETS     DAILY 135 tablet 3    insulin lispro, 1 Unit Dial, (HUMALOG KWIKPEN) 100 UNIT/ML SOPN 6 units before breakfast, lunch, and dinner.

## 2025-04-15 NOTE — ASSESSMENT & PLAN NOTE
Left maxillary sinusitis   Plan: I will place the patient on a 10-day course of Ceftin.  I will also have her to utilize her Flonase nasal spray twice a day for the partial middle ear effusion to the left side.  She is to follow-up in 3 weeks for reevaluation.

## 2025-04-29 DIAGNOSIS — F41.8 DEPRESSION WITH ANXIETY: ICD-10-CM

## 2025-04-29 DIAGNOSIS — N18.31 TYPE 2 DIABETES MELLITUS WITH STAGE 3A CHRONIC KIDNEY DISEASE, WITHOUT LONG-TERM CURRENT USE OF INSULIN (HCC): Primary | ICD-10-CM

## 2025-04-29 DIAGNOSIS — E11.22 TYPE 2 DIABETES MELLITUS WITH STAGE 3A CHRONIC KIDNEY DISEASE, WITHOUT LONG-TERM CURRENT USE OF INSULIN (HCC): Primary | ICD-10-CM

## 2025-04-29 RX ORDER — PEN NEEDLE, DIABETIC 32 GX 1/4"
1 NEEDLE, DISPOSABLE MISCELLANEOUS 4 TIMES DAILY
Qty: 400 EACH | Refills: 1 | Status: SHIPPED | OUTPATIENT
Start: 2025-04-29

## 2025-04-29 RX ORDER — INSULIN LISPRO 100 [IU]/ML
INJECTION, SOLUTION INTRAVENOUS; SUBCUTANEOUS
Qty: 17 ADJUSTABLE DOSE PRE-FILLED PEN SYRINGE | Refills: 1 | Status: SHIPPED | OUTPATIENT
Start: 2025-04-29

## 2025-05-06 ENCOUNTER — OFFICE VISIT (OUTPATIENT)
Dept: ENT CLINIC | Age: 85
End: 2025-05-06
Payer: MEDICARE

## 2025-05-06 VITALS
WEIGHT: 158 LBS | SYSTOLIC BLOOD PRESSURE: 112 MMHG | BODY MASS INDEX: 26.98 KG/M2 | HEIGHT: 64 IN | DIASTOLIC BLOOD PRESSURE: 80 MMHG

## 2025-05-06 DIAGNOSIS — H60.312 ACUTE DIFFUSE OTITIS EXTERNA OF LEFT EAR: Primary | ICD-10-CM

## 2025-05-06 PROCEDURE — 3079F DIAST BP 80-89 MM HG: CPT | Performed by: PHYSICIAN ASSISTANT

## 2025-05-06 PROCEDURE — G8399 PT W/DXA RESULTS DOCUMENT: HCPCS | Performed by: PHYSICIAN ASSISTANT

## 2025-05-06 PROCEDURE — 1159F MED LIST DOCD IN RCRD: CPT | Performed by: PHYSICIAN ASSISTANT

## 2025-05-06 PROCEDURE — 1160F RVW MEDS BY RX/DR IN RCRD: CPT | Performed by: PHYSICIAN ASSISTANT

## 2025-05-06 PROCEDURE — G8417 CALC BMI ABV UP PARAM F/U: HCPCS | Performed by: PHYSICIAN ASSISTANT

## 2025-05-06 PROCEDURE — 1090F PRES/ABSN URINE INCON ASSESS: CPT | Performed by: PHYSICIAN ASSISTANT

## 2025-05-06 PROCEDURE — 3074F SYST BP LT 130 MM HG: CPT | Performed by: PHYSICIAN ASSISTANT

## 2025-05-06 PROCEDURE — 1036F TOBACCO NON-USER: CPT | Performed by: PHYSICIAN ASSISTANT

## 2025-05-06 PROCEDURE — 1123F ACP DISCUSS/DSCN MKR DOCD: CPT | Performed by: PHYSICIAN ASSISTANT

## 2025-05-06 PROCEDURE — 99213 OFFICE O/P EST LOW 20 MIN: CPT | Performed by: PHYSICIAN ASSISTANT

## 2025-05-06 PROCEDURE — G8427 DOCREV CUR MEDS BY ELIG CLIN: HCPCS | Performed by: PHYSICIAN ASSISTANT

## 2025-05-06 PROCEDURE — 4130F TOPICAL PREP RX AOE: CPT | Performed by: PHYSICIAN ASSISTANT

## 2025-05-06 RX ORDER — CIPROFLOXACIN AND DEXAMETHASONE 3; 1 MG/ML; MG/ML
4 SUSPENSION/ DROPS AURICULAR (OTIC) 2 TIMES DAILY
Qty: 7 ML | Refills: 0 | Status: SHIPPED | OUTPATIENT
Start: 2025-05-06 | End: 2025-05-16

## 2025-05-06 RX ORDER — FLUTICASONE PROPIONATE 50 MCG
2 SPRAY, SUSPENSION (ML) NASAL 2 TIMES DAILY
Qty: 16 G | Refills: 3 | Status: SHIPPED | OUTPATIENT
Start: 2025-05-06

## 2025-05-06 NOTE — PROGRESS NOTES
Ms. Henley is a pleasant 84-year-old  female that presents for follow-up after treatment for left maxillary sinusitis and left otitis media.  She reports that her sinuses feel much improved.  She complains of swelling to the left ear canal and reports that this is not back to normal.  She also reports that she had a swollen lesion to pop up to the left preauricular region that has resolved.      Physical examination with the microscope revealed evidence of otitis externa of the left ear with some mild stenosis.  The lesion to the preauricular region was felt to be a resolving inclusion cyst.  The TM appeared to be normal to microscopic exam.  Examination of the right ear demonstrated normal TM and canal.  Neck exam demonstrated no lymphadenopathy or thyromegaly.      Impression: Clinically resolved left maxillary sinusitis with evidence of mild left otitis externa    Plan: I will place the patient on a 10-day course of Ciprodex.  I will have her follow-up in 3 weeks for reevaluation of the left ear.  She was reminded to call if her symptoms worsen or she has questions.      Electronically signed by REYNA DORSEY PA-C on 5/6/25 at 2:17 PM ANAT

## 2025-05-27 ENCOUNTER — OFFICE VISIT (OUTPATIENT)
Dept: ENT CLINIC | Age: 85
End: 2025-05-27
Payer: MEDICARE

## 2025-05-27 VITALS
SYSTOLIC BLOOD PRESSURE: 130 MMHG | HEIGHT: 64 IN | BODY MASS INDEX: 27.66 KG/M2 | DIASTOLIC BLOOD PRESSURE: 80 MMHG | WEIGHT: 162 LBS

## 2025-05-27 DIAGNOSIS — H60.312 ACUTE DIFFUSE OTITIS EXTERNA OF LEFT EAR: Primary | ICD-10-CM

## 2025-05-27 PROCEDURE — 3079F DIAST BP 80-89 MM HG: CPT | Performed by: PHYSICIAN ASSISTANT

## 2025-05-27 PROCEDURE — G8427 DOCREV CUR MEDS BY ELIG CLIN: HCPCS | Performed by: PHYSICIAN ASSISTANT

## 2025-05-27 PROCEDURE — 3075F SYST BP GE 130 - 139MM HG: CPT | Performed by: PHYSICIAN ASSISTANT

## 2025-05-27 PROCEDURE — 1090F PRES/ABSN URINE INCON ASSESS: CPT | Performed by: PHYSICIAN ASSISTANT

## 2025-05-27 PROCEDURE — 1036F TOBACCO NON-USER: CPT | Performed by: PHYSICIAN ASSISTANT

## 2025-05-27 PROCEDURE — G8399 PT W/DXA RESULTS DOCUMENT: HCPCS | Performed by: PHYSICIAN ASSISTANT

## 2025-05-27 PROCEDURE — 1159F MED LIST DOCD IN RCRD: CPT | Performed by: PHYSICIAN ASSISTANT

## 2025-05-27 PROCEDURE — G8417 CALC BMI ABV UP PARAM F/U: HCPCS | Performed by: PHYSICIAN ASSISTANT

## 2025-05-27 PROCEDURE — 4130F TOPICAL PREP RX AOE: CPT | Performed by: PHYSICIAN ASSISTANT

## 2025-05-27 PROCEDURE — 1123F ACP DISCUSS/DSCN MKR DOCD: CPT | Performed by: PHYSICIAN ASSISTANT

## 2025-05-27 PROCEDURE — 1160F RVW MEDS BY RX/DR IN RCRD: CPT | Performed by: PHYSICIAN ASSISTANT

## 2025-05-27 PROCEDURE — 99213 OFFICE O/P EST LOW 20 MIN: CPT | Performed by: PHYSICIAN ASSISTANT

## 2025-05-27 NOTE — PROGRESS NOTES
Ms. Henley is a pleasant 84-year-old  female that presents for a 2-week follow-up after treatment for otitis externa of the left ear.  She reports that she is no longer having pain from the ear or any drainage. Reports that she lost her hearing aid to the left side and is planning to purchase a replacement.  She is having some difficulty hearing due to this being the better side for her hearing.      Physical examination with the microscope revealed the patient have normal TM and canal to the right side.  Left side demonstrated resolved otitis externa with a normal-appearing TM.  Neck exam demonstrated no lymphadenopathy or thyromegaly.        Impression: Clinically resolved left otitis externa-etiology undetermined    Plan: Due to the patient being back to baseline, she is follow-up with me as needed.  She was reminded to call if she has recurrent symptoms or has questions.      Electronically signed by REYNA DORSEY PA-C on 5/27/25 at 3:43 PM CDT     Dr. Stoddard

## 2025-06-16 RX ORDER — ATORVASTATIN CALCIUM 80 MG/1
80 TABLET, FILM COATED ORAL NIGHTLY
Qty: 90 TABLET | Refills: 1 | Status: SHIPPED | OUTPATIENT
Start: 2025-06-16 | End: 2025-06-20 | Stop reason: SDUPTHER

## 2025-06-20 ENCOUNTER — OFFICE VISIT (OUTPATIENT)
Dept: PRIMARY CARE CLINIC | Age: 85
End: 2025-06-20

## 2025-06-20 VITALS
OXYGEN SATURATION: 97 % | DIASTOLIC BLOOD PRESSURE: 88 MMHG | BODY MASS INDEX: 27.46 KG/M2 | SYSTOLIC BLOOD PRESSURE: 156 MMHG | TEMPERATURE: 97.2 F | HEART RATE: 63 BPM | WEIGHT: 160 LBS

## 2025-06-20 DIAGNOSIS — E11.22 TYPE 2 DIABETES MELLITUS WITH STAGE 3A CHRONIC KIDNEY DISEASE, WITHOUT LONG-TERM CURRENT USE OF INSULIN (HCC): ICD-10-CM

## 2025-06-20 DIAGNOSIS — I10 PRIMARY HYPERTENSION: ICD-10-CM

## 2025-06-20 DIAGNOSIS — R06.00 DYSPNEA, UNSPECIFIED TYPE: ICD-10-CM

## 2025-06-20 DIAGNOSIS — N18.31 TYPE 2 DIABETES MELLITUS WITH STAGE 3A CHRONIC KIDNEY DISEASE, WITHOUT LONG-TERM CURRENT USE OF INSULIN (HCC): ICD-10-CM

## 2025-06-20 DIAGNOSIS — R53.83 OTHER FATIGUE: ICD-10-CM

## 2025-06-20 DIAGNOSIS — J44.9 CHRONIC OBSTRUCTIVE PULMONARY DISEASE, UNSPECIFIED COPD TYPE (HCC): ICD-10-CM

## 2025-06-20 DIAGNOSIS — M25.552 BILATERAL HIP PAIN: ICD-10-CM

## 2025-06-20 DIAGNOSIS — E03.9 ACQUIRED HYPOTHYROIDISM: Primary | ICD-10-CM

## 2025-06-20 DIAGNOSIS — T78.40XS ALLERGY, SEQUELA: ICD-10-CM

## 2025-06-20 DIAGNOSIS — I69.351 HEMIPARESIS AFFECTING RIGHT SIDE AS LATE EFFECT OF STROKE (HCC): ICD-10-CM

## 2025-06-20 DIAGNOSIS — M25.551 BILATERAL HIP PAIN: ICD-10-CM

## 2025-06-20 DIAGNOSIS — L40.50 PSORIATIC ARTHRITIS (HCC): ICD-10-CM

## 2025-06-20 DIAGNOSIS — E78.2 MIXED HYPERLIPIDEMIA: ICD-10-CM

## 2025-06-20 DIAGNOSIS — F41.8 DEPRESSION WITH ANXIETY: ICD-10-CM

## 2025-06-20 LAB
ALBUMIN SERPL-MCNC: 4.1 G/DL (ref 3.5–5.2)
ALP SERPL-CCNC: 137 U/L (ref 35–104)
ALT SERPL-CCNC: 25 U/L (ref 10–35)
ANION GAP SERPL CALCULATED.3IONS-SCNC: 13 MMOL/L (ref 8–16)
AST SERPL-CCNC: 23 U/L (ref 10–35)
BILIRUB SERPL-MCNC: 0.6 MG/DL (ref 0.2–1.2)
BUN SERPL-MCNC: 19 MG/DL (ref 8–23)
CALCIUM SERPL-MCNC: 9.4 MG/DL (ref 8.8–10.2)
CHLORIDE SERPL-SCNC: 103 MMOL/L (ref 98–107)
CO2 SERPL-SCNC: 23 MMOL/L (ref 22–29)
CREAT SERPL-MCNC: 0.7 MG/DL (ref 0.5–0.9)
GLUCOSE SERPL-MCNC: 170 MG/DL (ref 70–99)
HBA1C MFR BLD: 7.7 % (ref 4–5.6)
POTASSIUM SERPL-SCNC: 3.9 MMOL/L (ref 3.5–5.1)
PROT SERPL-MCNC: 6.6 G/DL (ref 6.4–8.3)
SODIUM SERPL-SCNC: 139 MMOL/L (ref 136–145)
T4 FREE SERPL-MCNC: 1.03 NG/DL (ref 0.93–1.7)
TSH SERPL DL<=0.005 MIU/L-ACNC: 2.88 UIU/ML (ref 0.27–4.2)

## 2025-06-20 RX ORDER — CARVEDILOL 12.5 MG/1
12.5 TABLET ORAL 2 TIMES DAILY
Qty: 180 TABLET | Refills: 1 | Status: SHIPPED | OUTPATIENT
Start: 2025-06-20

## 2025-06-20 RX ORDER — NITROGLYCERIN 0.4 MG/1
0.4 TABLET SUBLINGUAL EVERY 5 MIN PRN
Qty: 25 TABLET | Refills: 3 | Status: SHIPPED | OUTPATIENT
Start: 2025-06-20

## 2025-06-20 RX ORDER — ATORVASTATIN CALCIUM 80 MG/1
80 TABLET, FILM COATED ORAL NIGHTLY
Qty: 90 TABLET | Refills: 1 | Status: SHIPPED | OUTPATIENT
Start: 2025-06-20

## 2025-06-20 RX ORDER — CYCLOSPORINE 0.5 MG/ML
EMULSION OPHTHALMIC
COMMUNITY
Start: 2025-06-04

## 2025-06-20 NOTE — PROGRESS NOTES
[facility]  [address]  [address]  [place] [address]  Dept: [phone]  Dept Fax: [phone]  Loc: [phone]    Sherice Henley is a 84 y.o. female who presents today for her medical conditions/complaints as noted below.  Sherice Henley is here for 3 Month Follow-Up         Patient History:      Overactive bladder  -[facility] urology     Onychomycosis  -[facility] podiatry     History of CVA  -[facility] neurology: Dr. Hull last seen July 28, 2022  -Hospitalized November 20, 2021:  CTA head and neck with contrast  -November 20, 2021: No flow-limiting stenosis or arterial occlusion in the neck.  Moderate atheromatous narrowing along the supraclinoid segment of the right distal ICA.  No intracranial large vessel occlusion.     MRI brain without contrast  -November 20, 2021: Acute posterior left internal capsule lacunar infarct.      Coronary artery disease  - [facility] cardiology.  Last seen 2019  2D echocardiogram  -November 20, 2021: Ejection fraction 55 to 60%.  Mild MR.  Mild concentric left ventricular hypertrophy.     Nuclear stress test  -September 10, 2019: No areas of ischemia or infarction.     Heart catheterization  -June 11, 2018: Two-vessel coronary artery disease status post PCI to the mid LAD and balloon angioplasty for in-stent restenosis of the distal LAD.      COPD, stage I Gold classification  - [facility] pulmonology: Dr. Wyatt Huang: Last seen May 13, 2019  Pulmonary Functions Testing Results:  FEV1   Date Value Ref Range Status   05/13/2019 85% liters Final   FVC   Date Value Ref Range Status   05/13/2019 87% liters Final   FEV1/FVC   Date Value Ref Range Status   05/13/2019 74.47% % Final   DLCO   Date Value Ref Range Status   05/13/2019 84% ml/mmHg sec Final       Pulmonary nodule  -CT chest without contrast  - -September 18, 2023: Right upper lobe 4 mm nodule.  - -November 2, 2018: Right upper lobe: 4 mm nodule           pthx

## 2025-06-21 LAB
BARLEY IGE QN: <0.1 KU/L
BEEF IGE QN: <0.1 KU/L
BELL PEPPER IGE QN: <0.1 KU/L
CABBAGE IGE QN: <0.1 KU/L
CARROT IGE QN: <0.1 KU/L
CHICKEN SERUM PROT IGE QN: <0.1 KU/L
CODFISH IGE QN: <0.1 KU/L
CORN IGE QN: <0.1 KU/L
COW MILK IGE QN: <0.1 KU/L
CRAB IGE QN: <0.1 KU/L
DEPRECATED MISC ALLERGEN IGE RAST QL: NORMAL
EGG WHITE IGE QN: <0.1 KU/L
GRAPE IGE QN: <0.1 KU/L
LETTUCE IGE QN: <0.1 KU/L
OAT IGE QN: <0.1 KU/L
ORANGE IGE QN: <0.1 KU/L
PEANUT IGE QN: <0.1 KU/L
PORK IGE QN: <0.1 KU/L
POTATO IGE QN: <0.1 KU/L
RICE IGE QN: <0.1 KU/L
RYE IGE QN: <0.1 KU/L
SHRIMP IGE QN: <0.1 KU/L
SOYBEAN IGE QN: <0.1 KU/L
TOMATO IGE QN: <0.1 KU/L
TUNA IGE QN: <0.1 KU/L
WHEAT IGE QN: <0.1 KU/L
WHITE BEAN IGE QN: <0.1 KU/L

## 2025-06-23 ENCOUNTER — RESULTS FOLLOW-UP (OUTPATIENT)
Dept: PRIMARY CARE CLINIC | Age: 85
End: 2025-06-23

## 2025-07-22 ENCOUNTER — OFFICE VISIT (OUTPATIENT)
Dept: UROLOGY | Age: 85
End: 2025-07-22
Payer: MEDICARE

## 2025-07-22 VITALS — BODY MASS INDEX: 28 KG/M2 | HEIGHT: 64 IN | WEIGHT: 164 LBS | TEMPERATURE: 97.2 F

## 2025-07-22 DIAGNOSIS — N32.81 OAB (OVERACTIVE BLADDER): Primary | ICD-10-CM

## 2025-07-22 DIAGNOSIS — N39.46 MIXED STRESS AND URGE URINARY INCONTINENCE: ICD-10-CM

## 2025-07-22 LAB
APPEARANCE FLUID: CLEAR
BILIRUBIN, POC: NORMAL
BLOOD URINE, POC: NORMAL
CLARITY, POC: CLEAR
COLOR, POC: YELLOW
GLUCOSE URINE, POC: NORMAL MG/DL
KETONES, POC: NORMAL MG/DL
LEUKOCYTE EST, POC: NORMAL
NITRITE, POC: NORMAL
PH, POC: 5
PROTEIN, POC: NORMAL MG/DL
SPECIFIC GRAVITY, POC: 1.02
UROBILINOGEN, POC: 0.2 MG/DL

## 2025-07-22 PROCEDURE — G8417 CALC BMI ABV UP PARAM F/U: HCPCS | Performed by: NURSE PRACTITIONER

## 2025-07-22 PROCEDURE — 1159F MED LIST DOCD IN RCRD: CPT | Performed by: NURSE PRACTITIONER

## 2025-07-22 PROCEDURE — 1123F ACP DISCUSS/DSCN MKR DOCD: CPT | Performed by: NURSE PRACTITIONER

## 2025-07-22 PROCEDURE — G8399 PT W/DXA RESULTS DOCUMENT: HCPCS | Performed by: NURSE PRACTITIONER

## 2025-07-22 PROCEDURE — 99213 OFFICE O/P EST LOW 20 MIN: CPT | Performed by: NURSE PRACTITIONER

## 2025-07-22 PROCEDURE — 1036F TOBACCO NON-USER: CPT | Performed by: NURSE PRACTITIONER

## 2025-07-22 PROCEDURE — 0509F URINE INCON PLAN DOCD: CPT | Performed by: NURSE PRACTITIONER

## 2025-07-22 PROCEDURE — 81002 URINALYSIS NONAUTO W/O SCOPE: CPT | Performed by: NURSE PRACTITIONER

## 2025-07-22 PROCEDURE — 1090F PRES/ABSN URINE INCON ASSESS: CPT | Performed by: NURSE PRACTITIONER

## 2025-07-22 PROCEDURE — 51798 US URINE CAPACITY MEASURE: CPT | Performed by: NURSE PRACTITIONER

## 2025-07-22 PROCEDURE — G8427 DOCREV CUR MEDS BY ELIG CLIN: HCPCS | Performed by: NURSE PRACTITIONER

## 2025-07-22 ASSESSMENT — ENCOUNTER SYMPTOMS
NAUSEA: 0
VOMITING: 0
ABDOMINAL DISTENTION: 0
ABDOMINAL PAIN: 0
BACK PAIN: 0

## 2025-07-22 NOTE — PROGRESS NOTES
Sherice Henley is a 84 y.o. female who presents today   Chief Complaint   Patient presents with    Follow-up     I am here for my OAB follow up       Patient presents for follow-up of lower urinary tract symptoms. Original complaints were mostly of mixed stress and urge incontinence.  She had been maintained on Gemtesa, however, her insurance denied the medication.  She is not a good candidate for anticholinergics and cannot utilize Myrbetriq.  Urination has unchanged. She has prior history of CVA.  She was placed on Toviaz however she discontinued this medication a few weeks after starting it as her incontinence issue has improved. She states she believes she has learned to \"manage the problem and is no longer having accidents\".  Overall she is emptying her bladder today and is not having any accidents.  She still wears a pull-up for security primarily.      Past Medical History:   Diagnosis Date    Acute CVA (cerebrovascular accident) ([facility]) 11/20/2021    Arthritis     Psoriatic    Asthma     CAD (coronary artery disease)     CHF (congestive heart failure) ([facility])     COPD (chronic obstructive pulmonary disease) ([facility])     DM (diabetes mellitus) ([facility])     GERD (gastroesophageal reflux disease)     HTN (hypertension)     Hyperlipidemia     MI (myocardial infarction) ([facility])     x2    Thyroid disease        Past Surgical History:   Procedure Laterality Date    CATARACT REMOVAL      CHOLECYSTECTOMY      CORONARY ANGIOPLASTY  06/2018    Angioplasty to in-stent restenosis to the distal LAD    HIP SURGERY      HYSTERECTOMY (CERVIX STATUS UNKNOWN)      JOINT REPLACEMENT Right     Right knee replacement    OVARY REMOVAL         Current Outpatient Medications   Medication Sig Dispense Refill    cycloSPORINE (RESTASIS) 0.05 % ophthalmic emulsion INSTILL 1 DROP INTO BOTH   EYES 2 TIMES DAILY AS      DIRECTED      nitroGLYCERIN (NITROSTAT) 0.4 MG SL tablet Place 1 tablet under the tongue every 5 minutes as needed for Chest pain up

## 2025-08-20 ENCOUNTER — TELEPHONE (OUTPATIENT)
Age: 85
End: 2025-08-20
Payer: MEDICARE

## 2025-08-21 ENCOUNTER — OFFICE VISIT (OUTPATIENT)
Age: 85
End: 2025-08-21
Payer: MEDICARE

## 2025-08-21 VITALS
HEART RATE: 81 BPM | WEIGHT: 165 LBS | OXYGEN SATURATION: 98 % | HEIGHT: 66 IN | SYSTOLIC BLOOD PRESSURE: 110 MMHG | DIASTOLIC BLOOD PRESSURE: 78 MMHG | BODY MASS INDEX: 26.52 KG/M2

## 2025-08-21 DIAGNOSIS — E11.42 TYPE 2 DIABETES MELLITUS WITH DIABETIC POLYNEUROPATHY, WITH LONG-TERM CURRENT USE OF INSULIN: ICD-10-CM

## 2025-08-21 DIAGNOSIS — N18.30 STAGE 3 CHRONIC KIDNEY DISEASE, UNSPECIFIED WHETHER STAGE 3A OR 3B CKD: ICD-10-CM

## 2025-08-21 DIAGNOSIS — R26.9 GAIT ABNORMALITY: ICD-10-CM

## 2025-08-21 DIAGNOSIS — G81.90 HEMIPLEGIA AFFECTING DOMINANT SIDE: ICD-10-CM

## 2025-08-21 DIAGNOSIS — Z79.4 TYPE 2 DIABETES MELLITUS WITH DIABETIC POLYNEUROPATHY, WITH LONG-TERM CURRENT USE OF INSULIN: ICD-10-CM

## 2025-08-21 DIAGNOSIS — B35.1 ONYCHOMYCOSIS: Primary | ICD-10-CM

## 2025-08-21 RX ORDER — CYCLOSPORINE 0.5 MG/ML
1 EMULSION OPHTHALMIC 2 TIMES DAILY
COMMUNITY